# Patient Record
Sex: FEMALE | Race: WHITE | NOT HISPANIC OR LATINO | Employment: FULL TIME | URBAN - METROPOLITAN AREA
[De-identification: names, ages, dates, MRNs, and addresses within clinical notes are randomized per-mention and may not be internally consistent; named-entity substitution may affect disease eponyms.]

---

## 2017-11-29 ENCOUNTER — HOSPITAL ENCOUNTER (OUTPATIENT)
Dept: OTHER | Facility: HOSPITAL | Age: 52
Discharge: HOME | End: 2017-11-29
Attending: FAMILY MEDICINE | Admitting: FAMILY MEDICINE

## 2018-05-10 ENCOUNTER — HOSPITAL ENCOUNTER (OUTPATIENT)
Dept: SLEEP MEDICINE | Facility: HOSPITAL | Age: 53
Discharge: HOME | End: 2018-05-10
Attending: PSYCHIATRY & NEUROLOGY | Admitting: PSYCHIATRY & NEUROLOGY

## 2018-05-29 ENCOUNTER — HOSPITAL ENCOUNTER (OUTPATIENT)
Dept: SLEEP MEDICINE | Facility: HOSPITAL | Age: 53
Discharge: HOME | End: 2018-05-29
Attending: PSYCHIATRY & NEUROLOGY | Admitting: PSYCHIATRY & NEUROLOGY

## 2019-05-09 ENCOUNTER — CONVERSION ENCOUNTER (OUTPATIENT)
Dept: FAMILY MEDICINE CLINIC | Facility: CLINIC | Age: 54
End: 2019-05-09

## 2019-05-09 LAB
GLUCOSE BLD-MCNC: 176 MG/DL
HBA1C MFR BLD: 9 %

## 2019-06-13 VITALS
SYSTOLIC BLOOD PRESSURE: 144 MMHG | BODY MASS INDEX: 42.35 KG/M2 | HEART RATE: 95 BPM | HEIGHT: 63 IN | DIASTOLIC BLOOD PRESSURE: 94 MMHG | OXYGEN SATURATION: 95 % | WEIGHT: 239 LBS | RESPIRATION RATE: 17 BRPM

## 2019-07-31 ENCOUNTER — OFFICE VISIT (OUTPATIENT)
Dept: NEUROLOGY | Facility: CLINIC | Age: 54
End: 2019-07-31

## 2019-07-31 ENCOUNTER — TELEPHONE (OUTPATIENT)
Dept: NEUROLOGY | Facility: CLINIC | Age: 54
End: 2019-07-31

## 2019-07-31 VITALS
SYSTOLIC BLOOD PRESSURE: 152 MMHG | WEIGHT: 234.8 LBS | DIASTOLIC BLOOD PRESSURE: 74 MMHG | HEART RATE: 74 BPM | HEIGHT: 64 IN | BODY MASS INDEX: 40.08 KG/M2

## 2019-07-31 DIAGNOSIS — G47.33 OBSTRUCTIVE SLEEP APNEA SYNDROME: Primary | ICD-10-CM

## 2019-07-31 DIAGNOSIS — G47.33 OBSTRUCTIVE SLEEP APNEA: Primary | ICD-10-CM

## 2019-07-31 DIAGNOSIS — E66.01 CLASS 3 SEVERE OBESITY DUE TO EXCESS CALORIES WITHOUT SERIOUS COMORBIDITY WITH BODY MASS INDEX (BMI) OF 40.0 TO 44.9 IN ADULT (HCC): ICD-10-CM

## 2019-07-31 PROBLEM — E78.5 HYPERLIPIDEMIA: Status: ACTIVE | Noted: 2018-05-07

## 2019-07-31 PROBLEM — I10 HYPERTENSION, ESSENTIAL: Status: ACTIVE | Noted: 2019-07-31

## 2019-07-31 PROBLEM — H91.93 DECREASED HEARING OF BOTH EARS: Status: ACTIVE | Noted: 2019-07-31

## 2019-07-31 PROBLEM — E11.65 TYPE 2 DIABETES MELLITUS WITH HYPERGLYCEMIA, WITHOUT LONG-TERM CURRENT USE OF INSULIN (HCC): Status: ACTIVE | Noted: 2018-05-07

## 2019-07-31 PROBLEM — E66.813 CLASS 3 SEVERE OBESITY DUE TO EXCESS CALORIES WITHOUT SERIOUS COMORBIDITY WITH BODY MASS INDEX (BMI) OF 40.0 TO 44.9 IN ADULT: Status: ACTIVE | Noted: 2019-07-31

## 2019-07-31 PROBLEM — K20.90 ESOPHAGITIS: Status: ACTIVE | Noted: 2018-05-07

## 2019-07-31 PROBLEM — M21.611 BUNION, RIGHT: Status: ACTIVE | Noted: 2019-07-31

## 2019-07-31 PROBLEM — N95.1 MENOPAUSAL SYNDROME: Status: ACTIVE | Noted: 2019-07-31

## 2019-07-31 PROCEDURE — 99213 OFFICE O/P EST LOW 20 MIN: CPT | Performed by: PSYCHIATRY & NEUROLOGY

## 2019-07-31 RX ORDER — SIMVASTATIN 20 MG
TABLET ORAL DAILY
COMMUNITY
Start: 2019-05-30 | End: 2020-03-23 | Stop reason: SDUPTHER

## 2019-07-31 RX ORDER — LISINOPRIL 5 MG/1
TABLET ORAL DAILY
COMMUNITY
Start: 2019-05-09 | End: 2020-03-23 | Stop reason: SDUPTHER

## 2019-07-31 RX ORDER — ALPRAZOLAM 0.5 MG/1
TABLET ORAL DAILY PRN
Refills: 2 | COMMUNITY
Start: 2019-05-09 | End: 2022-06-03 | Stop reason: SDUPTHER

## 2019-07-31 RX ORDER — LANCETS
EACH MISCELLANEOUS
COMMUNITY
Start: 2018-01-04 | End: 2021-06-10

## 2019-07-31 RX ORDER — GLIPIZIDE 10 MG/1
TABLET, FILM COATED, EXTENDED RELEASE ORAL DAILY
COMMUNITY
Start: 2019-05-09 | End: 2019-08-16

## 2019-07-31 RX ORDER — METFORMIN HYDROCHLORIDE 500 MG/1
TABLET, EXTENDED RELEASE ORAL DAILY
COMMUNITY
Start: 2018-07-27 | End: 2019-08-06 | Stop reason: SDUPTHER

## 2019-07-31 RX ORDER — OMEPRAZOLE 40 MG/1
CAPSULE, DELAYED RELEASE ORAL
COMMUNITY
Start: 2019-06-01 | End: 2019-08-06 | Stop reason: SDUPTHER

## 2019-08-09 RX ORDER — METFORMIN HYDROCHLORIDE 500 MG/1
TABLET, EXTENDED RELEASE ORAL
Qty: 180 TABLET | Refills: 2 | Status: SHIPPED | OUTPATIENT
Start: 2019-08-09 | End: 2020-03-23 | Stop reason: SDUPTHER

## 2019-08-09 RX ORDER — OMEPRAZOLE 40 MG/1
CAPSULE, DELAYED RELEASE ORAL
Qty: 90 CAPSULE | Refills: 2 | Status: SHIPPED | OUTPATIENT
Start: 2019-08-09 | End: 2020-03-23 | Stop reason: SDUPTHER

## 2019-08-16 ENCOUNTER — OFFICE VISIT (OUTPATIENT)
Dept: FAMILY MEDICINE CLINIC | Facility: CLINIC | Age: 54
End: 2019-08-16

## 2019-08-16 VITALS
RESPIRATION RATE: 16 BRPM | HEIGHT: 64 IN | OXYGEN SATURATION: 96 % | TEMPERATURE: 98.2 F | WEIGHT: 237.2 LBS | DIASTOLIC BLOOD PRESSURE: 88 MMHG | BODY MASS INDEX: 40.49 KG/M2 | SYSTOLIC BLOOD PRESSURE: 128 MMHG | HEART RATE: 94 BPM

## 2019-08-16 DIAGNOSIS — E78.1 PURE HYPERGLYCERIDEMIA: ICD-10-CM

## 2019-08-16 DIAGNOSIS — I10 HYPERTENSION, ESSENTIAL: ICD-10-CM

## 2019-08-16 DIAGNOSIS — E11.65 TYPE 2 DIABETES MELLITUS WITH HYPERGLYCEMIA, WITHOUT LONG-TERM CURRENT USE OF INSULIN (HCC): Primary | ICD-10-CM

## 2019-08-16 LAB
BILIRUB BLD-MCNC: NEGATIVE MG/DL
CLARITY, POC: CLEAR
COLOR UR: YELLOW
GLUCOSE BLDC GLUCOMTR-MCNC: 241 MG/DL (ref 70–130)
GLUCOSE UR STRIP-MCNC: ABNORMAL MG/DL
HBA1C MFR BLD: 10.1 %
KETONES UR QL: NEGATIVE
LEUKOCYTE EST, POC: NEGATIVE
NITRITE UR-MCNC: NEGATIVE MG/ML
PH UR: 5 [PH] (ref 5–8)
PROT UR STRIP-MCNC: ABNORMAL MG/DL
RBC # UR STRIP: ABNORMAL /UL
SP GR UR: 1.02 (ref 1–1.03)
UROBILINOGEN UR QL: NORMAL

## 2019-08-16 PROCEDURE — 82962 GLUCOSE BLOOD TEST: CPT | Performed by: FAMILY MEDICINE

## 2019-08-16 PROCEDURE — 99214 OFFICE O/P EST MOD 30 MIN: CPT | Performed by: FAMILY MEDICINE

## 2019-08-16 PROCEDURE — 81002 URINALYSIS NONAUTO W/O SCOPE: CPT | Performed by: FAMILY MEDICINE

## 2019-08-16 PROCEDURE — 83036 HEMOGLOBIN GLYCOSYLATED A1C: CPT | Performed by: FAMILY MEDICINE

## 2019-08-16 RX ORDER — GLIPIZIDE 10 MG/1
10 TABLET, FILM COATED, EXTENDED RELEASE ORAL DAILY
Qty: 90 TABLET | Refills: 3
Start: 2019-08-16 | End: 2020-03-23

## 2019-08-16 RX ORDER — PIOGLITAZONEHYDROCHLORIDE 15 MG/1
15 TABLET ORAL DAILY
Qty: 90 TABLET | Refills: 3 | Status: SHIPPED | OUTPATIENT
Start: 2019-08-16 | End: 2020-03-23 | Stop reason: SDUPTHER

## 2019-08-16 RX ORDER — GLIPIZIDE 10 MG/1
20 TABLET, FILM COATED, EXTENDED RELEASE ORAL DAILY
Status: SHIPPED | COMMUNITY
Start: 2019-08-16 | End: 2019-08-16

## 2020-03-23 ENCOUNTER — TELEPHONE (OUTPATIENT)
Dept: FAMILY MEDICINE CLINIC | Facility: CLINIC | Age: 55
End: 2020-03-23

## 2020-03-23 ENCOUNTER — RESULTS ENCOUNTER (OUTPATIENT)
Dept: FAMILY MEDICINE CLINIC | Facility: CLINIC | Age: 55
End: 2020-03-23

## 2020-03-23 ENCOUNTER — OFFICE VISIT (OUTPATIENT)
Dept: FAMILY MEDICINE CLINIC | Facility: CLINIC | Age: 55
End: 2020-03-23

## 2020-03-23 VITALS
DIASTOLIC BLOOD PRESSURE: 84 MMHG | HEART RATE: 85 BPM | HEIGHT: 64 IN | TEMPERATURE: 97.7 F | SYSTOLIC BLOOD PRESSURE: 142 MMHG | OXYGEN SATURATION: 97 % | BODY MASS INDEX: 42.03 KG/M2 | WEIGHT: 246.2 LBS | RESPIRATION RATE: 16 BRPM

## 2020-03-23 DIAGNOSIS — Z12.31 ENCOUNTER FOR SCREENING MAMMOGRAM FOR MALIGNANT NEOPLASM OF BREAST: ICD-10-CM

## 2020-03-23 DIAGNOSIS — Z12.11 SCREEN FOR COLON CANCER: ICD-10-CM

## 2020-03-23 DIAGNOSIS — Z00.00 ANNUAL PHYSICAL EXAM: Primary | ICD-10-CM

## 2020-03-23 DIAGNOSIS — K20.90 ESOPHAGITIS: ICD-10-CM

## 2020-03-23 DIAGNOSIS — I10 HYPERTENSION, ESSENTIAL: ICD-10-CM

## 2020-03-23 DIAGNOSIS — E78.1 PURE HYPERTRIGLYCERIDEMIA: ICD-10-CM

## 2020-03-23 DIAGNOSIS — E11.65 TYPE 2 DIABETES MELLITUS WITH HYPERGLYCEMIA, WITHOUT LONG-TERM CURRENT USE OF INSULIN (HCC): ICD-10-CM

## 2020-03-23 LAB
BILIRUB BLD-MCNC: NEGATIVE MG/DL
CLARITY, POC: CLEAR
COLOR UR: YELLOW
GLUCOSE BLDC GLUCOMTR-MCNC: 298 MG/DL (ref 70–130)
GLUCOSE UR STRIP-MCNC: ABNORMAL MG/DL
HBA1C MFR BLD: 10.7 %
KETONES UR QL: NEGATIVE
LEUKOCYTE EST, POC: NEGATIVE
NITRITE UR-MCNC: NEGATIVE MG/ML
PH UR: 5 [PH] (ref 5–8)
POC MICROALBUMIN URINE: 20
PROT UR STRIP-MCNC: NEGATIVE MG/DL
RBC # UR STRIP: NEGATIVE /UL
SP GR UR: 1.02 (ref 1–1.03)
UROBILINOGEN UR QL: NORMAL

## 2020-03-23 PROCEDURE — 83036 HEMOGLOBIN GLYCOSYLATED A1C: CPT | Performed by: FAMILY MEDICINE

## 2020-03-23 PROCEDURE — 99396 PREV VISIT EST AGE 40-64: CPT | Performed by: FAMILY MEDICINE

## 2020-03-23 PROCEDURE — 99214 OFFICE O/P EST MOD 30 MIN: CPT | Performed by: FAMILY MEDICINE

## 2020-03-23 PROCEDURE — 82962 GLUCOSE BLOOD TEST: CPT | Performed by: FAMILY MEDICINE

## 2020-03-23 PROCEDURE — 82044 UR ALBUMIN SEMIQUANTITATIVE: CPT | Performed by: FAMILY MEDICINE

## 2020-03-23 PROCEDURE — 81003 URINALYSIS AUTO W/O SCOPE: CPT | Performed by: FAMILY MEDICINE

## 2020-03-23 RX ORDER — OMEPRAZOLE 40 MG/1
40 CAPSULE, DELAYED RELEASE ORAL DAILY
Qty: 90 CAPSULE | Refills: 3 | Status: SHIPPED | OUTPATIENT
Start: 2020-03-23 | End: 2021-03-08

## 2020-03-23 RX ORDER — METFORMIN HYDROCHLORIDE 500 MG/1
1000 TABLET, EXTENDED RELEASE ORAL
Qty: 180 TABLET | Refills: 3 | Status: SHIPPED | OUTPATIENT
Start: 2020-03-23 | End: 2021-03-08

## 2020-03-23 RX ORDER — SIMVASTATIN 20 MG
20 TABLET ORAL NIGHTLY
Qty: 90 TABLET | Refills: 3 | Status: SHIPPED | OUTPATIENT
Start: 2020-03-23 | End: 2021-03-08

## 2020-03-23 RX ORDER — PIOGLITAZONEHYDROCHLORIDE 45 MG/1
45 TABLET ORAL DAILY
Qty: 90 TABLET | Refills: 1 | Status: SHIPPED | OUTPATIENT
Start: 2020-03-23 | End: 2020-09-14

## 2020-03-23 RX ORDER — LISINOPRIL 5 MG/1
5 TABLET ORAL DAILY
Qty: 90 TABLET | Refills: 3 | Status: SHIPPED | OUTPATIENT
Start: 2020-03-23 | End: 2021-03-08

## 2020-03-23 RX ORDER — PIOGLITAZONEHYDROCHLORIDE 15 MG/1
15 TABLET ORAL DAILY
Qty: 90 TABLET | Refills: 3 | Status: SHIPPED | OUTPATIENT
Start: 2020-03-23 | End: 2020-03-23

## 2020-03-23 NOTE — TELEPHONE ENCOUNTER
I can increase the pioglitazone to 45 mg daily and since we're stopping the glipizide, she'll have to really restrict her diet.  I recommend she consider a very low carb or keto type diet for awhile.

## 2020-03-23 NOTE — TELEPHONE ENCOUNTER
Alania called back this morning after her appointment and she said she's sorry but the with the coupon card she's got, the trulicity will still be $450 per month and she can't afford that.  She wanted to know if there's something else to try?  Please advise.  Thank you.

## 2020-03-23 NOTE — TELEPHONE ENCOUNTER
I spoke to patient and she's agreeable to increase actos to 45mg daily. Could you please send new Rx to [facility] pharmacy? Thank you.

## 2020-03-24 LAB
ALBUMIN SERPL-MCNC: 4.5 G/DL (ref 3.8–4.9)
ALBUMIN/GLOB SERPL: 1.7 {RATIO} (ref 1.2–2.2)
ALP SERPL-CCNC: 115 IU/L (ref 39–117)
ALT SERPL-CCNC: 22 IU/L (ref 0–32)
AST SERPL-CCNC: 19 IU/L (ref 0–40)
BASOPHILS # BLD AUTO: 0.1 X10E3/UL (ref 0–0.2)
BASOPHILS NFR BLD AUTO: 1 %
BILIRUB SERPL-MCNC: 0.5 MG/DL (ref 0–1.2)
BUN SERPL-MCNC: 14 MG/DL (ref 6–24)
BUN/CREAT SERPL: 20 (ref 9–23)
CALCIUM SERPL-MCNC: 10 MG/DL (ref 8.7–10.2)
CHLORIDE SERPL-SCNC: 100 MMOL/L (ref 96–106)
CHOLEST SERPL-MCNC: 181 MG/DL (ref 100–199)
CO2 SERPL-SCNC: 22 MMOL/L (ref 20–29)
CREAT SERPL-MCNC: 0.7 MG/DL (ref 0.57–1)
EOSINOPHIL # BLD AUTO: 0.3 X10E3/UL (ref 0–0.4)
EOSINOPHIL NFR BLD AUTO: 3 %
ERYTHROCYTE [DISTWIDTH] IN BLOOD BY AUTOMATED COUNT: 13 % (ref 11.7–15.4)
GLOBULIN SER CALC-MCNC: 2.7 G/DL (ref 1.5–4.5)
GLUCOSE SERPL-MCNC: 293 MG/DL (ref 65–99)
HCT VFR BLD AUTO: 42.8 % (ref 34–46.6)
HDLC SERPL-MCNC: 37 MG/DL
HGB BLD-MCNC: 14 G/DL (ref 11.1–15.9)
IMM GRANULOCYTES # BLD AUTO: 0.1 X10E3/UL (ref 0–0.1)
IMM GRANULOCYTES NFR BLD AUTO: 1 %
LDLC SERPL CALC-MCNC: 85 MG/DL (ref 0–99)
LYMPHOCYTES # BLD AUTO: 4.2 X10E3/UL (ref 0.7–3.1)
LYMPHOCYTES NFR BLD AUTO: 41 %
MCH RBC QN AUTO: 28.4 PG (ref 26.6–33)
MCHC RBC AUTO-ENTMCNC: 32.7 G/DL (ref 31.5–35.7)
MCV RBC AUTO: 87 FL (ref 79–97)
MONOCYTES # BLD AUTO: 0.5 X10E3/UL (ref 0.1–0.9)
MONOCYTES NFR BLD AUTO: 5 %
NEUTROPHILS # BLD AUTO: 5 X10E3/UL (ref 1.4–7)
NEUTROPHILS NFR BLD AUTO: 49 %
PLATELET # BLD AUTO: 378 X10E3/UL (ref 150–450)
POTASSIUM SERPL-SCNC: 4.7 MMOL/L (ref 3.5–5.2)
PROT SERPL-MCNC: 7.2 G/DL (ref 6–8.5)
RBC # BLD AUTO: 4.93 X10E6/UL (ref 3.77–5.28)
SODIUM SERPL-SCNC: 138 MMOL/L (ref 134–144)
TRIGL SERPL-MCNC: 297 MG/DL (ref 0–149)
TSH SERPL DL<=0.005 MIU/L-ACNC: 3.73 UIU/ML (ref 0.45–4.5)
VLDLC SERPL CALC-MCNC: 59 MG/DL (ref 5–40)
WBC # BLD AUTO: 10.1 X10E3/UL (ref 3.4–10.8)

## 2020-04-01 NOTE — ASSESSMENT & PLAN NOTE
Lipid abnormalities are improving with treatment.  Nutritional counseling was provided. and Pharmacotherapy as ordered.  Lipids will be reassessed in 3 months.  Continue current medications.  Fasting labs ordered.

## 2020-04-01 NOTE — ASSESSMENT & PLAN NOTE
Diabetes is worsening.   Dietary recommendations for ADA diet.  Regular aerobic exercise.  Reminded to get yearly retinal exam.  Medication changes per orders.  Diabetes will be reassessed in 3 months.    Initially, she wanted to restart Trulicty, as she had good success with this in the past.  However, upon RX being sent to pharmacy, she called back that medication was too expensive.  Therefore, I will increase pioglitazone.  Patient only wanting generic medications at this time due to high deductible health insurance plan.  F/U in 3 mo.  If not improved, plan for referral to endocrinology.

## 2020-04-10 ENCOUNTER — TELEPHONE (OUTPATIENT)
Dept: FAMILY MEDICINE CLINIC | Facility: CLINIC | Age: 55
End: 2020-04-10

## 2020-04-10 NOTE — TELEPHONE ENCOUNTER
----- Message from Magali Rene MD sent at 4/6/2020 10:51 AM EDT -----  Please let patient know that her Cologuard testing for colon cancer screening is negative and good for 3 years of screening.  Next screening 2023.

## 2020-08-03 ENCOUNTER — TELEPHONE (OUTPATIENT)
Dept: FAMILY MEDICINE CLINIC | Facility: CLINIC | Age: 55
End: 2020-08-03

## 2020-08-03 NOTE — TELEPHONE ENCOUNTER
----- Message from Magali Rene MD sent at 8/1/2020  5:34 PM EDT -----  Regarding: Metformin  With the recent recall in Metformin, can you check to see if patient has been able to get this?   Thanks.

## 2020-08-03 NOTE — TELEPHONE ENCOUNTER
CHERRIE 08/03/2020 09:17am.  Pt returned call 08/-3/2020, advised she had Metformin filled 06/29/2020.

## 2020-08-04 ENCOUNTER — OFFICE VISIT (OUTPATIENT)
Dept: NEUROLOGY | Facility: CLINIC | Age: 55
End: 2020-08-04

## 2020-08-04 VITALS
HEART RATE: 81 BPM | SYSTOLIC BLOOD PRESSURE: 145 MMHG | DIASTOLIC BLOOD PRESSURE: 81 MMHG | HEIGHT: 64 IN | TEMPERATURE: 98 F | WEIGHT: 247.6 LBS | BODY MASS INDEX: 42.27 KG/M2

## 2020-08-04 DIAGNOSIS — G47.33 OBSTRUCTIVE SLEEP APNEA SYNDROME: Primary | ICD-10-CM

## 2020-08-04 PROCEDURE — 99213 OFFICE O/P EST LOW 20 MIN: CPT | Performed by: PSYCHIATRY & NEUROLOGY

## 2020-08-04 NOTE — PROGRESS NOTES
Sleep medicine follow-up visit    Alaina Hartman   1965  54 y.o. female   DATE OF SERVICE: 8/4/2020     PETE, yearly f/u for CPAP compliance, patient doing well with pap therapy. Patient uses a nasal mask and goes through [employer] for supplies.     SLEEP TESTING HISTORY:    On HST ,  patient had Severe obstructive sleep apnea syndrome with apnea-hypopnea index of 101.1 per sleep hour, minimum SpO2 of 62%    The compliance data reviewed and the patient is on CPAP therapy at 12-15 cm/H2O and compliance data indicates excellent compliance with 99.4% usage for more than 4 hours with an average usage of 7 hours 46 minutes. AHI down to 1.0 with CPAP therapy and mean CPAP pressure 12.5 cm of water.      The patient's hypersomnia also resolved with [place] Sleepiness Scale score of 0 with CPAP therapy.  The patient feels great and is benefiting from it and is compliant.     Obesity, BMI-43.1    Review of Systems   Constitutional: Negative for fatigue and fever.   HENT: Negative.    Eyes: Negative.    Respiratory: Positive for apnea. Negative for cough and wheezing.    Cardiovascular: Negative.    Gastrointestinal: Negative.    Endocrine: Negative.    Genitourinary: Negative.    Musculoskeletal: Negative.    Neurological: Negative.      I reviewed and addressed ROS entered by MA.    The following portions of the patient's history were reviewed and updated as appropriate: allergies, current medications, past family history, past medical history, past social history, past surgical history and problem list.      Family History   Problem Relation Age of Onset   • Breast cancer Mother    • Heart disease Mother    • Diabetes Brother    • Heart disease Brother        Past Medical History:   Diagnosis Date   • Diabetes mellitus (CMS/[facility])    • GERD (gastroesophageal reflux disease)    • Hyperlipidemia        Social History     Socioeconomic History   • Marital status:      Spouse name: Not on file   • Number of children:  Not on file   • Years of education: Not on file   • Highest education level: Not on file   Tobacco Use   • Smoking status: Former Smoker     Types: Cigarettes     Last attempt to quit: 2010     Years since quitting: 10.5   • Smokeless tobacco: Never Used   Substance and Sexual Activity   • Alcohol use: Yes     Frequency: Monthly or less     Drinks per session: 1 or 2   • Drug use: No   • Sexual activity: Defer         Current Outpatient Medications:   •  ACCU-CHEK FASTCLIX LANCETS misc, Accu-Chek FastClix Lancets Miscellaneous  1 (one) Misc Misc two times daily for 90 days  Quantity: 1  Refills: 4   Ordered: 4-Jan-2018  Leila Mayo  Start : 4-Jan-2018 Active Comments: Dx: E11.9, Disp: , Rfl:   •  ALPRAZolam (XANAX) 0.5 MG tablet, Daily As Needed., Disp: , Rfl: 2  •  Blood Glucose Monitoring Suppl (ACCU-CHEK YING) device, 1 Device Daily., Disp: , Rfl:   •  glucose blood (ACCU-CHEK YING PLUS) test strip, 1 strip., Disp: , Rfl:   •  lisinopril (PRINIVIL,ZESTRIL) 5 MG tablet, Take 1 tablet by mouth Daily., Disp: 90 tablet, Rfl: 3  •  metFORMIN ER (GLUCOPHAGE-XR) 500 MG 24 hr tablet, Take 2 tablets by mouth Daily With Dinner., Disp: 180 tablet, Rfl: 3  •  omeprazole (priLOSEC) 40 MG capsule, Take 1 capsule by mouth Daily., Disp: 90 capsule, Rfl: 3  •  pioglitazone (ACTOS) 45 MG tablet, Take 1 tablet by mouth Daily., Disp: 90 tablet, Rfl: 1  •  simvastatin (ZOCOR) 20 MG tablet, Take 1 tablet by mouth Every Night., Disp: 90 tablet, Rfl: 3    No Known Allergies     PHYSICAL EXAMINATION:  Vitals:    08/04/20 0856   BP: 145/81   Pulse: 81   Temp: 98 °F (36.7 °C)      Body mass index is 43.17 kg/m².       HEENT: Normal.      EXTREMITIES: No edema.     IMPRESSION:     Patient with obstructive sleep apnea syndrome with hypersomnia successfully treated with CPAP therapy and is compliant and benefiting from it.     Obesity, pt weight is up some    PLAN  1 continue cpap 12-15  2. Pt encouraged to lose weight  Fu in one  year    EPWORTH SLEEPINESS SCALE  Sitting and reading  0  WatchingTV  0  Sitting, inactive, in a public place  0  As a passenger in a car for 1 hour w/o a break  0  Lying down to rest in the afternoon  0  Sitting and talking to someone  0  Sitting quietly after a lunch  0  In a car, while stopped for traffic or a light  0  Total 0          RECOMMENDATIONS:   1. Continue present CPAP.   2. Follow up 1 year.       This document has been electronically signed by Joseph Seipel, MD on August 4, 2020 09:22

## 2020-08-05 ENCOUNTER — TELEPHONE (OUTPATIENT)
Dept: NEUROLOGY | Facility: CLINIC | Age: 55
End: 2020-08-05

## 2020-08-05 DIAGNOSIS — G47.33 OBSTRUCTIVE SLEEP APNEA SYNDROME: Primary | ICD-10-CM

## 2020-08-05 NOTE — TELEPHONE ENCOUNTER
----- Message from Joseph F Seipel, MD sent at 8/4/2020  9:12 AM EDT -----  nasal mask and goes through son[facility] for supplies

## 2020-08-26 ENCOUNTER — OFFICE VISIT (OUTPATIENT)
Dept: FAMILY MEDICINE CLINIC | Facility: CLINIC | Age: 55
End: 2020-08-26

## 2020-08-26 VITALS
TEMPERATURE: 97.7 F | WEIGHT: 244.6 LBS | DIASTOLIC BLOOD PRESSURE: 80 MMHG | OXYGEN SATURATION: 97 % | BODY MASS INDEX: 41.76 KG/M2 | HEART RATE: 107 BPM | HEIGHT: 64 IN | SYSTOLIC BLOOD PRESSURE: 122 MMHG

## 2020-08-26 DIAGNOSIS — G47.33 OBSTRUCTIVE SLEEP APNEA SYNDROME: ICD-10-CM

## 2020-08-26 DIAGNOSIS — I10 HYPERTENSION, ESSENTIAL: ICD-10-CM

## 2020-08-26 DIAGNOSIS — E66.01 CLASS 3 SEVERE OBESITY DUE TO EXCESS CALORIES WITHOUT SERIOUS COMORBIDITY WITH BODY MASS INDEX (BMI) OF 40.0 TO 44.9 IN ADULT (HCC): ICD-10-CM

## 2020-08-26 DIAGNOSIS — E78.1 PURE HYPERTRIGLYCERIDEMIA: ICD-10-CM

## 2020-08-26 DIAGNOSIS — E11.65 TYPE 2 DIABETES MELLITUS WITH HYPERGLYCEMIA, WITHOUT LONG-TERM CURRENT USE OF INSULIN (HCC): Primary | ICD-10-CM

## 2020-08-26 LAB — GLUCOSE BLDC GLUCOMTR-MCNC: 274 MG/DL (ref 70–130)

## 2020-08-26 PROCEDURE — 83036 HEMOGLOBIN GLYCOSYLATED A1C: CPT | Performed by: FAMILY MEDICINE

## 2020-08-26 PROCEDURE — 99214 OFFICE O/P EST MOD 30 MIN: CPT | Performed by: FAMILY MEDICINE

## 2020-08-26 PROCEDURE — 82962 GLUCOSE BLOOD TEST: CPT | Performed by: FAMILY MEDICINE

## 2020-08-26 RX ORDER — INSULIN ASPART 100 [IU]/ML
6 INJECTION, SUSPENSION SUBCUTANEOUS 2 TIMES DAILY WITH MEALS
Qty: 10 ML | Refills: 5 | Status: SHIPPED | OUTPATIENT
Start: 2020-08-26 | End: 2021-03-01

## 2020-08-26 RX ORDER — BLOOD SUGAR DIAGNOSTIC
1 STRIP MISCELLANEOUS 2 TIMES DAILY
Qty: 200 EACH | Refills: 5 | Status: SHIPPED | OUTPATIENT
Start: 2020-08-26 | End: 2020-10-30 | Stop reason: DRUGHIGH

## 2020-08-26 NOTE — PROGRESS NOTES
Chief Complaint   Patient presents with   • Diabetes   • Hyperlipidemia       Subjective   Alaina Hartman is a 54 y.o. female.     Diabetes   She presents for her follow-up diabetic visit. She has type 2 diabetes mellitus. There are no hypoglycemic associated symptoms. Pertinent negatives for hypoglycemia include no seizures. There are no diabetic associated symptoms. Pertinent negatives for diabetes include no chest pain, no polydipsia and no polyuria. There are no hypoglycemic complications. Symptoms are stable. Risk factors for coronary artery disease include diabetes mellitus, dyslipidemia, hypertension, obesity and post-menopausal. Current diabetic treatment includes oral agent (dual therapy). Compliance with diabetes treatment: could not tolerate glipizide (due to significant weight gain), Trulicity worked well but she could not afford. Her weight is stable. She is following a generally healthy diet. When asked about meal planning, she reported none. She has not had a previous visit with a dietitian. There is no change in her home blood glucose trend. An ACE inhibitor/angiotensin II receptor blocker is being taken. She does not see a podiatrist.  Hyperlipidemia   This is a chronic problem. The current episode started more than 1 year ago. Exacerbating diseases include diabetes and obesity. She has no history of hypothyroidism or liver disease. Pertinent negatives include no chest pain, myalgias or shortness of breath. Current antihyperlipidemic treatment includes statins. Risk factors for coronary artery disease include dyslipidemia, diabetes mellitus, obesity, hypertension and post-menopausal.   Hypertension   This is a chronic problem. The current episode started more than 1 year ago. The problem is unchanged. The problem is uncontrolled. Pertinent negatives include no chest pain, palpitations or shortness of breath. Current antihypertension treatment includes ACE inhibitors.      Patient has recently seen  Dr. Seipel for f/u on PETE on CPAP.      I have reviewed and updated her medications, medical history and problem list during today's office visit.       Past Medical History :  Active Ambulatory Problems     Diagnosis Date Noted   • BMI 40.0-44.9, adult (CMS/HCC) 07/31/2018   • Bunion, right 07/31/2019   • Decreased hearing of both ears 07/31/2019   • Esophagitis 05/07/2018   • Hyperlipidemia 05/07/2018   • Hypertension, essential 07/31/2019   • Menopausal syndrome 07/31/2019   • Obstructive sleep apnea syndrome 05/07/2018   • Type 2 diabetes mellitus with hyperglycemia, without long-term current use of insulin (CMS/HCC) 05/07/2018   • Class 3 severe obesity due to excess calories without serious comorbidity with body mass index (BMI) of 40.0 to 44.9 in adult (CMS/HCC) 07/31/2019     Resolved Ambulatory Problems     Diagnosis Date Noted   • No Resolved Ambulatory Problems     Past Medical History:   Diagnosis Date   • Diabetes mellitus (CMS/HCC)    • GERD (gastroesophageal reflux disease)        Medication List:    Current Outpatient Medications:   •  ALPRAZolam (XANAX) 0.5 MG tablet, Daily As Needed., Disp: , Rfl: 2  •  lisinopril (PRINIVIL,ZESTRIL) 5 MG tablet, Take 1 tablet by mouth Daily., Disp: 90 tablet, Rfl: 3  •  metFORMIN ER (GLUCOPHAGE-XR) 500 MG 24 hr tablet, Take 2 tablets by mouth Daily With Dinner., Disp: 180 tablet, Rfl: 3  •  omeprazole (priLOSEC) 40 MG capsule, Take 1 capsule by mouth Daily., Disp: 90 capsule, Rfl: 3  •  pioglitazone (ACTOS) 45 MG tablet, Take 1 tablet by mouth Daily., Disp: 90 tablet, Rfl: 1  •  simvastatin (ZOCOR) 20 MG tablet, Take 1 tablet by mouth Every Night., Disp: 90 tablet, Rfl: 3      No Known Allergies    Social History     Tobacco Use   • Smoking status: Former Smoker     Types: Cigarettes     Last attempt to quit: 2010     Years since quitting: 10.6   • Smokeless tobacco: Never Used   Substance Use Topics   • Alcohol use: Yes     Frequency: Monthly or less     Drinks  "per session: 1 or 2       Review of Systems   Constitutional: Negative for chills and fever.   HENT: Negative for ear pain and sore throat.    Eyes: Negative for double vision and visual disturbance.   Respiratory: Negative for cough and shortness of breath.    Cardiovascular: Negative for chest pain, palpitations and leg swelling.   Gastrointestinal: Negative for abdominal pain, blood in stool, constipation, diarrhea, nausea and vomiting.   Endocrine: Negative for polydipsia and polyuria.   Genitourinary: Negative for dysuria.   Musculoskeletal: Negative for arthralgias and myalgias.   Skin: Negative for rash.   Neurological: Negative for seizures, syncope, numbness and headache.   Psychiatric/Behavioral: Negative for behavioral problems and depressed mood.       I have reviewed and confirmed the accuracy of the ROS as documented by the MA/LPN/RN Magali Rene MD      Objective   Vitals:    08/26/20 0823   BP: 122/80   Pulse: 107   Temp: 97.7 °F (36.5 °C)   TempSrc: Oral   SpO2: 97%   Weight: 111 kg (244 lb 9.6 oz)   Height: 162.6 cm (64\")     Body mass index is 41.99 kg/m².    Physical Exam   Constitutional: She appears well-developed and well-nourished.   HENT:   Head: Normocephalic and atraumatic.   Right Ear: External ear normal.   Left Ear: External ear normal.   Mouth/Throat: Oropharynx is clear and moist.   Eyes: Pupils are equal, round, and reactive to light. Conjunctivae and EOM are normal. No scleral icterus.   Neck: Normal range of motion. Neck supple. No JVD present. No edema present. No thyromegaly present.   Cardiovascular: Normal rate, regular rhythm and normal heart sounds.   Pulmonary/Chest: Effort normal and breath sounds normal.   Musculoskeletal: Normal range of motion.   Neurological: She is alert. No cranial nerve deficit.   Skin: Skin is warm. No rash noted.   Psychiatric: She has a normal mood and affect. Her behavior is normal. Judgment and thought content normal.       Lab " Results   Component Value Date    HGBA1C 11.4 08/27/2020    HGBA1C 10.7 03/23/2020    HGBA1C 10.1 08/16/2019    HGBA1C 9.0 05/09/2019       Lab Results   Component Value Date    CHOL 139 08/22/2018    CHOL 143 02/19/2018    CHOL 218 (H) 11/29/2017     Lab Results   Component Value Date    TRIG 266 (H) 08/26/2020    TRIG 297 (H) 03/23/2020    TRIG 233 (H) 08/22/2018     Lab Results   Component Value Date    HDL 46 08/26/2020    HDL 37 (L) 03/23/2020    HDL 30 (L) 08/22/2018     Lab Results   Component Value Date     (H) 08/26/2020    LDL 85 03/23/2020    LDL 62 08/22/2018         Brief Urine Lab Results  (Last result in the past 365 days)      Color   Clarity   Blood   Leuk Est   Nitrite   Protein   CREAT   Urine HCG        03/23/20 0849 Yellow Clear Negative Negative Negative Negative                 Assessment/Plan     Diagnoses and all orders for this visit:    1. Type 2 diabetes mellitus with hyperglycemia, without long-term current use of insulin (CMS/[facility]) (Primary)  Assessment & Plan:  Diabetes is worsening.   Medication changes per orders.    Start Novolog 70/30.  Plan to start titration of insulin in 2 weeks if blood sugars not improving.      She declines referral to dietician.    Barrier to optimal blood sugar control includes most medications being cost prohibitive.  She prefers generics.  None of her current medications (metformin, pioglitazone) or past generic medications (glipizide) have been able to improve her blood sugars.  Insulin may be the most financially feasible option at this time.  Patient was instructed to try [facility] with a GoodRx coupon (coupon given in office).    Refer to endocrinology.   F/U with me in 3 mo if not able to see endocrinology before then.  F/U sooner if needed.    Orders:  -     POC Glucose  -     POC Glycosylated Hemoglobin (Hb A1C)  -     Cancel: POC Urinalysis Dipstick, Multipro  -     Comprehensive Metabolic Panel  -     CBC & Differential  -     Ambulatory  "Referral to Endocrinology  -     insulin aspart prot-insulin aspart (NovoLOG Mix 70/30) (70-30) 100 UNIT/ML injection; Inject 6 Units under the skin into the appropriate area as directed 2 (Two) Times a Day With Meals. Take 15 minutes before meals.  Dispense: 10 mL; Refill: 5  -     Insulin Syringe-Needle U-100 30G X 5/16\" 0.3 ML misc; 1 syringe 2 (two) times a day.  Dispense: 200 each; Refill: 5    2. Pure hypertriglyceridemia  Comments:  Fasting labs ordered.  Continue statin.  Orders:  -     Lipid Panel    3. Hypertension, essential  Comments:  Controlled.  Orders:  -     Comprehensive Metabolic Panel  -     CBC & Differential    4. Obstructive sleep apnea syndrome  Comments:  Now seeing Dr. Seipel.    5. Class 3 severe obesity due to excess calories without serious comorbidity with body mass index (BMI) of 40.0 to 44.9 in adult (CMS/[facility])  Comments:  Diet and weight loss encouraged.    Other orders  -     Cancel: Hemoglobin A1c      Return if symptoms worsen or fail to improve.     I wore protective equipment throughout this patient encounter to include mask. Hand hygiene was performed before donning protective equipment and after removal when leaving the room.          "

## 2020-08-27 LAB
ALBUMIN SERPL-MCNC: 4.5 G/DL (ref 3.8–4.9)
ALBUMIN/GLOB SERPL: 1.8 {RATIO} (ref 1.2–2.2)
ALP SERPL-CCNC: 114 IU/L (ref 39–117)
ALT SERPL-CCNC: 19 IU/L (ref 0–32)
AST SERPL-CCNC: 17 IU/L (ref 0–40)
BASOPHILS # BLD AUTO: 0.1 X10E3/UL (ref 0–0.2)
BASOPHILS NFR BLD AUTO: 1 %
BILIRUB SERPL-MCNC: 0.4 MG/DL (ref 0–1.2)
BUN SERPL-MCNC: 19 MG/DL (ref 6–24)
BUN/CREAT SERPL: 27 (ref 9–23)
CALCIUM SERPL-MCNC: 10.2 MG/DL (ref 8.7–10.2)
CHLORIDE SERPL-SCNC: 98 MMOL/L (ref 96–106)
CHOLEST SERPL-MCNC: 199 MG/DL (ref 100–199)
CO2 SERPL-SCNC: 26 MMOL/L (ref 20–29)
CREAT SERPL-MCNC: 0.7 MG/DL (ref 0.57–1)
EOSINOPHIL # BLD AUTO: 0.2 X10E3/UL (ref 0–0.4)
EOSINOPHIL NFR BLD AUTO: 2 %
ERYTHROCYTE [DISTWIDTH] IN BLOOD BY AUTOMATED COUNT: 13.8 % (ref 11.7–15.4)
GLOBULIN SER CALC-MCNC: 2.5 G/DL (ref 1.5–4.5)
GLUCOSE SERPL-MCNC: 276 MG/DL (ref 65–99)
HBA1C MFR BLD: 11.4 %
HCT VFR BLD AUTO: 40.4 % (ref 34–46.6)
HDLC SERPL-MCNC: 46 MG/DL
HGB BLD-MCNC: 12.9 G/DL (ref 11.1–15.9)
IMM GRANULOCYTES # BLD AUTO: 0.1 X10E3/UL (ref 0–0.1)
IMM GRANULOCYTES NFR BLD AUTO: 1 %
LDLC SERPL CALC-MCNC: 100 MG/DL (ref 0–99)
LYMPHOCYTES # BLD AUTO: 4.3 X10E3/UL (ref 0.7–3.1)
LYMPHOCYTES NFR BLD AUTO: 39 %
MCH RBC QN AUTO: 28.5 PG (ref 26.6–33)
MCHC RBC AUTO-ENTMCNC: 31.9 G/DL (ref 31.5–35.7)
MCV RBC AUTO: 89 FL (ref 79–97)
MONOCYTES # BLD AUTO: 0.5 X10E3/UL (ref 0.1–0.9)
MONOCYTES NFR BLD AUTO: 4 %
NEUTROPHILS # BLD AUTO: 5.8 X10E3/UL (ref 1.4–7)
NEUTROPHILS NFR BLD AUTO: 53 %
PLATELET # BLD AUTO: 364 X10E3/UL (ref 150–450)
POTASSIUM SERPL-SCNC: 4.9 MMOL/L (ref 3.5–5.2)
PROT SERPL-MCNC: 7 G/DL (ref 6–8.5)
RBC # BLD AUTO: 4.53 X10E6/UL (ref 3.77–5.28)
SODIUM SERPL-SCNC: 137 MMOL/L (ref 134–144)
TRIGL SERPL-MCNC: 266 MG/DL (ref 0–149)
VLDLC SERPL CALC-MCNC: 53 MG/DL (ref 5–40)
WBC # BLD AUTO: 11 X10E3/UL (ref 3.4–10.8)

## 2020-08-27 NOTE — ASSESSMENT & PLAN NOTE
Diabetes is worsening.   Medication changes per orders.    Start Novolog 70/30.  Plan to start titration of insulin in 2 weeks if blood sugars not improving.      She declines referral to dietician.    Barrier to optimal blood sugar control includes most medications being cost prohibitive.  She prefers generics.  None of her current medications (metformin, pioglitazone) or past generic medications (glipizide) have been able to improve her blood sugars.  Insulin may be the most financially feasible option at this time.  Patient was instructed to try [facility] with a GoodRx coupon (coupon given in office).    Refer to endocrinology.   F/U with me in 3 mo if not able to see endocrinology before then.  F/U sooner if needed.

## 2020-08-28 ENCOUNTER — TELEPHONE (OUTPATIENT)
Dept: FAMILY MEDICINE CLINIC | Facility: CLINIC | Age: 55
End: 2020-08-28

## 2020-09-03 DIAGNOSIS — E11.65 UNCONTROLLED TYPE 2 DIABETES MELLITUS WITH HYPERGLYCEMIA (HCC): Primary | ICD-10-CM

## 2020-09-14 DIAGNOSIS — E11.65 TYPE 2 DIABETES MELLITUS WITH HYPERGLYCEMIA, WITHOUT LONG-TERM CURRENT USE OF INSULIN (HCC): ICD-10-CM

## 2020-09-14 RX ORDER — PIOGLITAZONEHYDROCHLORIDE 45 MG/1
TABLET ORAL
Qty: 90 TABLET | Refills: 1 | Status: SHIPPED | OUTPATIENT
Start: 2020-09-14 | End: 2021-06-08

## 2020-10-30 ENCOUNTER — TELEPHONE (OUTPATIENT)
Dept: ENDOCRINOLOGY | Facility: CLINIC | Age: 55
End: 2020-10-30

## 2020-10-30 ENCOUNTER — OFFICE VISIT (OUTPATIENT)
Dept: ENDOCRINOLOGY | Facility: CLINIC | Age: 55
End: 2020-10-30

## 2020-10-30 DIAGNOSIS — E11.65 TYPE 2 DIABETES MELLITUS WITH HYPERGLYCEMIA, WITHOUT LONG-TERM CURRENT USE OF INSULIN (HCC): ICD-10-CM

## 2020-10-30 PROCEDURE — G0108 DIAB MANAGE TRN  PER INDIV: HCPCS | Performed by: DIETITIAN, REGISTERED

## 2020-10-30 NOTE — TELEPHONE ENCOUNTER
Pt attended DSME today and plans on seeing Dr. Cho in January '21.  Pt asked for her insulin syringes to be changed to a shorter needle.  Rx ready for MD signature.

## 2020-11-10 ENCOUNTER — OFFICE VISIT (OUTPATIENT)
Dept: ENDOCRINOLOGY | Facility: CLINIC | Age: 55
End: 2020-11-10

## 2020-11-10 DIAGNOSIS — E11.65 TYPE 2 DIABETES MELLITUS WITH HYPERGLYCEMIA, WITHOUT LONG-TERM CURRENT USE OF INSULIN (HCC): ICD-10-CM

## 2020-11-10 PROCEDURE — G0109 DIAB MANAGE TRN IND/GROUP: HCPCS | Performed by: DIETITIAN, REGISTERED

## 2020-11-17 ENCOUNTER — OFFICE VISIT (OUTPATIENT)
Dept: ENDOCRINOLOGY | Facility: CLINIC | Age: 55
End: 2020-11-17

## 2020-11-17 DIAGNOSIS — E11.65 TYPE 2 DIABETES MELLITUS WITH HYPERGLYCEMIA, WITHOUT LONG-TERM CURRENT USE OF INSULIN (HCC): ICD-10-CM

## 2020-11-17 PROCEDURE — G0109 DIAB MANAGE TRN IND/GROUP: HCPCS | Performed by: DIETITIAN, REGISTERED

## 2020-11-24 ENCOUNTER — OFFICE VISIT (OUTPATIENT)
Dept: ENDOCRINOLOGY | Facility: CLINIC | Age: 55
End: 2020-11-24

## 2020-11-24 DIAGNOSIS — E11.65 TYPE 2 DIABETES MELLITUS WITH HYPERGLYCEMIA, WITHOUT LONG-TERM CURRENT USE OF INSULIN (HCC): ICD-10-CM

## 2020-11-24 PROCEDURE — G0109 DIAB MANAGE TRN IND/GROUP: HCPCS | Performed by: DIETITIAN, REGISTERED

## 2020-11-25 ENCOUNTER — TELEPHONE (OUTPATIENT)
Dept: ENDOCRINOLOGY | Facility: CLINIC | Age: 55
End: 2020-11-25

## 2020-12-03 RX ORDER — BLOOD SUGAR DIAGNOSTIC
STRIP MISCELLANEOUS
Qty: 360 EACH | Refills: 3 | Status: SHIPPED | OUTPATIENT
Start: 2020-12-03 | End: 2022-02-21 | Stop reason: SDUPTHER

## 2021-02-28 DIAGNOSIS — E11.65 TYPE 2 DIABETES MELLITUS WITH HYPERGLYCEMIA, WITHOUT LONG-TERM CURRENT USE OF INSULIN (HCC): ICD-10-CM

## 2021-03-01 RX ORDER — INSULIN ASPART 100 [IU]/ML
INJECTION, SUSPENSION SUBCUTANEOUS
Qty: 10 ML | Refills: 5 | Status: SHIPPED | OUTPATIENT
Start: 2021-03-01 | End: 2021-09-17 | Stop reason: SDUPTHER

## 2021-03-08 DIAGNOSIS — E78.1 PURE HYPERTRIGLYCERIDEMIA: ICD-10-CM

## 2021-03-08 DIAGNOSIS — I10 HYPERTENSION, ESSENTIAL: ICD-10-CM

## 2021-03-08 DIAGNOSIS — K20.90 ESOPHAGITIS: ICD-10-CM

## 2021-03-08 DIAGNOSIS — E11.65 TYPE 2 DIABETES MELLITUS WITH HYPERGLYCEMIA, WITHOUT LONG-TERM CURRENT USE OF INSULIN (HCC): ICD-10-CM

## 2021-03-08 RX ORDER — SIMVASTATIN 20 MG
TABLET ORAL
Qty: 90 TABLET | Refills: 1 | Status: SHIPPED | OUTPATIENT
Start: 2021-03-08 | End: 2021-06-10

## 2021-03-08 RX ORDER — LISINOPRIL 5 MG/1
TABLET ORAL
Qty: 90 TABLET | Refills: 1 | Status: SHIPPED | OUTPATIENT
Start: 2021-03-08 | End: 2021-09-16

## 2021-03-08 RX ORDER — OMEPRAZOLE 40 MG/1
CAPSULE, DELAYED RELEASE ORAL
Qty: 90 CAPSULE | Refills: 1 | Status: SHIPPED | OUTPATIENT
Start: 2021-03-08 | End: 2021-12-28 | Stop reason: SDUPTHER

## 2021-03-08 RX ORDER — METFORMIN HYDROCHLORIDE 500 MG/1
TABLET, EXTENDED RELEASE ORAL
Qty: 180 TABLET | Refills: 1 | Status: SHIPPED | OUTPATIENT
Start: 2021-03-08 | End: 2021-09-16

## 2021-06-08 ENCOUNTER — LAB (OUTPATIENT)
Dept: LAB | Facility: HOSPITAL | Age: 56
End: 2021-06-08

## 2021-06-08 ENCOUNTER — OFFICE VISIT (OUTPATIENT)
Dept: ENDOCRINOLOGY | Facility: CLINIC | Age: 56
End: 2021-06-08

## 2021-06-08 VITALS
TEMPERATURE: 97.5 F | SYSTOLIC BLOOD PRESSURE: 125 MMHG | DIASTOLIC BLOOD PRESSURE: 80 MMHG | BODY MASS INDEX: 41.48 KG/M2 | HEART RATE: 83 BPM | OXYGEN SATURATION: 98 % | WEIGHT: 243 LBS | HEIGHT: 64 IN

## 2021-06-08 DIAGNOSIS — I10 HYPERTENSION, ESSENTIAL: ICD-10-CM

## 2021-06-08 DIAGNOSIS — E11.65 TYPE 2 DIABETES MELLITUS WITH HYPERGLYCEMIA, WITH LONG-TERM CURRENT USE OF INSULIN (HCC): Primary | ICD-10-CM

## 2021-06-08 DIAGNOSIS — Z79.4 TYPE 2 DIABETES MELLITUS WITH HYPERGLYCEMIA, WITH LONG-TERM CURRENT USE OF INSULIN (HCC): Primary | ICD-10-CM

## 2021-06-08 DIAGNOSIS — E78.2 MIXED HYPERLIPIDEMIA: ICD-10-CM

## 2021-06-08 DIAGNOSIS — E66.01 CLASS 3 SEVERE OBESITY DUE TO EXCESS CALORIES WITHOUT SERIOUS COMORBIDITY WITH BODY MASS INDEX (BMI) OF 40.0 TO 44.9 IN ADULT (HCC): ICD-10-CM

## 2021-06-08 DIAGNOSIS — Z79.4 TYPE 2 DIABETES MELLITUS WITH HYPERGLYCEMIA, WITH LONG-TERM CURRENT USE OF INSULIN (HCC): ICD-10-CM

## 2021-06-08 DIAGNOSIS — E11.65 TYPE 2 DIABETES MELLITUS WITH HYPERGLYCEMIA, WITH LONG-TERM CURRENT USE OF INSULIN (HCC): ICD-10-CM

## 2021-06-08 LAB
ALBUMIN SERPL-MCNC: 4.4 G/DL (ref 3.5–5.2)
ALBUMIN UR-MCNC: <1.2 MG/DL
ALBUMIN/GLOB SERPL: 1.6 G/DL
ALP SERPL-CCNC: 133 U/L (ref 39–117)
ALT SERPL W P-5'-P-CCNC: 33 U/L (ref 1–33)
ANION GAP SERPL CALCULATED.3IONS-SCNC: 12.4 MMOL/L (ref 5–15)
AST SERPL-CCNC: 24 U/L (ref 1–32)
BILIRUB SERPL-MCNC: 0.4 MG/DL (ref 0–1.2)
BUN SERPL-MCNC: 13 MG/DL (ref 6–20)
BUN/CREAT SERPL: 18.8 (ref 7–25)
CALCIUM SPEC-SCNC: 9.8 MG/DL (ref 8.6–10.5)
CHLORIDE SERPL-SCNC: 100 MMOL/L (ref 98–107)
CHOLEST SERPL-MCNC: 180 MG/DL (ref 0–200)
CO2 SERPL-SCNC: 25.6 MMOL/L (ref 22–29)
CREAT SERPL-MCNC: 0.69 MG/DL (ref 0.57–1)
CREAT UR-MCNC: 44.6 MG/DL
GFR SERPL CREATININE-BSD FRML MDRD: 89 ML/MIN/1.73
GLOBULIN UR ELPH-MCNC: 2.8 GM/DL
GLUCOSE BLDC GLUCOMTR-MCNC: 332 MG/DL (ref 70–105)
GLUCOSE SERPL-MCNC: 322 MG/DL (ref 65–99)
HBA1C MFR BLD: 12.7 % (ref 3.5–5.6)
HDLC SERPL-MCNC: 31 MG/DL (ref 40–60)
LDLC SERPL CALC-MCNC: 69 MG/DL (ref 0–100)
LDLC/HDLC SERPL: 1.48 {RATIO}
MICROALBUMIN/CREAT UR: NORMAL MG/G{CREAT}
POTASSIUM SERPL-SCNC: 4.8 MMOL/L (ref 3.5–5.2)
PROT SERPL-MCNC: 7.2 G/DL (ref 6–8.5)
SODIUM SERPL-SCNC: 138 MMOL/L (ref 136–145)
TRIGL SERPL-MCNC: 515 MG/DL (ref 0–150)
TSH SERPL DL<=0.05 MIU/L-ACNC: 2.43 UIU/ML (ref 0.27–4.2)
VLDLC SERPL-MCNC: 80 MG/DL (ref 5–40)

## 2021-06-08 PROCEDURE — 82962 GLUCOSE BLOOD TEST: CPT | Performed by: INTERNAL MEDICINE

## 2021-06-08 PROCEDURE — 82570 ASSAY OF URINE CREATININE: CPT

## 2021-06-08 PROCEDURE — 83036 HEMOGLOBIN GLYCOSYLATED A1C: CPT

## 2021-06-08 PROCEDURE — 80061 LIPID PANEL: CPT

## 2021-06-08 PROCEDURE — 99244 OFF/OP CNSLTJ NEW/EST MOD 40: CPT | Performed by: INTERNAL MEDICINE

## 2021-06-08 PROCEDURE — 80053 COMPREHEN METABOLIC PANEL: CPT

## 2021-06-08 PROCEDURE — 36415 COLL VENOUS BLD VENIPUNCTURE: CPT

## 2021-06-08 PROCEDURE — 82043 UR ALBUMIN QUANTITATIVE: CPT

## 2021-06-08 PROCEDURE — 84443 ASSAY THYROID STIM HORMONE: CPT

## 2021-06-08 NOTE — PATIENT INSTRUCTIONS
Please get your labs done today  Continue current medication  Continue to work on your diet and activity  Always keep glucose source in case of low blood sugar  Annual eye exam and flu vaccine

## 2021-06-08 NOTE — PROGRESS NOTES
Consultation requested by Dr. Rene for uncontrolled type 2 diabetes    Chief complaint: Uncontrolled type 2 diabetes    HPI: 55-year-old female who was diagnosed with diabetes about 1 to 2 years ago, also have hypertension and hyperlipidemia along with obesity is referred for diabetes consultation.  For type 2 diabetes: She did go through diabetes education at [facility], she checks her blood sugars 1-2 times per day and runs more than 200 most of the time.  She is currently using 70/30 insulin 20 units twice a day along with Metformin 500 twice a day.  She has tried pioglitazone in the past which she stopped due to swelling, glipizide caused palpitation and Jardiance did not help and she stopped both of them as well.  Is trying to work on diet the best she can.  She tells me that she did try Trulicity which worked very well but she could not afford it due to higher co-pay at that time.  But she is willing to try it again if needed.    Hypertension: Well-controlled    Hyperlipidemia: Currently on simvastatin.    Data reviewed:    Comprehensive Metabolic Panel (2020 09:18)   Lipid Panel (2020 09:18)   POC Glycosylated Hemoglobin (Hb A1C) (2020 09:00)       Past Medical History:   Diagnosis Date   • Diabetes mellitus (CMS/[facility])    • GERD (gastroesophageal reflux disease)    • Hiatal hernia    • Hyperlipidemia    • Type 2 diabetes mellitus (CMS/HCC)        Social History     Socioeconomic History   • Marital status:      Spouse name: Not on file   • Number of children: Not on file   • Years of education: Not on file   • Highest education level: Not on file   Tobacco Use   • Smoking status: Former Smoker     Types: Cigarettes     Quit date:      Years since quittin.4   • Smokeless tobacco: Never Used   Vaping Use   • Vaping Use: Never used   Substance and Sexual Activity   • Alcohol use: Yes     Comment: rare   • Drug use: No   • Sexual activity: Defer       Family History    Problem Relation Age of Onset   • Breast cancer Mother    • Heart disease Mother    • Thyroid disease Mother    • Stroke Mother    • Diabetes Brother    • Heart disease Brother    • Clotting disorder Father    • Cancer Paternal Grandmother        No Known Allergies    ROS:   Constitutional:  Denies fatigue, tiredness.    Eyes:  Denies change in visual acuity   HENT:  Denies nasal congestion or sore throat   Respiratory: denies cough, shortness of breath.   Cardiovascular:  denies chest pain, edema   GI:  Denies abdominal pain, nausea, vomiting.    :  Denies dysuria   Musculoskeletal:  Denies back pain or joint pain   Integument:  Denies dry skin, rash   Neurologic:  Denies headache, focal weakness or sensory changes   Endocrine:  Denies polyuria or polydipsia   Psychiatric:  Denies depression or anxiety      Vitals:    06/08/21 1330   BP: 125/80   Pulse: 83   Temp: 97.5 °F (36.4 °C)   SpO2: 98%     Body mass index is 42.37 kg/m².      Physical Exam:  GEN: NAD, conversant, obese  EYES: EOMI, PERRL, no conjunctival erythema  NECK: no thyromegaly, full ROM   CV: RRR, no murmurs/rubs/gallops, no peripheral edema  LUNG: CTAB, no wheezes/rales/ronchi  SKIN: no rashes, no acanthosis  MSK: no deformities, full ROM of all extremities  NEURO: no tremors, DTR normal  PSYCH: AOX3, appropriate mood, affect normal      Results Review:     I reviewed the patient's new clinical results.    Lab Results   Component Value Date    BUN 19 08/26/2020    CREATININE 0.70 08/26/2020    EGFRIFNONA 100 08/26/2020    EGFRIFAFRI 115 08/26/2020    BCR 27 (H) 08/26/2020    K 4.9 08/26/2020    CO2 26 08/26/2020    CALCIUM 10.2 08/26/2020    PROTENTOTREF 7.0 08/26/2020    ALBUMIN 4.5 08/26/2020    LABIL2 1.8 08/26/2020    AST 17 08/26/2020    ALT 19 08/26/2020    CHOL 139 08/22/2018    TRIG 266 (H) 08/26/2020    HDL 46 08/26/2020     (H) 08/26/2020     Lab Results   Component Value Date    HGBA1C 12.7 (H) 06/08/2021    HGBA1C 11.4  "08/27/2020    HGBA1C 10.7 03/23/2020     Lab Results   Component Value Date    CREATININE 0.70 08/26/2020     Lab Results   Component Value Date    TSH 3.730 03/23/2020       Medication Review: Reviewed.       Current Outpatient Medications:   •  ACCU-CHEK FASTCLIX LANCETS misc, Accu-Chek FastClix Lancets Miscellaneous  1 (one) Misc Misc two times daily for 90 days  Quantity: 1 {QS} Refills: 4   Ordered: 4-Jan-2018  Leila Mayo  Start : 4-Jan-2018 Active Comments: Dx: E11.9, Disp: , Rfl:   •  ALPRAZolam (XANAX) 0.5 MG tablet, Daily As Needed., Disp: , Rfl: 2  •  Blood Glucose Monitoring Suppl (ACCU-CHEK YING) device, 1 Device Daily., Disp: , Rfl:   •  glucose blood (Accu-Chek Ying Plus) test strip, Pt to check her blood sugars 4 times daily, Disp: 360 each, Rfl: 3  •  insulin aspart prot-insulin aspart (novoLOG 70/30) (70-30) 100 UNIT/ML injection, INJECT 6 UNITS UNDER THE SKIN IN THE APPROPRIATE AREA AS DIRCTED TWICE DAILY 15 MINUTES BEFORE MEALS. DISCARD AFTER 28 DAYS (Patient taking differently: 20 Units.), Disp: 10 mL, Rfl: 5  •  Insulin Syringe-Needle U-100 31G X 15/64\" 0.3 ML misc, Pt to use with insulin vials 2 times daily, Disp: 90 each, Rfl: 3  •  lisinopril (PRINIVIL,ZESTRIL) 5 MG tablet, Take 1 tablet by mouth once daily, Disp: 90 tablet, Rfl: 1  •  metFORMIN ER (GLUCOPHAGE-XR) 500 MG 24 hr tablet, TAKE 2 TABLETS BY MOUTH ONCE DAILY WITH SUPPER, Disp: 180 tablet, Rfl: 1  •  omeprazole (priLOSEC) 40 MG capsule, Take 1 capsule by mouth once daily, Disp: 90 capsule, Rfl: 1  •  simvastatin (ZOCOR) 20 MG tablet, TAKE 1 TABLET BY MOUTH ONCE EVERY NIGHT, Disp: 90 tablet, Rfl: 1    Assessment/Plan   1.  Diabetes mellitus type 2: Uncontrolled with hyperglycemia, at this time I will check A1c along with CMP before making any further recommendations.  She is advised to continue NovoLog Mix 70/30 insulin 20 units twice a day before meals along with Metformin 500 twice a day.  Continue to work on diet and activity " and always keep glucose source in case of low blood sugar.  Recommend annual eye exam and flu vaccine.  She already completed diabetes education.   2.  Hypertension: Well-controlled, continue current medication  3.  Hyperlipidemia: Currently on simvastatin, will follow lipid panel  4.  Obesity: She is trying to work on her diet and activity.           Chirag Cho MD FACE.

## 2021-06-08 NOTE — PROGRESS NOTES
Referred by Dr. Rene for uncontrolled diabetes consultation    Patient Care Team:  Magali Rene MD as PCP  General (Family Medicine)  Seipel, Joseph F MD as Consulting Physician (Sleep Medicine)     Chief Complaint: Uncontrolled type 2 diabetes    {CC  Problem List  Visit Diagnosis   Encounters  Notes  Medications  Labs  Result Review Imaging  Media     HPI: 55-year-old female who was diagnosed with diabetes about 1 to 2 years ago, also have hypertension and hyperlipidemia along with obesity is referred for diabetes consultation.  For type 2 diabetes: She did go through diabetes education at [facility], she checks her blood sugars 1-2 times per day and runs more than 200 most of the time.  She is currently using 70/30 insulin 20 units twice a day along with Metformin 500 twice a day.  She has tried pioglitazone in the past which she stopped due to swelling, glipizide caused palpitation and Jardiance did not help and she stopped both of them as well.  Is trying to work on diet the best she can.  She tells me that she did try Trulicity which worked very well but she could not afford it due to higher co-pay at that time.  But she is willing to try it again if needed.    Hypertension: Well-controlled    Hyperlipidemia: Currently on simvastatin.    Data reviewed:           Past Medical History:   Diagnosis Date   • Diabetes mellitus (CMS/[facility])    • GERD (gastroesophageal reflux disease)    • Hiatal hernia    • Hyperlipidemia    • Type 2 diabetes mellitus (CMS/HCC)        Social History     Socioeconomic History   • Marital status:      Spouse name: Not on file   • Number of children: Not on file   • Years of education: Not on file   • Highest education level: Not on file   Tobacco Use   • Smoking status: Former Smoker     Types: Cigarettes     Quit date:      Years since quittin.4   • Smokeless tobacco: Never Used   Vaping Use   • Vaping Use: Never used   Substance and  Sexual Activity   • Alcohol use: Yes     Comment: rare   • Drug use: No   • Sexual activity: Defer       Family History   Problem Relation Age of Onset   • Breast cancer Mother    • Heart disease Mother    • Thyroid disease Mother    • Stroke Mother    • Diabetes Brother    • Heart disease Brother    • Clotting disorder Father    • Cancer Paternal Grandmother        No Known Allergies    ROS:   Constitutional:  Denies fatigue, tiredness.    Eyes:  Denies change in visual acuity   HENT:  Denies nasal congestion or sore throat   Respiratory: denies cough, shortness of breath.   Cardiovascular:  denies chest pain, edema   GI:  Denies abdominal pain, nausea, vomiting.    :  Denies dysuria   Musculoskeletal:  Denies back pain or joint pain   Integument:  Denies dry skin, rash   Neurologic:  Denies headache, focal weakness or sensory changes   Endocrine:  Denies polyuria or polydipsia   Psychiatric:  Denies depression or anxiety      Vitals:    06/08/21 1330   BP: 125/80   Pulse: 83   Temp: 97.5 °F (36.4 °C)   SpO2: 98%     Body mass index is 42.37 kg/m².      Physical Exam:  GEN: NAD, conversant, obese  EYES: EOMI, PERRL, no conjunctival erythema  NECK: no thyromegaly, full ROM   CV: RRR, no murmurs/rubs/gallops, no peripheral edema  LUNG: CTAB, no wheezes/rales/ronchi  SKIN: no rashes, no acanthosis  MSK: no deformities, full ROM of all extremities  NEURO: no tremors, DTR normal  PSYCH: AOX3, appropriate mood, affect normal      Results Review:     I reviewed the patient's new clinical results.    Lab Results   Component Value Date    GLUCOSE 322 (H) 06/08/2021    BUN 13 06/08/2021    CREATININE 0.69 06/08/2021    EGFRIFNONA 89 06/08/2021    EGFRIFAFRI 115 08/26/2020    BCR 18.8 06/08/2021    K 4.8 06/08/2021    CO2 25.6 06/08/2021    CALCIUM 9.8 06/08/2021    PROTENTOTREF 7.0 08/26/2020    ALBUMIN 4.40 06/08/2021    LABIL2 1.8 08/26/2020    AST 24 06/08/2021    ALT 33 06/08/2021    CHOL 180 06/08/2021    TRIG 515 (H)  "06/08/2021    HDL 31 (L) 06/08/2021    LDL 69 06/08/2021     Lab Results   Component Value Date    HGBA1C 12.7 (H) 06/08/2021    HGBA1C 11.4 08/27/2020    HGBA1C 10.7 03/23/2020     Lab Results   Component Value Date    MICROALBUR <1.2 06/08/2021    CREATININE 0.69 06/08/2021     Lab Results   Component Value Date    TSH 2.430 06/08/2021       Medication Review: Reviewed.       Current Outpatient Medications:   •  ALPRAZolam (XANAX) 0.5 MG tablet, Daily As Needed., Disp: , Rfl: 2  •  Blood Glucose Monitoring Suppl (ACCU-CHEK YING) device, 1 Device Daily., Disp: , Rfl:   •  glucose blood (Accu-Chek Ying Plus) test strip, Pt to check her blood sugars 4 times daily, Disp: 360 each, Rfl: 3  •  insulin aspart prot-insulin aspart (novoLOG 70/30) (70-30) 100 UNIT/ML injection, INJECT 6 UNITS UNDER THE SKIN IN THE APPROPRIATE AREA AS DIRCTED TWICE DAILY 15 MINUTES BEFORE MEALS. DISCARD AFTER 28 DAYS (Patient taking differently: 20 Units.), Disp: 10 mL, Rfl: 5  •  Insulin Syringe-Needle U-100 31G X 15/64\" 0.3 ML misc, Pt to use with insulin vials 2 times daily, Disp: 90 each, Rfl: 3  •  lisinopril (PRINIVIL,ZESTRIL) 5 MG tablet, Take 1 tablet by mouth once daily, Disp: 90 tablet, Rfl: 1  •  metFORMIN ER (GLUCOPHAGE-XR) 500 MG 24 hr tablet, TAKE 2 TABLETS BY MOUTH ONCE DAILY WITH SUPPER, Disp: 180 tablet, Rfl: 1  •  omeprazole (priLOSEC) 40 MG capsule, Take 1 capsule by mouth once daily, Disp: 90 capsule, Rfl: 1    Assessment/Plan   1.  Diabetes mellitus type 2: Uncontrolled with hyperglycemia, at this time I will check A1c along with CMP before making any further recommendations.  She is advised to continue NovoLog Mix 70/30 insulin 20 units twice a day before meals along with Metformin 500 twice a day.  Continue to work on diet and activity and always keep glucose source in case of low blood sugar.  Recommend annual eye exam and flu vaccine.  She already completed diabetes education.  2.  Hypertension: Well-controlled, " continue current medication  3.  Hyperlipidemia: Currently on simvastatin, will follow lipid panel  4.  Obesity: She is trying to work on her diet and activity.           Chirag Cho MD FACE.

## 2021-06-10 DIAGNOSIS — Z79.4 TYPE 2 DIABETES MELLITUS WITH HYPERGLYCEMIA, WITH LONG-TERM CURRENT USE OF INSULIN (HCC): Primary | ICD-10-CM

## 2021-06-10 DIAGNOSIS — E11.65 TYPE 2 DIABETES MELLITUS WITH HYPERGLYCEMIA, WITH LONG-TERM CURRENT USE OF INSULIN (HCC): Primary | ICD-10-CM

## 2021-06-10 DIAGNOSIS — E78.2 MIXED HYPERLIPIDEMIA: ICD-10-CM

## 2021-06-10 DIAGNOSIS — I10 HYPERTENSION, ESSENTIAL: ICD-10-CM

## 2021-06-10 RX ORDER — ATORVASTATIN CALCIUM 20 MG/1
20 TABLET, FILM COATED ORAL DAILY
Qty: 90 TABLET | Refills: 4 | Status: SHIPPED | OUTPATIENT
Start: 2021-06-10 | End: 2022-07-11 | Stop reason: SDUPTHER

## 2021-06-10 RX ORDER — PEN NEEDLE, DIABETIC 31 GX5/16"
NEEDLE, DISPOSABLE MISCELLANEOUS
Qty: 50 EACH | Refills: 3 | Status: SHIPPED | OUTPATIENT
Start: 2021-06-10 | End: 2021-12-10 | Stop reason: SDUPTHER

## 2021-06-10 RX ORDER — DULAGLUTIDE 0.75 MG/.5ML
0.75 INJECTION, SOLUTION SUBCUTANEOUS
Qty: 4 PEN | Refills: 12 | Status: SHIPPED | OUTPATIENT
Start: 2021-06-10 | End: 2021-08-04

## 2021-07-12 ENCOUNTER — TELEPHONE (OUTPATIENT)
Dept: ENDOCRINOLOGY | Facility: CLINIC | Age: 56
End: 2021-07-12

## 2021-07-12 NOTE — TELEPHONE ENCOUNTER
Per pt, Trulicity has been working well but it is about $600 a month and she cannot afford it. She checked with insurance and Ozempic would cost the same. I lvm asking her if she had attempted to use a co-pay card from the , I asked her to call me back to discuss.

## 2021-07-12 NOTE — TELEPHONE ENCOUNTER
Pt has used coupon card, it did reduce it by $150, which is what made it $600, so she does want to explore other options. She has a high deductible plan.

## 2021-07-13 NOTE — TELEPHONE ENCOUNTER
Pt notified and verbalized understanding. She was driving, so I also sent a Voice[employer] message with instructions.

## 2021-07-13 NOTE — TELEPHONE ENCOUNTER
DC Trulicity, increase 70/30 insulin to 40 units subcu before breakfast and 20 units subcu before supper.  She can continue Metformin.  Check blood sugars at least 3 times a day before each meal and always keep glucose source in case of low blood sugar and check A1c and CMP before next follow-up.

## 2021-08-04 ENCOUNTER — OFFICE VISIT (OUTPATIENT)
Dept: NEUROLOGY | Facility: CLINIC | Age: 56
End: 2021-08-04

## 2021-08-04 VITALS
TEMPERATURE: 98 F | WEIGHT: 244 LBS | DIASTOLIC BLOOD PRESSURE: 78 MMHG | BODY MASS INDEX: 41.66 KG/M2 | HEART RATE: 74 BPM | SYSTOLIC BLOOD PRESSURE: 123 MMHG | HEIGHT: 64 IN

## 2021-08-04 DIAGNOSIS — G47.33 OBSTRUCTIVE SLEEP APNEA SYNDROME: ICD-10-CM

## 2021-08-04 PROCEDURE — 99212 OFFICE O/P EST SF 10 MIN: CPT | Performed by: PSYCHIATRY & NEUROLOGY

## 2021-08-13 ENCOUNTER — TELEPHONE (OUTPATIENT)
Dept: NEUROLOGY | Facility: CLINIC | Age: 56
End: 2021-08-13

## 2021-08-13 DIAGNOSIS — G47.33 OBSTRUCTIVE SLEEP APNEA SYNDROME: Primary | ICD-10-CM

## 2021-08-13 NOTE — TELEPHONE ENCOUNTER
----- Message from Joseph F Seipel, MD sent at 8/4/2021  9:21 AM EDT -----   nasal mask and goes through son[employer] for supplies.

## 2021-09-01 ENCOUNTER — TELEPHONE (OUTPATIENT)
Dept: ENDOCRINOLOGY | Facility: CLINIC | Age: 56
End: 2021-09-01

## 2021-09-01 NOTE — TELEPHONE ENCOUNTER
Patient left a voicemail asking if she can restart the Trulicity. She states she thinks she found a way to afford it. If OK send to [facility] in [place], [place].

## 2021-09-02 DIAGNOSIS — E11.65 TYPE 2 DIABETES MELLITUS WITH HYPERGLYCEMIA, WITH LONG-TERM CURRENT USE OF INSULIN (HCC): Primary | ICD-10-CM

## 2021-09-02 DIAGNOSIS — Z79.4 TYPE 2 DIABETES MELLITUS WITH HYPERGLYCEMIA, WITH LONG-TERM CURRENT USE OF INSULIN (HCC): Primary | ICD-10-CM

## 2021-09-02 RX ORDER — DULAGLUTIDE 1.5 MG/.5ML
1.5 INJECTION, SOLUTION SUBCUTANEOUS WEEKLY
Qty: 4 PEN | Refills: 11 | Status: SHIPPED | OUTPATIENT
Start: 2021-09-02 | End: 2021-12-10

## 2021-09-02 RX ORDER — DULAGLUTIDE 0.75 MG/.5ML
0.75 INJECTION, SOLUTION SUBCUTANEOUS WEEKLY
Qty: 4 PEN | Refills: 0 | Status: SHIPPED | OUTPATIENT
Start: 2021-09-02 | End: 2021-12-10

## 2021-09-02 NOTE — TELEPHONE ENCOUNTER
She can resume Trulicity at 0.75 mg subcu weekly for 4 weeks and then increase the dose to 1.5 mg subcu weekly.  Please send prescriptions.

## 2021-09-02 NOTE — TELEPHONE ENCOUNTER
Patient informed and I explained to her what dose to start on and then to increase to the other. She verbalized understanding.

## 2021-09-07 DIAGNOSIS — E11.65 TYPE 2 DIABETES MELLITUS WITH HYPERGLYCEMIA, WITHOUT LONG-TERM CURRENT USE OF INSULIN (HCC): ICD-10-CM

## 2021-09-08 RX ORDER — INSULIN ASPART 100 [IU]/ML
INJECTION, SUSPENSION SUBCUTANEOUS
Qty: 10 ML | Refills: 5 | OUTPATIENT
Start: 2021-09-08

## 2021-09-08 NOTE — TELEPHONE ENCOUNTER
Please reach out to Dr. Cho's office (endocrinology) and see if they would be willing to fill as he is managing her DM now.    I think he has instructed her to also take a different dosage than what the request is for.

## 2021-09-15 DIAGNOSIS — I10 HYPERTENSION, ESSENTIAL: ICD-10-CM

## 2021-09-15 DIAGNOSIS — E11.65 TYPE 2 DIABETES MELLITUS WITH HYPERGLYCEMIA, WITHOUT LONG-TERM CURRENT USE OF INSULIN (HCC): ICD-10-CM

## 2021-09-16 RX ORDER — LISINOPRIL 5 MG/1
TABLET ORAL
Qty: 90 TABLET | Refills: 0 | Status: SHIPPED | OUTPATIENT
Start: 2021-09-16 | End: 2021-12-28 | Stop reason: SDUPTHER

## 2021-09-16 RX ORDER — METFORMIN HYDROCHLORIDE 500 MG/1
TABLET, EXTENDED RELEASE ORAL
Qty: 180 TABLET | Refills: 0 | Status: SHIPPED | OUTPATIENT
Start: 2021-09-16 | End: 2021-12-28 | Stop reason: SDUPTHER

## 2021-09-17 DIAGNOSIS — E11.65 TYPE 2 DIABETES MELLITUS WITH HYPERGLYCEMIA, WITHOUT LONG-TERM CURRENT USE OF INSULIN (HCC): ICD-10-CM

## 2021-09-17 RX ORDER — INSULIN ASPART 100 [IU]/ML
20 INJECTION, SUSPENSION SUBCUTANEOUS 2 TIMES DAILY WITH MEALS
Qty: 20 ML | Refills: 5 | Status: SHIPPED | OUTPATIENT
Start: 2021-09-17 | End: 2021-12-10

## 2021-12-01 ENCOUNTER — LAB (OUTPATIENT)
Dept: LAB | Facility: HOSPITAL | Age: 56
End: 2021-12-01

## 2021-12-01 DIAGNOSIS — I10 HYPERTENSION, ESSENTIAL: ICD-10-CM

## 2021-12-01 DIAGNOSIS — E78.2 MIXED HYPERLIPIDEMIA: ICD-10-CM

## 2021-12-01 DIAGNOSIS — Z79.4 TYPE 2 DIABETES MELLITUS WITH HYPERGLYCEMIA, WITH LONG-TERM CURRENT USE OF INSULIN (HCC): ICD-10-CM

## 2021-12-01 DIAGNOSIS — E11.65 TYPE 2 DIABETES MELLITUS WITH HYPERGLYCEMIA, WITH LONG-TERM CURRENT USE OF INSULIN (HCC): ICD-10-CM

## 2021-12-01 LAB
ALBUMIN SERPL-MCNC: 4 G/DL (ref 3.5–5.2)
ALBUMIN/GLOB SERPL: 1.5 G/DL
ALP SERPL-CCNC: 117 U/L (ref 39–117)
ALT SERPL W P-5'-P-CCNC: 22 U/L (ref 1–33)
ANION GAP SERPL CALCULATED.3IONS-SCNC: 8.1 MMOL/L (ref 5–15)
AST SERPL-CCNC: 17 U/L (ref 1–32)
BILIRUB SERPL-MCNC: 0.4 MG/DL (ref 0–1.2)
BUN SERPL-MCNC: 15 MG/DL (ref 6–20)
BUN/CREAT SERPL: 22.1 (ref 7–25)
CALCIUM SPEC-SCNC: 9.2 MG/DL (ref 8.6–10.5)
CHLORIDE SERPL-SCNC: 101 MMOL/L (ref 98–107)
CHOLEST SERPL-MCNC: 148 MG/DL (ref 0–200)
CO2 SERPL-SCNC: 24.9 MMOL/L (ref 22–29)
CREAT SERPL-MCNC: 0.68 MG/DL (ref 0.57–1)
GFR SERPL CREATININE-BSD FRML MDRD: 90 ML/MIN/1.73
GLOBULIN UR ELPH-MCNC: 2.6 GM/DL
GLUCOSE SERPL-MCNC: 292 MG/DL (ref 65–99)
HBA1C MFR BLD: 11.7 % (ref 3.5–5.6)
HDLC SERPL-MCNC: 32 MG/DL (ref 40–60)
LDLC SERPL CALC-MCNC: 82 MG/DL (ref 0–100)
LDLC/HDLC SERPL: 2.38 {RATIO}
POTASSIUM SERPL-SCNC: 4.3 MMOL/L (ref 3.5–5.2)
PROT SERPL-MCNC: 6.6 G/DL (ref 6–8.5)
SODIUM SERPL-SCNC: 134 MMOL/L (ref 136–145)
TRIGL SERPL-MCNC: 200 MG/DL (ref 0–150)
VLDLC SERPL-MCNC: 34 MG/DL (ref 5–40)

## 2021-12-01 PROCEDURE — 83036 HEMOGLOBIN GLYCOSYLATED A1C: CPT

## 2021-12-01 PROCEDURE — 80053 COMPREHEN METABOLIC PANEL: CPT

## 2021-12-01 PROCEDURE — 36415 COLL VENOUS BLD VENIPUNCTURE: CPT

## 2021-12-01 PROCEDURE — 80061 LIPID PANEL: CPT

## 2021-12-10 ENCOUNTER — OFFICE VISIT (OUTPATIENT)
Dept: ENDOCRINOLOGY | Facility: CLINIC | Age: 56
End: 2021-12-10

## 2021-12-10 VITALS
DIASTOLIC BLOOD PRESSURE: 85 MMHG | SYSTOLIC BLOOD PRESSURE: 175 MMHG | OXYGEN SATURATION: 97 % | HEIGHT: 64 IN | WEIGHT: 242 LBS | HEART RATE: 92 BPM | TEMPERATURE: 97.3 F | BODY MASS INDEX: 41.32 KG/M2

## 2021-12-10 DIAGNOSIS — E66.01 CLASS 3 SEVERE OBESITY DUE TO EXCESS CALORIES WITHOUT SERIOUS COMORBIDITY WITH BODY MASS INDEX (BMI) OF 40.0 TO 44.9 IN ADULT (HCC): ICD-10-CM

## 2021-12-10 DIAGNOSIS — E11.65 TYPE 2 DIABETES MELLITUS WITH HYPERGLYCEMIA, WITH LONG-TERM CURRENT USE OF INSULIN (HCC): Primary | ICD-10-CM

## 2021-12-10 DIAGNOSIS — Z79.4 TYPE 2 DIABETES MELLITUS WITH HYPERGLYCEMIA, WITH LONG-TERM CURRENT USE OF INSULIN (HCC): Primary | ICD-10-CM

## 2021-12-10 DIAGNOSIS — I10 HYPERTENSION, ESSENTIAL: ICD-10-CM

## 2021-12-10 DIAGNOSIS — E78.2 MIXED HYPERLIPIDEMIA: ICD-10-CM

## 2021-12-10 LAB — GLUCOSE BLDC GLUCOMTR-MCNC: 245 MG/DL (ref 70–105)

## 2021-12-10 PROCEDURE — 99214 OFFICE O/P EST MOD 30 MIN: CPT | Performed by: INTERNAL MEDICINE

## 2021-12-10 PROCEDURE — 82962 GLUCOSE BLOOD TEST: CPT | Performed by: INTERNAL MEDICINE

## 2021-12-10 RX ORDER — DULAGLUTIDE 3 MG/.5ML
3 INJECTION, SOLUTION SUBCUTANEOUS WEEKLY
Qty: 4 PEN | Refills: 6 | Status: SHIPPED | OUTPATIENT
Start: 2021-12-10 | End: 2022-06-03

## 2021-12-10 RX ORDER — PEN NEEDLE, DIABETIC 31 GX5/16"
NEEDLE, DISPOSABLE MISCELLANEOUS
Qty: 100 EACH | Refills: 3 | Status: SHIPPED | OUTPATIENT
Start: 2021-12-10 | End: 2022-04-05 | Stop reason: HOSPADM

## 2021-12-10 RX ORDER — INSULIN LISPRO PROTAMIN/LISPRO 75-25/ML
VIAL (ML) SUBCUTANEOUS
Qty: 10 ML | Refills: 12 | Status: SHIPPED | OUTPATIENT
Start: 2021-12-10 | End: 2022-04-05 | Stop reason: HOSPADM

## 2021-12-10 NOTE — PATIENT INSTRUCTIONS
Increase Trulicity to 3.0 mg subcu weekly  DC NovoLog Mix 70/30 insulin due to insurance and start Humalog mix 75/25 insulin and take 20 units 3 times a day before each meal.  If it is expensive please call me  Please continue rest of the medication  Continue to work on your diet and activity  Annual eye exam and flu vaccine  Always keep glucose source in case of low blood sugar  Labs before follow-up.

## 2021-12-10 NOTE — PROGRESS NOTES
Endocrine Progress Note Outpatient     Patient Care Team:  Magali Rene MD as PCP - General (Family Medicine)  Seipel, Joseph F, MD as Consulting Physician (Sleep Medicine)    Chief Complaint: Follow-up type 2 diabetes          HPI: This is a 56-year-old female with history of type 2 diabetes, hypertension and hyperlipidemia is here for follow-up.    For type 2 diabetes: Currently on Trulicity 1.5 mg once a week, NovoLog Mix 70/30 insulin 20 units twice a day along with Metformin 500 mg twice a day.  The past she has not been able to tolerate glipizide and Actos.  She has tried Jardiance as well but it did not help her sugars.  Blood sugar still high at home around 250.  She is trying to work on her diet.    Hypertension: Her blood pressure is high today.  Hyperlipidemia: Currently on simvastatin.    Past Medical History:   Diagnosis Date   • Diabetes mellitus (HCC)    • GERD (gastroesophageal reflux disease)    • Hiatal hernia    • Hyperlipidemia    • Type 2 diabetes mellitus (HCC)        Social History     Socioeconomic History   • Marital status:    Tobacco Use   • Smoking status: Former Smoker     Types: Cigarettes     Quit date:      Years since quittin.9   • Smokeless tobacco: Never Used   Vaping Use   • Vaping Use: Never used   Substance and Sexual Activity   • Alcohol use: Yes     Comment: rare   • Drug use: No   • Sexual activity: Defer       Family History   Problem Relation Age of Onset   • Breast cancer Mother    • Heart disease Mother    • Thyroid disease Mother    • Stroke Mother    • Diabetes Brother    • Heart disease Brother    • Clotting disorder Father    • Cancer Paternal Grandmother        No Known Allergies    ROS:   Constitutional:  Denies fatigue, tiredness.    Eyes:  Denies change in visual acuity   HENT:  Denies nasal congestion or sore throat   Respiratory: denies cough, shortness of breath.   Cardiovascular:  denies chest pain, edema   GI:  Denies abdominal  pain, nausea, vomiting.   Musculoskeletal:  Denies back pain or joint pain   Integument:  Denies dry skin and rash   Neurologic:  Denies headache, focal weakness or sensory changes   Endocrine:  Denies polyuria or polydipsia   Psychiatric:  Denies depression or anxiety      Vitals:    12/10/21 1412   BP: 175/85   Pulse: 92   Temp: 97.3 °F (36.3 °C)   SpO2: 97%     Body mass index is 42.2 kg/m².     Physical Exam:  GEN: NAD, conversant, obese  EYES: EOMI, PERRL, no conjunctival erythema  NECK: no thyromegaly, full ROM   CV: RRR, no murmurs/rubs/gallops, no peripheral edema  LUNG: CTAB, no wheezes/rales/ronchi  SKIN: no rashes, no acanthosis  MSK: no deformities, full ROM of all extremities  NEURO: no tremors, DTR normal  PSYCH: AOX3, appropriate mood, affect normal      Results Review:     I reviewed the patient's new clinical results.    Lab Results   Component Value Date    HGBA1C 11.7 (H) 12/01/2021    HGBA1C 12.7 (H) 06/08/2021    HGBA1C 11.4 08/27/2020      Lab Results   Component Value Date    GLUCOSE 292 (H) 12/01/2021    BUN 15 12/01/2021    CREATININE 0.68 12/01/2021    EGFRIFNONA 90 12/01/2021    EGFRIFAFRI 115 08/26/2020    BCR 22.1 12/01/2021    K 4.3 12/01/2021    CO2 24.9 12/01/2021    CALCIUM 9.2 12/01/2021    PROTENTOTREF 7.0 08/26/2020    ALBUMIN 4.00 12/01/2021    LABIL2 1.8 08/26/2020    AST 17 12/01/2021    ALT 22 12/01/2021    CHOL 148 12/01/2021    TRIG 200 (H) 12/01/2021    LDL 82 12/01/2021    HDL 32 (L) 12/01/2021     Lab Results   Component Value Date    TSH 2.430 06/08/2021         Medication Review: Reviewed.       Current Outpatient Medications:   •  ALPRAZolam (XANAX) 0.5 MG tablet, Daily As Needed., Disp: , Rfl: 2  •  atorvastatin (LIPITOR) 20 MG tablet, Take 1 tablet by mouth Daily., Disp: 90 tablet, Rfl: 4  •  Blood Glucose Monitoring Suppl (ACCU-CHEK YING) device, 1 Device Daily., Disp: , Rfl:   •  Dulaglutide (Trulicity) 1.5 MG/0.5ML solution pen-injector, Inject 1.5 mg under the  "skin into the appropriate area as directed 1 (One) Time Per Week. Start after finishing the starting dose of 0.75mg/0.5ml weekly, Disp: 4 pen, Rfl: 11  •  glucose blood (Accu-Chek Fidelina Plus) test strip, Pt to check her blood sugars 4 times daily, Disp: 360 each, Rfl: 3  •  insulin aspart prot-insulin aspart (novoLOG 70/30) (70-30) 100 UNIT/ML injection, Inject 20 Units under the skin into the appropriate area as directed 2 (Two) Times a Day With Meals., Disp: 20 mL, Rfl: 5  •  Insulin Pen Needle (B-D ULTRAFINE III SHORT PEN) 31G X 8 MM misc, Use to inject Trulicity once a week. May use different size needle if preferred, Disp: 50 each, Rfl: 3  •  Insulin Syringe-Needle U-100 31G X 15/64\" 0.3 ML misc, Pt to use with insulin vials 2 times daily, Disp: 90 each, Rfl: 3  •  lisinopril (PRINIVIL,ZESTRIL) 5 MG tablet, Take 1 tablet by mouth once daily, Disp: 90 tablet, Rfl: 0  •  metFORMIN ER (GLUCOPHAGE-XR) 500 MG 24 hr tablet, TAKE 2 TABLETS BY MOUTH ONCE DAILY WITH SUPPER, Disp: 180 tablet, Rfl: 0  •  omeprazole (priLOSEC) 40 MG capsule, Take 1 capsule by mouth once daily, Disp: 90 capsule, Rfl: 1      Assessment/Plan   1.  Diabetes mellitus type 2: Uncontrolled but improving, at this time I will change NovoLog Mix 70/30 insulin to 20 units 3 times a day before each meal and continue Metformin but increase Trulicity to 3.0 mg subcu weekly.  She is going to work on her diet little bit more carefully.  We will follow blood sugars and make further recommendations as needed.  2.  Hypertension: Blood pressure is high today, [place] the [place] and she will be watching her blood pressure at home.  3.  Hyperlipidemia: Currently on simvastatin, but LDL is doing well, will follow lipid panel.  4.  Obesity: Her weight has come down by about 2 pounds.  Encouraged to continue to work on diet and activity and hopefully increasing the Trulicity will help her as well.            Chirag Cho MD " FACE.

## 2021-12-28 DIAGNOSIS — I10 HYPERTENSION, ESSENTIAL: ICD-10-CM

## 2021-12-28 DIAGNOSIS — E11.65 TYPE 2 DIABETES MELLITUS WITH HYPERGLYCEMIA, WITHOUT LONG-TERM CURRENT USE OF INSULIN: ICD-10-CM

## 2021-12-28 DIAGNOSIS — K20.90 ESOPHAGITIS: ICD-10-CM

## 2021-12-29 RX ORDER — LISINOPRIL 5 MG/1
5 TABLET ORAL DAILY
Qty: 90 TABLET | Refills: 0 | Status: SHIPPED | OUTPATIENT
Start: 2021-12-29 | End: 2022-04-05 | Stop reason: SDUPTHER

## 2021-12-29 RX ORDER — METFORMIN HYDROCHLORIDE 500 MG/1
1000 TABLET, EXTENDED RELEASE ORAL DAILY
Qty: 180 TABLET | Refills: 0 | Status: SHIPPED | OUTPATIENT
Start: 2021-12-29 | End: 2022-04-05 | Stop reason: SDUPTHER

## 2021-12-29 RX ORDER — OMEPRAZOLE 40 MG/1
40 CAPSULE, DELAYED RELEASE ORAL DAILY
Qty: 90 CAPSULE | Refills: 0 | Status: SHIPPED | OUTPATIENT
Start: 2021-12-29 | End: 2022-04-05 | Stop reason: SDUPTHER

## 2022-01-04 RX ORDER — INSULIN LISPRO 100 [IU]/ML
20 INJECTION, SUSPENSION SUBCUTANEOUS
Qty: 30 ML | Refills: 5 | Status: SHIPPED | OUTPATIENT
Start: 2022-01-04 | End: 2022-07-11 | Stop reason: SDUPTHER

## 2022-02-21 DIAGNOSIS — Z79.4 TYPE 2 DIABETES MELLITUS WITH HYPERGLYCEMIA, WITH LONG-TERM CURRENT USE OF INSULIN: Primary | ICD-10-CM

## 2022-02-21 DIAGNOSIS — E11.65 TYPE 2 DIABETES MELLITUS WITH HYPERGLYCEMIA, WITH LONG-TERM CURRENT USE OF INSULIN: Primary | ICD-10-CM

## 2022-02-21 RX ORDER — BLOOD SUGAR DIAGNOSTIC
STRIP MISCELLANEOUS
Qty: 360 EACH | Refills: 3 | Status: SHIPPED | OUTPATIENT
Start: 2022-02-21 | End: 2022-06-23

## 2022-04-05 DIAGNOSIS — I10 HYPERTENSION, ESSENTIAL: ICD-10-CM

## 2022-04-05 DIAGNOSIS — K20.90 ESOPHAGITIS: ICD-10-CM

## 2022-04-05 DIAGNOSIS — E11.65 TYPE 2 DIABETES MELLITUS WITH HYPERGLYCEMIA, WITHOUT LONG-TERM CURRENT USE OF INSULIN: ICD-10-CM

## 2022-04-05 RX ORDER — PEN NEEDLE, DIABETIC 30 GX3/16"
1 NEEDLE, DISPOSABLE MISCELLANEOUS 4 TIMES DAILY
Qty: 150 EACH | Refills: 5 | Status: SHIPPED | OUTPATIENT
Start: 2022-04-05 | End: 2022-12-28 | Stop reason: SDUPTHER

## 2022-04-05 RX ORDER — METFORMIN HYDROCHLORIDE 500 MG/1
1000 TABLET, EXTENDED RELEASE ORAL DAILY
Qty: 180 TABLET | Refills: 2 | Status: SHIPPED | OUTPATIENT
Start: 2022-04-05 | End: 2023-01-03

## 2022-04-05 RX ORDER — LISINOPRIL 5 MG/1
5 TABLET ORAL DAILY
Qty: 90 TABLET | Refills: 2 | Status: SHIPPED | OUTPATIENT
Start: 2022-04-05 | End: 2023-01-03

## 2022-04-05 RX ORDER — OMEPRAZOLE 40 MG/1
40 CAPSULE, DELAYED RELEASE ORAL DAILY
Qty: 90 CAPSULE | Refills: 2 | Status: SHIPPED | OUTPATIENT
Start: 2022-04-05 | End: 2023-01-03

## 2022-04-12 ENCOUNTER — TELEPHONE (OUTPATIENT)
Dept: FAMILY MEDICINE CLINIC | Facility: CLINIC | Age: 57
End: 2022-04-12

## 2022-04-12 NOTE — TELEPHONE ENCOUNTER
Called and spoke with patient. She stated she will probably be starting to look for a different practice.

## 2022-06-02 NOTE — PROGRESS NOTES
Chief Complaint   Patient presents with   • Annual Exam   • Hyperlipidemia   • Hypertension   • Hand Pain     Alaina Hartman is a 56 y.o. female here for her annual physical with me, along with f/u on multiple chronic medical conditions and to discuss new concerns.  Her last visit with me in the office was 8/2020.    Patient's last PE was 03/23/2020. Activity level is minimal. Exercises 4 per week. Appetite is good. Feels well with few complaints. Energy level is good. Sleeps well.     Patient's last colon cancer screening (Cologuard) was 04/01/2020. She is advised to repeat in 3 years.   Patient's last mammogram was 11/30/2018 (report not available for review).  Patient's last pap smear was date unknown.   HPV status unknown.  She had had a hysterectomy.  Last pelvic exam 2020.  Patient is doing routine self skin exam monthly.   Patient is doing routine self-breast exams monthly.    Hyperlipidemia  This is a recurrent problem. The current episode started more than 1 year ago. The problem is controlled. Exacerbating diseases include diabetes and obesity. Pertinent negatives include no chest pain, focal sensory loss, focal weakness, leg pain, myalgias or shortness of breath. Current antihyperlipidemic treatment includes statins. The current treatment provides moderate improvement of lipids. Risk factors for coronary artery disease include diabetes mellitus, hypertension, obesity, post-menopausal and a sedentary lifestyle.   Hypertension  This is a recurrent problem. The current episode started more than 1 year ago. The problem is unchanged. The problem is controlled. Pertinent negatives include no anxiety, blurred vision, chest pain, headaches, malaise/fatigue, neck pain, orthopnea, palpitations, peripheral edema, PND, shortness of breath or sweats. Risk factors for coronary artery disease include post-menopausal state, obesity, diabetes mellitus and sedentary lifestyle. Current antihypertension treatment includes  ACE inhibitors. The current treatment provides significant improvement. Compliance problems include exercise.    Hand Pain   The incident occurred more than 1 week ago. There was no injury mechanism. The pain is present in the left hand and right hand. The quality of the pain is described as aching and burning. The pain does not radiate. The pain is at a severity of 2/10. The pain is mild. The pain has been intermittent since the incident. Pertinent negatives include no chest pain, muscle weakness, numbness or tingling. The symptoms are aggravated by movement (gripping- No weakness with ). She has tried NSAIDs (stress ball) for the symptoms. The treatment provided no relief.     Father and brother developed contractures of their hands.    Mother had breast cancer (no known genetic testing).  No family h/o ovarian cancer.    The following portions of the patient's history were reviewed and updated as appropriate: allergies, current medications, past family history, past medical history, past social history, past surgical history and problem list.      Past Medical History :  Active Ambulatory Problems     Diagnosis Date Noted   • BMI 40.0-44.9, adult ([facility]) 07/31/2018   • Bunion, right 07/31/2019   • Decreased hearing of both ears 07/31/2019   • Esophagitis 05/07/2018   • Mixed hyperlipidemia 05/07/2018   • Hypertension, essential 07/31/2019   • Menopausal syndrome 07/31/2019   • Obstructive sleep apnea syndrome 05/07/2018   • Type 2 diabetes mellitus with hyperglycemia, with long-term current use of insulin ([facility]) 05/07/2018   • Class 3 severe obesity due to excess calories without serious comorbidity with body mass index (BMI) of 40.0 to 44.9 in adult ([facility]) 07/31/2019     Resolved Ambulatory Problems     Diagnosis Date Noted   • No Resolved Ambulatory Problems     Past Medical History:   Diagnosis Date   • Diabetes mellitus ([facility])    • GERD (gastroesophageal reflux disease)    • Hiatal hernia    • Hyperlipidemia     • Type 2 diabetes mellitus (HCC)        Medication List:    Current Outpatient Medications:   •  ALPRAZolam (XANAX) 0.5 MG tablet, Daily As Needed., Disp: , Rfl: 2  •  atorvastatin (LIPITOR) 20 MG tablet, Take 1 tablet by mouth Daily., Disp: 90 tablet, Rfl: 4  •  Blood Glucose Monitoring Suppl (ACCU-CHEK YING) device, 1 Device Daily., Disp: , Rfl:   •  glucose blood (Accu-Chek Ying Plus) test strip, Pt to check her blood sugars 4 times daily, Disp: 360 each, Rfl: 3  •  Insulin Lispro Prot & Lispro (HumaLOG Mix 75/25 KwikPen) (75-25) 100 UNIT/ML suspension pen-injector pen, Inject 20 Units under the skin into the appropriate area as directed 3 (Three) Times a Day Before Meals. Pt wants to switch from vials to pen please., Disp: 30 mL, Rfl: 5  •  Insulin Pen Needle (Pen Needles) 32G X 4 MM misc, 1 each 4 (Four) Times a Day. Use to inject insulin / DX E11.65, Disp: 150 each, Rfl: 5  •  lisinopril (PRINIVIL,ZESTRIL) 5 MG tablet, Take 1 tablet by mouth Daily. Appointment needed for additional refills., Disp: 90 tablet, Rfl: 2  •  metFORMIN ER (GLUCOPHAGE-XR) 500 MG 24 hr tablet, Take 2 tablets by mouth Daily. Appointment needed for additional refills., Disp: 180 tablet, Rfl: 2  •  omeprazole (priLOSEC) 40 MG capsule, Take 1 capsule by mouth Daily. Appointment needed for additional refills., Disp: 90 capsule, Rfl: 2    Allergies:  No Known Allergies    Social History:  Social History     Socioeconomic History   • Marital status:    Tobacco Use   • Smoking status: Former Smoker     Types: Cigarettes     Quit date:      Years since quittin.4   • Smokeless tobacco: Never Used   Vaping Use   • Vaping Use: Never used   Substance and Sexual Activity   • Alcohol use: Yes     Comment: rare   • Drug use: No   • Sexual activity: Defer       Family History:  Family History   Problem Relation Age of Onset   • Breast cancer Mother    • Heart disease Mother    • Thyroid disease Mother    • Stroke Mother    •  Diabetes Brother    • Heart disease Brother    • Clotting disorder Father    • Cancer Paternal Grandmother        Past Surgical History:  Past Surgical History:   Procedure Laterality Date   • BREAST LUMPECTOMY     • SUBTOTAL HYSTERECTOMY         PHQ-2 Depression Screening  Little interest or pleasure in doing things? 0-->not at all   Feeling down, depressed, or hopeless? 0-->not at all   PHQ-2 Total Score 0     Health Maintenance   Topic Date Due   • ZOSTER VACCINE (1 of 2) Never done   • Pneumococcal Vaccine 0-64 (2 - PCV) 11/29/2018   • HEPATITIS C SCREENING  Never done   • DIABETIC EYE EXAM  Never done   • MAMMOGRAM  11/30/2020   • DIABETIC FOOT EXAM  03/23/2021   • COVID-19 Vaccine (3 - Booster for Theresa series) 04/01/2022   • HEMOGLOBIN A1C  06/01/2022   • URINE MICROALBUMIN  06/08/2022   • INFLUENZA VACCINE  10/01/2022   • LIPID PANEL  12/01/2022   • COLORECTAL CANCER SCREENING  04/01/2023   • ANNUAL PHYSICAL  06/04/2023   • TDAP/TD VACCINES (3 - Td or Tdap) 08/13/2028   • Hepatitis B  Completed   • PAP SMEAR  Discontinued       Immunization History   Administered Date(s) Administered   • COVID-19 (THERESA) 06/02/2021, 12/01/2021   • Flu Vaccine Intradermal Quad 18-64YR 11/30/2018   • Flu Vaccine Split Quad 11/17/2017   • Hepatitis B 11/30/2018, 02/06/2019, 05/09/2019   • Hepatitis B Vaccine Adult IM 02/06/2019, 05/09/2019   • Influenza TIV (IM) 09/28/2021   • Influenza, Unspecified 11/30/2018   • Pneumococcal Polysaccharide (PPSV23) 11/29/2017   • Tdap 11/17/2017, 08/13/2018         Review Of Systems:  Review of Systems   Constitutional: Negative for malaise/fatigue.   Eyes: Negative for blurred vision.   Respiratory: Negative for shortness of breath.    Cardiovascular: Negative for chest pain, palpitations, orthopnea and PND.   Musculoskeletal: Negative for myalgias and neck pain.   Neurological: Negative for tingling, focal weakness and numbness.         Physical Exam:  Vital Signs:  /82 (BP  "Location: Right arm, Patient Position: Sitting, Cuff Size: Large Adult)   Pulse 97   Temp 97.8 °F (36.6 °C) (Temporal)   Resp 18   Ht 162.6 cm (64\")   Wt 112 kg (247 lb 4 oz)   SpO2 97% Comment: room air  BMI 42.44 kg/m²     Physical Exam  Constitutional:       General: She is not in acute distress.     Appearance: Normal appearance. She is well-developed. She is obese. She is not ill-appearing.   HENT:      Head: Normocephalic and atraumatic.      Right Ear: Tympanic membrane, ear canal and external ear normal.      Left Ear: Tympanic membrane, ear canal and external ear normal.      Nose: Nose normal.      Mouth/Throat:      Mouth: Mucous membranes are moist.      Pharynx: No posterior oropharyngeal erythema.   Eyes:      General: No scleral icterus.        Right eye: No discharge.         Left eye: No discharge.      Extraocular Movements: Extraocular movements intact.      Conjunctiva/sclera: Conjunctivae normal.      Pupils: Pupils are equal, round, and reactive to light.   Neck:      Thyroid: No thyromegaly.      Vascular: No carotid bruit or JVD.   Cardiovascular:      Rate and Rhythm: Normal rate and regular rhythm.      Pulses: Normal pulses.      Heart sounds: Normal heart sounds. No murmur heard.  Pulmonary:      Effort: Pulmonary effort is normal.      Breath sounds: Normal breath sounds. No wheezing or rales.   Chest:   Breasts:      Right: Normal. No swelling, bleeding, inverted nipple, mass, nipple discharge, skin change, tenderness or axillary adenopathy.      Left: Normal. No swelling, bleeding, inverted nipple, mass, nipple discharge, skin change, tenderness or axillary adenopathy.       Abdominal:      General: There is no distension.      Palpations: Abdomen is soft.      Tenderness: There is no abdominal tenderness. There is no guarding or rebound.   Genitourinary:     General: Normal vulva.      Exam position: Supine.      Vagina: Normal. No vaginal discharge.      Uterus: Absent.       " Adnexa:         Right: No mass, tenderness or fullness.          Left: No mass, tenderness or fullness.     Musculoskeletal:         General: No tenderness. Normal range of motion.      Cervical back: Normal range of motion and neck supple. No edema.   Lymphadenopathy:      Cervical: No cervical adenopathy.      Upper Body:      Right upper body: No axillary adenopathy.      Left upper body: No axillary adenopathy.   Skin:     General: Skin is warm.      Capillary Refill: Capillary refill takes less than 2 seconds.      Findings: No erythema, lesion or rash.   Neurological:      General: No focal deficit present.      Mental Status: She is alert and oriented to person, place, and time. Mental status is at baseline.      Cranial Nerves: No cranial nerve deficit.      Motor: No abnormal muscle tone.      Coordination: Coordination normal.   Psychiatric:         Mood and Affect: Mood normal.         Behavior: Behavior normal.         Thought Content: Thought content normal.         Judgment: Judgment normal.       Brief Urine Lab Results  (Last result in the past 365 days)      Color   Clarity   Blood   Leuk Est   Nitrite   Protein   CREAT   Urine HCG        06/03/22 0914 Yellow   Clear   Negative   Negative   Negative   Trace                   Assessment and Plan:  Diagnoses and all orders for this visit:    1. Annual physical exam (Primary)  -     POCT urinalysis dipstick, manual    2. Encounter for screening mammogram for malignant neoplasm of breast  -     Mammo Screening Bilateral With CAD; Future    3. Anxiety  -     ALPRAZolam (XANAX) 0.5 MG tablet; Take 1 tablet by mouth Daily As Needed for Anxiety.  Dispense: 20 tablet; Refill: 0    4. Arthralgia of both hands  -     Rheumatoid Factor  -     Cyclic Citrul Peptide Antibody, IgG / IgA  -     Sedimentation Rate  -     RENUKA  -     C-reactive protein  -     XR Hand 3+ View Bilateral  -     Ambulatory Referral to Hand Surgery  -     DNA / DS    5. Contracture of  hand  -     Ambulatory Referral to Hand Surgery    6. Class 3 severe obesity due to excess calories without serious comorbidity with body mass index (BMI) of 40.0 to 44.9 in adult (HCC)        Discussed screening for common diseases.  Discussed timing of cervical cancer screening with pap smear and HPV testing. Encouraged self-breast exams, clinical breast exams, and discussed timing of mammograms.  Recommend yearly eye exams. Also discussed monitoring of blood pressure, lipids, blood sugars.      Mammography ordered.  Colon cancer screening current.    I wore protective equipment throughout this patient encounter to include mask and gloves when appropriate. Hand hygiene was performed before donning protective equipment and after removal when leaving the room.

## 2022-06-03 ENCOUNTER — OFFICE VISIT (OUTPATIENT)
Dept: FAMILY MEDICINE CLINIC | Facility: CLINIC | Age: 57
End: 2022-06-03

## 2022-06-03 VITALS
WEIGHT: 247.25 LBS | BODY MASS INDEX: 42.21 KG/M2 | DIASTOLIC BLOOD PRESSURE: 82 MMHG | TEMPERATURE: 97.8 F | SYSTOLIC BLOOD PRESSURE: 138 MMHG | HEIGHT: 64 IN | RESPIRATION RATE: 18 BRPM | OXYGEN SATURATION: 97 % | HEART RATE: 97 BPM

## 2022-06-03 DIAGNOSIS — M24.549 CONTRACTURE OF HAND: ICD-10-CM

## 2022-06-03 DIAGNOSIS — M25.541 ARTHRALGIA OF BOTH HANDS: ICD-10-CM

## 2022-06-03 DIAGNOSIS — Z00.00 ANNUAL PHYSICAL EXAM: Primary | ICD-10-CM

## 2022-06-03 DIAGNOSIS — E66.01 CLASS 3 SEVERE OBESITY DUE TO EXCESS CALORIES WITHOUT SERIOUS COMORBIDITY WITH BODY MASS INDEX (BMI) OF 40.0 TO 44.9 IN ADULT: ICD-10-CM

## 2022-06-03 DIAGNOSIS — Z12.31 ENCOUNTER FOR SCREENING MAMMOGRAM FOR MALIGNANT NEOPLASM OF BREAST: ICD-10-CM

## 2022-06-03 DIAGNOSIS — F41.9 ANXIETY: ICD-10-CM

## 2022-06-03 DIAGNOSIS — M25.542 ARTHRALGIA OF BOTH HANDS: ICD-10-CM

## 2022-06-03 LAB
BILIRUB BLD-MCNC: NEGATIVE MG/DL
CLARITY, POC: CLEAR
COLOR UR: YELLOW
GLUCOSE UR STRIP-MCNC: ABNORMAL MG/DL
KETONES UR QL: NEGATIVE
LEUKOCYTE EST, POC: NEGATIVE
NITRITE UR-MCNC: NEGATIVE MG/ML
PH UR: 5 [PH] (ref 5–8)
PROT UR STRIP-MCNC: ABNORMAL MG/DL
RBC # UR STRIP: NEGATIVE /UL
SP GR UR: 1.03 (ref 1–1.03)
UROBILINOGEN UR QL: NORMAL

## 2022-06-03 PROCEDURE — 99396 PREV VISIT EST AGE 40-64: CPT | Performed by: FAMILY MEDICINE

## 2022-06-03 PROCEDURE — 99213 OFFICE O/P EST LOW 20 MIN: CPT | Performed by: FAMILY MEDICINE

## 2022-06-03 PROCEDURE — 81002 URINALYSIS NONAUTO W/O SCOPE: CPT | Performed by: FAMILY MEDICINE

## 2022-06-03 RX ORDER — ALPRAZOLAM 0.5 MG/1
0.5 TABLET ORAL DAILY PRN
Qty: 20 TABLET | Refills: 0 | Status: SHIPPED | OUTPATIENT
Start: 2022-06-03 | End: 2022-07-14 | Stop reason: SDUPTHER

## 2022-06-06 ENCOUNTER — TELEPHONE (OUTPATIENT)
Dept: FAMILY MEDICINE CLINIC | Facility: CLINIC | Age: 57
End: 2022-06-06

## 2022-06-06 NOTE — TELEPHONE ENCOUNTER
Patient called into the office in regards to returning a missed phone call from Marya.  Please call when possible and advise.

## 2022-06-09 ENCOUNTER — HOSPITAL ENCOUNTER (OUTPATIENT)
Dept: GENERAL RADIOLOGY | Facility: HOSPITAL | Age: 57
Discharge: HOME | End: 2022-06-09
Admitting: FAMILY MEDICINE

## 2022-06-09 PROCEDURE — 73130 X-RAY EXAM OF HAND: CPT

## 2022-06-10 LAB
ANA SER QL: POSITIVE
CRP SERPL-MCNC: 4 MG/L (ref 0–10)
DSDNA AB SER-ACNC: 1 IU/ML (ref 0–9)
ERYTHROCYTE [SEDIMENTATION RATE] IN BLOOD BY WESTERGREN METHOD: 5 MM/HR (ref 0–40)
Lab: NORMAL
RHEUMATOID FACT SERPL-ACNC: <10 IU/ML

## 2022-06-11 LAB — CCP IGA+IGG SERPL IA-ACNC: 3 UNITS (ref 0–19)

## 2022-06-23 DIAGNOSIS — E11.65 TYPE 2 DIABETES MELLITUS WITH HYPERGLYCEMIA, WITHOUT LONG-TERM CURRENT USE OF INSULIN: Primary | ICD-10-CM

## 2022-06-23 RX ORDER — BLOOD SUGAR DIAGNOSTIC
1 STRIP MISCELLANEOUS 4 TIMES DAILY
Qty: 150 EACH | Refills: 11 | Status: SHIPPED | OUTPATIENT
Start: 2022-06-23

## 2022-07-01 ENCOUNTER — TELEPHONE (OUTPATIENT)
Dept: FAMILY MEDICINE CLINIC | Facility: CLINIC | Age: 57
End: 2022-07-01

## 2022-07-01 DIAGNOSIS — R92.8 ABNORMAL MAMMOGRAM OF RIGHT BREAST: Primary | ICD-10-CM

## 2022-07-01 NOTE — TELEPHONE ENCOUNTER
Per Concha of [facility] they need orders for Right Diagnostic Mammo & Right Breast Ultrasound faxed to [phone].

## 2022-07-06 ENCOUNTER — TELEPHONE (OUTPATIENT)
Dept: FAMILY MEDICINE CLINIC | Facility: CLINIC | Age: 57
End: 2022-07-06

## 2022-07-06 DIAGNOSIS — N63.0 BREAST MASS IN FEMALE: Primary | ICD-10-CM

## 2022-07-06 NOTE — TELEPHONE ENCOUNTER
France from [facility] calling in regards to patient.  She mentioned they were going to be doing a biopsy and needed an order placed for this.  Mentioned I would send a message to the nurse.  Best number for her to be reached at is [phone].

## 2022-07-07 ENCOUNTER — LAB (OUTPATIENT)
Dept: LAB | Facility: HOSPITAL | Age: 57
End: 2022-07-07

## 2022-07-07 DIAGNOSIS — Z79.4 TYPE 2 DIABETES MELLITUS WITH HYPERGLYCEMIA, WITH LONG-TERM CURRENT USE OF INSULIN: ICD-10-CM

## 2022-07-07 DIAGNOSIS — E11.65 TYPE 2 DIABETES MELLITUS WITH HYPERGLYCEMIA, WITH LONG-TERM CURRENT USE OF INSULIN: ICD-10-CM

## 2022-07-07 LAB
ALBUMIN SERPL-MCNC: 4.2 G/DL (ref 3.5–5.2)
ALBUMIN UR-MCNC: 1.3 MG/DL
ALBUMIN/GLOB SERPL: 1.6 G/DL
ALP SERPL-CCNC: 146 U/L (ref 39–117)
ALT SERPL W P-5'-P-CCNC: 22 U/L (ref 1–33)
ANION GAP SERPL CALCULATED.3IONS-SCNC: 9 MMOL/L (ref 5–15)
AST SERPL-CCNC: 11 U/L (ref 1–32)
BILIRUB SERPL-MCNC: 0.3 MG/DL (ref 0–1.2)
BUN SERPL-MCNC: 12 MG/DL (ref 6–20)
BUN/CREAT SERPL: 18.2 (ref 7–25)
CALCIUM SPEC-SCNC: 9.2 MG/DL (ref 8.6–10.5)
CHLORIDE SERPL-SCNC: 102 MMOL/L (ref 98–107)
CHOLEST SERPL-MCNC: 169 MG/DL (ref 0–200)
CO2 SERPL-SCNC: 27 MMOL/L (ref 22–29)
CREAT SERPL-MCNC: 0.66 MG/DL (ref 0.57–1)
CREAT UR-MCNC: 87.7 MG/DL
EGFRCR SERPLBLD CKD-EPI 2021: 104.4 ML/MIN/1.73
GLOBULIN UR ELPH-MCNC: 2.6 GM/DL
GLUCOSE SERPL-MCNC: 285 MG/DL (ref 65–99)
HBA1C MFR BLD: 11.1 % (ref 3.5–5.6)
HDLC SERPL-MCNC: 45 MG/DL (ref 40–60)
LDLC SERPL CALC-MCNC: 102 MG/DL (ref 0–100)
LDLC/HDLC SERPL: 2.21 {RATIO}
MICROALBUMIN/CREAT UR: 14.8 MG/G
POTASSIUM SERPL-SCNC: 4.7 MMOL/L (ref 3.5–5.2)
PROT SERPL-MCNC: 6.8 G/DL (ref 6–8.5)
SODIUM SERPL-SCNC: 138 MMOL/L (ref 136–145)
TRIGL SERPL-MCNC: 123 MG/DL (ref 0–150)
VLDLC SERPL-MCNC: 22 MG/DL (ref 5–40)

## 2022-07-07 PROCEDURE — 80061 LIPID PANEL: CPT

## 2022-07-07 PROCEDURE — 82570 ASSAY OF URINE CREATININE: CPT

## 2022-07-07 PROCEDURE — 83036 HEMOGLOBIN GLYCOSYLATED A1C: CPT

## 2022-07-07 PROCEDURE — 80053 COMPREHEN METABOLIC PANEL: CPT

## 2022-07-07 PROCEDURE — 82043 UR ALBUMIN QUANTITATIVE: CPT

## 2022-07-07 PROCEDURE — 36415 COLL VENOUS BLD VENIPUNCTURE: CPT

## 2022-07-11 ENCOUNTER — OFFICE VISIT (OUTPATIENT)
Dept: ENDOCRINOLOGY | Facility: CLINIC | Age: 57
End: 2022-07-11

## 2022-07-11 VITALS
WEIGHT: 243 LBS | SYSTOLIC BLOOD PRESSURE: 142 MMHG | DIASTOLIC BLOOD PRESSURE: 85 MMHG | HEIGHT: 64 IN | HEART RATE: 93 BPM | OXYGEN SATURATION: 96 % | BODY MASS INDEX: 41.48 KG/M2

## 2022-07-11 DIAGNOSIS — E11.65 TYPE 2 DIABETES MELLITUS WITH HYPERGLYCEMIA, WITH LONG-TERM CURRENT USE OF INSULIN: Primary | ICD-10-CM

## 2022-07-11 DIAGNOSIS — E66.01 CLASS 3 SEVERE OBESITY DUE TO EXCESS CALORIES WITHOUT SERIOUS COMORBIDITY WITH BODY MASS INDEX (BMI) OF 40.0 TO 44.9 IN ADULT: ICD-10-CM

## 2022-07-11 DIAGNOSIS — Z79.4 TYPE 2 DIABETES MELLITUS WITH HYPERGLYCEMIA, WITH LONG-TERM CURRENT USE OF INSULIN: Primary | ICD-10-CM

## 2022-07-11 DIAGNOSIS — E78.2 MIXED HYPERLIPIDEMIA: ICD-10-CM

## 2022-07-11 DIAGNOSIS — I10 HYPERTENSION, ESSENTIAL: ICD-10-CM

## 2022-07-11 LAB — GLUCOSE BLDC GLUCOMTR-MCNC: 298 MG/DL (ref 70–105)

## 2022-07-11 PROCEDURE — 82962 GLUCOSE BLOOD TEST: CPT | Performed by: INTERNAL MEDICINE

## 2022-07-11 PROCEDURE — 99214 OFFICE O/P EST MOD 30 MIN: CPT | Performed by: INTERNAL MEDICINE

## 2022-07-11 RX ORDER — ERGOCALCIFEROL (VITAMIN D2) 50 MCG
2000 CAPSULE ORAL DAILY
Qty: 100 CAPSULE | Refills: 4 | Status: SHIPPED | OUTPATIENT
Start: 2022-07-11

## 2022-07-11 RX ORDER — INSULIN LISPRO 100 [IU]/ML
INJECTION, SUSPENSION SUBCUTANEOUS
Qty: 30 ML | Refills: 5 | Status: SHIPPED | OUTPATIENT
Start: 2022-07-11 | End: 2022-11-11 | Stop reason: SDUPTHER

## 2022-07-11 RX ORDER — ATORVASTATIN CALCIUM 40 MG/1
40 TABLET, FILM COATED ORAL DAILY
Qty: 90 TABLET | Refills: 4 | Status: SHIPPED | OUTPATIENT
Start: 2022-07-11 | End: 2023-02-20 | Stop reason: SDUPTHER

## 2022-07-11 RX ORDER — MAGNESIUM 200 MG
1000 TABLET ORAL DAILY
Qty: 100 EACH | Refills: 4 | Status: SHIPPED | OUTPATIENT
Start: 2022-07-11

## 2022-07-11 NOTE — PROGRESS NOTES
Endocrine Progress Note Outpatient     Patient Care Team:  Magali Rene MD as PCP - General (Family Medicine)  Seipel, Joseph F, MD as Consulting Physician (Sleep Medicine)    Chief Complaint: Follow-up type 2 diabetes    HPI: This is a 56-year-old female with history of type 2 diabetes, hypertension and hyperlipidemia is here for follow-up.     For type 2 diabetes: Currently on NovoLog Mix 75/25 insulin 25 units twice a day along with Metformin 500 mg twice a day.  The past she has not been able to tolerate glipizide and Actos.  She has tried Jardiance as well but it did not help her sugars.  Blood sugar still high at home around 240.  She is trying to work on her diet.    Hypertension: Her blood pressure is doing well.     Hyperlipidemia: Currently on atorvastatin.  .    Past Medical History:   Diagnosis Date   • Diabetes mellitus (HCC)    • GERD (gastroesophageal reflux disease)    • Hiatal hernia    • Hyperlipidemia    • Type 2 diabetes mellitus (HCC)        Social History     Socioeconomic History   • Marital status:      Spouse name: Bora   • Number of children: 3   • Years of education: 14   Tobacco Use   • Smoking status: Former Smoker     Types: Cigarettes     Quit date:      Years since quittin.5   • Smokeless tobacco: Never Used   Vaping Use   • Vaping Use: Never used   Substance and Sexual Activity   • Alcohol use: Yes     Comment: rare   • Drug use: No   • Sexual activity: Defer       Family History   Problem Relation Age of Onset   • Breast cancer Mother    • Heart disease Mother    • Thyroid disease Mother    • Stroke Mother    • Diabetes Brother    • Heart disease Brother    • Clotting disorder Father    • Cancer Paternal Grandmother        No Known Allergies    ROS:   Constitutional:  Denies fatigue, tiredness.    Eyes:  Denies change in visual acuity   HENT:  Denies nasal congestion or sore throat   Respiratory: denies cough, shortness of breath.   Cardiovascular:   denies chest pain, edema   GI:  Denies abdominal pain, nausea, vomiting.   Musculoskeletal:  Denies back pain or joint pain   Integument:  Denies dry skin and rash   Neurologic:  Denies headache, focal weakness or sensory changes   Endocrine:  Denies polyuria or polydipsia   Psychiatric:  Denies depression or anxiety      Vitals:    07/11/22 1519   BP: 142/85   Pulse: 93   SpO2: 96%     Body mass index is 41.71 kg/m².     Physical Exam:  GEN: NAD, conversant, obese  EYES: EOMI, PERRL, no conjunctival erythema  NECK: no thyromegaly, full ROM   CV: RRR, no murmurs/rubs/gallops, no peripheral edema  LUNG: CTAB, no wheezes/rales/ronchi  SKIN: no rashes, no acanthosis  MSK: no deformities, full ROM of all extremities  NEURO: no tremors, DTR normal  PSYCH: AOX3, appropriate mood, affect normal      Results Review:     I reviewed the patient's new clinical results.    Lab Results   Component Value Date    HGBA1C 11.1 (H) 07/07/2022    HGBA1C 11.7 (H) 12/01/2021    HGBA1C 12.7 (H) 06/08/2021      Lab Results   Component Value Date    GLUCOSE 285 (H) 07/07/2022    BUN 12 07/07/2022    CREATININE 0.66 07/07/2022    EGFRIFNONA 90 12/01/2021    EGFRIFAFRI 115 08/26/2020    BCR 18.2 07/07/2022    K 4.7 07/07/2022    CO2 27.0 07/07/2022    CALCIUM 9.2 07/07/2022    PROTENTOTREF 7.0 08/26/2020    ALBUMIN 4.20 07/07/2022    LABIL2 1.8 08/26/2020    AST 11 07/07/2022    ALT 22 07/07/2022    CHOL 169 07/07/2022    TRIG 123 07/07/2022     (H) 07/07/2022    HDL 45 07/07/2022     Lab Results   Component Value Date    TSH 2.430 06/08/2021         Medication Review: Reviewed.       Current Outpatient Medications:   •  ALPRAZolam (XANAX) 0.5 MG tablet, Take 1 tablet by mouth Daily As Needed for Anxiety., Disp: 20 tablet, Rfl: 0  •  atorvastatin (LIPITOR) 20 MG tablet, Take 1 tablet by mouth Daily., Disp: 90 tablet, Rfl: 4  •  glucose blood (Accu-Chek Guide) test strip, 1 each by Other route 4 (Four) Times a Day. Use as instructed,  Disp: 150 each, Rfl: 11  •  Insulin Lispro Prot & Lispro (HumaLOG Mix 75/25 KwikPen) (75-25) 100 UNIT/ML suspension pen-injector pen, Inject 20 Units under the skin into the appropriate area as directed 3 (Three) Times a Day Before Meals. Pt wants to switch from vials to pen please., Disp: 30 mL, Rfl: 5  •  Insulin Pen Needle (Pen Needles) 32G X 4 MM misc, 1 each 4 (Four) Times a Day. Use to inject insulin / DX E11.65, Disp: 150 each, Rfl: 5  •  lisinopril (PRINIVIL,ZESTRIL) 5 MG tablet, Take 1 tablet by mouth Daily. Appointment needed for additional refills., Disp: 90 tablet, Rfl: 2  •  metFORMIN ER (GLUCOPHAGE-XR) 500 MG 24 hr tablet, Take 2 tablets by mouth Daily. Appointment needed for additional refills., Disp: 180 tablet, Rfl: 2  •  omeprazole (priLOSEC) 40 MG capsule, Take 1 capsule by mouth Daily. Appointment needed for additional refills., Disp: 90 capsule, Rfl: 2      Assessment & Plan   1.  Diabetes mellitus type 2 with hyperglycemia: Uncontrolled with high blood sugars.  Will increase 75/25 insulin to 35 units subcu 3 times a day before meals and continue metformin.  She is going to work on her diet little bit more carefully.  We will follow blood sugars and make further recommendations as needed.    2.  Hypertension: Well controlled. CPM    3.  Hyperlipidemia: LDL mild high, will increase atorvastatin to 40 mg p.o. daily and follow lipid panel    4.  Obesity: Continue to work on diet and activity and try to lose some weight.    Assessment and plan from December 10, 2021 reviewed and updated.            Chirag Cho MD FACE.

## 2022-07-11 NOTE — PATIENT INSTRUCTIONS
Please change Humalog mix 75/25 insulin to 35 units subcu 3 times a day before meals  Increase atorvastatin to 40 mg p.o. daily  Always keep glucose source in case of low blood sugar  Continue to work on your diet and activity  Annual eye exam  Labs before follow-up

## 2022-07-12 PROCEDURE — 88360 TUMOR IMMUNOHISTOCHEM/MANUAL: CPT

## 2022-07-12 PROCEDURE — 88305 TISSUE EXAM BY PATHOLOGIST: CPT | Performed by: RADIOLOGY

## 2022-07-12 PROCEDURE — 88360 TUMOR IMMUNOHISTOCHEM/MANUAL: CPT | Performed by: RADIOLOGY

## 2022-07-13 ENCOUNTER — LAB REQUISITION (OUTPATIENT)
Dept: LAB | Facility: HOSPITAL | Age: 57
End: 2022-07-13

## 2022-07-13 DIAGNOSIS — Z00.00 ENCOUNTER FOR GENERAL ADULT MEDICAL EXAMINATION WITHOUT ABNORMAL FINDINGS: ICD-10-CM

## 2022-07-14 ENCOUNTER — TELEPHONE (OUTPATIENT)
Dept: FAMILY MEDICINE CLINIC | Facility: CLINIC | Age: 57
End: 2022-07-14

## 2022-07-14 DIAGNOSIS — F41.9 ANXIETY: ICD-10-CM

## 2022-07-14 DIAGNOSIS — C80.1 DUCTAL CARCINOMA: Primary | ICD-10-CM

## 2022-07-14 RX ORDER — ALPRAZOLAM 0.5 MG/1
0.5 TABLET ORAL DAILY PRN
Qty: 20 TABLET | Refills: 0 | Status: SHIPPED | OUTPATIENT
Start: 2022-07-14 | End: 2022-09-22 | Stop reason: SDUPTHER

## 2022-07-14 NOTE — TELEPHONE ENCOUNTER
Pathology results called to patient.   Invasive ductal carcinoma seen on biopsy.    Please refer to  [facility] in [place].

## 2022-07-15 ENCOUNTER — TELEPHONE (OUTPATIENT)
Dept: ONCOLOGY | Facility: CLINIC | Age: 57
End: 2022-07-15

## 2022-07-18 LAB
LAB AP CASE REPORT: NORMAL
LAB AP IHC HER2/NEU REPORT,ADDENDUM: NORMAL
PATH REPORT.ADDENDUM SPEC: NORMAL
PATH REPORT.FINAL DX SPEC: NORMAL
PATH REPORT.GROSS SPEC: NORMAL

## 2022-07-19 ENCOUNTER — RESEARCH ENCOUNTER (OUTPATIENT)
Dept: OTHER | Facility: OTHER | Age: 57
End: 2022-07-19

## 2022-07-19 RX ORDER — ACETAMINOPHEN 160 MG
2000 TABLET,DISINTEGRATING ORAL DAILY
COMMUNITY
Start: 2022-07-11 | End: 2022-09-22 | Stop reason: SDUPTHER

## 2022-07-19 NOTE — PROGRESS NOTES
Hematology/Oncology Outpatient Consultation    Patient name: Alaina Hartman  : 1965  MRN: [id number]  Primary Care Physician: Magali Rene MD  Referring Physician: Magali Rene*  Reason For Consult:     Chief Complaint   Patient presents with   • Appointment     Ductal carcinoma (HCC)       History of Present Illness:    This is a 56-year-old female who was clinically asymptomatic and had a routine screening mammogram which showed an abnormality on the right breast.  Prior to that patient had left breast biopsy in  for benign disease.    Review of her screening mammogram which was performed on 6/3/2022 showed a new 2 cm focal asymmetry with architectural distortion in the mid to posterior third depth of the outer right breast the left breast was benign.  Right diagnostic mammogram and ultrasound was recommended.  On 2022 patient underwent right diagnostic mammogram and ultrasound which showed a 2 cm irregular mass in the lateral inferior breast.  Same day she had an ultrasound which showed a 1.6 cm on the right breast the right axilla did not show any abnormal lymph nodes.    Patient then underwent ultrasound-guided biopsy of the right breast mass pathology revealed moderately differentiated invasive ductal carcinoma estrogen receptor positive at 95% progesterone receptor positive at 50%, HER2/bertha was negative at 1+ on immunohistochemistry.      Patient had a partial hysterectomy many years ago.  She is  and has 3 children.  She does not smoke, does not drink alcohol    Family history significant for mother with breast cancer in her 50s, her father had skin cancers, paternal grandmother had colon cancer at the age of 70      She is a       Past Medical History:   Diagnosis Date   • Diabetes mellitus (HCC)    • GERD (gastroesophageal reflux disease)    • Hiatal hernia    • Hyperlipidemia    • Type 2 diabetes mellitus (HCC)        Past Surgical History:    Procedure Laterality Date   • BREAST LUMPECTOMY     • SUBTOTAL HYSTERECTOMY           Current Outpatient Medications:   •  ALPRAZolam (XANAX) 0.5 MG tablet, Take 1 tablet by mouth Daily As Needed for Anxiety., Disp: 20 tablet, Rfl: 0  •  atorvastatin (LIPITOR) 40 MG tablet, Take 1 tablet by mouth Daily., Disp: 90 tablet, Rfl: 4  •  Cholecalciferol (Vitamin D3) 50 MCG (2000 UT) capsule, Take 2,000 Units by mouth Daily., Disp: , Rfl:   •  Cyanocobalamin (Vitamin B-12) 1000 MCG sublingual tablet, Place 1 tablet under the tongue Daily., Disp: 100 each, Rfl: 4  •  glucose blood (Accu-Chek Guide) test strip, 1 each by Other route 4 (Four) Times a Day. Use as instructed, Disp: 150 each, Rfl: 11  •  Insulin Lispro Prot & Lispro (HumaLOG Mix 75/25 KwikPen) (75-25) 100 UNIT/ML suspension pen-injector pen, Inject 35 units subcu 3 times a day before each meal., Disp: 30 mL, Rfl: 5  •  Insulin Pen Needle (Pen Needles) 32G X 4 MM misc, 1 each 4 (Four) Times a Day. Use to inject insulin / DX E11.65, Disp: 150 each, Rfl: 5  •  lisinopril (PRINIVIL,ZESTRIL) 5 MG tablet, Take 1 tablet by mouth Daily. Appointment needed for additional refills., Disp: 90 tablet, Rfl: 2  •  metFORMIN ER (GLUCOPHAGE-XR) 500 MG 24 hr tablet, Take 2 tablets by mouth Daily. Appointment needed for additional refills., Disp: 180 tablet, Rfl: 2  •  omeprazole (priLOSEC) 40 MG capsule, Take 1 capsule by mouth Daily. Appointment needed for additional refills., Disp: 90 capsule, Rfl: 2  •  Vitamin D, Ergocalciferol, 50 MCG (2000 UT) capsule, Take 2,000 Units by mouth Daily., Disp: 100 capsule, Rfl: 4    No Known Allergies    Immunization History   Administered Date(s) Administered   • COVID-19 (ticketea) 06/02/2021, 12/01/2021   • Flu Vaccine Intradermal Quad 18-64YR 11/30/2018   • Flu Vaccine Split Quad 11/17/2017   • Hepatitis B 11/30/2018, 02/06/2019, 05/09/2019   • Hepatitis B Vaccine Adult IM 02/06/2019, 05/09/2019   • Influenza TIV (IM) 09/28/2021   •  "Influenza, Unspecified 2018   • Pneumococcal Polysaccharide (PPSV23) 2017   • Tdap 2017, 2018       Family History   Problem Relation Age of Onset   • Breast cancer Mother    • Heart disease Mother    • Thyroid disease Mother    • Stroke Mother    • Diabetes Brother    • Heart disease Brother    • Clotting disorder Father    • Cancer Paternal Grandmother        Cancer-related family history includes Breast cancer in her mother; Cancer in her paternal grandmother.    Social History     Tobacco Use   • Smoking status: Former Smoker     Types: Cigarettes     Quit date:      Years since quittin.5   • Smokeless tobacco: Never Used   Vaping Use   • Vaping Use: Never used   Substance Use Topics   • Alcohol use: Yes     Comment: rare   • Drug use: No       ROS:    Review of Systems   Constitutional: Negative for chills, fatigue and fever.   HENT: Negative for congestion, drooling, ear discharge, rhinorrhea, sinus pressure and tinnitus.    Eyes: Negative for photophobia, pain and discharge.   Respiratory: Negative for apnea, choking and stridor.    Cardiovascular: Negative for palpitations.   Gastrointestinal: Negative for abdominal distention, abdominal pain and anal bleeding.   Endocrine: Negative for polydipsia and polyphagia.   Genitourinary: Negative for decreased urine volume, flank pain and genital sores.   Musculoskeletal: Negative for gait problem, neck pain and neck stiffness.   Skin: Negative for color change, rash and wound.   Neurological: Negative for tremors, seizures, syncope, facial asymmetry and speech difficulty.   Hematological: Negative for adenopathy.   Psychiatric/Behavioral: Negative for agitation, confusion, hallucinations and self-injury. The patient is not hyperactive.        Objective:    Vitals:    22 1218   BP: 154/85   Pulse: 75   Temp: 97.1 °F (36.2 °C)   SpO2: 97%   Weight: 110 kg (243 lb)  Comment: last visit   Height: 162.6 cm (64\")   PainSc: 0-No pain "     Body mass index is 41.71 kg/m².    ECOG    (0) Fully active, able to carry on all predisease performance without restriction    Physical Exam:    Physical Exam  Vitals and nursing note reviewed.   Constitutional:       General: She is not in acute distress.     Appearance: She is not diaphoretic.   HENT:      Head: Normocephalic and atraumatic.   Eyes:      General: No scleral icterus.        Right eye: No discharge.         Left eye: No discharge.      Conjunctiva/sclera: Conjunctivae normal.   Neck:      Thyroid: No thyromegaly.   Cardiovascular:      Rate and Rhythm: Normal rate and regular rhythm.      Heart sounds: Normal heart sounds.     No friction rub. No gallop.   Pulmonary:      Effort: Pulmonary effort is normal. No respiratory distress.      Breath sounds: No stridor. No wheezing.   Abdominal:      General: Bowel sounds are normal.      Palpations: Abdomen is soft. There is no mass.      Tenderness: There is no abdominal tenderness. There is no guarding or rebound.   Musculoskeletal:         General: No tenderness. Normal range of motion.      Cervical back: Normal range of motion and neck supple.   Lymphadenopathy:      Cervical: No cervical adenopathy.   Skin:     General: Skin is warm.      Findings: No erythema or rash.   Neurological:      Mental Status: She is alert and oriented to person, place, and time.      Motor: No abnormal muscle tone.   Psychiatric:         Behavior: Behavior normal.       Bilateral breast exam showed ecchymosis on the right lateral breast otherwise has no palpable mass.  Right axilla is negative.  Left breast and axilla was also negative    RECENT LABS  WBC   Date Value Ref Range Status   08/26/2020 11.0 (H) 3.4 - 10.8 x10E3/uL Final     RBC   Date Value Ref Range Status   08/26/2020 4.53 3.77 - 5.28 x10E6/uL Final     Hemoglobin   Date Value Ref Range Status   08/26/2020 12.9 11.1 - 15.9 g/dL Final     Hematocrit   Date Value Ref Range Status   08/26/2020 40.4 34.0 -  46.6 % Final     MCV   Date Value Ref Range Status   08/26/2020 89 79 - 97 fL Final     MCH   Date Value Ref Range Status   08/26/2020 28.5 26.6 - 33.0 pg Final     MCHC   Date Value Ref Range Status   08/26/2020 31.9 31.5 - 35.7 g/dL Final     RDW   Date Value Ref Range Status   08/26/2020 13.8 11.7 - 15.4 % Final     MPV   Date Value Ref Range Status   08/22/2018 8.7 7.5 - 11.5 fL Final     Platelets   Date Value Ref Range Status   08/26/2020 364 150 - 450 x10E3/uL Final     Neutrophil Rel %   Date Value Ref Range Status   08/26/2020 53 Not Estab. % Final     Lymphocyte Rel %   Date Value Ref Range Status   08/26/2020 39 Not Estab. % Final     Monocyte Rel %   Date Value Ref Range Status   08/26/2020 4 Not Estab. % Final     Eosinophil Rel %   Date Value Ref Range Status   08/26/2020 2 Not Estab. % Final     Basophil Rel %   Date Value Ref Range Status   08/26/2020 1 Not Estab. % Final     Neutrophils Absolute   Date Value Ref Range Status   08/26/2020 5.8 1.4 - 7.0 x10E3/uL Final     Lymphocytes Absolute   Date Value Ref Range Status   08/26/2020 4.3 (H) 0.7 - 3.1 x10E3/uL Final     Monocytes Absolute   Date Value Ref Range Status   08/26/2020 0.5 0.1 - 0.9 x10E3/uL Final     Eosinophils Absolute   Date Value Ref Range Status   08/26/2020 0.2 0.0 - 0.4 x10E3/uL Final     Basophils Absolute   Date Value Ref Range Status   08/26/2020 0.1 0.0 - 0.2 x10E3/uL Final       Lab Results   Component Value Date    GLUCOSE 285 (H) 07/07/2022    BUN 12 07/07/2022    CREATININE 0.66 07/07/2022    EGFRIFNONA 90 12/01/2021    EGFRIFAFRI 115 08/26/2020    BCR 18.2 07/07/2022    K 4.7 07/07/2022    CO2 27.0 07/07/2022    CALCIUM 9.2 07/07/2022    PROTENTOTREF 7.0 08/26/2020    ALBUMIN 4.20 07/07/2022    LABIL2 1.8 08/26/2020    AST 11 07/07/2022    ALT 22 07/07/2022         Assessment & Plan   There are no diagnoses linked to this encounter.    1. Moderately differentiated invasive ductal carcinoma of the right breast at 8 with  biopsy.  ER positive at 95%, AR positive at 50% and HER2/bertha was negative.  T1 cN0 M0  2. Status post partial hysterectomy, needs to determine menopausal status  3. Family history of breast cancer in her mother at the age of 50, paternal grandmother with colon cancer at 17 and her father had skin cancer.    Discussion    Reviewed her clinical scenario with patient in detail.  Discussed that she has clinical stage I breast cancer.  We reviewed that stage I breast cancer is highly curable but she would need to have surgical resection plus or minus chemotherapy, radiation if she undergoes breast conservative therapy and endocrine treatment.  We discussed that chemotherapy recommendations will based on Oncotype DX assay if she meets the criteria postoperatively.    She has family history of breast cancer in her mother therefore genetic testing will be considered.  Patient is willing to review the literature on this.  This has been given to her today.      Plans:    · Give information on cancer genetics for her to review  · FSH estradiol levels today  · Refer to Dr. Mcwilliams  · Refer to Dr. De Los Santos  · Add to breast conference  · Follow-up 2 weeks postsurgery  · All questions answered    Patient verbalized understanding and is in agreement of the above plan.              I spent 60 total minutes, face-to-face, caring for Alaina today.  90% of this time involved counseling and/or coordination of care as documented within this note, reviewing data, discussing with patient and formulating management decisions.

## 2022-07-21 LAB
LAB AP CASE REPORT: NORMAL
PATH REPORT.FINAL DX SPEC: NORMAL
PATH REPORT.GROSS SPEC: NORMAL

## 2022-07-22 ENCOUNTER — APPOINTMENT (OUTPATIENT)
Dept: LAB | Facility: HOSPITAL | Age: 57
End: 2022-07-22

## 2022-07-22 ENCOUNTER — CONSULT (OUTPATIENT)
Dept: ONCOLOGY | Facility: CLINIC | Age: 57
End: 2022-07-22

## 2022-07-22 VITALS
WEIGHT: 243 LBS | TEMPERATURE: 97.1 F | OXYGEN SATURATION: 97 % | SYSTOLIC BLOOD PRESSURE: 154 MMHG | BODY MASS INDEX: 41.48 KG/M2 | HEART RATE: 75 BPM | HEIGHT: 64 IN | DIASTOLIC BLOOD PRESSURE: 85 MMHG

## 2022-07-22 DIAGNOSIS — C80.1 DUCTAL CARCINOMA: Primary | ICD-10-CM

## 2022-07-22 LAB
ESTRADIOL SERPL HS-MCNC: <5 PG/ML
FSH SERPL-ACNC: 33.1 MIU/ML

## 2022-07-22 PROCEDURE — 99205 OFFICE O/P NEW HI 60 MIN: CPT | Performed by: INTERNAL MEDICINE

## 2022-07-22 PROCEDURE — 36415 COLL VENOUS BLD VENIPUNCTURE: CPT | Performed by: INTERNAL MEDICINE

## 2022-07-22 PROCEDURE — 83001 ASSAY OF GONADOTROPIN (FSH): CPT | Performed by: INTERNAL MEDICINE

## 2022-07-22 PROCEDURE — 82670 ASSAY OF TOTAL ESTRADIOL: CPT | Performed by: INTERNAL MEDICINE

## 2022-07-27 ENCOUNTER — OFFICE VISIT (OUTPATIENT)
Dept: SURGERY | Facility: CLINIC | Age: 57
End: 2022-07-27

## 2022-07-27 VITALS
RESPIRATION RATE: 18 BRPM | HEIGHT: 64 IN | DIASTOLIC BLOOD PRESSURE: 93 MMHG | SYSTOLIC BLOOD PRESSURE: 146 MMHG | OXYGEN SATURATION: 96 % | HEART RATE: 80 BPM | BODY MASS INDEX: 42.07 KG/M2 | TEMPERATURE: 97.7 F | WEIGHT: 246.4 LBS

## 2022-07-27 DIAGNOSIS — C50.111 MALIGNANT NEOPLASM OF CENTRAL PORTION OF RIGHT BREAST IN FEMALE, ESTROGEN RECEPTOR POSITIVE: Primary | ICD-10-CM

## 2022-07-27 DIAGNOSIS — Z17.0 MALIGNANT NEOPLASM OF CENTRAL PORTION OF RIGHT BREAST IN FEMALE, ESTROGEN RECEPTOR POSITIVE: Primary | ICD-10-CM

## 2022-07-27 PROCEDURE — 99204 OFFICE O/P NEW MOD 45 MIN: CPT | Performed by: SURGERY

## 2022-07-27 RX ORDER — CHLORHEXIDINE GLUCONATE 0.12 MG/ML
15 RINSE ORAL EVERY 12 HOURS SCHEDULED
Status: CANCELLED | OUTPATIENT
Start: 2022-07-27

## 2022-07-27 RX ORDER — LIDOCAINE 40 MG/G
1 CREAM TOPICAL AS NEEDED
Status: CANCELLED | OUTPATIENT
Start: 2022-07-27

## 2022-07-27 RX ORDER — MULTIPLE VITAMINS W/ MINERALS TAB 9MG-400MCG
1 TAB ORAL DAILY
COMMUNITY

## 2022-07-27 RX ORDER — SODIUM CHLORIDE 9 MG/ML
100 INJECTION, SOLUTION INTRAVENOUS CONTINUOUS
Status: CANCELLED | OUTPATIENT
Start: 2022-07-27

## 2022-07-28 ENCOUNTER — TELEPHONE (OUTPATIENT)
Dept: ONCOLOGY | Facility: CLINIC | Age: 57
End: 2022-07-28

## 2022-07-28 ENCOUNTER — PATIENT ROUNDING (BHMG ONLY) (OUTPATIENT)
Dept: ONCOLOGY | Facility: CLINIC | Age: 57
End: 2022-07-28

## 2022-07-28 PROBLEM — C50.111 MALIGNANT NEOPLASM OF CENTRAL PORTION OF RIGHT BREAST IN FEMALE, ESTROGEN RECEPTOR POSITIVE (HCC): Status: ACTIVE | Noted: 2022-07-28

## 2022-07-28 PROBLEM — Z17.0 MALIGNANT NEOPLASM OF CENTRAL PORTION OF RIGHT BREAST IN FEMALE, ESTROGEN RECEPTOR POSITIVE: Status: ACTIVE | Noted: 2022-07-28

## 2022-07-28 NOTE — TELEPHONE ENCOUNTER
The patient was needing to a consult with Dr. De Los Santos with Radiation Oncology.  I called and spoke with Olive and she was able to give me a few appointment options.  I called and spoke with the patient and she stated that she would like to come in on 8/2/2022 at 2:20pm.  I notified Olive and she placed the patient her on Dr. De Los Santos's schedule.

## 2022-07-28 NOTE — PROGRESS NOTES
July 28, 2022    Hello, may I speak with Alaina Hartman?    My name is Reema Richmond      I am  with [facility]  [address] [place] [address] [phone].    Before we get started may I verify your date of birth? 1965    I am calling to officially welcome you to our practice and ask about your recent visit. Is this a good time to talk? no    Tell me about your visit with us. What things went well?  A My Chart message was sent to the patient.       We're always looking for ways to make our patients' experiences even better. Do you have recommendations on ways we may improve?  no    Overall were you satisfied with your first visit to our practice? yes       I appreciate you taking the time to speak with me today. Is there anything else I can do for you? no      Thank you, and have a great day.

## 2022-08-01 ENCOUNTER — HOSPITAL ENCOUNTER (OUTPATIENT)
Dept: RADIATION ONCOLOGY | Facility: HOSPITAL | Age: 57
Setting detail: RADIATION/ONCOLOGY SERIES
End: 2022-08-01

## 2022-08-01 NOTE — H&P (VIEW-ONLY)
GENERAL SURGERY CONSULTATION NOTE    Consult requested by: Dr. Rust    Patient Care Team:  Magali Rene MD as PCP - General (Family Medicine)  Seipel, Joseph F, MD as Consulting Physician (Sleep Medicine)  Jagdeep Mcwilliams MD as Surgeon (General Surgery)    Reason for consult: Right breast cancer    Subjective     Patient is a 56 y.o. female presents with a new diagnosis of right breast cancer.  The patient went in for her routine screening mammogram without any complaints of pain, skin changes, palpable masses, or discharge from the nipple.  Her screening mammogram which was performed on 6/23/2022 demonstrated 2 cm focal asymmetry with architectural distortion in the mid to posterior third depth outer right breast.  The patient had previously had a left breast biopsy in 2003 for benign disease.  She underwent a diagnostic mammogram and ultrasound on 7/6/2022 which again demonstrated 2 cm irregularly marginated mass in the slightly inferior right breast at posterior depth.  On ultrasound, this mass measured 1.6 x 1.5 x 1.1 cm, was taller than wide, and with posterior shadowing.  The right axilla was examined, and only normal appearing right axillary lymph nodes were detected.  She then subsequently underwent core needle biopsy which returned as invasive moderately differentiated ductal carcinoma, Robert grade 6 of 9, no in situ carcinoma was identified.  She was noted to be ER positive at 95%, VT positive at 50% and HER2 negative.  Her family history is significant for her mother who was diagnosed with breast cancer in her 50s and her father who had skin cancers.  She had a paternal grandmother with colon cancer at the age of 70.  She reports that she started having her periods at age 15, she had a hysterectomy in 1991 for abnormal bleeding.  She had 4 pregnancies with 3 live births and 1 miscarriage.  Her first child was born when she was 19 and she never breast-fed.  She took oral  contraceptive pills for a short time 30 years ago, and has never taken any hormone replacement therapy.    Review of Systems   Constitutional: Negative for appetite change, chills and fever.   HENT: Negative for congestion and sore throat.    Respiratory: Negative for cough and shortness of breath.    Cardiovascular: Negative for chest pain and palpitations.   Gastrointestinal: Negative for abdominal pain, constipation, diarrhea, nausea, vomiting and GERD.   Genitourinary: Negative for difficulty urinating, dysuria and frequency.   Musculoskeletal: Negative for arthralgias and back pain.   Skin: Negative for rash and skin lesions.   Neurological: Negative for dizziness, seizures and memory problem.   Hematological: Negative for adenopathy. Does not bruise/bleed easily.   Psychiatric/Behavioral: Negative for sleep disturbance and depressed mood.        History  Past Medical History:   Diagnosis Date   • Diabetes mellitus (HCC)    • GERD (gastroesophageal reflux disease)    • Hiatal hernia    • Hyperlipidemia    • Type 2 diabetes mellitus (HCC)      Past Surgical History:   Procedure Laterality Date   • BREAST LUMPECTOMY Left    • SUBTOTAL HYSTERECTOMY       Family History   Problem Relation Age of Onset   • Breast cancer Mother    • Heart disease Mother    • Thyroid disease Mother    • Stroke Mother    • Clotting disorder Father    • Diabetes Brother    • Heart disease Brother    • Cancer Paternal Grandmother    • Colon cancer Paternal Grandmother      Social History     Tobacco Use   • Smoking status: Former Smoker     Types: Cigarettes     Quit date:      Years since quittin.5   • Smokeless tobacco: Never Used   Vaping Use   • Vaping Use: Never used   Substance Use Topics   • Alcohol use: Yes     Comment: rare   • Drug use: No     (Not in a hospital admission)    Allergies:  Patient has no known allergies.    Objective     Vital Signs       Physical Exam  Vitals reviewed. Exam conducted with a  chaperone present.   Constitutional:       Appearance: She is well-developed.   HENT:      Head: Normocephalic and atraumatic.   Eyes:      Pupils: Pupils are equal, round, and reactive to light.   Cardiovascular:      Rate and Rhythm: Normal rate and regular rhythm.   Pulmonary:      Effort: Pulmonary effort is normal.      Breath sounds: Normal breath sounds.   Chest:   Breasts:      Right: No axillary adenopathy or supraclavicular adenopathy.      Left: No axillary adenopathy or supraclavicular adenopathy.        Comments: Both breasts were examined the upright and supine position.  Both breasts exhibit normal shape and contour without any dominant masses bilaterally.  There are no overlying skin changes, no discharge from the nipple, and no obvious deformities.  Abdominal:      General: There is no distension.      Palpations: Abdomen is soft.      Tenderness: There is no abdominal tenderness.      Hernia: No hernia is present.   Musculoskeletal:         General: Normal range of motion.      Cervical back: Normal range of motion.   Lymphadenopathy:      Cervical: No cervical adenopathy.      Upper Body:      Right upper body: No supraclavicular or axillary adenopathy.      Left upper body: No supraclavicular or axillary adenopathy.   Skin:     General: Skin is warm and dry.      Findings: No rash.   Neurological:      Mental Status: She is alert and oriented to person, place, and time.         Results Review:   Lab Results (last 24 hours)     ** No results found for the last 24 hours. **        No radiology results for the last day      I reviewed the patient's new imaging results and agree with the interpretation.  I reviewed the patient's other test results and agree with the interpretation    Assessment & Plan     Active Problems:  Right breast cancer    We reviewed the patient's imaging, and her pathology reports in detail.  We discussed that breast cancer is treated in a multidisciplinary fashion, with  input from radiation oncology, medical oncology, and general surgery.  We discussed that the patient's case will be discussed at a multidisciplinary tumor board meeting held every other week.  We then discussed the diagnosis of breast cancer and that most breast cancers arise either from the ducts or from the lobules.  Using visual aids, we discussed the difference between invasive and in situ disease, especially whether or not the lymph nodes need to be evaluated.  Usually, with in situ disease, lymph nodes are not examined.  However, examining the lymph nodes should be considered if the area of concern is widespread or if it is high-grade.  We also discussed hormone receptors, and discussed that there are medications which can be given if the hormone receptors were positive which can be used to treat the cancer, and to provide protection in not only the breast with cancer, but the contralateral breast as well.  The surgical options were discussed with the patient which include breast conservation therapy (lumpectomy with radiation) and mastectomy.  With regards to mastectomy, we discussed that reconstruction may be performed either at the time of the surgery, or in a delayed fashion.  We discussed that the survival benefit between these 2 procedures are equivalent, and therefore they are considered oncologically appropriate.  However, some women will choose one versus the other for a multitude of factors.  There are times when a lumpectomy is not a feasible option, these include but are not limited to tumor to breast ratio, the location of the tumor, previous radiation to the chest wall, or connective tissue disorders which would make radiation treatment ineffective.  This patient has elected to undergo breast conservation therapy and understands that this may include a lumpectomy with evaluation of her lymph nodes, followed by radiation.  There may or may not be a role for chemotherapy or hormonal therapy  following her surgery as well.  This will be managed by medical oncology.  We discussed that in order for the surgery to be effective we will need to have clear surgical margins, and that if the margins are positive, then she may require further surgical excision versus a prolonged course of radiation.  The risk, benefits, and alternatives to surgery were discussed with the patient which include but are not limited to: bleeding, infection, damage to nerves/blood vessels, and pain.    The patient understands, and agrees to proceed with wire localized right breast lumpectomy with sentinel lymph node biopsy.      I discussed the patients findings and my recommendations with the patient and her .     Jagdeep Mcwilliams MD  08/01/22  15:02 EDT

## 2022-08-01 NOTE — PROGRESS NOTES
GENERAL SURGERY CONSULTATION NOTE    Consult requested by: Dr. Rust    Patient Care Team:  Magali Rene MD as PCP - General (Family Medicine)  Seipel, Joseph F, MD as Consulting Physician (Sleep Medicine)  Jagdeep Mcwilliams MD as Surgeon (General Surgery)    Reason for consult: Right breast cancer    Subjective     Patient is a 56 y.o. female presents with a new diagnosis of right breast cancer.  The patient went in for her routine screening mammogram without any complaints of pain, skin changes, palpable masses, or discharge from the nipple.  Her screening mammogram which was performed on 6/23/2022 demonstrated 2 cm focal asymmetry with architectural distortion in the mid to posterior third depth outer right breast.  The patient had previously had a left breast biopsy in 2003 for benign disease.  She underwent a diagnostic mammogram and ultrasound on 7/6/2022 which again demonstrated 2 cm irregularly marginated mass in the slightly inferior right breast at posterior depth.  On ultrasound, this mass measured 1.6 x 1.5 x 1.1 cm, was taller than wide, and with posterior shadowing.  The right axilla was examined, and only normal appearing right axillary lymph nodes were detected.  She then subsequently underwent core needle biopsy which returned as invasive moderately differentiated ductal carcinoma, Robert grade 6 of 9, no in situ carcinoma was identified.  She was noted to be ER positive at 95%, DE positive at 50% and HER2 negative.  Her family history is significant for her mother who was diagnosed with breast cancer in her 50s and her father who had skin cancers.  She had a paternal grandmother with colon cancer at the age of 70.  She reports that she started having her periods at age 15, she had a hysterectomy in 1991 for abnormal bleeding.  She had 4 pregnancies with 3 live births and 1 miscarriage.  Her first child was born when she was 19 and she never breast-fed.  She took oral  contraceptive pills for a short time 30 years ago, and has never taken any hormone replacement therapy.    Review of Systems   Constitutional: Negative for appetite change, chills and fever.   HENT: Negative for congestion and sore throat.    Respiratory: Negative for cough and shortness of breath.    Cardiovascular: Negative for chest pain and palpitations.   Gastrointestinal: Negative for abdominal pain, constipation, diarrhea, nausea, vomiting and GERD.   Genitourinary: Negative for difficulty urinating, dysuria and frequency.   Musculoskeletal: Negative for arthralgias and back pain.   Skin: Negative for rash and skin lesions.   Neurological: Negative for dizziness, seizures and memory problem.   Hematological: Negative for adenopathy. Does not bruise/bleed easily.   Psychiatric/Behavioral: Negative for sleep disturbance and depressed mood.        History  Past Medical History:   Diagnosis Date   • Diabetes mellitus (HCC)    • GERD (gastroesophageal reflux disease)    • Hiatal hernia    • Hyperlipidemia    • Type 2 diabetes mellitus (HCC)      Past Surgical History:   Procedure Laterality Date   • BREAST LUMPECTOMY Left    • SUBTOTAL HYSTERECTOMY       Family History   Problem Relation Age of Onset   • Breast cancer Mother    • Heart disease Mother    • Thyroid disease Mother    • Stroke Mother    • Clotting disorder Father    • Diabetes Brother    • Heart disease Brother    • Cancer Paternal Grandmother    • Colon cancer Paternal Grandmother      Social History     Tobacco Use   • Smoking status: Former Smoker     Types: Cigarettes     Quit date:      Years since quittin.5   • Smokeless tobacco: Never Used   Vaping Use   • Vaping Use: Never used   Substance Use Topics   • Alcohol use: Yes     Comment: rare   • Drug use: No     (Not in a hospital admission)    Allergies:  Patient has no known allergies.    Objective     Vital Signs       Physical Exam  Vitals reviewed. Exam conducted with a  chaperone present.   Constitutional:       Appearance: She is well-developed.   HENT:      Head: Normocephalic and atraumatic.   Eyes:      Pupils: Pupils are equal, round, and reactive to light.   Cardiovascular:      Rate and Rhythm: Normal rate and regular rhythm.   Pulmonary:      Effort: Pulmonary effort is normal.      Breath sounds: Normal breath sounds.   Chest:   Breasts:      Right: No axillary adenopathy or supraclavicular adenopathy.      Left: No axillary adenopathy or supraclavicular adenopathy.        Comments: Both breasts were examined the upright and supine position.  Both breasts exhibit normal shape and contour without any dominant masses bilaterally.  There are no overlying skin changes, no discharge from the nipple, and no obvious deformities.  Abdominal:      General: There is no distension.      Palpations: Abdomen is soft.      Tenderness: There is no abdominal tenderness.      Hernia: No hernia is present.   Musculoskeletal:         General: Normal range of motion.      Cervical back: Normal range of motion.   Lymphadenopathy:      Cervical: No cervical adenopathy.      Upper Body:      Right upper body: No supraclavicular or axillary adenopathy.      Left upper body: No supraclavicular or axillary adenopathy.   Skin:     General: Skin is warm and dry.      Findings: No rash.   Neurological:      Mental Status: She is alert and oriented to person, place, and time.         Results Review:   Lab Results (last 24 hours)     ** No results found for the last 24 hours. **        No radiology results for the last day      I reviewed the patient's new imaging results and agree with the interpretation.  I reviewed the patient's other test results and agree with the interpretation    Assessment & Plan     Active Problems:  Right breast cancer    We reviewed the patient's imaging, and her pathology reports in detail.  We discussed that breast cancer is treated in a multidisciplinary fashion, with  input from radiation oncology, medical oncology, and general surgery.  We discussed that the patient's case will be discussed at a multidisciplinary tumor board meeting held every other week.  We then discussed the diagnosis of breast cancer and that most breast cancers arise either from the ducts or from the lobules.  Using visual aids, we discussed the difference between invasive and in situ disease, especially whether or not the lymph nodes need to be evaluated.  Usually, with in situ disease, lymph nodes are not examined.  However, examining the lymph nodes should be considered if the area of concern is widespread or if it is high-grade.  We also discussed hormone receptors, and discussed that there are medications which can be given if the hormone receptors were positive which can be used to treat the cancer, and to provide protection in not only the breast with cancer, but the contralateral breast as well.  The surgical options were discussed with the patient which include breast conservation therapy (lumpectomy with radiation) and mastectomy.  With regards to mastectomy, we discussed that reconstruction may be performed either at the time of the surgery, or in a delayed fashion.  We discussed that the survival benefit between these 2 procedures are equivalent, and therefore they are considered oncologically appropriate.  However, some women will choose one versus the other for a multitude of factors.  There are times when a lumpectomy is not a feasible option, these include but are not limited to tumor to breast ratio, the location of the tumor, previous radiation to the chest wall, or connective tissue disorders which would make radiation treatment ineffective.  This patient has elected to undergo breast conservation therapy and understands that this may include a lumpectomy with evaluation of her lymph nodes, followed by radiation.  There may or may not be a role for chemotherapy or hormonal therapy  following her surgery as well.  This will be managed by medical oncology.  We discussed that in order for the surgery to be effective we will need to have clear surgical margins, and that if the margins are positive, then she may require further surgical excision versus a prolonged course of radiation.  The risk, benefits, and alternatives to surgery were discussed with the patient which include but are not limited to: bleeding, infection, damage to nerves/blood vessels, and pain.    The patient understands, and agrees to proceed with wire localized right breast lumpectomy with sentinel lymph node biopsy.      I discussed the patients findings and my recommendations with the patient and her .     Jagdeep Mcwilliams MD  08/01/22  15:02 EDT

## 2022-08-01 NOTE — PROGRESS NOTES
RADIATION THERAPY CONSULT NOTE    NAME: Alaina Hartman  YOB: 1965  MRN #: [id number]  DATE OF SERVICE: 8/1/2022  REFERRING PROVIDER: No referring provider defined for this encounter.  PRIMARY CARE PROVIDER: Magali Rene MD    DIAGNOSIS:    No diagnosis found.    REASON FOR CONSULTATION/CHIEF COMPLAINT:  ***  I was asked to see the patient at the request of the referring provider noted below for advice and recommendations regarding this diagnosis and the role of radiation therapy.                              REQUESTING PHYSICIAN:    No referring provider defined for this encounter.    RECORDS OBTAINED:  Records of the patients history including those obtained from the referring provider were reviewed and summarized in detail.    HISTORY OF PRESENT ILLNESS:  Alaina Hartman is a 56 y.o. female         The following portions of the patient's history were reviewed and updated as appropriate: allergies, current medications, past family history, past medical history, past social history, past surgical history and problem list. Reviewed with the patient and remain unchanged.    PAST MEDICAL HISTORY:  she has a past medical history of Diabetes mellitus (HCC), GERD (gastroesophageal reflux disease), Hiatal hernia, Hyperlipidemia, and Type 2 diabetes mellitus (HCC).    MEDICATIONS:    Current Outpatient Medications:   •  ALPRAZolam (XANAX) 0.5 MG tablet, Take 1 tablet by mouth Daily As Needed for Anxiety., Disp: 20 tablet, Rfl: 0  •  atorvastatin (LIPITOR) 40 MG tablet, Take 1 tablet by mouth Daily., Disp: 90 tablet, Rfl: 4  •  BIOTIN PO, Take  by mouth., Disp: , Rfl:   •  Cholecalciferol (Vitamin D3) 50 MCG (2000 UT) capsule, Take 2,000 Units by mouth Daily., Disp: , Rfl:   •  Cyanocobalamin (Vitamin B-12) 1000 MCG sublingual tablet, Place 1 tablet under the tongue Daily., Disp: 100 each, Rfl: 4  •  glucose blood (Accu-Chek Guide) test strip, 1 each by Other route 4 (Four) Times a Day. Use  as instructed, Disp: 150 each, Rfl: 11  •  Insulin Lispro Prot & Lispro (HumaLOG Mix 75/25 KwikPen) (75-25) 100 UNIT/ML suspension pen-injector pen, Inject 35 units subcu 3 times a day before each meal., Disp: 30 mL, Rfl: 5  •  Insulin Pen Needle (Pen Needles) 32G X 4 MM misc, 1 each 4 (Four) Times a Day. Use to inject insulin / DX E11.65, Disp: 150 each, Rfl: 5  •  lisinopril (PRINIVIL,ZESTRIL) 5 MG tablet, Take 1 tablet by mouth Daily. Appointment needed for additional refills., Disp: 90 tablet, Rfl: 2  •  metFORMIN ER (GLUCOPHAGE-XR) 500 MG 24 hr tablet, Take 2 tablets by mouth Daily. Appointment needed for additional refills., Disp: 180 tablet, Rfl: 2  •  multivitamin with minerals tablet tablet, Take 1 tablet by mouth Daily., Disp: , Rfl:   •  omeprazole (priLOSEC) 40 MG capsule, Take 1 capsule by mouth Daily. Appointment needed for additional refills., Disp: 90 capsule, Rfl: 2  •  Vitamin D, Ergocalciferol, 50 MCG (2000 UT) capsule, Take 2,000 Units by mouth Daily., Disp: 100 capsule, Rfl: 4    ALLERGIES:  No Known Allergies    PAST SURGICAL HISTORY:  she has a past surgical history that includes Breast lumpectomy (Left, 2003) and Subtotal Hysterectomy.    PREVIOUS RADIOTHERAPY OR CHEMOTHERAPY:  {yes and no:75829}    FAMILY HISTORY:  herfamily history includes Breast cancer in her mother; Cancer in her paternal grandmother; Clotting disorder in her father; Colon cancer in her paternal grandmother; Diabetes in her brother; Heart disease in her brother and mother; Stroke in her mother; Thyroid disease in her mother.    SOCIAL HISTORY:  she reports that she quit smoking about 12 years ago. Her smoking use included cigarettes. She has never used smokeless tobacco. She reports current alcohol use. She reports that she does not use drugs.    PAIN AND PAIN MANAGEMENT:  There were no vitals filed for this visit.    NUTRITIONAL STATUS:  Otherwise no issues    KPS:  {Karnofsky Performance Status:10940}  ECOG:  {Religious  Health Onc ECOG Status:75886}   PHQ-9 Total Score:       REVIEW OF SYSTEMS:   Review of Systems     Otherwise negative as below.     General: No fevers, chills, weight change, or drenching night sweats. Skin: No rashes or jaundice.  HEENT: No change in vision or hearing, no headaches.  Neck: No dysphagia or masses.  Heme/Lymph: No easy bruising or bleeding.  Respiratory System: No shortness of breath or cough.  Cardiovascular: No chest pain, palpitations, or dyspnea on exertion.  +/- Pacemaker. GI: No nausea, vomiting, diarrhea, melena, or hematochezia.  : No dysuria or hematuria.  Endocrine: No heat or cold intolerance. Musculoskeletal: No myalgias or arthralgias.  Neuro: No weakness, numbness, syncope, or seizures. Psych: No mood changes or depression. Ext: Denies swelling.      Objective   VITAL SIGNS:  There were no vitals filed for this visit.    PHYSICAL EXAM:  GENERAL:  No apparent distress. Sitting comfortably in room.    HEENT:  Normocephalic, atraumatic. Pupils are equal, round, reactive to light. Sclera anicteric. Conjunctiva not injected. Oropharynx without erythema, ulcerations or thrush.   NECK:  Supple with no masses.  LYMPHATIC:  No cervical, supraclavicular or axillary adenopathy appreciated bilaterally.   CARDIOVASCULAR:  S1 & S2 detected; no murmurs, rubs or gallops.  CHEST:  Clear to auscultation bilaterally; no wheezes, crackles or rubs. Work of breathing normal.  ABDOMEN:  Bowel sounds present. Abdomen is soft, nontender, nondistended.   MUSCULOSKELETAL:  No tenderness to palpation along the spine or scapulae. Normal range of motion.  EXTREMITIES:  No clubbing, cyanosis, edema.  SKIN:  No erythema, rashes, ulcerations noted.   NEUROLOGIC:  Cranial nerves II-XII grossly intact bilaterally. No focal neurologic deficits.  PSYCHIATRIC:  Alert, aware, and appropriate.  BREASTS *** (Enter for standard Template if female): Left breast unremarkable with no dominant masses, skin changes, discharge or  pain. Right breast unremarkable with no dominant masses, skin changes, discharge or pain.  GYN: ***  RECTAL EXAMINATION *** (Enter for standard Template if male): External skin normal, normal sphincter tone, prostate normal in size and consistency, no nodules, stool was guaiac negative.    PERTINENT IMAGING/PATHOLOGY/LABS (Medical Decision Making):      COORDINATION OF CARE:  A copy of this note is sent to the referring provider.    PATHOLOGY (Reviewed):     IMAGING (Reviewed):     LABS (Reviewed):  HEMATOLOGY:  WBC   Date Value Ref Range Status   08/26/2020 11.0 (H) 3.4 - 10.8 x10E3/uL Final     RBC   Date Value Ref Range Status   08/26/2020 4.53 3.77 - 5.28 x10E6/uL Final     Hemoglobin   Date Value Ref Range Status   08/26/2020 12.9 11.1 - 15.9 g/dL Final     Hematocrit   Date Value Ref Range Status   08/26/2020 40.4 34.0 - 46.6 % Final     Platelets   Date Value Ref Range Status   08/26/2020 364 150 - 450 x10E3/uL Final     CHEMISTRY:  Lab Results   Component Value Date    GLUCOSE 285 (H) 07/07/2022    BUN 12 07/07/2022    CREATININE 0.66 07/07/2022    EGFRIFNONA 90 12/01/2021    EGFRIFAFRI 115 08/26/2020    BCR 18.2 07/07/2022    K 4.7 07/07/2022    CO2 27.0 07/07/2022    CALCIUM 9.2 07/07/2022    PROTENTOTREF 7.0 08/26/2020    ALBUMIN 4.20 07/07/2022    LABIL2 1.8 08/26/2020    AST 11 07/07/2022    ALT 22 07/07/2022       Assessment & Plan   ASSESSMENT AND PLAN:    No diagnosis found.         This assessment comes from my review of the imaging, pathology, physician notes and other pertinent information as mentioned.    DISPOSITION:        TIME SPENT WITH PATIENT:  I spent greater than *** minutes in face-to-face time with the patient and *** minutes of that time were spent in counseling and coordination of care, including review of imaging and pathology; indications, goals, logistics, alternatives and risks - both common and rare - for my recommendations as well as surveillance and potential outcomes.          CC: No ref. provider found Magali Rene MD Jill Weaver, MIGUEL  8/1/2022  2:34 PM EDT

## 2022-08-02 ENCOUNTER — RESEARCH ENCOUNTER (OUTPATIENT)
Dept: OTHER | Facility: OTHER | Age: 57
End: 2022-08-02

## 2022-08-02 ENCOUNTER — CONSULT (OUTPATIENT)
Dept: RADIATION ONCOLOGY | Facility: HOSPITAL | Age: 57
End: 2022-08-02

## 2022-08-02 VITALS
HEIGHT: 64 IN | WEIGHT: 249 LBS | RESPIRATION RATE: 20 BRPM | SYSTOLIC BLOOD PRESSURE: 142 MMHG | DIASTOLIC BLOOD PRESSURE: 81 MMHG | HEART RATE: 96 BPM | BODY MASS INDEX: 42.51 KG/M2 | TEMPERATURE: 96.8 F | OXYGEN SATURATION: 96 %

## 2022-08-02 DIAGNOSIS — C50.111 MALIGNANT NEOPLASM OF CENTRAL PORTION OF RIGHT BREAST IN FEMALE, ESTROGEN RECEPTOR POSITIVE: Primary | ICD-10-CM

## 2022-08-02 DIAGNOSIS — Z17.0 MALIGNANT NEOPLASM OF CENTRAL PORTION OF RIGHT BREAST IN FEMALE, ESTROGEN RECEPTOR POSITIVE: Primary | ICD-10-CM

## 2022-08-02 PROCEDURE — G0463 HOSPITAL OUTPT CLINIC VISIT: HCPCS

## 2022-08-02 PROCEDURE — 99204 OFFICE O/P NEW MOD 45 MIN: CPT | Performed by: RADIOLOGY

## 2022-08-02 NOTE — PROGRESS NOTES
"Chief Complaint  Sleep Apnea    Subjective          Alaina Hartman presents to [facility] NEUROLOGY  History of Present Illness  PETE, yearly f/u for CPAP compliance, patient doing well with pap therapy. Patient uses a nasal mask and goes through [employer] for supplies. patient has not received replacement machine yet.    Sleep testing history:    On HST ,  patient had Severe obstructive sleep apnea syndrome with apnea-hypopnea index of 101.1 per sleep hour, minimum SpO2 of 62%    PAP download:  The patient is on CPAP therapy at 12-15 cm/H2O.   Data indicates Excellent compliance. With 100% usage for more than 4 hours with an average usage of 8 hours 16 minutes. AHI down to 0.6 .  Average pressures 12.3.  Average large leak 0sec.     The patient's hypersomnia has resolved       [place] Sleepiness Scale:  Sitting and reading 0 WatchingTV 0  Sitting, inactive, in a public place 0  As a passenger in a car for 1 hour w/o a break  0  Lying down to rest in the afternoon  0  Sitting and talking to someone  0  Sitting quietly after a lunch  0  In a car, while stopped for traffic or a light  0  Total 0    Review of Systems   Respiratory: Positive for apnea.    All other systems reviewed and are negative.        Objective   Vital Signs:   /76   Pulse 85   Temp 98 °F (36.7 °C) (Temporal)   Resp 18   Ht 162.6 cm (64\")   Wt 113 kg (250 lb)   BMI 42.91 kg/m²     Physical Exam  Vitals reviewed.   Pulmonary:      Effort: Pulmonary effort is normal.   Neurological:      General: No focal deficit present.      Mental Status: She is alert.   Psychiatric:         Mood and Affect: Mood normal.        Result Review :                 Assessment and Plan    Diagnoses and all orders for this visit:    1. Obstructive sleep apnea syndrome (Primary)      Continue CPAP 12-15  The patient is compliant with and benefiting from PAP therapy.      Follow Up   Return in about 1 year (around 8/4/2023).    Patient was given " instructions and counseling regarding her condition or for health maintenance advice. Please see specific information pulled into the AVS if appropriate.       This document has been electronically signed by Joseph Seipel, MD on August 4, 2022 09:08 EDT

## 2022-08-02 NOTE — PROGRESS NOTES
Radiation Oncology Consult Note    Name: Alaina Hartman  YOB: 1965  MRN #: [id number]  Date of Service: 8/2/2022  Referring Provider: Magali Rene  [facility] [address] [place] [id number]  Primary Care Provider: Magali Rene MD    DIAGNOSIS: Moderately differentiated invasive ductal carcinoma of the right breast, ER + (95%), HI + (50%) Her2-; [id number].    REASON FOR CONSULTATION/CHIEF COMPLAINT:  Newly diagnosed right breast cancer  I was asked to see the patient at the request of the referring provider noted below for advice and recommendations regarding this diagnosis and the role of radiation therapy.                              REQUESTING PHYSICIAN:  Magali Rene Md [address] [address] [place],  [place] [facility]    RECORDS OBTAINED:  Records of the patients history including those obtained from the referring provider were reviewed and summarized in detail.    HISTORY OF PRESENT ILLNESS:  Alaina Hartman is a 56 y.o. female who was clinically asymptomatic and had a routine screening mammogram which showed an abnormality on the right breast.       Review of her screening mammogram which was performed on 6/3/2022 showed a new 2 cm focal asymmetry with architectural distortion in the mid to posterior third depth of the outer right breast the left breast was benign.  Right diagnostic mammogram and ultrasound was recommended.      On 7/6/2022 patient underwent right diagnostic mammogram and ultrasound which showed a 2 cm irregular mass in the lateral inferior breast.  Same day she had an ultrasound which showed a 1.6 cm on the right breast the right axilla did not show any abnormal lymph nodes.     Patient then underwent ultrasound-guided biopsy of the right breast mass and pathology revealed moderately differentiated invasive ductal carcinoma estrogen receptor positive at 95% progesterone receptor positive at 50%, HER2/bertha was negative at 1+  on immunohistochemistry.        Patient had a partial hysterectomy many years ago.  She does have personal history of prior left breast biopsy in 2003 for benign disease.    She is  and has 3 children.  She does not smoke, does not drink alcohol     Family history significant for mother with breast cancer in her 50s, her father had skin cancers, paternal grandmother had colon cancer at the age of 70        The following portions of the patient's history were reviewed and updated as appropriate: allergies, current medications, past family history, past medical history, past social history, past surgical history and problem list. Reviewed with the patient and remain unchanged.    PAST MEDICAL HISTORY:  she  has a past medical history of Diabetes mellitus (HCC), GERD (gastroesophageal reflux disease), Hiatal hernia, Hyperlipidemia, and Type 2 diabetes mellitus (HCC).  MEDICATIONS:   Current Outpatient Medications:   •  ALPRAZolam (XANAX) 0.5 MG tablet, Take 1 tablet by mouth Daily As Needed for Anxiety., Disp: 20 tablet, Rfl: 0  •  atorvastatin (LIPITOR) 40 MG tablet, Take 1 tablet by mouth Daily., Disp: 90 tablet, Rfl: 4  •  BIOTIN PO, Take  by mouth., Disp: , Rfl:   •  Cholecalciferol (Vitamin D3) 50 MCG (2000 UT) capsule, Take 2,000 Units by mouth Daily., Disp: , Rfl:   •  Cyanocobalamin (Vitamin B-12) 1000 MCG sublingual tablet, Place 1 tablet under the tongue Daily., Disp: 100 each, Rfl: 4  •  glucose blood (Accu-Chek Guide) test strip, 1 each by Other route 4 (Four) Times a Day. Use as instructed, Disp: 150 each, Rfl: 11  •  Insulin Lispro Prot & Lispro (HumaLOG Mix 75/25 KwikPen) (75-25) 100 UNIT/ML suspension pen-injector pen, Inject 35 units subcu 3 times a day before each meal., Disp: 30 mL, Rfl: 5  •  Insulin Pen Needle (Pen Needles) 32G X 4 MM misc, 1 each 4 (Four) Times a Day. Use to inject insulin / DX E11.65, Disp: 150 each, Rfl: 5  •  lisinopril (PRINIVIL,ZESTRIL) 5 MG tablet, Take 1 tablet by  "mouth Daily. Appointment needed for additional refills., Disp: 90 tablet, Rfl: 2  •  metFORMIN ER (GLUCOPHAGE-XR) 500 MG 24 hr tablet, Take 2 tablets by mouth Daily. Appointment needed for additional refills., Disp: 180 tablet, Rfl: 2  •  multivitamin with minerals tablet tablet, Take 1 tablet by mouth Daily., Disp: , Rfl:   •  omeprazole (priLOSEC) 40 MG capsule, Take 1 capsule by mouth Daily. Appointment needed for additional refills., Disp: 90 capsule, Rfl: 2  •  Vitamin D, Ergocalciferol, 50 MCG (2000 UT) capsule, Take 2,000 Units by mouth Daily., Disp: 100 capsule, Rfl: 4  ALLERGIES: No Known Allergies  PAST SURGICAL HISTORY: she has a past surgical history that includes Breast lumpectomy (Left, 2003) and Subtotal Hysterectomy.  PREVIOUS RADIOTHERAPY OR CHEMOTHERAPY: no; no connective tissue disorders  FAMILY HISTORY: her family history includes Breast cancer in her mother; Cancer in her paternal grandmother; Clotting disorder in her father; Colon cancer in her paternal grandmother; Diabetes in her brother; Heart disease in her brother and mother; Stroke in her mother; Thyroid disease in her mother.   Paternal aunt had lung cancer  SOCIAL HISTORY: she  reports that she quit smoking about 12 years ago. Her smoking use included cigarettes. She has never used smokeless tobacco. She reports current alcohol use. She reports that she does not use drugs. .  PAIN AND PAIN MANAGEMENT: no pain.  Vitals:    08/02/22 1442   BP: 142/81   Pulse: 96   Resp: 20   Temp: 96.8 °F (36 °C)   TempSrc: Temporal   SpO2: 96%   Weight: 113 kg (249 lb)   Height: 162.6 cm (64\")   PainSc: 0-No pain     NUTRITIONAL STATUS:   no issues  KPS: 90        PHQ-9 Total Score: negative distress tool.     Review of Systems:   General: No fevers, chills, weight change, or drenching night sweats. Skin: No rashes or jaundice.  HEENT: No change in vision or hearing, no headaches.  Neck: No dysphagia or masses.  Heme/Lymph: No easy bruising or " "bleeding.  Respiratory System: No shortness of breath or cough.  Cardiovascular: No chest pain, palpitations, or dyspnea on exertion.  - Pacemaker. GI: No nausea, vomiting, diarrhea, melena, or hematochezia.  : No dysuria or hematuria.  Endocrine: No heat or cold intolerance. Musculoskeletal: No myalgias or arthralgias.  Neuro: No weakness, numbness, syncope, or seizures. Psych: No mood changes or depression. Ext: Denies swelling.        Objective     Vitals:  Vitals:    08/02/22 1442   BP: 142/81   Pulse: 96   Resp: 20   Temp: 96.8 °F (36 °C)   TempSrc: Temporal   SpO2: 96%   Weight: 113 kg (249 lb)   Height: 162.6 cm (64\")   PainSc: 0-No pain         PHYSICAL EXAM:  GENERAL: in no apparent distress, sitting comfortably in room.    HEENT: normocephalic, atraumatic. Pupils are equal, round, reactive to light. Sclera anicteric. Conjunctiva not injected. Oropharynx without erythema, ulcerations or thrush.   NECK: Supple with no masses.  LYMPHATIC: no cervical, supraclavicular or axillary adenopathy appreciated bilaterally.   CARDIOVASCULAR: S1 & S2 detected; no murmurs, rubs or gallops.  CHEST: clear to auscultation bilaterally; no wheezes, crackles or rubs. Work of breathing normal.  ABDOMEN: bowel sounds present. Abdomen is soft, nontender, nondistended.   MUSCULOSKELETAL: no tenderness to palpation along the spine or scapulae. Normal range of motion.  EXTREMITIES: no clubbing, cyanosis, edema.  SKIN: no erythema, rashes, ulcerations noted.   NEUROLOGIC: cranial nerves II-XII grossly intact bilaterally. No focal neurologic deficits.  PSYCHIATRIC:  alert, aware, and appropriate.  BREASTS: Left breast unremarkable with no dominant masses, skin changes, discharge or pain; Right breast with post biopsy bruising/changes, axillary region is negative. No discharge or pain.      PERTINENT IMAGING/PATHOLOGY/LABS (Medical Decision Making):     COORDINATION OF CARE: A copy of this note is sent to the referring " provider.    PATHOLOGY (Reviewed): as noted above    IMAGING (Reviewed): as noted above.    LABS (Reviewed):  Hematology WBC   Date Value Ref Range Status   08/26/2020 11.0 (H) 3.4 - 10.8 x10E3/uL Final     RBC   Date Value Ref Range Status   08/26/2020 4.53 3.77 - 5.28 x10E6/uL Final     Hemoglobin   Date Value Ref Range Status   08/26/2020 12.9 11.1 - 15.9 g/dL Final     Hematocrit   Date Value Ref Range Status   08/26/2020 40.4 34.0 - 46.6 % Final     Platelets   Date Value Ref Range Status   08/26/2020 364 150 - 450 x10E3/uL Final      Chemistry   Lab Results   Component Value Date    GLUCOSE 285 (H) 07/07/2022    BUN 12 07/07/2022    CREATININE 0.66 07/07/2022    EGFRIFNONA 90 12/01/2021    EGFRIFAFRI 115 08/26/2020    BCR 18.2 07/07/2022    K 4.7 07/07/2022    CO2 27.0 07/07/2022    CALCIUM 9.2 07/07/2022    PROTENTOTREF 7.0 08/26/2020    ALBUMIN 4.20 07/07/2022    LABIL2 1.8 08/26/2020    AST 11 07/07/2022    ALT 22 07/07/2022       Assessment & Plan     ASSESSMENT AND PLAN:    59 year old IDC of the right breast, ER+MN+Her2-  [id number]  -FH discussed with patient and with Dr. Rust from genetics standpoint earlier---mother at age of 50, paternal grandmother with colon cancer 17, father had skin cancer.  -Earlier met with Dr. Mcwilliams and favoring BCT and I have discussed the role of adjuvant whole breast radiation therapy to her in complete detail.  -Role for Oncotype DX already discussed.  All questions answered about XRT after high level discussion, including potential toxicities both acute and late.    This assessment comes from my review of the imaging, pathology, physician notes and other pertinent information as mentioned.    DISPOSITION: Post-Breast conserving surgery f/u needed.        TIME SPENT WITH PATIENT:   I spent 45 minutes caring for Alaina on this date of service. This time includes time spent by me in the following activities: preparing for the visit, reviewing tests, obtaining and/or  reviewing a separately obtained history, performing a medically appropriate examination and/or evaluation, counseling and educating the patient/family/caregiver, documenting information in the medical record and care coordination           CC: Magali Rene Catherine Nicole, MD Ifeoma R. Okeke, MD John P. McCormick, MD John A Cox, MD  8/2/2022  2:58 PM EDT        Addendum:  Surgery on 08/18/2022 with Dr. Mcwilliams--Right breast lumpectomy with a 1.7 cm IDC, grade 2,  5 SLNs removed and all negative, margins clear. Oncotype DX and follow-up with Dr. Rust; she will need to see us after Oncotype results are available to guide systemic therapy discussions.    Approved by:     Jagdeep De Los Santos MD  08/24/22  15:14 EDT

## 2022-08-04 ENCOUNTER — OFFICE VISIT (OUTPATIENT)
Dept: NEUROLOGY | Facility: CLINIC | Age: 57
End: 2022-08-04

## 2022-08-04 VITALS
HEART RATE: 85 BPM | TEMPERATURE: 98 F | DIASTOLIC BLOOD PRESSURE: 76 MMHG | BODY MASS INDEX: 42.68 KG/M2 | WEIGHT: 250 LBS | HEIGHT: 64 IN | SYSTOLIC BLOOD PRESSURE: 111 MMHG | RESPIRATION RATE: 18 BRPM

## 2022-08-04 DIAGNOSIS — G47.33 OBSTRUCTIVE SLEEP APNEA SYNDROME: Primary | ICD-10-CM

## 2022-08-04 PROCEDURE — 99213 OFFICE O/P EST LOW 20 MIN: CPT | Performed by: PSYCHIATRY & NEUROLOGY

## 2022-08-09 ENCOUNTER — HOSPITAL ENCOUNTER (OUTPATIENT)
Dept: CARDIOLOGY | Facility: HOSPITAL | Age: 57
Discharge: HOME | End: 2022-08-09

## 2022-08-09 ENCOUNTER — LAB (OUTPATIENT)
Dept: LAB | Facility: HOSPITAL | Age: 57
End: 2022-08-09

## 2022-08-09 DIAGNOSIS — C50.111 MALIGNANT NEOPLASM OF CENTRAL PORTION OF RIGHT BREAST IN FEMALE, ESTROGEN RECEPTOR POSITIVE: ICD-10-CM

## 2022-08-09 DIAGNOSIS — Z17.0 MALIGNANT NEOPLASM OF CENTRAL PORTION OF RIGHT BREAST IN FEMALE, ESTROGEN RECEPTOR POSITIVE: ICD-10-CM

## 2022-08-09 LAB
ALBUMIN SERPL-MCNC: 4.2 G/DL (ref 3.5–5.2)
ALBUMIN/GLOB SERPL: 1.8 G/DL
ALP SERPL-CCNC: 127 U/L (ref 39–117)
ALT SERPL W P-5'-P-CCNC: 18 U/L (ref 1–33)
ANION GAP SERPL CALCULATED.3IONS-SCNC: 13.5 MMOL/L (ref 5–15)
AST SERPL-CCNC: 17 U/L (ref 1–32)
BASOPHILS # BLD AUTO: 0.09 10*3/MM3 (ref 0–0.2)
BASOPHILS NFR BLD AUTO: 0.9 % (ref 0–1.5)
BILIRUB SERPL-MCNC: 0.5 MG/DL (ref 0–1.2)
BUN SERPL-MCNC: 14 MG/DL (ref 6–20)
BUN/CREAT SERPL: 20 (ref 7–25)
CALCIUM SPEC-SCNC: 8.9 MG/DL (ref 8.6–10.5)
CHLORIDE SERPL-SCNC: 99 MMOL/L (ref 98–107)
CO2 SERPL-SCNC: 25.5 MMOL/L (ref 22–29)
CREAT SERPL-MCNC: 0.7 MG/DL (ref 0.57–1)
DEPRECATED RDW RBC AUTO: 42.8 FL (ref 37–54)
EGFRCR SERPLBLD CKD-EPI 2021: 102.9 ML/MIN/1.73
EOSINOPHIL # BLD AUTO: 0.17 10*3/MM3 (ref 0–0.4)
EOSINOPHIL NFR BLD AUTO: 1.6 % (ref 0.3–6.2)
ERYTHROCYTE [DISTWIDTH] IN BLOOD BY AUTOMATED COUNT: 13.8 % (ref 12.3–15.4)
GLOBULIN UR ELPH-MCNC: 2.4 GM/DL
GLUCOSE SERPL-MCNC: 221 MG/DL (ref 65–99)
HCT VFR BLD AUTO: 39.1 % (ref 34–46.6)
HGB BLD-MCNC: 12.5 G/DL (ref 12–15.9)
IMM GRANULOCYTES # BLD AUTO: 0.05 10*3/MM3 (ref 0–0.05)
IMM GRANULOCYTES NFR BLD AUTO: 0.5 % (ref 0–0.5)
LYMPHOCYTES # BLD AUTO: 4.26 10*3/MM3 (ref 0.7–3.1)
LYMPHOCYTES NFR BLD AUTO: 41.3 % (ref 19.6–45.3)
MCH RBC QN AUTO: 27.1 PG (ref 26.6–33)
MCHC RBC AUTO-ENTMCNC: 32 G/DL (ref 31.5–35.7)
MCV RBC AUTO: 84.6 FL (ref 79–97)
MONOCYTES # BLD AUTO: 0.72 10*3/MM3 (ref 0.1–0.9)
MONOCYTES NFR BLD AUTO: 7 % (ref 5–12)
NEUTROPHILS NFR BLD AUTO: 48.7 % (ref 42.7–76)
NEUTROPHILS NFR BLD AUTO: 5.03 10*3/MM3 (ref 1.7–7)
NRBC BLD AUTO-RTO: 0 /100 WBC (ref 0–0.2)
PLATELET # BLD AUTO: 355 10*3/MM3 (ref 140–450)
PMV BLD AUTO: 11 FL (ref 6–12)
POTASSIUM SERPL-SCNC: 3.9 MMOL/L (ref 3.5–5.2)
PROT SERPL-MCNC: 6.6 G/DL (ref 6–8.5)
RBC # BLD AUTO: 4.62 10*6/MM3 (ref 3.77–5.28)
SODIUM SERPL-SCNC: 138 MMOL/L (ref 136–145)
WBC NRBC COR # BLD: 10.32 10*3/MM3 (ref 3.4–10.8)

## 2022-08-09 PROCEDURE — 80053 COMPREHEN METABOLIC PANEL: CPT

## 2022-08-09 PROCEDURE — 85025 COMPLETE CBC W/AUTO DIFF WBC: CPT

## 2022-08-09 PROCEDURE — 36415 COLL VENOUS BLD VENIPUNCTURE: CPT

## 2022-08-09 PROCEDURE — 93005 ELECTROCARDIOGRAM TRACING: CPT | Performed by: SURGERY

## 2022-08-09 PROCEDURE — 93010 ELECTROCARDIOGRAM REPORT: CPT | Performed by: INTERNAL MEDICINE

## 2022-08-10 LAB — QT INTERVAL: 365 MS

## 2022-08-15 ENCOUNTER — TELEPHONE (OUTPATIENT)
Dept: ONCOLOGY | Facility: CLINIC | Age: 57
End: 2022-08-15

## 2022-08-16 ENCOUNTER — LAB (OUTPATIENT)
Dept: LAB | Facility: HOSPITAL | Age: 57
End: 2022-08-16

## 2022-08-16 DIAGNOSIS — C50.111 MALIGNANT NEOPLASM OF CENTRAL PORTION OF RIGHT BREAST IN FEMALE, ESTROGEN RECEPTOR POSITIVE: ICD-10-CM

## 2022-08-16 DIAGNOSIS — Z17.0 MALIGNANT NEOPLASM OF CENTRAL PORTION OF RIGHT BREAST IN FEMALE, ESTROGEN RECEPTOR POSITIVE: ICD-10-CM

## 2022-08-16 PROCEDURE — U0004 COV-19 TEST NON-CDC HGH THRU: HCPCS

## 2022-08-16 PROCEDURE — C9803 HOPD COVID-19 SPEC COLLECT: HCPCS

## 2022-08-17 ENCOUNTER — ANESTHESIA EVENT (OUTPATIENT)
Dept: PERIOP | Facility: HOSPITAL | Age: 57
End: 2022-08-17

## 2022-08-17 LAB — SARS-COV-2 ORF1AB RESP QL NAA+PROBE: NOT DETECTED

## 2022-08-18 ENCOUNTER — ANESTHESIA (OUTPATIENT)
Dept: PERIOP | Facility: HOSPITAL | Age: 57
End: 2022-08-18

## 2022-08-18 ENCOUNTER — HOSPITAL ENCOUNTER (OUTPATIENT)
Dept: NUCLEAR MEDICINE | Facility: HOSPITAL | Age: 57
Discharge: HOME | End: 2022-08-18

## 2022-08-18 ENCOUNTER — HOSPITAL ENCOUNTER (OUTPATIENT)
Facility: HOSPITAL | Age: 57
Setting detail: HOSPITAL OUTPATIENT SURGERY
Discharge: HOME | End: 2022-08-18
Attending: SURGERY | Admitting: SURGERY

## 2022-08-18 ENCOUNTER — HOSPITAL ENCOUNTER (OUTPATIENT)
Dept: MAMMOGRAPHY | Facility: HOSPITAL | Age: 57
Discharge: HOME | End: 2022-08-18

## 2022-08-18 VITALS
SYSTOLIC BLOOD PRESSURE: 152 MMHG | BODY MASS INDEX: 46.85 KG/M2 | OXYGEN SATURATION: 97 % | HEIGHT: 64 IN | WEIGHT: 274.4 LBS | RESPIRATION RATE: 16 BRPM | TEMPERATURE: 97.3 F | DIASTOLIC BLOOD PRESSURE: 79 MMHG | HEART RATE: 66 BPM

## 2022-08-18 DIAGNOSIS — C50.111 MALIGNANT NEOPLASM OF CENTRAL PORTION OF RIGHT BREAST IN FEMALE, ESTROGEN RECEPTOR POSITIVE: ICD-10-CM

## 2022-08-18 DIAGNOSIS — Z17.0 MALIGNANT NEOPLASM OF CENTRAL PORTION OF RIGHT BREAST IN FEMALE, ESTROGEN RECEPTOR POSITIVE: ICD-10-CM

## 2022-08-18 LAB
GLUCOSE BLDC GLUCOMTR-MCNC: 191 MG/DL (ref 70–105)
GLUCOSE BLDC GLUCOMTR-MCNC: 217 MG/DL (ref 70–105)

## 2022-08-18 PROCEDURE — 25010000002 FENTANYL CITRATE (PF) 100 MCG/2ML SOLUTION: Performed by: NURSE ANESTHETIST, CERTIFIED REGISTERED

## 2022-08-18 PROCEDURE — 63710000001 INSULIN REGULAR HUMAN PER 5 UNITS: Performed by: ANESTHESIOLOGY

## 2022-08-18 PROCEDURE — 38900 IO MAP OF SENT LYMPH NODE: CPT | Performed by: SURGERY

## 2022-08-18 PROCEDURE — 88307 TISSUE EXAM BY PATHOLOGIST: CPT | Performed by: SURGERY

## 2022-08-18 PROCEDURE — 25010000002 SUCCINYLCHOLINE PER 20 MG: Performed by: NURSE ANESTHETIST, CERTIFIED REGISTERED

## 2022-08-18 PROCEDURE — 38525 BIOPSY/REMOVAL LYMPH NODES: CPT | Performed by: SURGERY

## 2022-08-18 PROCEDURE — 25010000002 PROPOFOL 10 MG/ML EMULSION: Performed by: NURSE ANESTHETIST, CERTIFIED REGISTERED

## 2022-08-18 PROCEDURE — 19301 PARTIAL MASTECTOMY: CPT | Performed by: NURSE PRACTITIONER

## 2022-08-18 PROCEDURE — 19301 PARTIAL MASTECTOMY: CPT | Performed by: SURGERY

## 2022-08-18 PROCEDURE — C1819 TISSUE LOCALIZATION-EXCISION: HCPCS

## 2022-08-18 PROCEDURE — 25010000002 CEFAZOLIN PER 500 MG: Performed by: SURGERY

## 2022-08-18 PROCEDURE — 0 TECHETIUM TC99M TILMANOCEPT: Performed by: SURGERY

## 2022-08-18 PROCEDURE — 76098 X-RAY EXAM SURGICAL SPECIMEN: CPT

## 2022-08-18 PROCEDURE — 88305 TISSUE EXAM BY PATHOLOGIST: CPT | Performed by: SURGERY

## 2022-08-18 PROCEDURE — A9520 TC99 TILMANOCEPT DIAG 0.5MCI: HCPCS | Performed by: SURGERY

## 2022-08-18 PROCEDURE — 38792 RA TRACER ID OF SENTINL NODE: CPT

## 2022-08-18 PROCEDURE — 25010000002 ONDANSETRON PER 1 MG: Performed by: NURSE ANESTHETIST, CERTIFIED REGISTERED

## 2022-08-18 PROCEDURE — 82962 GLUCOSE BLOOD TEST: CPT

## 2022-08-18 RX ORDER — LIDOCAINE 40 MG/G
1 CREAM TOPICAL AS NEEDED
Status: DISCONTINUED | OUTPATIENT
Start: 2022-08-18 | End: 2022-08-18 | Stop reason: HOSPADM

## 2022-08-18 RX ORDER — SODIUM CHLORIDE, SODIUM LACTATE, POTASSIUM CHLORIDE, AND CALCIUM CHLORIDE .6; .31; .03; .02 G/100ML; G/100ML; G/100ML; G/100ML
20 INJECTION, SOLUTION INTRAVENOUS CONTINUOUS
Status: DISCONTINUED | OUTPATIENT
Start: 2022-08-18 | End: 2022-08-18 | Stop reason: HOSPADM

## 2022-08-18 RX ORDER — CEFAZOLIN SODIUM IN 0.9 % NACL 3 G/100 ML
3 INTRAVENOUS SOLUTION, PIGGYBACK (ML) INTRAVENOUS ONCE
Status: COMPLETED | OUTPATIENT
Start: 2022-08-18 | End: 2022-08-18

## 2022-08-18 RX ORDER — SODIUM CHLORIDE 0.9 % (FLUSH) 0.9 %
10 SYRINGE (ML) INJECTION AS NEEDED
Status: DISCONTINUED | OUTPATIENT
Start: 2022-08-18 | End: 2022-08-18 | Stop reason: HOSPADM

## 2022-08-18 RX ORDER — SODIUM CHLORIDE 0.9 % (FLUSH) 0.9 %
10 SYRINGE (ML) INJECTION EVERY 12 HOURS SCHEDULED
Status: DISCONTINUED | OUTPATIENT
Start: 2022-08-18 | End: 2022-08-18 | Stop reason: HOSPADM

## 2022-08-18 RX ORDER — MIDAZOLAM HYDROCHLORIDE 1 MG/ML
1 INJECTION INTRAMUSCULAR; INTRAVENOUS
Status: DISCONTINUED | OUTPATIENT
Start: 2022-08-18 | End: 2022-08-18 | Stop reason: HOSPADM

## 2022-08-18 RX ORDER — FENTANYL CITRATE 50 UG/ML
INJECTION, SOLUTION INTRAMUSCULAR; INTRAVENOUS AS NEEDED
Status: DISCONTINUED | OUTPATIENT
Start: 2022-08-18 | End: 2022-08-18 | Stop reason: SURG

## 2022-08-18 RX ORDER — PHENYLEPHRINE HCL IN 0.9% NACL 1 MG/10 ML
SYRINGE (ML) INTRAVENOUS AS NEEDED
Status: DISCONTINUED | OUTPATIENT
Start: 2022-08-18 | End: 2022-08-18 | Stop reason: SURG

## 2022-08-18 RX ORDER — ONDANSETRON 2 MG/ML
4 INJECTION INTRAMUSCULAR; INTRAVENOUS ONCE AS NEEDED
Status: DISCONTINUED | OUTPATIENT
Start: 2022-08-18 | End: 2022-08-18 | Stop reason: HOSPADM

## 2022-08-18 RX ORDER — LIDOCAINE HYDROCHLORIDE 10 MG/ML
2.5 INJECTION, SOLUTION INFILTRATION; PERINEURAL ONCE
Status: DISCONTINUED | OUTPATIENT
Start: 2022-08-18 | End: 2022-08-19 | Stop reason: HOSPADM

## 2022-08-18 RX ORDER — ONDANSETRON 2 MG/ML
INJECTION INTRAMUSCULAR; INTRAVENOUS AS NEEDED
Status: DISCONTINUED | OUTPATIENT
Start: 2022-08-18 | End: 2022-08-18 | Stop reason: SURG

## 2022-08-18 RX ORDER — SODIUM CHLORIDE, SODIUM LACTATE, POTASSIUM CHLORIDE, CALCIUM CHLORIDE 600; 310; 30; 20 MG/100ML; MG/100ML; MG/100ML; MG/100ML
9 INJECTION, SOLUTION INTRAVENOUS CONTINUOUS PRN
Status: DISCONTINUED | OUTPATIENT
Start: 2022-08-18 | End: 2022-08-18 | Stop reason: HOSPADM

## 2022-08-18 RX ORDER — NALOXONE HCL 0.4 MG/ML
0.4 VIAL (ML) INJECTION AS NEEDED
Status: DISCONTINUED | OUTPATIENT
Start: 2022-08-18 | End: 2022-08-18 | Stop reason: HOSPADM

## 2022-08-18 RX ORDER — PROCHLORPERAZINE EDISYLATE 5 MG/ML
10 INJECTION INTRAMUSCULAR; INTRAVENOUS ONCE AS NEEDED
Status: DISCONTINUED | OUTPATIENT
Start: 2022-08-18 | End: 2022-08-18 | Stop reason: HOSPADM

## 2022-08-18 RX ORDER — ROCURONIUM BROMIDE 10 MG/ML
INJECTION, SOLUTION INTRAVENOUS AS NEEDED
Status: DISCONTINUED | OUTPATIENT
Start: 2022-08-18 | End: 2022-08-18 | Stop reason: SURG

## 2022-08-18 RX ORDER — FLUMAZENIL 0.1 MG/ML
0.1 INJECTION INTRAVENOUS AS NEEDED
Status: DISCONTINUED | OUTPATIENT
Start: 2022-08-18 | End: 2022-08-18 | Stop reason: HOSPADM

## 2022-08-18 RX ORDER — IPRATROPIUM BROMIDE AND ALBUTEROL SULFATE 2.5; .5 MG/3ML; MG/3ML
3 SOLUTION RESPIRATORY (INHALATION) ONCE AS NEEDED
Status: DISCONTINUED | OUTPATIENT
Start: 2022-08-18 | End: 2022-08-18 | Stop reason: HOSPADM

## 2022-08-18 RX ORDER — SODIUM CHLORIDE 9 MG/ML
100 INJECTION, SOLUTION INTRAVENOUS CONTINUOUS
Status: DISCONTINUED | OUTPATIENT
Start: 2022-08-18 | End: 2022-08-18 | Stop reason: HOSPADM

## 2022-08-18 RX ORDER — SUCCINYLCHOLINE CHLORIDE 20 MG/ML
INJECTION INTRAMUSCULAR; INTRAVENOUS AS NEEDED
Status: DISCONTINUED | OUTPATIENT
Start: 2022-08-18 | End: 2022-08-18 | Stop reason: SURG

## 2022-08-18 RX ORDER — BUPIVACAINE HYDROCHLORIDE 2.5 MG/ML
INJECTION, SOLUTION INFILTRATION; PERINEURAL AS NEEDED
Status: DISCONTINUED | OUTPATIENT
Start: 2022-08-18 | End: 2022-08-18 | Stop reason: HOSPADM

## 2022-08-18 RX ORDER — SODIUM CHLORIDE, SODIUM LACTATE, POTASSIUM CHLORIDE, CALCIUM CHLORIDE 600; 310; 30; 20 MG/100ML; MG/100ML; MG/100ML; MG/100ML
INJECTION, SOLUTION INTRAVENOUS CONTINUOUS PRN
Status: DISCONTINUED | OUTPATIENT
Start: 2022-08-18 | End: 2022-08-18 | Stop reason: SURG

## 2022-08-18 RX ORDER — CHLORHEXIDINE GLUCONATE 0.12 MG/ML
15 RINSE ORAL EVERY 12 HOURS SCHEDULED
Status: DISCONTINUED | OUTPATIENT
Start: 2022-08-18 | End: 2022-08-18 | Stop reason: HOSPADM

## 2022-08-18 RX ORDER — HYDROCODONE BITARTRATE AND ACETAMINOPHEN 5; 325 MG/1; MG/1
1-2 TABLET ORAL EVERY 4 HOURS PRN
Qty: 30 TABLET | Refills: 0 | Status: SHIPPED | OUTPATIENT
Start: 2022-08-18 | End: 2022-08-31

## 2022-08-18 RX ORDER — HYDROCODONE BITARTRATE AND ACETAMINOPHEN 7.5; 325 MG/1; MG/1
1 TABLET ORAL ONCE AS NEEDED
Status: DISCONTINUED | OUTPATIENT
Start: 2022-08-18 | End: 2022-08-18 | Stop reason: HOSPADM

## 2022-08-18 RX ORDER — DIPHENHYDRAMINE HYDROCHLORIDE 50 MG/ML
12.5 INJECTION INTRAMUSCULAR; INTRAVENOUS ONCE AS NEEDED
Status: DISCONTINUED | OUTPATIENT
Start: 2022-08-18 | End: 2022-08-18 | Stop reason: HOSPADM

## 2022-08-18 RX ORDER — FENTANYL CITRATE 50 UG/ML
50 INJECTION, SOLUTION INTRAMUSCULAR; INTRAVENOUS
Status: DISCONTINUED | OUTPATIENT
Start: 2022-08-18 | End: 2022-08-18 | Stop reason: HOSPADM

## 2022-08-18 RX ORDER — PROPOFOL 10 MG/ML
VIAL (ML) INTRAVENOUS AS NEEDED
Status: DISCONTINUED | OUTPATIENT
Start: 2022-08-18 | End: 2022-08-18 | Stop reason: SURG

## 2022-08-18 RX ADMIN — FENTANYL CITRATE 50 MCG: 50 INJECTION, SOLUTION INTRAMUSCULAR; INTRAVENOUS at 11:47

## 2022-08-18 RX ADMIN — SODIUM CHLORIDE, SODIUM LACTATE, POTASSIUM CHLORIDE, AND CALCIUM CHLORIDE: .6; .31; .03; .02 INJECTION, SOLUTION INTRAVENOUS at 10:48

## 2022-08-18 RX ADMIN — INSULIN HUMAN 5 UNITS: 100 INJECTION, SOLUTION PARENTERAL at 13:16

## 2022-08-18 RX ADMIN — Medication 100 MCG: at 11:07

## 2022-08-18 RX ADMIN — SODIUM CHLORIDE, SODIUM LACTATE, POTASSIUM CHLORIDE, AND CALCIUM CHLORIDE 20 ML/HR: .6; .31; .03; .02 INJECTION, SOLUTION INTRAVENOUS at 09:20

## 2022-08-18 RX ADMIN — PROPOFOL 150 MG: 10 INJECTION, EMULSION INTRAVENOUS at 11:27

## 2022-08-18 RX ADMIN — PROPOFOL 200 MG: 10 INJECTION, EMULSION INTRAVENOUS at 10:57

## 2022-08-18 RX ADMIN — ROCURONIUM BROMIDE 20 MG: 10 INJECTION, SOLUTION INTRAVENOUS at 11:11

## 2022-08-18 RX ADMIN — TILMANOCEPT 1 DOSE: KIT at 09:09

## 2022-08-18 RX ADMIN — SUCCINYLCHOLINE CHLORIDE 180 MG: 20 INJECTION INTRAMUSCULAR; INTRAVENOUS at 11:01

## 2022-08-18 RX ADMIN — Medication 3 G: at 10:57

## 2022-08-18 RX ADMIN — ONDANSETRON 4 MG: 2 INJECTION INTRAMUSCULAR; INTRAVENOUS at 11:03

## 2022-08-18 RX ADMIN — Medication 100 MCG: at 12:03

## 2022-08-18 RX ADMIN — LIDOCAINE 4% 1 APPLICATION: 4 CREAM TOPICAL at 08:20

## 2022-08-18 RX ADMIN — Medication 100 MCG: at 12:15

## 2022-08-18 RX ADMIN — FENTANYL CITRATE 100 MCG: 50 INJECTION, SOLUTION INTRAMUSCULAR; INTRAVENOUS at 10:59

## 2022-08-18 RX ADMIN — ROCURONIUM BROMIDE 10 MG: 10 INJECTION, SOLUTION INTRAVENOUS at 10:58

## 2022-08-18 RX ADMIN — SUGAMMADEX 100 MG: 100 INJECTION, SOLUTION INTRAVENOUS at 11:27

## 2022-08-18 RX ADMIN — PROPOFOL 100 MG: 10 INJECTION, EMULSION INTRAVENOUS at 11:12

## 2022-08-18 NOTE — DISCHARGE INSTRUCTIONS
Ok for discharge  Follow-up with me (Dr. Mcwilliams) in 2 weeks  Regular diet as tolerated  Ok to shower starting tomorrow. No tub baths, pools, lakes, or streams for 1 week.  Ok to apply ice to incisions  Please wear brassiere at all times except when showering  Call my office or present to the ED with: fevers greater than 101.5, redness around the incisions, drainage from the incisions, intractable nausea/vomiting, or pain that is getting worse instead of better

## 2022-08-18 NOTE — ANESTHESIA POSTPROCEDURE EVALUATION
Patient: Alaina Hartman    Procedure Summary     Date: 08/18/22 Room / Location: [facility] OR 08 / [facility] MAIN OR    Anesthesia Start: 1047 Anesthesia Stop: 1238    Procedures:       Right breast lumpectomy (Right Breast)      SENTINEL NODE BIOPSY (Right ) Diagnosis:       Malignant neoplasm of central portion of right breast in female, estrogen receptor positive (HCC)      (Malignant neoplasm of central portion of right breast in female, estrogen receptor positive (HCC) [C50.111, Z17.0])    Surgeons: Jagdeep Mcwilliams MD Provider: Jennifer Peraza MD    Anesthesia Type: general ASA Status: 3          Anesthesia Type: general    Vitals  Vitals Value Taken Time   /60 08/18/22 1327   Temp 97.9 °F (36.6 °C) 08/18/22 1326   Pulse 71 08/18/22 1327   Resp 12 08/18/22 1326   SpO2 97 % 08/18/22 1327   Vitals shown include unvalidated device data.        Post Anesthesia Care and Evaluation    Patient location during evaluation: PACU  Patient participation: complete - patient participated  Level of consciousness: awake  Pain management: adequate    Airway patency: patent  Anesthetic complications: No anesthetic complications  PONV Status: controlled  Cardiovascular status: acceptable  Respiratory status: acceptable  Hydration status: acceptable

## 2022-08-18 NOTE — ANESTHESIA PROCEDURE NOTES
Airway  Date/Time: 8/18/2022 10:58 AM    General Information and Staff    Anesthesiologist: Jennifer Peraza MD  CRNA/CAA: Lev Nunez CRNA    Indications and Patient Condition  Indications for airway management: airway protection and CNS depression    Preoxygenated: yes      Final Airway Details  Final airway type: endotracheal airway      Successful airway: ETT  Cuffed: yes   Successful intubation technique: direct laryngoscopy  Endotracheal tube insertion site: oral  Blade: Yfn  Blade size: 3  ETT size (mm): 7.0  Cormack-Lehane Classification: grade I - full view of glottis  Placement verified by: chest auscultation and capnometry   Measured from: lips  Number of attempts at approach: 1

## 2022-08-18 NOTE — ANESTHESIA PREPROCEDURE EVALUATION
Anesthesia Evaluation     Patient summary reviewed and Nursing notes reviewed   no history of anesthetic complications:  NPO Solid Status: > 8 hours  NPO Liquid Status: > 8 hours           Airway   Mallampati: II  TM distance: >3 FB  Neck ROM: full  No difficulty expected  Dental      Pulmonary     breath sounds clear to auscultation  (+) sleep apnea on CPAP,   Cardiovascular     ECG reviewed  Rhythm: regular  Rate: normal    (+) hypertension, hyperlipidemia,       Neuro/Psych- negative ROS  GI/Hepatic/Renal/Endo    (+) morbid obesity, hiatal hernia, GERD,  diabetes mellitus type 2 poorly controlled,     Musculoskeletal (-) negative ROS    Abdominal     Abdomen: soft.   Substance History - negative use     OB/GYN negative ob/gyn ROS         Other      history of cancer                    Anesthesia Plan    ASA 3     general     intravenous induction     Anesthetic plan, risks, benefits, and alternatives have been provided, discussed and informed consent has been obtained with: patient.    Plan discussed with CAA.        CODE STATUS:

## 2022-08-18 NOTE — OP NOTE
BREAST BIOPSY WITH NEEDLE LOCALIZATION, SENTINEL NODE BIOPSY  Operative Note    Patient Name:  Alaina Hartman  YOB: 1965    Date of Surgery:  8/18/2022     Indications: Patient is a 56-year-old lady with a newly diagnosed right breast cancer.  We discussed the risk, benefits, and alternatives to surgery, the patient agreed to proceed the operating room for right lumpectomy and sentinel lymph node biopsy.    Pre-op Diagnosis:   Malignant neoplasm of central portion of right breast in female, estrogen receptor positive (HCC) [C50.111, Z17.0]    Post-op Diagnosis:  Post-Op Diagnosis Codes:     * Malignant neoplasm of central portion of right breast in female, estrogen receptor positive (HCC) [C50.111, Z17.0]    Procedure/CPT® Codes:      Procedure(s):  Right breast lumpectomy  SENTINEL NODE BIOPSY    Staff:  Surgeon(s):  Jagdeep Mcwilliams MD    Assistant: Sadia Lo APRN was responsible for performing the following activities: Retraction, Suction and Closing and their skilled assistance was necessary for the success of this case.     Anesthesia: General    Estimated Blood Loss: minimal    Implants:    Nothing was implanted during the procedure    Specimen:          Specimens     ID Source Type Tests Collected By Collected At Frozen?    A Breast, Right Tissue · TISSUE PATHOLOGY EXAM   Jagdeep Mcwilliams MD 8/18/22 1124 No    Description: Right Breast; Long-lateral, Double-deep, Short-superior    Comment: Right Breast lumpectomy  Long-lateral  Double-deep  Short-superior    B Breast, Right Tissue · TISSUE PATHOLOGY EXAM   Jagdeep Mcwilliams MD 8/18/22 1150 No    Description: Right breast sentinel node    This specimen was not marked as sent.    C Breast, Right Tissue · TISSUE PATHOLOGY EXAM   Jagdeep Mcwilliams MD 8/18/22 1156     Description: Right breast extra jojo tissue    This specimen was not marked as sent.          Findings: Wire, clip, and mass within the specimen.   Gordon lymph node measuring 432 on neoprobe ex vivo, additional jojo tissue sent measuring 100 ex vivo.    Complications: None, immediately    Description of Procedure: After obtaining informed consent the preop holding area, the patient was brought the operating room placed in supine position.  SCDs were applied, and preoperative antibiotics were administered.  The patient then underwent uncomplicated induction of general endotracheal anesthesia.  The right chest and axilla were then prepped and draped in the usual sterile fashion and after a brief timeout the procedure began.  Prior to coming the operating room, the patient had undergone wire localization and injection of radiosulfur colloid in the periareolar tissue of the right breast.  I then began by making a curvilinear incision on the underneath side of the right breast about a fingerbreadth away from the wire, and then used electrocautery to dissect down through the subcutaneous fat and breast tissue.  I then raised a swath of subcutaneous fat inferiorly until the wire could be seen entering the incision, then I brought the wire through the incision.  I then grasped the tissue surrounding the wire, and using electrocautery was able to divide the surrounding breast tissue away from the lump, completely removing the wire.  With the lump of tissue now completely removed from the breast I used a short stitch to ana lilia the superior margin, long stitch marked lateral margin, double stitch to ana lilia the deep margin.  This was then passed off the field for mammographic review where it was confirmed that the wire, clip, and area of concern within the specimen.  I then turned my attention towards location of a sentinel lymph node.  Using the neoprobe I selected an area of increased uptake and created a an incision in the right axilla.  After doing this, I carried this down through the subcutaneous fat to the clavipectoral fascia which was incised exposing the axillary  fat pad below.  Using combination of blunt dissection and electrocautery I was able to dissect down and find an area of increased uptake.  There was a lymph node present which was large and bulky.  I remove this, and examined it and there appeared to be evidence of uptake up to 100 on neoprobe ex vivo.  I then returned to the axilla, where I encountered an area of even more uptake deeper in the axilla.  Again I grasped this with an Allis clamp and use electrocautery to divide the small feeding blood vessels and lymphatics.  With this lymph node now removed it was examined ex vivo and measured 432 on neoprobe.  This was labeled as the sentinel lymph node and passed off the field and the additional jojo tissue was passed off as well.  I then returned to the axilla and there was no increased uptake within 10% of 432 or 43 on neoprobe.  I then infiltrated local anesthesia into the wounds.  Used electrocautery to ensure hemostasis.  We then closed the incision using interrupted 3-0 Vicryl to reapproximate the deep tissues, interrupted 3-0 Vicryl to reapproximate the dermis, and running 4-0 Monocryl in a subcuticular fashion to reapproximate the skin.  The wound was dressed with skin affix skin glue, fluffs, ABD, and a brassiere.  The patient tolerated the procedure well, was awoken, and taken to PACU in satisfactory condition.    Jagdeep Mcwilliams MD     Date: 8/18/2022  Time: 12:16 EDT

## 2022-08-25 NOTE — PROGRESS NOTES
Hematology/Oncology Outpatient Follow Up    PATIENT NAME:Alaina Hartman  :1965  MRN: [id number]  PRIMARY CARE PHYSICIAN: Magali Rene MD  REFERRING PHYSICIAN: Magali Rene*    Chief Complaint   Patient presents with   • Follow-up     Ductal carcinoma (HCC)        HISTORY OF PRESENT ILLNESS:     This is a 56-year-old female who was clinically asymptomatic and had a routine screening mammogram which showed an abnormality on the right breast.  Prior to that patient had left breast biopsy in  for benign disease.     Review of her screening mammogram which was performed on 6/3/2022 showed a new 2 cm focal asymmetry with architectural distortion in the mid to posterior third depth of the outer right breast the left breast was benign.  Right diagnostic mammogram and ultrasound was recommended.  On 2022 patient underwent right diagnostic mammogram and ultrasound which showed a 2 cm irregular mass in the lateral inferior breast.  Same day she had an ultrasound which showed a 1.6 cm on the right breast the right axilla did not show any abnormal lymph nodes.     Patient then underwent ultrasound-guided biopsy of the right breast mass pathology revealed moderately differentiated invasive ductal carcinoma estrogen receptor positive at 95% progesterone receptor positive at 50%, HER2/bertha was negative at 1+ on immunohistochemistry.        Patient had a partial hysterectomy many years ago.  She is  and has 3 children.  She does not smoke, does not drink alcohol     Family history significant for mother with breast cancer in her 50s, her father had skin cancers, paternal grandmother had colon cancer at the age of 70        She is a       · 2022 estradiol level was less than 5 and FSH was 33 consistent with postmenopausal state  · 2022 patient underwent right lumpectomy with sentinel lymph node biopsy.  Pathology revealed residual moderately differentiated  ductal carcinoma measuring 1.7 cm completely excised total of 5 lymph nodes were removed and all of them were negative for metastatic disease.  Pathology stage is pT1 cpN0 M0.  There was no evidence of DCIS all margins were negative distance to the closest margin was anterior margin at 2 mm ER positive at 95, IN positive at 50% and HER2/bertha was negative.  Past Medical History:   Diagnosis Date   • Ankle edema     at times   • Breast cancer, right (HCC) 08/2022   • Diabetes mellitus (HCC)    • GERD (gastroesophageal reflux disease)    • Hiatal hernia    • Hyperlipidemia    • Morbid obesity (HCC)    • Sleep apnea     CPAP- to bring dos   • Type 2 diabetes mellitus (HCC)        Past Surgical History:   Procedure Laterality Date   • BREAST BIOPSY Right 8/18/2022    Procedure: Right breast lumpectomy;  Surgeon: Jagdeep Mcwilliams MD;  Location: [facility];  Service: General;  Laterality: Right;   • BREAST LUMPECTOMY Left 2003   • SENTINEL NODE BIOPSY Right 8/18/2022    Procedure: SENTINEL NODE BIOPSY;  Surgeon: Jagdeep Mcwilliams MD;  Location: [facility];  Service: General;  Laterality: Right;   • SUBTOTAL HYSTERECTOMY           Current Outpatient Medications:   •  ALPRAZolam (XANAX) 0.5 MG tablet, Take 1 tablet by mouth Daily As Needed for Anxiety., Disp: 20 tablet, Rfl: 0  •  atorvastatin (LIPITOR) 40 MG tablet, Take 1 tablet by mouth Daily., Disp: 90 tablet, Rfl: 4  •  BIOTIN PO, Take  by mouth., Disp: , Rfl:   •  Cholecalciferol (Vitamin D3) 50 MCG (2000 UT) capsule, Take 2,000 Units by mouth Daily., Disp: , Rfl:   •  Cyanocobalamin (Vitamin B-12) 1000 MCG sublingual tablet, Place 1 tablet under the tongue Daily., Disp: 100 each, Rfl: 4  •  glucose blood (Accu-Chek Guide) test strip, 1 each by Other route 4 (Four) Times a Day. Use as instructed, Disp: 150 each, Rfl: 11  •  HYDROcodone-acetaminophen (NORCO) 5-325 MG per tablet, Take 1-2 tablets by mouth Every 4 (Four) Hours As Needed (Pain).,  Disp: 30 tablet, Rfl: 0  •  Insulin Lispro Prot & Lispro (HumaLOG Mix 75/25 KwikPen) (75-25) 100 UNIT/ML suspension pen-injector pen, Inject 35 units subcu 3 times a day before each meal., Disp: 30 mL, Rfl: 5  •  Insulin Pen Needle (Pen Needles) 32G X 4 MM misc, 1 each 4 (Four) Times a Day. Use to inject insulin / DX E11.65, Disp: 150 each, Rfl: 5  •  lisinopril (PRINIVIL,ZESTRIL) 5 MG tablet, Take 1 tablet by mouth Daily. Appointment needed for additional refills. (Patient taking differently: Take 5 mg by mouth Every Morning. Appointment needed for additional refills.), Disp: 90 tablet, Rfl: 2  •  metFORMIN ER (GLUCOPHAGE-XR) 500 MG 24 hr tablet, Take 2 tablets by mouth Daily. Appointment needed for additional refills. (Patient taking differently: Take 1,000 mg by mouth every night at bedtime. Appointment needed for additional refills.), Disp: 180 tablet, Rfl: 2  •  multivitamin with minerals tablet tablet, Take 1 tablet by mouth Daily., Disp: , Rfl:   •  omeprazole (priLOSEC) 40 MG capsule, Take 1 capsule by mouth Daily. Appointment needed for additional refills., Disp: 90 capsule, Rfl: 2  •  Vitamin D, Ergocalciferol, 50 MCG (2000 UT) capsule, Take 2,000 Units by mouth Daily., Disp: 100 capsule, Rfl: 4    No Known Allergies    Family History   Problem Relation Age of Onset   • Breast cancer Mother    • Heart disease Mother    • Thyroid disease Mother    • Stroke Mother    • Clotting disorder Father    • Diabetes Brother    • Heart disease Brother    • Cancer Paternal Grandmother    • Colon cancer Paternal Grandmother        Cancer-related family history includes Breast cancer in her mother; Cancer in her paternal grandmother; Colon cancer in her paternal grandmother.    Social History     Tobacco Use   • Smoking status: Former Smoker     Types: Cigarettes     Quit date:      Years since quittin.6   • Smokeless tobacco: Never Used   Vaping Use   • Vaping Use: Never used   Substance Use Topics   • Alcohol  use: Yes     Comment: rare   • Drug use: No       I have reviewed and confirmed the accuracy of the patient's history: Chief complaint, HPI, ROS and Subjective as entered by the MA/LPN/RN. Lianet Rust MD 08/29/22      SUBJECTIVE:    Patient is here today for routine follow-up and does not have any specific complaints today.  She did well with her surgery        REVIEW OF SYSTEMS:    Review of Systems   Constitutional: Negative for chills, fatigue and fever.   HENT: Negative for congestion, drooling, ear discharge, rhinorrhea, sinus pressure and tinnitus.    Eyes: Negative for photophobia, pain and discharge.   Respiratory: Negative for apnea, choking and stridor.    Cardiovascular: Negative for palpitations.   Gastrointestinal: Negative for abdominal distention, abdominal pain and anal bleeding.   Endocrine: Negative for polydipsia and polyphagia.   Genitourinary: Negative for decreased urine volume, flank pain and genital sores.   Musculoskeletal: Negative for gait problem, neck pain and neck stiffness.   Skin: Negative for color change, rash and wound.   Neurological: Negative for tremors, seizures, syncope, facial asymmetry and speech difficulty.   Hematological: Negative for adenopathy.   Psychiatric/Behavioral: Negative for agitation, confusion, hallucinations and self-injury. The patient is not hyperactive.        OBJECTIVE:    Vitals:    08/29/22 1405   BP: 150/92   Pulse: 69   Resp: 20   Temp: 97.1 °F (36.2 °C)   TempSrc: Infrared   SpO2: 98%   PainSc:   4   PainLoc: Comment: under arm - surgery site     There is no height or weight on file to calculate BMI.    ECOG    (0) Fully active, able to carry on all predisease performance without restriction    Physical Exam  Vitals and nursing note reviewed.   Constitutional:       General: She is not in acute distress.     Appearance: She is not diaphoretic.   HENT:      Head: Normocephalic and atraumatic.   Eyes:      General: No scleral icterus.         Right eye: No discharge.         Left eye: No discharge.      Conjunctiva/sclera: Conjunctivae normal.   Neck:      Thyroid: No thyromegaly.   Cardiovascular:      Rate and Rhythm: Normal rate and regular rhythm.      Heart sounds: Normal heart sounds.     No friction rub. No gallop.   Pulmonary:      Effort: Pulmonary effort is normal. No respiratory distress.      Breath sounds: No stridor. No wheezing.   Abdominal:      General: Bowel sounds are normal.      Palpations: Abdomen is soft. There is no mass.      Tenderness: There is no abdominal tenderness. There is no guarding or rebound.   Musculoskeletal:         General: No tenderness. Normal range of motion.      Cervical back: Normal range of motion and neck supple.   Lymphadenopathy:      Cervical: No cervical adenopathy.   Skin:     General: Skin is warm.      Findings: No erythema or rash.   Neurological:      Mental Status: She is alert and oriented to person, place, and time.      Motor: No abnormal muscle tone.   Psychiatric:         Behavior: Behavior normal.       I have reexamined the patient and the results are consistent with the previously documented exam. Lianet Gissel Rust MD     RECENT LABS    WBC   Date Value Ref Range Status   08/29/2022 11.87 (H) 3.40 - 10.80 10*3/mm3 Final   08/26/2020 11.0 (H) 3.4 - 10.8 x10E3/uL Final     RBC   Date Value Ref Range Status   08/29/2022 4.43 3.77 - 5.28 10*6/mm3 Final   08/26/2020 4.53 3.77 - 5.28 x10E6/uL Final     Hemoglobin   Date Value Ref Range Status   08/29/2022 12.2 12.0 - 15.9 g/dL Final     Hematocrit   Date Value Ref Range Status   08/29/2022 37.6 34.0 - 46.6 % Final     MCV   Date Value Ref Range Status   08/29/2022 84.9 79.0 - 97.0 fL Final     MCH   Date Value Ref Range Status   08/29/2022 27.5 26.6 - 33.0 pg Final     MCHC   Date Value Ref Range Status   08/29/2022 32.4 31.5 - 35.7 g/dL Final     RDW   Date Value Ref Range Status   08/29/2022 14.3 12.3 - 15.4 % Final     RDW-SD   Date  Value Ref Range Status   08/29/2022 43.0 37.0 - 54.0 fl Final     MPV   Date Value Ref Range Status   08/29/2022 10.0 6.0 - 12.0 fL Final     Platelets   Date Value Ref Range Status   08/29/2022 334 140 - 450 10*3/mm3 Final     Neutrophil %   Date Value Ref Range Status   08/29/2022 56.4 42.7 - 76.0 % Final     Lymphocyte %   Date Value Ref Range Status   08/29/2022 35.2 19.6 - 45.3 % Final     Monocyte %   Date Value Ref Range Status   08/29/2022 5.9 5.0 - 12.0 % Final     Eosinophil %   Date Value Ref Range Status   08/29/2022 1.7 0.3 - 6.2 % Final     Basophil %   Date Value Ref Range Status   08/29/2022 0.8 0.0 - 1.5 % Final     Immature Grans %   Date Value Ref Range Status   08/09/2022 0.5 0.0 - 0.5 % Final     Neutrophils, Absolute   Date Value Ref Range Status   08/29/2022 6.69 1.70 - 7.00 10*3/mm3 Final     Lymphocytes, Absolute   Date Value Ref Range Status   08/29/2022 4.18 (H) 0.70 - 3.10 10*3/mm3 Final     Monocytes, Absolute   Date Value Ref Range Status   08/29/2022 0.70 0.10 - 0.90 10*3/mm3 Final     Eosinophils, Absolute   Date Value Ref Range Status   08/29/2022 0.20 0.00 - 0.40 10*3/mm3 Final     Basophils, Absolute   Date Value Ref Range Status   08/29/2022 0.10 0.00 - 0.20 10*3/mm3 Final     Immature Grans, Absolute   Date Value Ref Range Status   08/09/2022 0.05 0.00 - 0.05 10*3/mm3 Final     nRBC   Date Value Ref Range Status   08/09/2022 0.0 0.0 - 0.2 /100 WBC Final       Lab Results   Component Value Date    GLUCOSE 221 (H) 08/09/2022    BUN 14 08/09/2022    CREATININE 0.70 08/09/2022    EGFRIFNONA 90 12/01/2021    EGFRIFAFRI 115 08/26/2020    BCR 20.0 08/09/2022    K 3.9 08/09/2022    CO2 25.5 08/09/2022    CALCIUM 8.9 08/09/2022    PROTENTOTREF 7.0 08/26/2020    ALBUMIN 4.20 08/09/2022    LABIL2 1.8 08/26/2020    AST 17 08/09/2022    ALT 18 08/09/2022         Assessment & Plan     Ductal carcinoma (HCC)  - CBC & Differential  - Ambulatory Referral to Physical Therapy  Lymphedema      ASSESSMENT:      1. Moderately differentiated invasive ductal carcinoma of the right breast.  Status post right lumpectomy with sentinel lymph node biopsy.  pT1 cpN0 M0.  ER positive at 95%, AL positive at 50% and HER2/bertha was negative.  T1 cN0 M0  2. Status post partial hysterectomy, needs to determine menopausal status  3. Postmenopausal state following lab confirmation  4. Family history of breast cancer in her mother at the age of 50, paternal grandmother with colon cancer at 17 and her father had skin cancer.     Discussion     Reviewed her clinical scenario with patient in detail.  Discussed that she has clinical stage I breast cancer.  We reviewed that stage I breast cancer is highly curable but she would need to have surgical resection plus or minus chemotherapy, radiation if she undergoes breast conservative therapy and endocrine treatment.  We discussed that chemotherapy recommendations will based on Oncotype DX assay if she meets the criteria postoperatively.    I have reviewed her final pathology.  Her lymph nodes were negative therefore recommended Oncotype DX assay to help stratify additional benefit from chemotherapy.  We discussed the implications of Oncotype DX assay to help to predict benefits of chemo and also for prognostic information.  Patient is agreeable to pursue Oncotype DX assay     She has family history of breast cancer in her mother therefore genetic testing will be considered.  Patient is willing to review the literature on this.  This has been given to her today.        Plans:     · Oncotype DX assay ordered  · Gave information on cancer genetics for her to review.  Patient to let me know when she is ready to proceed  · She will be a candidate for AI   · Follow-up with Dr. Mcwilliams for postop care.  She has an appointment coming up  · Will refer her back to Dr. De Los Santos after review of Oncotype DX assay   · Referred to physical therapy for lymphedema prophylaxis  · All  questions answered     Patient verbalized understanding and is in agreement of the above plan.               I spent 40 total minutes, face-to-face, caring for Alaina today.  90% of this time involved counseling and/or coordination of care as documented within this note, reviewing her path report and formulating treatment decisions.

## 2022-08-29 ENCOUNTER — OFFICE VISIT (OUTPATIENT)
Dept: ONCOLOGY | Facility: CLINIC | Age: 57
End: 2022-08-29

## 2022-08-29 ENCOUNTER — LAB (OUTPATIENT)
Dept: LAB | Facility: HOSPITAL | Age: 57
End: 2022-08-29

## 2022-08-29 VITALS
TEMPERATURE: 97.1 F | HEART RATE: 69 BPM | DIASTOLIC BLOOD PRESSURE: 92 MMHG | OXYGEN SATURATION: 98 % | SYSTOLIC BLOOD PRESSURE: 150 MMHG | RESPIRATION RATE: 20 BRPM

## 2022-08-29 DIAGNOSIS — C80.1 DUCTAL CARCINOMA: ICD-10-CM

## 2022-08-29 DIAGNOSIS — C50.111 MALIGNANT NEOPLASM OF CENTRAL PORTION OF RIGHT BREAST IN FEMALE, ESTROGEN RECEPTOR POSITIVE: Primary | ICD-10-CM

## 2022-08-29 DIAGNOSIS — Z17.0 MALIGNANT NEOPLASM OF CENTRAL PORTION OF RIGHT BREAST IN FEMALE, ESTROGEN RECEPTOR POSITIVE: Primary | ICD-10-CM

## 2022-08-29 DIAGNOSIS — C80.1 DUCTAL CARCINOMA: Primary | ICD-10-CM

## 2022-08-29 LAB
BASOPHILS # BLD AUTO: 0.1 10*3/MM3 (ref 0–0.2)
BASOPHILS NFR BLD AUTO: 0.8 % (ref 0–1.5)
DEPRECATED RDW RBC AUTO: 43 FL (ref 37–54)
EOSINOPHIL # BLD AUTO: 0.2 10*3/MM3 (ref 0–0.4)
EOSINOPHIL NFR BLD AUTO: 1.7 % (ref 0.3–6.2)
ERYTHROCYTE [DISTWIDTH] IN BLOOD BY AUTOMATED COUNT: 14.3 % (ref 12.3–15.4)
HCT VFR BLD AUTO: 37.6 % (ref 34–46.6)
HGB BLD-MCNC: 12.2 G/DL (ref 12–15.9)
HOLD SPECIMEN: NORMAL
HOLD SPECIMEN: NORMAL
LYMPHOCYTES # BLD AUTO: 4.18 10*3/MM3 (ref 0.7–3.1)
LYMPHOCYTES NFR BLD AUTO: 35.2 % (ref 19.6–45.3)
MCH RBC QN AUTO: 27.5 PG (ref 26.6–33)
MCHC RBC AUTO-ENTMCNC: 32.4 G/DL (ref 31.5–35.7)
MCV RBC AUTO: 84.9 FL (ref 79–97)
MONOCYTES # BLD AUTO: 0.7 10*3/MM3 (ref 0.1–0.9)
MONOCYTES NFR BLD AUTO: 5.9 % (ref 5–12)
NEUTROPHILS NFR BLD AUTO: 56.4 % (ref 42.7–76)
NEUTROPHILS NFR BLD AUTO: 6.69 10*3/MM3 (ref 1.7–7)
PLATELET # BLD AUTO: 334 10*3/MM3 (ref 140–450)
PMV BLD AUTO: 10 FL (ref 6–12)
RBC # BLD AUTO: 4.43 10*6/MM3 (ref 3.77–5.28)
WBC NRBC COR # BLD: 11.87 10*3/MM3 (ref 3.4–10.8)

## 2022-08-29 PROCEDURE — 99215 OFFICE O/P EST HI 40 MIN: CPT | Performed by: INTERNAL MEDICINE

## 2022-08-29 PROCEDURE — 36415 COLL VENOUS BLD VENIPUNCTURE: CPT

## 2022-08-29 PROCEDURE — 85025 COMPLETE CBC W/AUTO DIFF WBC: CPT

## 2022-08-31 ENCOUNTER — OFFICE VISIT (OUTPATIENT)
Dept: SURGERY | Facility: CLINIC | Age: 57
End: 2022-08-31

## 2022-08-31 VITALS
BODY MASS INDEX: 43.23 KG/M2 | HEART RATE: 72 BPM | OXYGEN SATURATION: 98 % | WEIGHT: 253.2 LBS | TEMPERATURE: 98.4 F | DIASTOLIC BLOOD PRESSURE: 89 MMHG | SYSTOLIC BLOOD PRESSURE: 149 MMHG | HEIGHT: 64 IN | RESPIRATION RATE: 18 BRPM

## 2022-08-31 DIAGNOSIS — C50.111 MALIGNANT NEOPLASM OF CENTRAL PORTION OF RIGHT BREAST IN FEMALE, ESTROGEN RECEPTOR POSITIVE: Primary | ICD-10-CM

## 2022-08-31 DIAGNOSIS — Z17.0 MALIGNANT NEOPLASM OF CENTRAL PORTION OF RIGHT BREAST IN FEMALE, ESTROGEN RECEPTOR POSITIVE: Primary | ICD-10-CM

## 2022-08-31 PROCEDURE — 99024 POSTOP FOLLOW-UP VISIT: CPT | Performed by: SURGERY

## 2022-09-02 NOTE — PROGRESS NOTES
"Post-op Note    Subjective   Alaina Hartman is a 56 y.o. female status post right lumpectomy and sentinel lymph node biopsy performed on 8/18/2022.  Patient overall appears to be doing well without any significant pain, fevers, or wound healing complications.  She does have some slight swelling in her right axilla.      Objective   /89 (BP Location: Left arm, Patient Position: Sitting, Cuff Size: Large Adult)   Pulse 72   Temp 98.4 °F (36.9 °C) (Infrared)   Resp 18   Ht 161.3 cm (63.5\")   Wt 115 kg (253 lb 3.2 oz)   SpO2 98%   BMI 44.15 kg/m²   Surgical incision is well healed without any signs of erythema, induration, or drainage to suggest infection.  There is a slight seroma in the right axilla with some induration at the sentinel lymph node biopsy site without evidence of infection.  No significant swelling within the right arm      Assessment & Plan   Patient is a 56-year-old lady status post right lumpectomy and sentinel lymph node biopsy performed on 8/18/2022.    Pathology reviewed with patient which demonstrates:  Specimen #1 (Breast, right, needle localization excision):    Residual moderately differentiated ductal carcinoma (size 1.7 cm), completely excised    See synoptic report for additional details      Specimen #2 ([place] node, right breast, excision):    Two (2) sentinel lymph nodes negative for malignancy by H&E     Specimen #3 (\"Extranodal tissue right breast,\" excision):    Three (3) lymph nodes negative for malignancy    Follow-up with medical oncology as directed.  Oncotype DX will be performed given the size of the tumor to see whether or not the patient would benefit from chemotherapy.  She will follow-up with me if she needs a Port-A-Cath insertion.  Patient will benefit from endocrine therapy  Follow-up with radiation oncology as directed  Patient offered referral to physical therapy for lymphedema evaluation and treatment.  Discussed that she is slightly high risk due to the " number of lymph nodes that were removed, but her risk is still less than 1 in 4.  She will continue to perform daily exercises at home and use her right arm frequently.  Should she notice any swelling, she will reach back out to me and proceed with physical therapy  Follow-up with me in 6 months or sooner if needed    Jagdeep Mcwilliams MD  9/2/2022  14:58 EDT

## 2022-09-12 ENCOUNTER — APPOINTMENT (OUTPATIENT)
Dept: LAB | Facility: HOSPITAL | Age: 57
End: 2022-09-12

## 2022-09-13 ENCOUNTER — TELEPHONE (OUTPATIENT)
Dept: ONCOLOGY | Facility: CLINIC | Age: 57
End: 2022-09-13

## 2022-09-13 LAB
LAB AP CASE REPORT: NORMAL
LAB AP CLINICAL INFORMATION: NORMAL
LAB AP SYNOPTIC CHECKLIST: NORMAL
PATH REPORT.ADDENDUM SPEC: NORMAL
PATH REPORT.FINAL DX SPEC: NORMAL
PATH REPORT.GROSS SPEC: NORMAL

## 2022-09-13 NOTE — TELEPHONE ENCOUNTER
Late entry from 9/12/22: Received a call from Melba, the breast cancer navigator, stating that the pt's Oncotype form was not received by the company and it had to be re-sent. Because of this, the results were not available for pt's follow-up appt. I called the pt and explained the situation. She was understanding and appt was rescheduled.

## 2022-09-21 NOTE — PROGRESS NOTES
Subjective   Alaina Hartman is a 56 y.o. female.     Chief Complaint   Patient presents with   • Establish Care   • lab review    • breast surgery        History of Present Illness  Establish care :   Patient is here today and she states that she wants to get established with a new pcp as she was a former patient of Dr. Rene.     Breast surgery:   Patient is here today and she states that she at this time has breast cancer and she states that she recently had a Lumpectomy done on her Right side. She states that they also took out lymphnodes as well. She states that she has some pain still from this procedure. She states that she has yet to start treatments however there was some confusion at the oncology office and she states that due to a test being not submitted and she states that this is suppose to be back at the end of this month and she states that they will then tell her what the plan is going to be. She states that they have discussed radiation.     Labs :   Patient states that the last time she had labs done with her old PCP she had one come back that was RENUKA positive. She states that she was never really talked to as to what that meant as she was told that there could be several things that it could be. She states that she has looked into it some and yes it did say cancer and she is a new cancer diagnosed patient but there was other things it could have been that could trigger it positive and she states that she is curious to know what it might be.   Anxiety  Presents for follow-up visit. Symptoms include excessive worry, nervous/anxious behavior and panic. Patient reports no chest pain, compulsions, confusion, decreased concentration, depressed mood, dizziness, dry mouth, feeling of choking, hyperventilation, impotence, insomnia, irritability, malaise, muscle tension, nausea, obsessions, palpitations, restlessness, shortness of breath or suicidal ideas. Primary symptoms comment: she states that she  has terrible passenger anxiety, however she has recently been diagnosed with breast cancer  and she states that she feels her anxiety has been increasing some . Symptoms occur occasionally. The severity of symptoms is mild. The quality of sleep is fair. Nighttime awakenings: occasional.     Compliance with medications is %. Treatment side effects: none         The following portions of the patient's history were reviewed and updated as appropriate: allergies, current medications, past family history, past medical history, past social history, past surgical history and problem list.    Allergies:  No Known Allergies    Social History:  Social History     Socioeconomic History   • Marital status:      Spouse name: Bora   • Number of children: 3   • Years of education: 14   Tobacco Use   • Smoking status: Former Smoker     Packs/day: 0.25     Years: 10.00     Pack years: 2.50     Types: Cigarettes     Quit date: 2010     Years since quittin.7   • Smokeless tobacco: Never Used   Vaping Use   • Vaping Use: Never used   Substance and Sexual Activity   • Alcohol use: Not Currently     Comment: rare   • Drug use: Never   • Sexual activity: Yes     Partners: Male     Birth control/protection: Hysterectomy       Family History:  Family History   Problem Relation Age of Onset   • Breast cancer Mother    • Heart disease Mother    • Thyroid disease Mother    • Stroke Mother    • Clotting disorder Father    • Diabetes Brother    • Heart disease Brother    • Cancer Paternal Grandmother    • Colon cancer Paternal Grandmother    • Diabetes Brother    • Stroke Brother        Past Medical History :  Patient Active Problem List   Diagnosis   • BMI 40.0-44.9, adult (HCC)   • Bunion, right   • Decreased hearing of both ears   • Esophagitis   • Mixed hyperlipidemia   • Hypertension, essential   • Menopausal syndrome   • Obstructive sleep apnea syndrome   • Type 2 diabetes mellitus with hyperglycemia, with long-term  current use of insulin (HCC)   • Class 3 severe obesity due to excess calories without serious comorbidity with body mass index (BMI) of 40.0 to 44.9 in adult (HCC)   • Malignant neoplasm of central portion of right breast in female, estrogen receptor positive (HCC)       Medication List:  Outpatient Encounter Medications as of 9/22/2022   Medication Sig Dispense Refill   • ALPRAZolam (XANAX) 0.5 MG tablet Take 1 tablet by mouth Daily As Needed for Anxiety. 20 tablet 0   • atorvastatin (LIPITOR) 40 MG tablet Take 1 tablet by mouth Daily. 90 tablet 4   • BIOTIN PO Take  by mouth.     • Cyanocobalamin (Vitamin B-12) 1000 MCG sublingual tablet Place 1 tablet under the tongue Daily. 100 each 4   • glucose blood (Accu-Chek Guide) test strip 1 each by Other route 4 (Four) Times a Day. Use as instructed 150 each 11   • Insulin Lispro Prot & Lispro (HumaLOG Mix 75/25 KwikPen) (75-25) 100 UNIT/ML suspension pen-injector pen Inject 35 units subcu 3 times a day before each meal. 30 mL 5   • Insulin Pen Needle (Pen Needles) 32G X 4 MM misc 1 each 4 (Four) Times a Day. Use to inject insulin / DX E11.65 150 each 5   • lisinopril (PRINIVIL,ZESTRIL) 5 MG tablet Take 1 tablet by mouth Daily. Appointment needed for additional refills. (Patient taking differently: Take 5 mg by mouth Every Morning. Appointment needed for additional refills.) 90 tablet 2   • metFORMIN ER (GLUCOPHAGE-XR) 500 MG 24 hr tablet Take 2 tablets by mouth Daily. Appointment needed for additional refills. (Patient taking differently: Take 1,000 mg by mouth every night at bedtime. Appointment needed for additional refills.) 180 tablet 2   • multivitamin with minerals tablet tablet Take 1 tablet by mouth Daily.     • omeprazole (priLOSEC) 40 MG capsule Take 1 capsule by mouth Daily. Appointment needed for additional refills. 90 capsule 2   • Vitamin D, Ergocalciferol, 50 MCG (2000 UT) capsule Take 2,000 Units by mouth Daily. 100 capsule 4   • [DISCONTINUED]  "ALPRAZolam (XANAX) 0.5 MG tablet Take 1 tablet by mouth Daily As Needed for Anxiety. 20 tablet 0   • [DISCONTINUED] Cholecalciferol (Vitamin D3) 50 MCG (2000 UT) capsule Take 2,000 Units by mouth Daily.       No facility-administered encounter medications on file as of 9/22/2022.       Past Surgical History:  Past Surgical History:   Procedure Laterality Date   • BREAST BIOPSY Right 8/18/2022    Procedure: Right breast lumpectomy;  Surgeon: Jagdeep Mcwilliams MD;  Location: [facility] OR;  Service: General;  Laterality: Right;   • BREAST LUMPECTOMY Left 2003   • SENTINEL NODE BIOPSY Right 8/18/2022    Procedure: SENTINEL NODE BIOPSY;  Surgeon: Jagdeep Mcwilliams MD;  Location: [facility];  Service: General;  Laterality: Right;   • SUBTOTAL HYSTERECTOMY         Review of Systems:  Review of Systems   Constitutional: Negative for irritability.   Respiratory: Negative for shortness of breath.    Cardiovascular: Negative for chest pain and palpitations.   Gastrointestinal: Negative for nausea.   Genitourinary: Negative for impotence.   Neurological: Negative for dizziness and confusion.   Psychiatric/Behavioral: Negative for decreased concentration, suicidal ideas and depressed mood. The patient is nervous/anxious. The patient does not have insomnia.        I have reviewed and confirmed the accuracy of the HPI and ROS as documented by the MA/LPN/RN Fabiola Joyner MD    Vital Signs:  Visit Vitals  /70   Pulse 98   Temp 97.3 °F (36.3 °C)   Resp 18   Ht 161.3 cm (63.5\")   Wt 116 kg (254 lb 12.8 oz)   SpO2 99%   BMI 44.43 kg/m²       Physical Exam  Constitutional:       Appearance: Normal appearance.   HENT:      Head: Normocephalic and atraumatic.      Mouth/Throat:      Mouth: Mucous membranes are moist.      Pharynx: Oropharynx is clear.   Eyes:      General: No scleral icterus.     Extraocular Movements: Extraocular movements intact.      Conjunctiva/sclera: Conjunctivae normal.      Pupils: " Pupils are equal, round, and reactive to light.   Cardiovascular:      Rate and Rhythm: Normal rate and regular rhythm.      Heart sounds:     No friction rub. No gallop.   Pulmonary:      Effort: Pulmonary effort is normal. No respiratory distress.      Breath sounds: Normal breath sounds. No wheezing.   Abdominal:      General: Bowel sounds are normal. There is no distension.      Palpations: Abdomen is soft.      Tenderness: There is no abdominal tenderness.   Musculoskeletal:         General: No swelling, tenderness or deformity. Normal range of motion.      Cervical back: Normal range of motion. No rigidity or tenderness.   Lymphadenopathy:      Cervical: No cervical adenopathy.   Skin:     General: Skin is warm.      Capillary Refill: Capillary refill takes less than 2 seconds.      Findings: No lesion or rash.      Comments: Does have psoriatic plaques on elbows b/l, as well as across knuckles of hands b/l   Neurological:      General: No focal deficit present.      Mental Status: She is alert and oriented to person, place, and time. Mental status is at baseline.      Gait: Gait normal.   Psychiatric:         Behavior: Behavior normal.         Thought Content: Thought content normal.         Assessment and Plan:  Problem List Items Addressed This Visit        Endocrine and Metabolic    Class 3 severe obesity due to excess calories without serious comorbidity with body mass index (BMI) of 40.0 to 44.9 in adult (HCC) - Primary    Current Assessment & Plan     Patient's (Body mass index is 44.43 kg/m².) indicates that they are morbidly obese (BMI > 40 or > 35 with obesity - related health condition) with health conditions that include hypertension . Weight is improving with lifestyle modifications. BMI is is above average; BMI management plan is completed. We discussed portion control and increasing exercise.            Other Visit Diagnoses     Encounter to establish care        Anxiety        Relevant  Medications    ALPRAZolam (XANAX) 0.5 MG tablet    Need for assessment by dentistry for poor dentition        Malignant neoplasm of central portion of breast in female, estrogen receptor positive, unspecified laterality (HCC)              An After Visit Summary and PPPS were given to the patient.   Patient presents today for follow-up.  Recently had lumpectomy August 18.  Has oncology appointment September 29 to discuss further treatment plan.  She has labs scheduled at that time.  We will hold off on obtaining labs today for that reason.  Patient has been having some issues with anxiety she takes alprazolam as needed.  Will provide refill for this today.  She had some questions about her RENUKA coming back positive.  We discussed possibilities for why this is.  Ultimately deciding that we should focus on the breast cancer treatment at this time.  She is also due to have some dental work completed, will need clearance for this prior to procedure.  She can send the paperwork to our clinic to be completed.    I wore protective equipment throughout this patient encounter to include mask and eye protection. Hand hygiene was performed before donning protective equipment and after removal when leaving the room.

## 2022-09-22 ENCOUNTER — OFFICE VISIT (OUTPATIENT)
Dept: FAMILY MEDICINE CLINIC | Facility: CLINIC | Age: 57
End: 2022-09-22

## 2022-09-22 VITALS
BODY MASS INDEX: 43.5 KG/M2 | WEIGHT: 254.8 LBS | RESPIRATION RATE: 18 BRPM | OXYGEN SATURATION: 99 % | HEART RATE: 98 BPM | DIASTOLIC BLOOD PRESSURE: 70 MMHG | HEIGHT: 64 IN | SYSTOLIC BLOOD PRESSURE: 140 MMHG | TEMPERATURE: 97.3 F

## 2022-09-22 DIAGNOSIS — Z01.20 NEED FOR ASSESSMENT BY DENTISTRY FOR POOR DENTITION: ICD-10-CM

## 2022-09-22 DIAGNOSIS — E66.01 CLASS 3 SEVERE OBESITY DUE TO EXCESS CALORIES WITHOUT SERIOUS COMORBIDITY WITH BODY MASS INDEX (BMI) OF 40.0 TO 44.9 IN ADULT: Primary | ICD-10-CM

## 2022-09-22 DIAGNOSIS — Z17.0 MALIGNANT NEOPLASM OF CENTRAL PORTION OF BREAST IN FEMALE, ESTROGEN RECEPTOR POSITIVE, UNSPECIFIED LATERALITY: ICD-10-CM

## 2022-09-22 DIAGNOSIS — Z76.89 ENCOUNTER TO ESTABLISH CARE: ICD-10-CM

## 2022-09-22 DIAGNOSIS — C50.119 MALIGNANT NEOPLASM OF CENTRAL PORTION OF BREAST IN FEMALE, ESTROGEN RECEPTOR POSITIVE, UNSPECIFIED LATERALITY: ICD-10-CM

## 2022-09-22 DIAGNOSIS — F41.9 ANXIETY: ICD-10-CM

## 2022-09-22 PROCEDURE — 99214 OFFICE O/P EST MOD 30 MIN: CPT | Performed by: STUDENT IN AN ORGANIZED HEALTH CARE EDUCATION/TRAINING PROGRAM

## 2022-09-22 RX ORDER — ALPRAZOLAM 0.5 MG/1
0.5 TABLET ORAL DAILY PRN
Qty: 20 TABLET | Refills: 0 | Status: SHIPPED | OUTPATIENT
Start: 2022-09-22

## 2022-09-22 NOTE — ASSESSMENT & PLAN NOTE
Patient's (Body mass index is 44.43 kg/m².) indicates that they are morbidly obese (BMI > 40 or > 35 with obesity - related health condition) with health conditions that include hypertension . Weight is improving with lifestyle modifications. BMI is is above average; BMI management plan is completed. We discussed portion control and increasing exercise.

## 2022-09-27 LAB
LAB AP CASE REPORT: NORMAL
PATH REPORT.FINAL DX SPEC: NORMAL
PATH REPORT.GROSS SPEC: NORMAL

## 2022-09-29 ENCOUNTER — TELEPHONE (OUTPATIENT)
Dept: ONCOLOGY | Facility: CLINIC | Age: 57
End: 2022-09-29

## 2022-09-29 ENCOUNTER — OFFICE VISIT (OUTPATIENT)
Dept: ONCOLOGY | Facility: CLINIC | Age: 57
End: 2022-09-29

## 2022-09-29 ENCOUNTER — LAB (OUTPATIENT)
Dept: LAB | Facility: HOSPITAL | Age: 57
End: 2022-09-29

## 2022-09-29 VITALS
WEIGHT: 250 LBS | SYSTOLIC BLOOD PRESSURE: 151 MMHG | DIASTOLIC BLOOD PRESSURE: 94 MMHG | HEART RATE: 83 BPM | BODY MASS INDEX: 42.68 KG/M2 | TEMPERATURE: 97.1 F | HEIGHT: 64 IN | RESPIRATION RATE: 18 BRPM

## 2022-09-29 DIAGNOSIS — C80.1 DUCTAL CARCINOMA: ICD-10-CM

## 2022-09-29 DIAGNOSIS — Z17.0 MALIGNANT NEOPLASM OF CENTRAL PORTION OF RIGHT BREAST IN FEMALE, ESTROGEN RECEPTOR POSITIVE: Primary | ICD-10-CM

## 2022-09-29 DIAGNOSIS — C80.1 DUCTAL CARCINOMA: Primary | ICD-10-CM

## 2022-09-29 DIAGNOSIS — C50.111 MALIGNANT NEOPLASM OF CENTRAL PORTION OF RIGHT BREAST IN FEMALE, ESTROGEN RECEPTOR POSITIVE: Primary | ICD-10-CM

## 2022-09-29 LAB
ALBUMIN SERPL-MCNC: 4.1 G/DL (ref 3.5–5.2)
ALBUMIN/GLOB SERPL: 1.4 G/DL
ALP SERPL-CCNC: 145 U/L (ref 39–117)
ALT SERPL W P-5'-P-CCNC: 23 U/L (ref 1–33)
ANION GAP SERPL CALCULATED.3IONS-SCNC: 11 MMOL/L (ref 5–15)
AST SERPL-CCNC: 25 U/L (ref 1–32)
BASOPHILS # BLD AUTO: 0.08 10*3/MM3 (ref 0–0.2)
BASOPHILS NFR BLD AUTO: 0.7 % (ref 0–1.5)
BILIRUB SERPL-MCNC: 0.4 MG/DL (ref 0–1.2)
BUN SERPL-MCNC: 12 MG/DL (ref 6–20)
BUN/CREAT SERPL: 16.9 (ref 7–25)
CALCIUM SPEC-SCNC: 9.5 MG/DL (ref 8.6–10.5)
CHLORIDE SERPL-SCNC: 99 MMOL/L (ref 98–107)
CO2 SERPL-SCNC: 27 MMOL/L (ref 22–29)
CREAT SERPL-MCNC: 0.71 MG/DL (ref 0.57–1)
DEPRECATED RDW RBC AUTO: 43.6 FL (ref 37–54)
EGFRCR SERPLBLD CKD-EPI 2021: 101.2 ML/MIN/1.73
EOSINOPHIL # BLD AUTO: 0.17 10*3/MM3 (ref 0–0.4)
EOSINOPHIL NFR BLD AUTO: 1.4 % (ref 0.3–6.2)
ERYTHROCYTE [DISTWIDTH] IN BLOOD BY AUTOMATED COUNT: 14.5 % (ref 12.3–15.4)
GLOBULIN UR ELPH-MCNC: 3 GM/DL
GLUCOSE SERPL-MCNC: 256 MG/DL (ref 65–99)
HCT VFR BLD AUTO: 39.5 % (ref 34–46.6)
HGB BLD-MCNC: 12.7 G/DL (ref 12–15.9)
HOLD SPECIMEN: NORMAL
HOLD SPECIMEN: NORMAL
LYMPHOCYTES # BLD AUTO: 4.04 10*3/MM3 (ref 0.7–3.1)
LYMPHOCYTES NFR BLD AUTO: 34.1 % (ref 19.6–45.3)
MCH RBC QN AUTO: 27.1 PG (ref 26.6–33)
MCHC RBC AUTO-ENTMCNC: 32.2 G/DL (ref 31.5–35.7)
MCV RBC AUTO: 84.4 FL (ref 79–97)
MONOCYTES # BLD AUTO: 0.72 10*3/MM3 (ref 0.1–0.9)
MONOCYTES NFR BLD AUTO: 6.1 % (ref 5–12)
NEUTROPHILS NFR BLD AUTO: 57.7 % (ref 42.7–76)
NEUTROPHILS NFR BLD AUTO: 6.85 10*3/MM3 (ref 1.7–7)
PLATELET # BLD AUTO: 370 10*3/MM3 (ref 140–450)
PMV BLD AUTO: 10.1 FL (ref 6–12)
POTASSIUM SERPL-SCNC: 4.2 MMOL/L (ref 3.5–5.2)
PROT SERPL-MCNC: 7.1 G/DL (ref 6–8.5)
RBC # BLD AUTO: 4.68 10*6/MM3 (ref 3.77–5.28)
SODIUM SERPL-SCNC: 137 MMOL/L (ref 136–145)
WBC NRBC COR # BLD: 11.86 10*3/MM3 (ref 3.4–10.8)

## 2022-09-29 PROCEDURE — 85025 COMPLETE CBC W/AUTO DIFF WBC: CPT

## 2022-09-29 PROCEDURE — 36415 COLL VENOUS BLD VENIPUNCTURE: CPT

## 2022-09-29 PROCEDURE — 99215 OFFICE O/P EST HI 40 MIN: CPT | Performed by: INTERNAL MEDICINE

## 2022-09-29 PROCEDURE — 80053 COMPREHEN METABOLIC PANEL: CPT | Performed by: INTERNAL MEDICINE

## 2022-09-29 NOTE — TELEPHONE ENCOUNTER
PATIENT STATES SHE WANTS TO WAIT AND SCHEDULE FIRST CHEMO APPT WHEN SHE IS HERE FOR CHEMO TEACHING..

## 2022-09-29 NOTE — PROGRESS NOTES
Hematology/Oncology Outpatient Follow Up    PATIENT NAME:Alaina Hartman  :1965  MRN: [id number]  PRIMARY CARE PHYSICIAN: Fabiola Joyner MD  REFERRING PHYSICIAN: Fabiola Joyner MD    Chief Complaint   Patient presents with   • Follow-up     Ductal carcinoma (HCC)        HISTORY OF PRESENT ILLNESS:     This is a 56-year-old female who was clinically asymptomatic and had a routine screening mammogram which showed an abnormality on the right breast.  Prior to that patient had left breast biopsy in  for benign disease.     Review of her screening mammogram which was performed on 6/3/2022 showed a new 2 cm focal asymmetry with architectural distortion in the mid to posterior third depth of the outer right breast the left breast was benign.  Right diagnostic mammogram and ultrasound was recommended.  On 2022 patient underwent right diagnostic mammogram and ultrasound which showed a 2 cm irregular mass in the lateral inferior breast.  Same day she had an ultrasound which showed a 1.6 cm on the right breast the right axilla did not show any abnormal lymph nodes.     Patient then underwent ultrasound-guided biopsy of the right breast mass pathology revealed moderately differentiated invasive ductal carcinoma estrogen receptor positive at 95% progesterone receptor positive at 50%, HER2/bertha was negative at 1+ on immunohistochemistry.        Patient had a partial hysterectomy many years ago.  She is  and has 3 children.  She does not smoke, does not drink alcohol     Family history significant for mother with breast cancer in her 50s, her father had skin cancers, paternal grandmother had colon cancer at the age of 70        She is a       · 2022 estradiol level was less than 5 and FSH was 33 consistent with postmenopausal state  · 2022 patient underwent right lumpectomy with sentinel lymph node biopsy.  Pathology revealed residual moderately differentiated ductal carcinoma  measuring 1.7 cm completely excised total of 5 lymph nodes were removed and all of them were negative for metastatic disease.  Pathology stage is pT1 cpN0 M0.  There was no evidence of DCIS all margins were negative distance to the closest margin was anterior margin at 2 mm ER positive at 95, TN positive at 50% and HER2/bertha was negative.  · 9/27/2022 patient had Oncotype DX assay done which returned with a recurrence score of 28.  This is consistent with 17% risk of recurrence at 9 years distantly, and more than 15% chemotherapy benefit.  On the validation assay estrogen receptor was positive, TN was positive and HER2/bertha was negative.  Past Medical History:   Diagnosis Date   • Ankle edema     at times   • Breast cancer, right (HCC) 08/2022   • Diabetes mellitus (HCC)    • GERD (gastroesophageal reflux disease)    • Hiatal hernia    • Hyperlipidemia    • Morbid obesity (HCC)    • Sleep apnea     CPAP- to bring dos   • Type 2 diabetes mellitus (HCC)        Past Surgical History:   Procedure Laterality Date   • BREAST BIOPSY Right 8/18/2022    Procedure: Right breast lumpectomy;  Surgeon: Jagdeep Mcwilliams MD;  Location: [facility];  Service: General;  Laterality: Right;   • BREAST LUMPECTOMY Left 2003   • SENTINEL NODE BIOPSY Right 8/18/2022    Procedure: SENTINEL NODE BIOPSY;  Surgeon: Jagdeep Mcwilliams MD;  Location: [facility];  Service: General;  Laterality: Right;   • SUBTOTAL HYSTERECTOMY           Current Outpatient Medications:   •  ALPRAZolam (XANAX) 0.5 MG tablet, Take 1 tablet by mouth Daily As Needed for Anxiety., Disp: 20 tablet, Rfl: 0  •  atorvastatin (LIPITOR) 40 MG tablet, Take 1 tablet by mouth Daily., Disp: 90 tablet, Rfl: 4  •  BIOTIN PO, Take  by mouth., Disp: , Rfl:   •  Cyanocobalamin (Vitamin B-12) 1000 MCG sublingual tablet, Place 1 tablet under the tongue Daily., Disp: 100 each, Rfl: 4  •  glucose blood (Accu-Chek Guide) test strip, 1 each by Other route 4 (Four) Times  a Day. Use as instructed, Disp: 150 each, Rfl: 11  •  Insulin Lispro Prot & Lispro (HumaLOG Mix 75/25 KwikPen) (75-25) 100 UNIT/ML suspension pen-injector pen, Inject 35 units subcu 3 times a day before each meal., Disp: 30 mL, Rfl: 5  •  Insulin Pen Needle (Pen Needles) 32G X 4 MM misc, 1 each 4 (Four) Times a Day. Use to inject insulin / DX E11.65, Disp: 150 each, Rfl: 5  •  lisinopril (PRINIVIL,ZESTRIL) 5 MG tablet, Take 1 tablet by mouth Daily. Appointment needed for additional refills. (Patient taking differently: Take 5 mg by mouth Every Morning. Appointment needed for additional refills.), Disp: 90 tablet, Rfl: 2  •  metFORMIN ER (GLUCOPHAGE-XR) 500 MG 24 hr tablet, Take 2 tablets by mouth Daily. Appointment needed for additional refills. (Patient taking differently: Take 1,000 mg by mouth every night at bedtime. Appointment needed for additional refills.), Disp: 180 tablet, Rfl: 2  •  multivitamin with minerals tablet tablet, Take 1 tablet by mouth Daily., Disp: , Rfl:   •  omeprazole (priLOSEC) 40 MG capsule, Take 1 capsule by mouth Daily. Appointment needed for additional refills., Disp: 90 capsule, Rfl: 2  •  Vitamin D, Ergocalciferol, 50 MCG (2000 UT) capsule, Take 2,000 Units by mouth Daily., Disp: 100 capsule, Rfl: 4    No Known Allergies    Family History   Problem Relation Age of Onset   • Breast cancer Mother    • Heart disease Mother    • Thyroid disease Mother    • Stroke Mother    • Clotting disorder Father    • Diabetes Brother    • Heart disease Brother    • Cancer Paternal Grandmother    • Colon cancer Paternal Grandmother    • Diabetes Brother    • Stroke Brother        Cancer-related family history includes Breast cancer in her mother; Cancer in her paternal grandmother; Colon cancer in her paternal grandmother.    Social History     Tobacco Use   • Smoking status: Former Smoker     Packs/day: 0.25     Years: 10.00     Pack years: 2.50     Types: Cigarettes     Quit date: 1/1/2010     Years  "since quittin.7   • Smokeless tobacco: Never Used   Vaping Use   • Vaping Use: Never used   Substance Use Topics   • Alcohol use: Not Currently     Comment: rare   • Drug use: Never     I have reviewed and confirmed the accuracy of the patient's history: Chief complaint, HPI, ROS and Subjective as entered by the MA/LPN/RN. Lianet Rust MD 22       SUBJECTIVE:    Patient denies any new issues today        REVIEW OF SYSTEMS:    Review of Systems   Constitutional: Negative for chills, fatigue and fever.   HENT: Negative for congestion, drooling, ear discharge, rhinorrhea, sinus pressure and tinnitus.    Eyes: Negative for photophobia, pain and discharge.   Respiratory: Negative for apnea, choking and stridor.    Cardiovascular: Negative for palpitations.   Gastrointestinal: Negative for abdominal distention, abdominal pain and anal bleeding.   Endocrine: Negative for polydipsia and polyphagia.   Genitourinary: Negative for decreased urine volume, flank pain and genital sores.   Musculoskeletal: Negative for gait problem, neck pain and neck stiffness.   Skin: Negative for color change, rash and wound.   Neurological: Negative for tremors, seizures, syncope, facial asymmetry and speech difficulty.   Hematological: Negative for adenopathy.   Psychiatric/Behavioral: Negative for agitation, confusion, hallucinations and self-injury. The patient is not hyperactive.        OBJECTIVE:    Vitals:    22 1444   BP: 151/94   Pulse: 83   Resp: 18   Temp: 97.1 °F (36.2 °C)   TempSrc: Infrared   Weight: 113 kg (250 lb)   Height: 161.3 cm (63.5\")   PainSc: 0-No pain     Body mass index is 43.59 kg/m².    ECOG    (0) Fully active, able to carry on all predisease performance without restriction    Physical Exam  Vitals and nursing note reviewed.   Constitutional:       General: She is not in acute distress.     Appearance: She is not diaphoretic.   HENT:      Head: Normocephalic and atraumatic.   Eyes:      " General: No scleral icterus.        Right eye: No discharge.         Left eye: No discharge.      Conjunctiva/sclera: Conjunctivae normal.   Neck:      Thyroid: No thyromegaly.   Cardiovascular:      Rate and Rhythm: Normal rate and regular rhythm.      Heart sounds: Normal heart sounds.     No friction rub. No gallop.   Pulmonary:      Effort: Pulmonary effort is normal. No respiratory distress.      Breath sounds: No stridor. No wheezing.   Abdominal:      General: Bowel sounds are normal.      Palpations: Abdomen is soft. There is no mass.      Tenderness: There is no abdominal tenderness. There is no guarding or rebound.   Musculoskeletal:         General: No tenderness. Normal range of motion.      Cervical back: Normal range of motion and neck supple.   Lymphadenopathy:      Cervical: No cervical adenopathy.   Skin:     General: Skin is warm.      Findings: No erythema or rash.   Neurological:      Mental Status: She is alert and oriented to person, place, and time.      Motor: No abnormal muscle tone.   Psychiatric:         Behavior: Behavior normal.       I have reexamined the patient and the results are consistent with the previously documented exam. Lianet Gissel Rust MD     RECENT LABS    WBC   Date Value Ref Range Status   09/29/2022 11.86 (H) 3.40 - 10.80 10*3/mm3 Final   08/26/2020 11.0 (H) 3.4 - 10.8 x10E3/uL Final     RBC   Date Value Ref Range Status   09/29/2022 4.68 3.77 - 5.28 10*6/mm3 Final   08/26/2020 4.53 3.77 - 5.28 x10E6/uL Final     Hemoglobin   Date Value Ref Range Status   09/29/2022 12.7 12.0 - 15.9 g/dL Final     Hematocrit   Date Value Ref Range Status   09/29/2022 39.5 34.0 - 46.6 % Final     MCV   Date Value Ref Range Status   09/29/2022 84.4 79.0 - 97.0 fL Final     MCH   Date Value Ref Range Status   09/29/2022 27.1 26.6 - 33.0 pg Final     MCHC   Date Value Ref Range Status   09/29/2022 32.2 31.5 - 35.7 g/dL Final     RDW   Date Value Ref Range Status   09/29/2022 14.5 12.3 -  15.4 % Final     RDW-SD   Date Value Ref Range Status   09/29/2022 43.6 37.0 - 54.0 fl Final     MPV   Date Value Ref Range Status   09/29/2022 10.1 6.0 - 12.0 fL Final     Platelets   Date Value Ref Range Status   09/29/2022 370 140 - 450 10*3/mm3 Final     Neutrophil %   Date Value Ref Range Status   09/29/2022 57.7 42.7 - 76.0 % Final     Lymphocyte %   Date Value Ref Range Status   09/29/2022 34.1 19.6 - 45.3 % Final     Monocyte %   Date Value Ref Range Status   09/29/2022 6.1 5.0 - 12.0 % Final     Eosinophil %   Date Value Ref Range Status   09/29/2022 1.4 0.3 - 6.2 % Final     Basophil %   Date Value Ref Range Status   09/29/2022 0.7 0.0 - 1.5 % Final     Immature Grans %   Date Value Ref Range Status   08/09/2022 0.5 0.0 - 0.5 % Final     Neutrophils, Absolute   Date Value Ref Range Status   09/29/2022 6.85 1.70 - 7.00 10*3/mm3 Final     Lymphocytes, Absolute   Date Value Ref Range Status   09/29/2022 4.04 (H) 0.70 - 3.10 10*3/mm3 Final     Monocytes, Absolute   Date Value Ref Range Status   09/29/2022 0.72 0.10 - 0.90 10*3/mm3 Final     Eosinophils, Absolute   Date Value Ref Range Status   09/29/2022 0.17 0.00 - 0.40 10*3/mm3 Final     Basophils, Absolute   Date Value Ref Range Status   09/29/2022 0.08 0.00 - 0.20 10*3/mm3 Final     Immature Grans, Absolute   Date Value Ref Range Status   08/09/2022 0.05 0.00 - 0.05 10*3/mm3 Final     nRBC   Date Value Ref Range Status   08/09/2022 0.0 0.0 - 0.2 /100 WBC Final       Lab Results   Component Value Date    GLUCOSE 221 (H) 08/09/2022    BUN 14 08/09/2022    CREATININE 0.70 08/09/2022    EGFRIFNONA 90 12/01/2021    EGFRIFAFRI 115 08/26/2020    BCR 20.0 08/09/2022    K 3.9 08/09/2022    CO2 25.5 08/09/2022    CALCIUM 8.9 08/09/2022    PROTENTOTREF 7.0 08/26/2020    ALBUMIN 4.20 08/09/2022    LABIL2 1.8 08/26/2020    AST 17 08/09/2022    ALT 18 08/09/2022         Assessment & Plan     Ductal carcinoma (HCC)  - CBC & Differential  - Ambulatory Referral to General  Surgery  - Comprehensive Metabolic Panel  - Comprehensive Metabolic Panel      ASSESSMENT:      1. Moderately differentiated invasive ductal carcinoma of the right breast.  Status post right lumpectomy with sentinel lymph node biopsy.  pT1 cpN0 M0.  ER positive at 95%, IA positive at 50% and HER2/bertha was negative.  T1 cN0 M0  2. Oncotype DX assay with a high recurrence score at 28, consistent with 17% risk of distant recurrence at 9 years with tamoxifen alone and group absolute chemotherapy benefit of more than 15%  3. Status post partial hysterectomy  4. Postmenopausal state following lab confirmation  5. Family history of breast cancer in her mother at the age of 50, paternal grandmother with colon cancer at 17 and her father had skin cancer.     Discussion     Reviewed the results of her Oncotype DX assay which came back with a high recurrence score at 28.  She has some up to 15% benefit with chemotherapy.  She also has a risk of relapse of 17% despite adjuvant hormonal treatment.  For these reasons chemotherapy have been recommended.  Patient was reluctant about pursuing adjuvant chemotherapy due to concerns of side effects.  Discussed that it is important for her to receive chemotherapy given the high Oncotype DX assay report.  She has comorbidities but she is relatively healthy and remains very functional.  Patient is willing to take Taxotere and Cytoxan.    Taxotere and Cytoxan every 21 days for 4 cycles.    This will be followed by adjuvant radiation and then adjuvant endocrine therapy     We discussed supportive care that will be offered to her while undergoing chemotherapy    We discussed the side effects to include but not limited to:    Chemotherapy side effects include, but not limited to, nausea, vomiting, bone marrow suppression, which can result in blood, platelet transfusion. There is also risk of permanent bone marrow destruction, which can cause myelodysplastic syndrome or leukemia years down the  line. There is risk of infection which can result in hospitalization and even death. There is also risk of fatigue, asthenia, alopecia which could become permanent. Chemo will help to reduce risk of relapse of cancer, but does not eliminate risk completely.    Discussed that specifically Taxotere can lead to hypersensitivity reaction, peripheral neuropathy, cardiac arrhythmia, swelling.       Plans:     · Oncotype DX assay results reviewed  · We will asked Dr. Mcwilliams to place port for chemotherapy  · She was given information on Cytoxan and Taxotere  · Hemotherapy ordered in [place]  · Schedule chemotherapy education with nurse practitioner next week  · Referred to lymphedema clinic  · Gave information on cancer genetics for her to review.  Patient to let me know when she is ready to proceed  · She will be a candidate for AI   · Patient will be seen by radiation oncologist following adjuvant chemotherapy  · All questions answered       Patient verbalized understanding and is in agreement of the above plan.              I spent 45 total minutes, face-to-face, caring for Alaina today.  90% of this time involved counseling and/or coordination of care as documented within this note.

## 2022-09-29 NOTE — PATIENT INSTRUCTIONS
"Taxotere        Generic Name: Docetaxel    Drug Type:    Taxotere is an anti-cancer (\"antineoplastic\" or \"cytotoxic\") chemotherapy drug.  Taxotere is classified as a \"plant alkaloid,\" a \"taxane\" and an \"antimicrotubule agent.\"  (For more detail, see \"How Taxotere Works\" section below).    What Taxotere Is Used For:  Approved in treatment of breast cancer, non-small cell lung cancer, advanced stomach cancer, head and neck cancer and metastatic prostate cancer.  Also being investigated to treat small cell lung, ovarian, bladder, and pancreatic cancers, soft tissue sarcoma and melanoma.  Note:  If a drug has been approved for one use, physicians may elect to use this same drug for other problems if they believe it may be helpful.    How Taxotere Is Given:  Taxotere is given through a vein (intravenously, IV)    There is no pill form of Taxotere  Premedication with a corticosteroid pill starting a day prior to Taxotere infusion for 3 days is given to reduce the severity of fluid retention and allergic reactions.  Your doctor will prescribe the exact regimen.  The amount of Taxotere that you will receive depends on many factors, including your height and weight, your general health or other health problems, and the type of cancer or condition being treated.  Your doctor will determine your dose and schedule.  Side Effects:  Important things to remember about Taxotere side effects:    Most people do not experience all of the Taxotere side effects listed  Taxotere side effects are often predictable in terms of their onset and duration  Taxotere side effects are almost always reversible and will go away after treatment is complete  There are many options to help minimize or prevent Taxotere side effects  There is no relationship between the presence or severity of Taxotere side effects and the effectiveness of Taxotere.  Taxotere side effects and their severity depend on how much Taxotere is given.  In other words, high " doses of Taxotere may produce more severe side effects).  The following Taxotere side effects are common (occurring in greater than 30%) for patients taking Taxotere:    Low white blood cell count (this can increase your risk for infection)  Low red blood cell count (anemia)  Rayray: Meaning low point, rayray is the point in time between chemotherapy cycles in which you experience low blood counts.    Onset: 4-7 days  Rayray: 5-9 days  Recovery: 21 days    Fluid retention with weight gain, swelling of the ankles or abdominal area.  Peripheral neuropathy (numbness in your fingers and toes) may occur with repeated doses. This should be reported to your healthcare provider.  Nausea  Diarrhea  Mouth sores  Hair loss  Fatigue and weakness  Infection  Nail changes (color changes to your fingernails or toenails may occur while taking Taxotere. In extreme, but rare, cases nails may fall off. After you have finished Taxotere treatments, your nails will generally grow back.)  These Taxotere side effects are less common, meaning they occur in 10-29 percent of patients receiving Taxotere:    Vomiting  Muscle/bone/joint pain (myalgias and arthralgias)  Low platelet count (This can increase your risk of bleeding)  Increases in blood tests measuring liver function.  These return to normal once treatment is discontinued. (see liver problems)  Infusion-related Taxotere side effects (symptoms which may occur during the actual treatment) include:    Allergic reactions (rash, flushing, fever, lowered blood pressure).  Happens rarely, usually occurs in the first or second infusion.  Frequency is reduced by premedication with corticosteroid starting one day before infusion.  You will be monitored closely during the infusion for any signs of allergic reaction.  Infusion site reactions (uncommon and generally mild, consist of darkening of the vein, inflammation, redness or dryness of the skin, or swelling of the vein).  Not all Taxotere side  effects are listed above, some that are rare (occurring in less than 10% of patients) are not listed here.  However, you should always inform your health care provider if you experience any unusual symptoms.    When to contact your doctor or health care provider:  Contact your health care provider immediately, day or night, if you should experience any of the following symptoms:    Fever of 100.4° F (38° C) or higher, chills (possible signs of infection)  The following symptoms require medical attention, but are not an emergency.  Contact your health care provider within 24 hours of noticing any of the following:    Nausea (interferes with ability to eat and unrelieved with prescribed medication).  Vomiting (vomiting more than 4-5 times in a 24 hour period).  Diarrhea (4-6 episodes in a 24-hour period).  Unusual bleeding or bruising.  Black or tarry stools, or blood in your stools or urine.  Extreme fatigue (unable to carry on self-care activities).  Mouth sores (painful redness, swelling or ulcers).  Yellowing of the skin or eyes.  Swelling of the ankles.  Weight gain.  Swelling of the stomach.   Shortness of breath.  Always inform your health care provider if you experience any unusual symptoms.    Precautions:  Before starting Taxotere treatment, make sure you tell your doctor about any other medications you are taking (including prescription, over-the-counter, vitamins, herbal remedies, etc.).   Do not take aspirin, products containing aspirin unless your doctor specifically permits this.  Do not receive any kind of immunization or vaccination without your doctor's approval while taking Taxotere.  Inform your health care professional if you are pregnant or may be pregnant prior to starting this treatment. Pregnancy category D (Taxotere may be hazardous to the fetus.  Women who are pregnant or become pregnant must be advised of the potential hazard to the fetus).  For both men and women: Do not conceive a child  (get pregnant) while taking Taxotere.  Barrier methods of contraception, such as condoms, are recommended. Discuss with your doctor when you may safely become pregnant or conceive a child after therapy.  Do not breast feed while taking Taxotere.  Self-Care Tips:  You may be at risk of infection so try to avoid crowds or people with colds or not feeling well, and report fever or any other signs of infection immediately to your health care provider.  Wash your hands often.  To help treat/prevent mouth sores, use a soft toothbrush, and rinse three times a day with 1/2 to 1 teaspoon of baking soda and/or 1/2 to 1 teaspoon of salt mixed with 8 ounces of water.  Use an electric razor and a soft toothbrush to minimize bleeding.  Avoid contact sports or activities that could cause injury.  To reduce nausea, take anti-nausea medications as prescribed by your doctor, and eat small, frequent meals.    Avoid sun exposure.  Wear SPF 15 (or higher) sunblock and protective clothing. Drink at least two to three quarts of fluid every 24 hours, unless you are instructed otherwise.  In general, drinking alcoholic beverages should be kept to a minimum or avoided completely.  You should discuss this with your doctor.  Get plenty of rest.   Maintain good nutrition.  If you experience symptoms or side effects, be sure to discuss them with your health care team. They can prescribe medications and/or offer other suggestions that are effective in managing such problems.  Monitoring and Testing:  You will be checked regularly by your health care professional while you are taking Taxotere, to monitor side effects and check your response to therapy.  Periodic blood work to monitor your complete blood count (CBC) as well as the function of other organs (such as your kidneys and liver) will also be ordered by your doctor.        How Taxotere Works:  Cancerous tumors are characterized by cell division, which is no longer controlled as it is in  "normal tissue. \"Normal\" cells stop dividing when they come into contact with like cells, a mechanism known as contact inhibition.  Cancerous cells lose this ability.  Cancer cells no longer have the normal checks and balances in place that control and limit cell division.  The process of cell division, whether normal or cancerous cells, is through the cell cycle.  The cell cycle goes from the resting phase, through active growing phases, and then to mitosis (division).    The ability of chemotherapy to kill cancer cells depends on its ability to halt cell division.  Usually, the drugs work by damaging the RNA or DNA that tells the cell how to copy itself in division.  If the cells are unable to divide, they die.  The faster the cells are dividing, the more likely it is that chemotherapy will kill the cells, causing the tumor to shrink.  They also induce cell suicide (self-death or apoptosis).    Chemotherapy drugs that affect cells only when they are dividing are called cell-cycle specific.  Chemotherapy drugs that affect cells when they are at rest are called cell-cycle non-specific.  The scheduling of chemotherapy is set based on the type of cells, rate at which they divide, and the time at which a given drug is likely to be effective.  This is why chemotherapy is typically given in cycles.    Chemotherapy is most effective at killing cells that are rapidly dividing.  Unfortunately, chemotherapy does not know the difference between the cancerous cells and the normal cells. The \"normal\" cells will grow back and be healthy but in the meantime, side effects occur.  The \"normal\" cells most commonly affected by chemotherapy are the blood cells, the cells in the mouth, stomach and bowel, and the hair follicles; resulting in low blood counts, mouth sores, nausea, diarrhea, and/or hair loss.  Different drugs may affect different parts of the body.    Taxotere belongs to a class of chemotherapy drugs called plant " "alkaloids. Plant alkaloids are made from plants.  The vinca alkaloids are made from the periwinkle plant (catharanthus rosea). The taxanes are made from the bark of the [place] Yew tree (taxus).  The vinca alkaloids and taxanes are also known as antimicrotubule agents. The podophyllotoxins are derived from the May apple plant. Camptothecan analogs are derived from the  \"Happy Tree\" (Camptotheca acuminata).  Podophyllotoxins and camptothecan analogs are also known as topoisomerase inhibitors.  The plant alkaloids are cell-cycle specific.  This means they attack the cells during various phases of division.    Vinca alkaloids: Vincristine, Vinblastine and Vinorelbine  Taxanes:  Paclitaxel and Taxotere  Podophyllotoxins:  Etoposide and Tenisopide  Camptothecan analogs: Irinotecan and Topotecan  Antimicrotubule agents (such as Taxotere), inhibit the microtubule structures within the cell.  Microtubules are part of the cell's apparatus for dividing and replicating itself.  Inhibition of these structures ultimately results in cell death.    Note:  We strongly encourage you to talk with your health care professional about your specific medical condition and treatments. The information contained in this website is meant to be helpful and educational, but is not a substitute for medical advice.              Cytoxan ®        Generic Name: Cyclophosphamide    Other Trade Name: Neosar®    Cyclophosphamide is the generic name for the trade name drug Cytoxan or Neosar. In some cases, health care professionals may use the trade name Cytoxan or Neosar when referring to the generic drug name cyclophosphamide.    Drug Type: Cyclophosphamide is an anti-cancer (\"antineoplastic\" or \"cytotoxic\") chemotherapy drug. This medication is classified as an \"alkylating agent.\" (For more detail, see \"How Cyclophosphamide Works\" section below).    What Cyclophosphamide Is Used For  Cancers treated with cyclophosphamide include: Hodgkin's and " non-Hodgkin's lymphoma, Burkitt's lymphoma, chronic lymphocytic leukemia (CLL), chronic myelocytic leukemia (CML), acute myeloid leukemia (AML), acute lymphocytic leukemia (ALL), t-cell lymphoma (mycosis fungoides), multiple myeloma, neuroblastoma, retinoblastoma, breast cancer, ovarian cancer, and conditioning regimens for bone marrow transplantation  Note: If a drug has been approved for one use, physicians may elect to use this same drug for other problems if they believe it may be helpful.    How Cyclophosphamide Is Given  The amount of cyclophosphamide that you will receive depends on many factors, including your height and weight, your general health or other health problems, and the type of cancer or condition you have. Your doctor will determine your exact dosage and schedule.  Cyclophosphamide can be given by a number of different routes. The route that it is given depends on the dosage, the condition being treated, as well as the purpose it is being used for.  It is usually given through a vein by injection or infusion (intravenous, IV) or by mouth in tablet form, depending upon diagnosis.  Cyclophosphamide is also approved to be given by a shot into a muscle (IM), into the abdominal lining (intraperitoneal, IP), or into the lining of the lung (intrapleural).  Tablets should be given with food or after meals. Tablets should not be cut or crushed.  Side Effects  Important things to remember about the side effects of cyclophosphamide:    Most people will not experience all of the cyclophosphamide side effects listed.  Cyclophosphamide side effects are often predictable in terms of their onset, duration, and severity.  Cyclophosphamide side effects will improve after therapy is complete.  Cyclophosphamide side effects may be quite manageable. There are many options to minimize or prevent the side effects of cyclophosphamide.  The following side effects are common (occurring in greater than 30%) for patients  taking cyclophosphamide:    Low blood counts: Your white and red blood cells and platelets may temporarily decrease. This can put you at increased risk for infection, anemia and/or bleeding.  Onset: 7 days  Rayray: 10-14 days  Recovery: 21 days  Rayray: Meaning low point, rayray is the point in time between chemotherapy cycles in which you experience low blood counts.  Hair loss: Temporary - usually begins 3-6 weeks after the start of therapy. Hair will grow back after treatment is completed although the color and/or texture may be different.  Nausea and vomiting: more common with larger doses, usually beginning 6-10 hours after therapy.  Poor appetite  Discoloration of the skin or nails  The following are less common side effects (occurring in 10-29%) for patients receiving cyclophosphamide:    Loss of fertility: Your ability to conceive or father a child may be affected by cyclophosphamide. Discuss this issue with your health care provider.  Diarrhea  Mouth sores  Bladder irritation and bleeding (hemorrhagic cystitis) (see bladder problems)  Delayed Effects of Cyclophosphamide:    There is a slight risk of developing a blood cancer such as leukemia or myelodysplasia after taking cyclophosphamide. Talk to your doctor about this risk.  Note: Not all side effects are listed above. Side effects that are very rare - occurring in less than about 10 percent of patients - are not listed here. But you should always inform your health care provider if you experience any unusual symptoms.    When to Contact Your Doctor or Health Care Provider  Contact your health care provider immediately, day or night, if you should experience any of the following symptoms:    Fever of 100.4º F (38º C) or higher, chills (possible signs of infection)  The following symptoms require medical attention, but are not an emergency. Contact your health care provider within 24 hours of noticing any of the following:    Nausea (interferes with ability  to eat and unrelieved with prescribed medication)  Vomiting (vomiting more than 4-5 times in a 24-hour period)  Diarrhea (4-6 episodes in a 24-hour period)  Unusual bleeding or bruising  Black or tarry stools, or blood in your stools  Blood in the urine  Pain or burning with urination  Extreme fatigue (unable to carry on self-care activities)  Mouth sores (painful redness, swelling or ulcers  Always inform your health care provider if you experience any unusual symptoms.    Precautions  Before starting cyclophosphamide treatment, make sure you tell your doctor about any other medications you are taking (including prescription, over-the-counter, vitamins, herbal remedies, etc.) Do not take aspirin or products containing aspirin unless your doctor specifically permits this.  Do not receive any kind of immunization or vaccination without your doctor's approval while taking cyclophosphamide.  Inform your health care professional if you are pregnant or may be pregnant prior to starting this treatment. Cyclophosphamide may be hazardous to the fetus. Women who are pregnant or become pregnant must be advised of the potential hazard to the fetus.  For both men and women: Use contraceptives, and do not conceive a child (get pregnant) while taking cyclophosphamide. Barrier methods of contraception, such as condoms, are recommended for up to one year after last dose of cyclophosphamide.  Do not breast feed while taking cyclophosphamide.  Self-Care Tips  Drink at least two to three quarts of fluid every 24 hours, unless you are instructed otherwise.  It is important to void (empty your bladder) frequently especially in the first 24 hours after taking cyclophosphamide. Report any pain or burning on urination to your health care provider.  You may be at risk of infections so try to avoid crowds or people with colds, and report fever or any other signs of infection immediately to your health care provider.  Wash your hands  "often  To help treat/prevent mouth sores, use a soft toothbrush, and rinse three times a day with 1 teaspoon of baking soda mixed with 8 ounces of water.  Use an electric razor and a soft toothbrush to minimize bleeding.  Avoid contact sports or activities that could cause injury.  To reduce nausea, take anti-nausea medications as prescribed by your doctor, and eat small, frequent meals.  Eat foods that may help reduce diarrhea (see managing side effects - diarrhea).  Avoid sun exposure. Wear SPF 30 (or higher) sunblock and protective clothing.  In general, drinking alcoholic beverages should be kept to a minimum or avoided completely. You should discuss this with your doctor.  Get plenty or rest.  Maintain good nutrition.  Remain as active as you are able. Gentle exercise is encouraged such as a daily walk.  If you experience symptoms or side effects, be sure to discuss them with your health care team. They can prescribe medications and/or offer other suggestions that are effective in managing such problems.  Monitoring and Testing While Taking Cyclophosphamide  You will be checked regularly by your doctor while you are taking cyclophosphamide, to monitor side effects and check your response to therapy. Periodic blood work will be obtained to monitor your complete blood count (CBC) as well as the function of other organs (such as your kidneys and liver) will also be ordered by your doctor.    How Cyclophosphamide Works  Chemotherapy (anti-neoplastic drugs):    Cancerous tumors are characterized by cell division, which is no longer controlled as it is in normal tissue. \"Normal\" cells stop dividing when they come into contact with like cells, a mechanism known as contact inhibition. Cancerous cells lose this ability. Cancer cells no longer have the normal checks and balances in place that control and limit cell division. The process of cell division, whether normal or cancerous cells, is through the cell cycle. The " "cell cycle goes from the resting phase, through active growing phases, and then to mitosis (division).    The ability of chemotherapy to kill cancer cells depends on its ability to halt cell division. Usually, the drugs work by damaging the RNA or DNA that tells the cell how to copy itself in division. If the cells are unable to divide, they die. The faster the cells are dividing, the more likely it is that chemotherapy will kill the cells, causing the tumor to shrink. They also induce cell suicide (self-death or apoptosis).    Chemotherapy drugs that affect cells only when they are dividing are called cell-cycle specific. Chemotherapy drugs that affect cells when they are at rest are called cell-cycle non-specific. The scheduling of chemotherapy is set based on the type of cells, rate at which they divide, and the time at which a given drug is likely to be effective. This is why chemotherapy is typically given in cycles.    Unfortunately, chemotherapy does not know the difference between the cancerous cells and the normal cells. Chemotherapy will kill all cells that are rapidly dividing. The \"normal\" cells most commonly affected by chemotherapy are the blood cells, the cells in the mouth, stomach and bowel, are the hair follicles; resulting in low blood counts, mouth sores, nausea, diarrhea, and/or hair loss. Different drugs may affect different parts of the body.    Cyclophosphamide is classified as an alkylating agent. Alkylating agents are most active in the resting phase of the cell. These drugs are cell-cycle non-specific. There are several types of alkylating agents.    Note: We strongly encourage you to talk with your health care professional about your specific medical condition and treatments. The information contained in this website is meant to be helpful and educational, but is not substitute for medical advice.    For more information about cyclophosphamide, see FDA prescribing information or package " insert.

## 2022-09-30 ENCOUNTER — TELEPHONE (OUTPATIENT)
Dept: ONCOLOGY | Facility: CLINIC | Age: 57
End: 2022-09-30

## 2022-09-30 DIAGNOSIS — C50.111 MALIGNANT NEOPLASM OF CENTRAL PORTION OF RIGHT BREAST IN FEMALE, ESTROGEN RECEPTOR POSITIVE: Primary | ICD-10-CM

## 2022-09-30 DIAGNOSIS — Z17.0 MALIGNANT NEOPLASM OF CENTRAL PORTION OF RIGHT BREAST IN FEMALE, ESTROGEN RECEPTOR POSITIVE: Primary | ICD-10-CM

## 2022-09-30 RX ORDER — SODIUM CHLORIDE 9 MG/ML
100 INJECTION, SOLUTION INTRAVENOUS CONTINUOUS
Status: CANCELLED | OUTPATIENT
Start: 2022-09-30

## 2022-09-30 RX ORDER — CHLORHEXIDINE GLUCONATE 0.12 MG/ML
15 RINSE ORAL EVERY 12 HOURS SCHEDULED
Status: CANCELLED | OUTPATIENT
Start: 2022-09-30

## 2022-10-03 ENCOUNTER — NUTRITION (OUTPATIENT)
Dept: ONCOLOGY | Facility: CLINIC | Age: 57
End: 2022-10-03
Payer: COMMERCIAL

## 2022-10-03 ENCOUNTER — OFFICE VISIT (OUTPATIENT)
Dept: ONCOLOGY | Facility: CLINIC | Age: 57
End: 2022-10-03

## 2022-10-03 ENCOUNTER — CLINICAL SUPPORT (OUTPATIENT)
Dept: ONCOLOGY | Facility: CLINIC | Age: 57
End: 2022-10-03

## 2022-10-03 VITALS
BODY MASS INDEX: 42 KG/M2 | HEART RATE: 85 BPM | DIASTOLIC BLOOD PRESSURE: 85 MMHG | OXYGEN SATURATION: 96 % | WEIGHT: 246 LBS | HEIGHT: 64 IN | TEMPERATURE: 96.8 F | SYSTOLIC BLOOD PRESSURE: 141 MMHG

## 2022-10-03 VITALS
HEIGHT: 64 IN | TEMPERATURE: 96.8 F | SYSTOLIC BLOOD PRESSURE: 141 MMHG | DIASTOLIC BLOOD PRESSURE: 85 MMHG | BODY MASS INDEX: 42 KG/M2 | RESPIRATION RATE: 18 BRPM | HEART RATE: 85 BPM | WEIGHT: 246 LBS

## 2022-10-03 DIAGNOSIS — Z17.0 MALIGNANT NEOPLASM OF CENTRAL PORTION OF RIGHT BREAST IN FEMALE, ESTROGEN RECEPTOR POSITIVE: Primary | ICD-10-CM

## 2022-10-03 DIAGNOSIS — C50.111 MALIGNANT NEOPLASM OF CENTRAL PORTION OF RIGHT BREAST IN FEMALE, ESTROGEN RECEPTOR POSITIVE: Primary | ICD-10-CM

## 2022-10-03 DIAGNOSIS — Z71.9 ENCOUNTER FOR EDUCATION: Primary | ICD-10-CM

## 2022-10-03 DIAGNOSIS — C50.111 MALIGNANT NEOPLASM OF CENTRAL PORTION OF RIGHT BREAST IN FEMALE, ESTROGEN RECEPTOR POSITIVE: ICD-10-CM

## 2022-10-03 DIAGNOSIS — C80.1 DUCTAL CARCINOMA: ICD-10-CM

## 2022-10-03 DIAGNOSIS — Z17.0 MALIGNANT NEOPLASM OF CENTRAL PORTION OF RIGHT BREAST IN FEMALE, ESTROGEN RECEPTOR POSITIVE: ICD-10-CM

## 2022-10-03 PROCEDURE — 99214 OFFICE O/P EST MOD 30 MIN: CPT | Performed by: INTERNAL MEDICINE

## 2022-10-03 RX ORDER — DEXAMETHASONE 4 MG/1
TABLET ORAL
Qty: 12 TABLET | Refills: 3 | Status: SHIPPED | OUTPATIENT
Start: 2022-10-03 | End: 2023-02-20

## 2022-10-03 RX ORDER — ONDANSETRON HYDROCHLORIDE 8 MG/1
8 TABLET, FILM COATED ORAL 3 TIMES DAILY PRN
Qty: 30 TABLET | Refills: 5 | Status: SHIPPED | OUTPATIENT
Start: 2022-10-03

## 2022-10-03 NOTE — PROGRESS NOTES
OSW met w/ patient and her mother, after APRN teaching of initial infusion therapy. OSW was notified that patient had a plethora of questions related to prognosis, that APRN couldn't answer, therefore she was not signing anything today, and meeting with Dr. Rust on October 21st to have all her questions answered.     OSW introduced self, role and availability, regardless of participating in chemotherapy or not. Patient has previously had a lumpectomy, and was given 'clear margins' per patient report. Patient does work, lives with her  and reports basic needs are met.     OSW discussed ACP with patient, including discussing [place] guidelines[place]stat[place] for healthcare decisions. OSW provided patient with an ACP booklet, HCR form and Living Will form.     OSW discussed various services available here including: OSW, financial counselor, dietician, massage therapy and volunteer chaplaincy program. Also provided a packet of resources including a welcome letter, community, transportation and oncology resources.     Mother and patient asked OSW at the end, if OSW thought waiting until the 21st would be too long to wait. OSW deferred to Breast Health Navigator, and stated she would ask her for the family.     OSW called Breast Health Navigator, who stated Dr. Rust would want to see her before then. Breast Health Navigator stated she would handle it.     No other needs/concerns were noted by OSW at this time.     Concha Ibarra, LSW, CSW, MSW  Oncology MSW  [facility]

## 2022-10-03 NOTE — PAT
Pt educated on BS being too high for surgery-- encouraged to monitor sugar and avoid things that will elevate BS

## 2022-10-03 NOTE — TELEPHONE ENCOUNTER
Spoke to Dr. Brady's office. I explained that the pt will need treatment so I need to know what is planning on being done. They stated that she will have multiple teeth extracted and that the pt has an office visit in November, but the surgery would not be until around January 2023. I asked if there is any way they could get her in earlier. She stated that they are completely booked. I spoke to the pt after her office visit today and relayed this to her. I also spoke to Dr. Rust. She said that her main concern is infection. Will call [facility] to see what their recommendations are at this time.

## 2022-10-03 NOTE — PROGRESS NOTES
Hematology/Oncology Outpatient Follow Up    PATIENT NAME:Alaina Hartman  :1965  MRN: [id number]  PRIMARY CARE PHYSICIAN: Fabiola Joyner MD  REFERRING PHYSICIAN: Fabiola Joyner MD    Chief Complaint   Patient presents with   • Follow-up     Ductal carcinoma (HCC)        HISTORY OF PRESENT ILLNESS:     This is a 56-year-old female who was clinically asymptomatic and had a routine screening mammogram which showed an abnormality on the right breast.  Prior to that patient had left breast biopsy in  for benign disease.     Review of her screening mammogram which was performed on 6/3/2022 showed a new 2 cm focal asymmetry with architectural distortion in the mid to posterior third depth of the outer right breast the left breast was benign.  Right diagnostic mammogram and ultrasound was recommended.  On 2022 patient underwent right diagnostic mammogram and ultrasound which showed a 2 cm irregular mass in the lateral inferior breast.  Same day she had an ultrasound which showed a 1.6 cm on the right breast the right axilla did not show any abnormal lymph nodes.     Patient then underwent ultrasound-guided biopsy of the right breast mass pathology revealed moderately differentiated invasive ductal carcinoma estrogen receptor positive at 95% progesterone receptor positive at 50%, HER2/bertha was negative at 1+ on immunohistochemistry.        Patient had a partial hysterectomy many years ago.  She is  and has 3 children.  She does not smoke, does not drink alcohol     Family history significant for mother with breast cancer in her 50s, her father had skin cancers, paternal grandmother had colon cancer at the age of 70        She is a       · 2022 estradiol level was less than 5 and FSH was 33 consistent with postmenopausal state  · 2022 patient underwent right lumpectomy with sentinel lymph node biopsy.  Pathology revealed residual moderately differentiated ductal carcinoma  measuring 1.7 cm completely excised total of 5 lymph nodes were removed and all of them were negative for metastatic disease.  Pathology stage is pT1 cpN0 M0.  There was no evidence of DCIS all margins were negative distance to the closest margin was anterior margin at 2 mm ER positive at 95, SC positive at 50% and HER2/bertha was negative.  · 9/27/2022 patient had Oncotype DX assay done which returned with a recurrence score of 28.  This is consistent with 17% risk of recurrence at 9 years distantly, and more than 15% chemotherapy benefit.  On the validation assay estrogen receptor was positive, SC was positive and HER2/bertha was negative.  Past Medical History:   Diagnosis Date   • Ankle edema     at times   • Breast cancer, right (HCC) 08/2022   • Diabetes mellitus (HCC)    • GERD (gastroesophageal reflux disease)    • Hiatal hernia    • Hyperlipidemia    • Morbid obesity (HCC)    • Sleep apnea     CPAP- to bring dos   • Type 2 diabetes mellitus (HCC)        Past Surgical History:   Procedure Laterality Date   • BREAST BIOPSY Right 08/18/2022    Procedure: Right breast lumpectomy;  Surgeon: Jagdeep Mcwilliams MD;  Location: [facility];  Service: General;  Laterality: Right;   • BREAST LUMPECTOMY Left 2003    x2   • SENTINEL NODE BIOPSY Right 08/18/2022    Procedure: SENTINEL NODE BIOPSY;  Surgeon: Jagdeep Mcwilliams MD;  Location: [facility];  Service: General;  Laterality: Right;   • SUBTOTAL HYSTERECTOMY           Current Outpatient Medications:   •  ALPRAZolam (XANAX) 0.5 MG tablet, Take 1 tablet by mouth Daily As Needed for Anxiety., Disp: 20 tablet, Rfl: 0  •  atorvastatin (LIPITOR) 40 MG tablet, Take 1 tablet by mouth Daily., Disp: 90 tablet, Rfl: 4  •  BIOTIN PO, Take  by mouth., Disp: , Rfl:   •  Cyanocobalamin (Vitamin B-12) 1000 MCG sublingual tablet, Place 1 tablet under the tongue Daily., Disp: 100 each, Rfl: 4  •  dexamethasone (DECADRON) 4 MG tablet, Take 2 tablets oral twice a day  for 3 consecutive days beginning the day before chemotherapy and continue for 6 doses., Disp: 12 tablet, Rfl: 3  •  glucose blood (Accu-Chek Guide) test strip, 1 each by Other route 4 (Four) Times a Day. Use as instructed, Disp: 150 each, Rfl: 11  •  Insulin Lispro Prot & Lispro (HumaLOG Mix 75/25 KwikPen) (75-25) 100 UNIT/ML suspension pen-injector pen, Inject 35 units subcu 3 times a day before each meal., Disp: 30 mL, Rfl: 5  •  Insulin Pen Needle (Pen Needles) 32G X 4 MM misc, 1 each 4 (Four) Times a Day. Use to inject insulin / DX E11.65, Disp: 150 each, Rfl: 5  •  lisinopril (PRINIVIL,ZESTRIL) 5 MG tablet, Take 1 tablet by mouth Daily. Appointment needed for additional refills. (Patient taking differently: Take 5 mg by mouth Every Morning. Appointment needed for additional refills.    HOLD 24 hours before surgery), Disp: 90 tablet, Rfl: 2  •  metFORMIN ER (GLUCOPHAGE-XR) 500 MG 24 hr tablet, Take 2 tablets by mouth Daily. Appointment needed for additional refills. (Patient taking differently: Take 1,000 mg by mouth every night at bedtime. Appointment needed for additional refills.  HOLD 48 hours before surgery), Disp: 180 tablet, Rfl: 2  •  multivitamin with minerals tablet tablet, Take 1 tablet by mouth Daily., Disp: , Rfl:   •  omeprazole (priLOSEC) 40 MG capsule, Take 1 capsule by mouth Daily. Appointment needed for additional refills., Disp: 90 capsule, Rfl: 2  •  ondansetron (ZOFRAN) 8 MG tablet, Take 1 tablet by mouth 3 (Three) Times a Day As Needed for Nausea or Vomiting., Disp: 30 tablet, Rfl: 5  •  Vitamin D, Ergocalciferol, 50 MCG (2000 UT) capsule, Take 2,000 Units by mouth Daily., Disp: 100 capsule, Rfl: 4    No Known Allergies    Family History   Problem Relation Age of Onset   • Breast cancer Mother    • Heart disease Mother    • Thyroid disease Mother    • Stroke Mother    • Clotting disorder Father    • Diabetes Brother    • Heart disease Brother    • Cancer Paternal Grandmother    • Colon  cancer Paternal Grandmother    • Diabetes Brother    • Stroke Brother        Cancer-related family history includes Breast cancer in her mother; Cancer in her paternal grandmother; Colon cancer in her paternal grandmother.    Social History     Tobacco Use   • Smoking status: Former Smoker     Packs/day: 0.25     Years: 10.00     Pack years: 2.50     Types: Cigarettes     Quit date: 2010     Years since quittin.7   • Smokeless tobacco: Never Used   Vaping Use   • Vaping Use: Never used   Substance Use Topics   • Alcohol use: Not Currently     Comment: rare   • Drug use: Never     I have reexamined the patient and the results are consistent with the previously documented exam. Lianet Rust MD       SUBJECTIVE:    Patient denies any new issues today.  She has questions regarding the benefits of chemo therapy.  She brought her mother-in-law today        REVIEW OF SYSTEMS:    Review of Systems   Constitutional: Negative for chills, fatigue and fever.   HENT: Negative for congestion, drooling, ear discharge, rhinorrhea, sinus pressure and tinnitus.    Eyes: Negative for photophobia, pain and discharge.   Respiratory: Negative for apnea, choking and stridor.    Cardiovascular: Negative for palpitations.   Gastrointestinal: Negative for abdominal distention, abdominal pain and anal bleeding.   Endocrine: Negative for polydipsia and polyphagia.   Genitourinary: Negative for decreased urine volume, flank pain and genital sores.   Musculoskeletal: Negative for gait problem, neck pain and neck stiffness.   Skin: Negative for color change, rash and wound.   Neurological: Negative for tremors, seizures, syncope, facial asymmetry and speech difficulty.   Hematological: Negative for adenopathy.   Psychiatric/Behavioral: Negative for agitation, confusion, hallucinations and self-injury. The patient is not hyperactive.        OBJECTIVE:    Vitals:    10/03/22 1023   BP: 141/85   Pulse: 85   Resp: 18   Temp: 96.8  "°F (36 °C)   TempSrc: Infrared   Weight: 112 kg (246 lb)   Height: 161.3 cm (63.5\")   PainSc: 0-No pain     Body mass index is 42.89 kg/m².    ECOG    (0) Fully active, able to carry on all predisease performance without restriction    Physical Exam  Vitals and nursing note reviewed.   Constitutional:       General: She is not in acute distress.     Appearance: She is not diaphoretic.   HENT:      Head: Normocephalic and atraumatic.   Eyes:      General: No scleral icterus.        Right eye: No discharge.         Left eye: No discharge.      Conjunctiva/sclera: Conjunctivae normal.   Neck:      Thyroid: No thyromegaly.   Cardiovascular:      Rate and Rhythm: Normal rate and regular rhythm.      Heart sounds: Normal heart sounds.     No friction rub. No gallop.   Pulmonary:      Effort: Pulmonary effort is normal. No respiratory distress.      Breath sounds: No stridor. No wheezing.   Abdominal:      General: Bowel sounds are normal.      Palpations: Abdomen is soft. There is no mass.      Tenderness: There is no abdominal tenderness. There is no guarding or rebound.   Musculoskeletal:         General: No tenderness. Normal range of motion.      Cervical back: Normal range of motion and neck supple.   Lymphadenopathy:      Cervical: No cervical adenopathy.   Skin:     General: Skin is warm.      Findings: No erythema or rash.   Neurological:      Mental Status: She is alert and oriented to person, place, and time.      Motor: No abnormal muscle tone.   Psychiatric:         Behavior: Behavior normal.     I have reexamined the patient and the results are consistent with the previously documented exam. Lianet Rust MD       RECENT LABS    WBC   Date Value Ref Range Status   09/29/2022 11.86 (H) 3.40 - 10.80 10*3/mm3 Final   08/26/2020 11.0 (H) 3.4 - 10.8 x10E3/uL Final     RBC   Date Value Ref Range Status   09/29/2022 4.68 3.77 - 5.28 10*6/mm3 Final   08/26/2020 4.53 3.77 - 5.28 x10E6/uL Final     Hemoglobin "   Date Value Ref Range Status   09/29/2022 12.7 12.0 - 15.9 g/dL Final     Hematocrit   Date Value Ref Range Status   09/29/2022 39.5 34.0 - 46.6 % Final     MCV   Date Value Ref Range Status   09/29/2022 84.4 79.0 - 97.0 fL Final     MCH   Date Value Ref Range Status   09/29/2022 27.1 26.6 - 33.0 pg Final     MCHC   Date Value Ref Range Status   09/29/2022 32.2 31.5 - 35.7 g/dL Final     RDW   Date Value Ref Range Status   09/29/2022 14.5 12.3 - 15.4 % Final     RDW-SD   Date Value Ref Range Status   09/29/2022 43.6 37.0 - 54.0 fl Final     MPV   Date Value Ref Range Status   09/29/2022 10.1 6.0 - 12.0 fL Final     Platelets   Date Value Ref Range Status   09/29/2022 370 140 - 450 10*3/mm3 Final     Neutrophil %   Date Value Ref Range Status   09/29/2022 57.7 42.7 - 76.0 % Final     Lymphocyte %   Date Value Ref Range Status   09/29/2022 34.1 19.6 - 45.3 % Final     Monocyte %   Date Value Ref Range Status   09/29/2022 6.1 5.0 - 12.0 % Final     Eosinophil %   Date Value Ref Range Status   09/29/2022 1.4 0.3 - 6.2 % Final     Basophil %   Date Value Ref Range Status   09/29/2022 0.7 0.0 - 1.5 % Final     Immature Grans %   Date Value Ref Range Status   08/09/2022 0.5 0.0 - 0.5 % Final     Neutrophils, Absolute   Date Value Ref Range Status   09/29/2022 6.85 1.70 - 7.00 10*3/mm3 Final     Lymphocytes, Absolute   Date Value Ref Range Status   09/29/2022 4.04 (H) 0.70 - 3.10 10*3/mm3 Final     Monocytes, Absolute   Date Value Ref Range Status   09/29/2022 0.72 0.10 - 0.90 10*3/mm3 Final     Eosinophils, Absolute   Date Value Ref Range Status   09/29/2022 0.17 0.00 - 0.40 10*3/mm3 Final     Basophils, Absolute   Date Value Ref Range Status   09/29/2022 0.08 0.00 - 0.20 10*3/mm3 Final     Immature Grans, Absolute   Date Value Ref Range Status   08/09/2022 0.05 0.00 - 0.05 10*3/mm3 Final     nRBC   Date Value Ref Range Status   08/09/2022 0.0 0.0 - 0.2 /100 WBC Final       Lab Results   Component Value Date    GLUCOSE  256 (H) 09/29/2022    BUN 12 09/29/2022    CREATININE 0.71 09/29/2022    EGFRIFNONA 90 12/01/2021    EGFRIFAFRI 115 08/26/2020    BCR 16.9 09/29/2022    K 4.2 09/29/2022    CO2 27.0 09/29/2022    CALCIUM 9.5 09/29/2022    PROTENTOTREF 7.0 08/26/2020    ALBUMIN 4.10 09/29/2022    LABIL2 1.8 08/26/2020    AST 25 09/29/2022    ALT 23 09/29/2022         Assessment & Plan     There are no diagnoses linked to this encounter.    ASSESSMENT:      1. Moderately differentiated invasive ductal carcinoma of the right breast.  Status post right lumpectomy with sentinel lymph node biopsy.  pT1 cpN0 M0.  ER positive at 95%, LA positive at 50% and HER2/bertha was negative.  T1 cN0 M0.  Ongoing management  2. Oncotype DX assay with a high recurrence score at 28, consistent with 17% risk of distant recurrence at 9 years with tamoxifen alone and group absolute chemotherapy benefit of more than 15%  3. Status post partial hysterectomy  4. Postmenopausal state following lab confirmation  5. Family history of breast cancer in her mother at the age of 50, paternal grandmother with colon cancer at 17 and her father had skin cancer.  6. Assessment has been reviewed and updated.  No changes made 10/3/2022     Discussion     Reviewed the results of her Oncotype DX assay which came back with a high recurrence score at 28.  She has some up to 15% benefit with chemotherapy.  She also has a risk of relapse of 17% despite adjuvant hormonal treatment.  For these reasons chemotherapy have been recommended.  Patient was reluctant about pursuing adjuvant chemotherapy due to concerns of side effects.  Discussed that it is important for her to receive chemotherapy given the high Oncotype DX assay report.  She has comorbidities but she is relatively healthy and remains very functional.  Patient is willing to take Taxotere and Cytoxan.    Taxotere and Cytoxan every 21 days for 4 cycles.    This will be followed by adjuvant radiation and then adjuvant  endocrine therapy     We discussed supportive care that will be offered to her while undergoing chemotherapy    We discussed the side effects to include but not limited to:    Chemotherapy side effects include, but not limited to, nausea, vomiting, bone marrow suppression, which can result in blood, platelet transfusion. There is also risk of permanent bone marrow destruction, which can cause myelodysplastic syndrome or leukemia years down the line. There is risk of infection which can result in hospitalization and even death. There is also risk of fatigue, asthenia, alopecia which could become permanent. Chemo will help to reduce risk of relapse of cancer, but does not eliminate risk completely.    Discussed that specifically Taxotere can lead to hypersensitivity reaction, peripheral neuropathy, cardiac arrhythmia, swelling.       Plans:     · Oncotype DX assay results reviewed in great detail with patient and her mother-in-law who is present today.  · We will asked Dr. Mcwilliams to place port for chemotherapy.  This has been scheduled for October 6, 2022  · She was given information on Cytoxan and Taxotere.  She also had chemotherapy education today  · Chemotherapy has been ordered in [place]  · Referred to lymphedema clinic.  She is being followed by Melba Gamboa RN  · Gave information on cancer genetics for her to review.  Patient to let me know when she is ready to proceed  · She will be a candidate for AI   · Patient will be seen by radiation oncologist following adjuvant chemotherapy  · All questions answered       Patient verbalized understanding and is in agreement of the above plan.           I spent 30 total minutes, face-to-face, caring for Alaina today.  90% of this time involved counseling and/or coordination of care as documented within this note.

## 2022-10-05 ENCOUNTER — ANESTHESIA EVENT (OUTPATIENT)
Dept: PERIOP | Facility: HOSPITAL | Age: 57
End: 2022-10-05

## 2022-10-05 PROBLEM — Z45.2 ENCOUNTER FOR CARE RELATED TO VASCULAR ACCESS PORT: Status: ACTIVE | Noted: 2022-10-05

## 2022-10-05 RX ORDER — HEPARIN SODIUM (PORCINE) LOCK FLUSH IV SOLN 100 UNIT/ML 100 UNIT/ML
500 SOLUTION INTRAVENOUS AS NEEDED
Status: CANCELLED | OUTPATIENT
Start: 2022-10-05

## 2022-10-05 RX ORDER — SODIUM CHLORIDE 0.9 % (FLUSH) 0.9 %
20 SYRINGE (ML) INJECTION AS NEEDED
Status: CANCELLED | OUTPATIENT
Start: 2022-10-05

## 2022-10-05 NOTE — TELEPHONE ENCOUNTER
Spoke to a woman at [facility]. She stated that the dentist was in a procedure at that time, but she would try to find out information on infection risk and call back tomorrow.

## 2022-10-05 NOTE — TELEPHONE ENCOUNTER
Attempted to call [facility] to discuss infection risk if pt were to start treatment. Unable to speak to anyone. Detailed message with callback number left.

## 2022-10-05 NOTE — ANESTHESIA PREPROCEDURE EVALUATION
Anesthesia Evaluation     Patient summary reviewed and Nursing notes reviewed   no history of anesthetic complications:  NPO Solid Status: > 8 hours  NPO Liquid Status: > 8 hours           Airway   Mallampati: I  TM distance: >3 FB  Neck ROM: full  No difficulty expected  Dental - normal exam     Pulmonary - normal exam   (+) a smoker Former, sleep apnea on CPAP,   Cardiovascular - normal exam    ECG reviewed    (+) hypertension, hyperlipidemia,       Neuro/Psych  GI/Hepatic/Renal/Endo    (+) morbid obesity, hiatal hernia, GERD,  diabetes mellitus using insulin,     Musculoskeletal     Abdominal   (+) obese,     Bowel sounds: normal.   Substance History      OB/GYN          Other      history of cancer    ROS/Med Hx Other: Breast cancer, hyperglycemia, edema, esophagitis    PSH  BREAST LUMPECTOMY SUBTOTAL HYSTERECTOMY  BREAST BIOPSY SENTINEL NODE BIOPSY                Anesthesia Plan    ASA 3     general     (Patient identified; pre-operative vital signs, all relevant labs/studies, complete medical/surgical/anesthetic history, full medication list, full allergy list, and NPO status obtained/reviewed; physical assessment performed; anesthetic options, side effects, potential complications, risks, and benefits discussed; questions answered; written anesthesia consent obtained; patient cleared for procedure; anesthesia machine and equipment checked and functioning)  intravenous induction     Anesthetic plan, risks, benefits, and alternatives have been provided, discussed and informed consent has been obtained with: patient.    Plan discussed with CRNA and CAA.        CODE STATUS:

## 2022-10-06 ENCOUNTER — APPOINTMENT (OUTPATIENT)
Dept: GENERAL RADIOLOGY | Facility: HOSPITAL | Age: 57
End: 2022-10-06

## 2022-10-06 ENCOUNTER — HOSPITAL ENCOUNTER (OUTPATIENT)
Facility: HOSPITAL | Age: 57
Setting detail: HOSPITAL OUTPATIENT SURGERY
Discharge: HOME | End: 2022-10-06
Attending: SURGERY | Admitting: SURGERY

## 2022-10-06 ENCOUNTER — ANESTHESIA (OUTPATIENT)
Dept: PERIOP | Facility: HOSPITAL | Age: 57
End: 2022-10-06

## 2022-10-06 VITALS
DIASTOLIC BLOOD PRESSURE: 76 MMHG | SYSTOLIC BLOOD PRESSURE: 135 MMHG | HEART RATE: 92 BPM | HEIGHT: 64 IN | OXYGEN SATURATION: 97 % | WEIGHT: 251.4 LBS | BODY MASS INDEX: 42.92 KG/M2 | RESPIRATION RATE: 16 BRPM | TEMPERATURE: 98.2 F

## 2022-10-06 DIAGNOSIS — Z17.0 MALIGNANT NEOPLASM OF CENTRAL PORTION OF RIGHT BREAST IN FEMALE, ESTROGEN RECEPTOR POSITIVE: ICD-10-CM

## 2022-10-06 DIAGNOSIS — C50.111 MALIGNANT NEOPLASM OF CENTRAL PORTION OF RIGHT BREAST IN FEMALE, ESTROGEN RECEPTOR POSITIVE: ICD-10-CM

## 2022-10-06 LAB
GLUCOSE BLDC GLUCOMTR-MCNC: 197 MG/DL (ref 70–105)
GLUCOSE BLDC GLUCOMTR-MCNC: 214 MG/DL (ref 70–105)
GLUCOSE BLDC GLUCOMTR-MCNC: 225 MG/DL (ref 70–105)

## 2022-10-06 PROCEDURE — 82962 GLUCOSE BLOOD TEST: CPT

## 2022-10-06 PROCEDURE — 25010000002 ONDANSETRON PER 1 MG: Performed by: ANESTHESIOLOGIST ASSISTANT

## 2022-10-06 PROCEDURE — 76000 FLUOROSCOPY <1 HR PHYS/QHP: CPT

## 2022-10-06 PROCEDURE — 77001 FLUOROGUIDE FOR VEIN DEVICE: CPT

## 2022-10-06 PROCEDURE — 25010000002 CEFAZOLIN PER 500 MG: Performed by: SURGERY

## 2022-10-06 PROCEDURE — C1788 PORT, INDWELLING, IMP: HCPCS | Performed by: SURGERY

## 2022-10-06 PROCEDURE — 25010000002 PROPOFOL 10 MG/ML EMULSION: Performed by: ANESTHESIOLOGIST ASSISTANT

## 2022-10-06 PROCEDURE — 25010000002 HEPARIN (PORCINE) PER 1000 UNITS: Performed by: SURGERY

## 2022-10-06 PROCEDURE — 77001 FLUOROGUIDE FOR VEIN DEVICE: CPT | Performed by: SURGERY

## 2022-10-06 PROCEDURE — 25010000002 AMISULPRIDE (ANTIEMETIC) 5 MG/2ML SOLUTION: Performed by: ANESTHESIOLOGIST ASSISTANT

## 2022-10-06 PROCEDURE — S0260 H&P FOR SURGERY: HCPCS | Performed by: SURGERY

## 2022-10-06 PROCEDURE — 36561 INSERT TUNNELED CV CATH: CPT | Performed by: SURGERY

## 2022-10-06 PROCEDURE — 63710000001 INSULIN LISPRO (HUMAN) PER 5 UNITS: Performed by: ANESTHESIOLOGIST ASSISTANT

## 2022-10-06 PROCEDURE — 71045 X-RAY EXAM CHEST 1 VIEW: CPT

## 2022-10-06 PROCEDURE — 76937 US GUIDE VASCULAR ACCESS: CPT | Performed by: SURGERY

## 2022-10-06 PROCEDURE — 25010000002 FENTANYL CITRATE (PF) 100 MCG/2ML SOLUTION: Performed by: ANESTHESIOLOGIST ASSISTANT

## 2022-10-06 PROCEDURE — 0 LIDOCAINE 1 % SOLUTION: Performed by: ANESTHESIOLOGIST ASSISTANT

## 2022-10-06 PROCEDURE — 25010000002 MIDAZOLAM PER 1 MG: Performed by: ANESTHESIOLOGIST ASSISTANT

## 2022-10-06 PROCEDURE — C9399 UNCLASSIFIED DRUGS OR BIOLOG: HCPCS | Performed by: ANESTHESIOLOGIST ASSISTANT

## 2022-10-06 DEVICE — PRT INTRO VASC/INTERV VORTEX FILL/HL DETACH/POLYURET/CATH 8F: Type: IMPLANTABLE DEVICE | Site: CHEST | Status: FUNCTIONAL

## 2022-10-06 RX ORDER — DIPHENHYDRAMINE HYDROCHLORIDE 50 MG/ML
12.5 INJECTION INTRAMUSCULAR; INTRAVENOUS
Status: DISCONTINUED | OUTPATIENT
Start: 2022-10-06 | End: 2022-10-06 | Stop reason: HOSPADM

## 2022-10-06 RX ORDER — ACETAMINOPHEN 325 MG/1
650 TABLET ORAL ONCE AS NEEDED
Status: DISCONTINUED | OUTPATIENT
Start: 2022-10-06 | End: 2022-10-06 | Stop reason: HOSPADM

## 2022-10-06 RX ORDER — FENTANYL CITRATE 50 UG/ML
75 INJECTION, SOLUTION INTRAMUSCULAR; INTRAVENOUS
Status: DISCONTINUED | OUTPATIENT
Start: 2022-10-06 | End: 2022-10-06 | Stop reason: HOSPADM

## 2022-10-06 RX ORDER — NALOXONE HCL 0.4 MG/ML
0.4 VIAL (ML) INJECTION AS NEEDED
Status: DISCONTINUED | OUTPATIENT
Start: 2022-10-06 | End: 2022-10-06 | Stop reason: HOSPADM

## 2022-10-06 RX ORDER — HYDROCODONE BITARTRATE AND ACETAMINOPHEN 5; 325 MG/1; MG/1
1 TABLET ORAL ONCE AS NEEDED
Status: DISCONTINUED | OUTPATIENT
Start: 2022-10-06 | End: 2022-10-06 | Stop reason: HOSPADM

## 2022-10-06 RX ORDER — ONDANSETRON 2 MG/ML
4 INJECTION INTRAMUSCULAR; INTRAVENOUS ONCE AS NEEDED
Status: DISCONTINUED | OUTPATIENT
Start: 2022-10-06 | End: 2022-10-06 | Stop reason: HOSPADM

## 2022-10-06 RX ORDER — HYDRALAZINE HYDROCHLORIDE 20 MG/ML
5 INJECTION INTRAMUSCULAR; INTRAVENOUS
Status: DISCONTINUED | OUTPATIENT
Start: 2022-10-06 | End: 2022-10-06 | Stop reason: HOSPADM

## 2022-10-06 RX ORDER — PHENYLEPHRINE HCL IN 0.9% NACL 1 MG/10 ML
SYRINGE (ML) INTRAVENOUS AS NEEDED
Status: DISCONTINUED | OUTPATIENT
Start: 2022-10-06 | End: 2022-10-06 | Stop reason: SURG

## 2022-10-06 RX ORDER — INSULIN LISPRO 100 [IU]/ML
2 INJECTION, SOLUTION INTRAVENOUS; SUBCUTANEOUS ONCE
Status: COMPLETED | OUTPATIENT
Start: 2022-10-06 | End: 2022-10-06

## 2022-10-06 RX ORDER — BUPIVACAINE HYDROCHLORIDE AND EPINEPHRINE 5; 5 MG/ML; UG/ML
INJECTION, SOLUTION EPIDURAL; INTRACAUDAL; PERINEURAL AS NEEDED
Status: DISCONTINUED | OUTPATIENT
Start: 2022-10-06 | End: 2022-10-06 | Stop reason: HOSPADM

## 2022-10-06 RX ORDER — SODIUM CHLORIDE, SODIUM LACTATE, POTASSIUM CHLORIDE, CALCIUM CHLORIDE 600; 310; 30; 20 MG/100ML; MG/100ML; MG/100ML; MG/100ML
INJECTION, SOLUTION INTRAVENOUS CONTINUOUS PRN
Status: DISCONTINUED | OUTPATIENT
Start: 2022-10-06 | End: 2022-10-06 | Stop reason: SURG

## 2022-10-06 RX ORDER — DIPHENHYDRAMINE HCL 25 MG
25 CAPSULE ORAL
Status: DISCONTINUED | OUTPATIENT
Start: 2022-10-06 | End: 2022-10-06 | Stop reason: HOSPADM

## 2022-10-06 RX ORDER — HYDROCODONE BITARTRATE AND ACETAMINOPHEN 7.5; 325 MG/1; MG/1
1 TABLET ORAL ONCE AS NEEDED
Status: DISCONTINUED | OUTPATIENT
Start: 2022-10-06 | End: 2022-10-06 | Stop reason: HOSPADM

## 2022-10-06 RX ORDER — EPHEDRINE SULFATE 5 MG/ML
INJECTION INTRAVENOUS AS NEEDED
Status: DISCONTINUED | OUTPATIENT
Start: 2022-10-06 | End: 2022-10-06 | Stop reason: SURG

## 2022-10-06 RX ORDER — LIDOCAINE HYDROCHLORIDE 10 MG/ML
INJECTION, SOLUTION INFILTRATION; PERINEURAL AS NEEDED
Status: DISCONTINUED | OUTPATIENT
Start: 2022-10-06 | End: 2022-10-06 | Stop reason: SURG

## 2022-10-06 RX ORDER — LABETALOL HYDROCHLORIDE 5 MG/ML
5 INJECTION, SOLUTION INTRAVENOUS
Status: DISCONTINUED | OUTPATIENT
Start: 2022-10-06 | End: 2022-10-06 | Stop reason: HOSPADM

## 2022-10-06 RX ORDER — MIDAZOLAM HYDROCHLORIDE 1 MG/ML
2 INJECTION INTRAMUSCULAR; INTRAVENOUS
Status: DISCONTINUED | OUTPATIENT
Start: 2022-10-06 | End: 2022-10-06 | Stop reason: HOSPADM

## 2022-10-06 RX ORDER — CHLORHEXIDINE GLUCONATE 0.12 MG/ML
15 RINSE ORAL EVERY 12 HOURS SCHEDULED
Status: DISCONTINUED | OUTPATIENT
Start: 2022-10-06 | End: 2022-10-06 | Stop reason: HOSPADM

## 2022-10-06 RX ORDER — FLUMAZENIL 0.1 MG/ML
0.5 INJECTION INTRAVENOUS AS NEEDED
Status: DISCONTINUED | OUTPATIENT
Start: 2022-10-06 | End: 2022-10-06 | Stop reason: HOSPADM

## 2022-10-06 RX ORDER — MEPERIDINE HYDROCHLORIDE 25 MG/ML
12.5 INJECTION INTRAMUSCULAR; INTRAVENOUS; SUBCUTANEOUS
Status: DISCONTINUED | OUTPATIENT
Start: 2022-10-06 | End: 2022-10-06 | Stop reason: HOSPADM

## 2022-10-06 RX ORDER — HYDROCODONE BITARTRATE AND ACETAMINOPHEN 5; 325 MG/1; MG/1
1-2 TABLET ORAL EVERY 4 HOURS PRN
Qty: 15 TABLET | Refills: 0 | Status: SHIPPED | OUTPATIENT
Start: 2022-10-06 | End: 2022-11-04 | Stop reason: SDUPTHER

## 2022-10-06 RX ORDER — PROMETHAZINE HYDROCHLORIDE 25 MG/1
25 TABLET ORAL ONCE AS NEEDED
Status: DISCONTINUED | OUTPATIENT
Start: 2022-10-06 | End: 2022-10-06 | Stop reason: HOSPADM

## 2022-10-06 RX ORDER — MIDAZOLAM HYDROCHLORIDE 1 MG/ML
INJECTION INTRAMUSCULAR; INTRAVENOUS AS NEEDED
Status: DISCONTINUED | OUTPATIENT
Start: 2022-10-06 | End: 2022-10-06 | Stop reason: SURG

## 2022-10-06 RX ORDER — DIPHENHYDRAMINE HYDROCHLORIDE 50 MG/ML
12.5 INJECTION INTRAMUSCULAR; INTRAVENOUS ONCE AS NEEDED
Status: DISCONTINUED | OUTPATIENT
Start: 2022-10-06 | End: 2022-10-06 | Stop reason: HOSPADM

## 2022-10-06 RX ORDER — SODIUM CHLORIDE 9 MG/ML
100 INJECTION, SOLUTION INTRAVENOUS CONTINUOUS
Status: DISCONTINUED | OUTPATIENT
Start: 2022-10-06 | End: 2022-10-06 | Stop reason: HOSPADM

## 2022-10-06 RX ORDER — FENTANYL CITRATE 50 UG/ML
INJECTION, SOLUTION INTRAMUSCULAR; INTRAVENOUS AS NEEDED
Status: DISCONTINUED | OUTPATIENT
Start: 2022-10-06 | End: 2022-10-06 | Stop reason: SURG

## 2022-10-06 RX ORDER — PROMETHAZINE HYDROCHLORIDE 25 MG/1
25 SUPPOSITORY RECTAL ONCE AS NEEDED
Status: DISCONTINUED | OUTPATIENT
Start: 2022-10-06 | End: 2022-10-06 | Stop reason: HOSPADM

## 2022-10-06 RX ORDER — LORAZEPAM 2 MG/ML
1 INJECTION INTRAMUSCULAR
Status: DISCONTINUED | OUTPATIENT
Start: 2022-10-06 | End: 2022-10-06 | Stop reason: HOSPADM

## 2022-10-06 RX ORDER — ONDANSETRON 2 MG/ML
INJECTION INTRAMUSCULAR; INTRAVENOUS AS NEEDED
Status: DISCONTINUED | OUTPATIENT
Start: 2022-10-06 | End: 2022-10-06 | Stop reason: SURG

## 2022-10-06 RX ORDER — ACETAMINOPHEN 325 MG/1
325 TABLET ORAL ONCE AS NEEDED
Status: DISCONTINUED | OUTPATIENT
Start: 2022-10-06 | End: 2022-10-06 | Stop reason: HOSPADM

## 2022-10-06 RX ORDER — IPRATROPIUM BROMIDE AND ALBUTEROL SULFATE 2.5; .5 MG/3ML; MG/3ML
3 SOLUTION RESPIRATORY (INHALATION) ONCE AS NEEDED
Status: DISCONTINUED | OUTPATIENT
Start: 2022-10-06 | End: 2022-10-06 | Stop reason: HOSPADM

## 2022-10-06 RX ORDER — EPHEDRINE SULFATE 5 MG/ML
5 INJECTION INTRAVENOUS ONCE AS NEEDED
Status: DISCONTINUED | OUTPATIENT
Start: 2022-10-06 | End: 2022-10-06 | Stop reason: HOSPADM

## 2022-10-06 RX ORDER — PROPOFOL 10 MG/ML
VIAL (ML) INTRAVENOUS AS NEEDED
Status: DISCONTINUED | OUTPATIENT
Start: 2022-10-06 | End: 2022-10-06 | Stop reason: SURG

## 2022-10-06 RX ORDER — ACETAMINOPHEN 650 MG/1
650 SUPPOSITORY RECTAL ONCE AS NEEDED
Status: DISCONTINUED | OUTPATIENT
Start: 2022-10-06 | End: 2022-10-06 | Stop reason: HOSPADM

## 2022-10-06 RX ORDER — HYDROCODONE BITARTRATE AND ACETAMINOPHEN 10; 325 MG/1; MG/1
1 TABLET ORAL EVERY 4 HOURS PRN
Status: DISCONTINUED | OUTPATIENT
Start: 2022-10-06 | End: 2022-10-06 | Stop reason: HOSPADM

## 2022-10-06 RX ADMIN — EPHEDRINE SULFATE 10 MG: 5 INJECTION INTRAVENOUS at 14:59

## 2022-10-06 RX ADMIN — Medication 100 MCG: at 14:39

## 2022-10-06 RX ADMIN — AMISULPRIDE 5 MG: 2.5 INJECTION, SOLUTION INTRAVENOUS at 14:53

## 2022-10-06 RX ADMIN — MIDAZOLAM HYDROCHLORIDE 1 MG: 1 INJECTION, SOLUTION INTRAMUSCULAR; INTRAVENOUS at 14:31

## 2022-10-06 RX ADMIN — MIDAZOLAM HYDROCHLORIDE 1 MG: 1 INJECTION, SOLUTION INTRAMUSCULAR; INTRAVENOUS at 14:33

## 2022-10-06 RX ADMIN — PROPOFOL 200 MG: 10 INJECTION, EMULSION INTRAVENOUS at 14:35

## 2022-10-06 RX ADMIN — INSULIN LISPRO 2 UNITS: 100 INJECTION, SOLUTION INTRAVENOUS; SUBCUTANEOUS at 15:36

## 2022-10-06 RX ADMIN — FENTANYL CITRATE 50 MCG: 50 INJECTION, SOLUTION INTRAMUSCULAR; INTRAVENOUS at 14:54

## 2022-10-06 RX ADMIN — Medication 200 MCG: at 14:50

## 2022-10-06 RX ADMIN — CEFAZOLIN 2 G: 2 INJECTION, POWDER, FOR SOLUTION INTRAMUSCULAR; INTRAVENOUS at 14:38

## 2022-10-06 RX ADMIN — Medication 100 MCG: at 15:11

## 2022-10-06 RX ADMIN — Medication 200 MCG: at 14:58

## 2022-10-06 RX ADMIN — SODIUM CHLORIDE, SODIUM LACTATE, POTASSIUM CHLORIDE, AND CALCIUM CHLORIDE: .6; .31; .03; .02 INJECTION, SOLUTION INTRAVENOUS at 14:28

## 2022-10-06 RX ADMIN — MUPIROCIN 1 APPLICATION: 20 OINTMENT TOPICAL at 12:00

## 2022-10-06 RX ADMIN — FENTANYL CITRATE 50 MCG: 50 INJECTION, SOLUTION INTRAMUSCULAR; INTRAVENOUS at 14:34

## 2022-10-06 RX ADMIN — LIDOCAINE HYDROCHLORIDE 50 MG: 10 INJECTION, SOLUTION INFILTRATION; PERINEURAL at 14:34

## 2022-10-06 RX ADMIN — CHLORHEXIDINE GLUCONATE 15 ML: 1.2 SOLUTION ORAL at 12:00

## 2022-10-06 RX ADMIN — HYDROCODONE BITARTRATE AND ACETAMINOPHEN 1 TABLET: 5; 325 TABLET ORAL at 16:00

## 2022-10-06 RX ADMIN — ONDANSETRON 4 MG: 2 INJECTION INTRAMUSCULAR; INTRAVENOUS at 15:18

## 2022-10-06 RX ADMIN — Medication 200 MCG: at 14:44

## 2022-10-06 NOTE — ANESTHESIA POSTPROCEDURE EVALUATION
Oncology End of Shift Note Bedside shift change report given to Maria D Hood RN (incoming nurse) by Rolanda Negrete (outgoing nurse) on Darlynn Allegra. Report included the following information SBAR, Kardex and MAR. Shift Summary: pt stable during shift. Received prn meds x2. Am labs drawn Issues for Physician to Address: 
 
Patient on Cardiac Monitoring? Yes 
[x] Yes 
[] No 
 
Rhythm Rolanda Negrete Patient: Alaina Hartman    Procedure Summary     Date: 10/06/22 Room / Location: [facility] OR 08 / [facility] MAIN OR    Anesthesia Start: 1428 Anesthesia Stop: 1525    Procedure: INSERTION OF PORTACATH (N/A ) Diagnosis:       Malignant neoplasm of central portion of right breast in female, estrogen receptor positive (HCC)      (Malignant neoplasm of central portion of right breast in female, estrogen receptor positive (HCC) [C50.111, Z17.0])    Surgeons: Jagdeep Mcwilliams MD Provider: Bhupinder Conrad MD    Anesthesia Type: general ASA Status: 3          Anesthesia Type: general    Vitals  Vitals Value Taken Time   /81 10/06/22 1632   Temp 98.5 °F (36.9 °C) 10/06/22 1630   Pulse 96 10/06/22 1636   Resp 19 10/06/22 1630   SpO2 93 % 10/06/22 1636   Vitals shown include unvalidated device data.        Post Anesthesia Care and Evaluation    Patient location during evaluation: PACU  Patient participation: complete - patient participated  Level of consciousness: awake  Pain scale: See nurse's notes for pain score.  Pain management: adequate    Airway patency: patent  Anesthetic complications: No anesthetic complications  PONV Status: none  Cardiovascular status: acceptable  Respiratory status: acceptable and spontaneous ventilation  Hydration status: acceptable    Comments: Patient seen and examined postoperatively; vital signs stable; SpO2 greater than or equal to 90%; cardiopulmonary status stable; nausea/vomiting adequately controlled; pain adequately controlled; no apparent anesthesia complications; patient discharged from anesthesia care when discharge criteria were met

## 2022-10-06 NOTE — OP NOTE
INSERTION OF PORTACATH  Operative Note    Patient Name:  Alaina Hartman  YOB: 1965    Date of Surgery:  10/6/2022     Indications:  The patient is a 56 y.o. female who presents for port-a-cath insertion for initiation of chemotherpay. The risks, benefits, and alternative to surgery were discussed with the patient prior to proceeding to the operating room.    Pre-op Diagnosis:   Malignant neoplasm of central portion of right breast in female, estrogen receptor positive (HCC) [C50.111, Z17.0]    Post-op Diagnosis:  Post-Op Diagnosis Codes:     * Malignant neoplasm of central portion of right breast in female, estrogen receptor positive (HCC) [C50.111, Z17.0]    Procedure/CPT® Codes:      Procedure(s):  INSERTION OF PORTACATH    Staff:  Surgeon(s):  Jagdeep Mcwilliams MD         Anesthesia: General    Estimated Blood Loss: minimal    Implants:    Implant Name Type Inv. Item Serial No.  Lot No. LRB No. Used Action   PRT INTRO VASC/INTERV VORTEX FILL/HL DETACH/POLYURET/CATH 8F - [id number] Implant PRT INTRO VASC/INTERV VORTEX FILL/HL DETACH/POLYURET/CATH 8F  [employer] [id number] N/A 1 Implanted       Specimen:          None    Findings: Normal anatomy, left internal jugular vein approach    Complications: none, immediately    Description of Procedure: After obtaining informed consent in the preop holding area the patient was brought to the operating room and placed in the supine position.  SCDs were applied, and preoperative antibiotics were administered.  The patient then underwent uncomplicated induction of general endotracheal anesthesia.  The arms were tucked in the left neck and chest were prepped and draped in the usual sterile fashion.  After a brief timeout the procedure began.  Using ultrasound guidance the left internal jugular vein was accessed using a finder needle and a wire was passed through the left internal jugular vein towards the heart.  Fluoroscopy was used to  demonstrate a wire passing from the left neck down towards the right heart and through to the inferior vena cava.  We then turned our attention to creation of a subcutaneous pocket for the port to sit.  An incision was made in the left chest and carried down through the subcutaneous tissues using electrocautery.  Subcutaneous fat was then divided using electrocautery to maintain hemostasis.  Once the pocket was of adequate size we turned our attention towards passing her catheter through the internal jugular vein.  Once again prior to dilating, ultrasound guidance was used to confirm location of the wire within the left internal jugular vein.  We then used a dilator and sheath over the wire and removed the dilator and wire leaving the sheath in place.  A tunneling device was used to tunnel the catheter from the neck to the port site.  The catheter was cut at 23 cm, and the catheter was attached to the port using the clear plastic piece.  There was a snap confirming adequate seating of the catheter and clear plastic securing piece on the port.  We then inserted the catheter through the sheath which was then broken away leaving the catheter in the left internal jugular vein.  The entire sheath was removed.  We then aspirated and flushed the catheter without any difficulty. A second view was obtained with fluoroscopy to confirm adequate positioning of the catheter. The port site was then closed in 2 layers, using interrupted 4-0 Vicryl stitches to reapproximate the dermis and 4-0 running Monocryl to reapproximate the epidermis.  The wound was dressed using skin glue.  A single 4-0 Monocryl stitch was applied in the right neck in a subcuticular fashion, and the skin was dressed using skin glue.  Prior to closing these incisions they were infiltrated with local anesthesia.  The patient tolerated the procedure well, was extubated, and taken to PACU in satisfactory condition.  She will have a chest x-ray performed prior to  being discharged home to confirm proper placement of her port as well as to look for pneumothorax.    Jagdeep Mcwilliams MD     Date: 10/6/2022  Time: 15:19 EDT

## 2022-10-06 NOTE — DISCHARGE INSTRUCTIONS
Ok for discharge  Follow-up with me (Dr. Mcwilliams) in 2 weeks  Regular diet as tolerated  Ok to shower starting tomorrow. No tub baths, pools, lakes, or streams for 1 week.  Ok to apply ice to incisions  No heavy lifting (greater than 20 lbs) for 6 weeks after surgery  Call my office or present to the ED with: fevers greater than 101.5, redness around the incisions, drainage from the incisions, intractable nausea/vomiting, or pain that is getting worse instead of better

## 2022-10-06 NOTE — H&P
GENERAL SURGERY ESTABLISHED PATIENT NOTE    Patient Care Team:  Fabiola Joyner MD as PCP - General (Family Medicine)  Seipel, Joseph F, MD as Consulting Physician (Sleep Medicine)  Jagdeep Mcwilliams MD as Surgeon (General Surgery)    Reason for follow-up: Right breast cancer    Subjective     Patient is a 56 y.o. female presents today for insertion of Port-A-Cath after undergoing a right lumpectomy and sentinel lymph node biopsy on 8/18/2022.  The patient surgical specimen was sent for further diagnostic testing, and using the Oncotype Dx test, it was determined that the patient would see a clear benefit from receiving adjuvant chemotherapy to reduce the risk of recurrence in the future.     Review of Systems   Constitutional: Negative for appetite change, chills and fever.   HENT: Negative for congestion and sore throat.    Respiratory: Negative for cough and shortness of breath.    Cardiovascular: Negative for chest pain and palpitations.   Gastrointestinal: Negative for abdominal pain, constipation, diarrhea, nausea, vomiting and GERD.   Genitourinary: Negative for difficulty urinating, dysuria and frequency.   Musculoskeletal: Negative for arthralgias and back pain.   Skin: Negative for rash and skin lesions.   Neurological: Negative for dizziness, seizures and memory problem.   Hematological: Negative for adenopathy. Does not bruise/bleed easily.   Psychiatric/Behavioral: Negative for sleep disturbance and depressed mood.        History  Past Medical History:   Diagnosis Date   • Ankle edema     at times   • Breast cancer, right (HCC) 08/2022   • Diabetes mellitus (HCC)    • GERD (gastroesophageal reflux disease)    • Hiatal hernia    • Hyperlipidemia    • Morbid obesity (HCC)    • Sleep apnea     CPAP- to bring dos   • Type 2 diabetes mellitus (HCC)      Past Surgical History:   Procedure Laterality Date   • BREAST BIOPSY Right 08/18/2022    Procedure: Right breast lumpectomy;  Surgeon: Oj  Jagdeep Aguila MD;  Location: [facility] MAIN OR;  Service: General;  Laterality: Right;   • BREAST LUMPECTOMY Left 2003    x2   • SENTINEL NODE BIOPSY Right 2022    Procedure: SENTINEL NODE BIOPSY;  Surgeon: Jagdeep Mcwilliams MD;  Location: [facility] MAIN OR;  Service: General;  Laterality: Right;   • SUBTOTAL HYSTERECTOMY       Family History   Problem Relation Age of Onset   • Breast cancer Mother    • Heart disease Mother    • Thyroid disease Mother    • Stroke Mother    • Clotting disorder Father    • Diabetes Brother    • Heart disease Brother    • Cancer Paternal Grandmother    • Colon cancer Paternal Grandmother    • Diabetes Brother    • Stroke Brother      Social History     Tobacco Use   • Smoking status: Former Smoker     Packs/day: 0.25     Years: 10.00     Pack years: 2.50     Types: Cigarettes     Quit date: 2010     Years since quittin.7   • Smokeless tobacco: Never Used   Vaping Use   • Vaping Use: Never used   Substance Use Topics   • Alcohol use: Not Currently     Comment: rare   • Drug use: Never     Medications Prior to Admission   Medication Sig Dispense Refill Last Dose   • ALPRAZolam (XANAX) 0.5 MG tablet Take 1 tablet by mouth Daily As Needed for Anxiety. 20 tablet 0    • atorvastatin (LIPITOR) 40 MG tablet Take 1 tablet by mouth Daily. 90 tablet 4    • BIOTIN PO Take  by mouth.   10/2/2022   • Cyanocobalamin (Vitamin B-12) 1000 MCG sublingual tablet Place 1 tablet under the tongue Daily. 100 each 4 10/3/2022   • Insulin Lispro Prot & Lispro (HumaLOG Mix 75/25 KwikPen) (75-25) 100 UNIT/ML suspension pen-injector pen Inject 35 units subcu 3 times a day before each meal. 30 mL 5    • lisinopril (PRINIVIL,ZESTRIL) 5 MG tablet Take 1 tablet by mouth Daily. Appointment needed for additional refills. (Patient taking differently: Take 5 mg by mouth Every Morning. Appointment needed for additional refills.     HOLD 24 hours before surgery) 90 tablet 2    • metFORMIN ER (GLUCOPHAGE-XR)  500 MG 24 hr tablet Take 2 tablets by mouth Daily. Appointment needed for additional refills. (Patient taking differently: Take 1,000 mg by mouth every night at bedtime. Appointment needed for additional refills.    HOLD 48 hours before surgery) 180 tablet 2    • multivitamin with minerals tablet tablet Take 1 tablet by mouth Daily.   10/3/2022   • omeprazole (priLOSEC) 40 MG capsule Take 1 capsule by mouth Daily. Appointment needed for additional refills. 90 capsule 2    • Vitamin D, Ergocalciferol, 50 MCG (2000 UT) capsule Take 2,000 Units by mouth Daily. 100 capsule 4 10/3/2022   • dexamethasone (DECADRON) 4 MG tablet Take 2 tablets oral twice a day for 3 consecutive days beginning the day before chemotherapy and continue for 6 doses. 12 tablet 3    • glucose blood (Accu-Chek Guide) test strip 1 each by Other route 4 (Four) Times a Day. Use as instructed 150 each 11    • Insulin Pen Needle (Pen Needles) 32G X 4 MM misc 1 each 4 (Four) Times a Day. Use to inject insulin / DX E11.65 150 each 5    • ondansetron (ZOFRAN) 8 MG tablet Take 1 tablet by mouth 3 (Three) Times a Day As Needed for Nausea or Vomiting. 30 tablet 5      Allergies:  Patient has no known allergies.    Objective     Vital Signs       Physical Exam  Vitals reviewed.   Constitutional:       Appearance: She is well-developed.   HENT:      Head: Normocephalic and atraumatic.   Eyes:      Pupils: Pupils are equal, round, and reactive to light.   Cardiovascular:      Rate and Rhythm: Normal rate and regular rhythm.   Pulmonary:      Effort: Pulmonary effort is normal.      Breath sounds: Normal breath sounds.   Abdominal:      General: There is no distension.      Palpations: Abdomen is soft.      Tenderness: There is no abdominal tenderness.      Hernia: No hernia is present.   Musculoskeletal:         General: Normal range of motion.      Cervical back: Normal range of motion.   Lymphadenopathy:      Cervical: No cervical adenopathy.   Skin:      General: Skin is warm and dry.      Findings: No rash.   Neurological:      General: No focal deficit present.      Mental Status: She is alert and oriented to person, place, and time.   Psychiatric:         Mood and Affect: Mood normal.         Behavior: Behavior normal.         Thought Content: Thought content normal.         Judgment: Judgment normal.         Results Review:   Lab Results (last 24 hours)     ** No results found for the last 24 hours. **        No radiology results for the last day      I reviewed the patient's new imaging results and agree with the interpretation.  I reviewed the patient's other test results and agree with the interpretation    Assessment & Plan     Principal Problem:    Malignant neoplasm of central portion of right breast in female, estrogen receptor positive (HCC)    Discussed the risk, benefits, and alternatives to Port-A-Cath insertion.  The patient understands, and agrees to proceed to the operating room for Port-A-Cath insertion.  Risks include bleeding, infection, malposition, malfunction, need for revision, damage to surrounding structures, need for removal, and pneumothorax.    I discussed the patients findings and my recommendations with the patient.     Jagdeep Mcwilliams MD  10/06/22  11:29 EDT

## 2022-10-06 NOTE — ANESTHESIA PROCEDURE NOTES
Airway  Urgency: elective    Date/Time: 10/6/2022 2:36 PM  End Time:10/6/2022 2:36 AM  Airway not difficult    General Information and Staff    Patient location during procedure: OR  Anesthesiologist: Bhupinder Conrad MD  CRNA/CAA: Mitzy Green CAA    Indications and Patient Condition  Indications for airway management: airway protection    Preoxygenated: yes  Mask difficulty assessment: 0 - not attempted    Final Airway Details  Final airway type: supraglottic airway      Successful airway: unique and LMA  Size 4    Number of attempts at approach: 1  Assessment: lips, teeth, and gum same as pre-op and atraumatic intubation    Additional Comments  Performed by DONNA Storey

## 2022-10-08 RX ORDER — LEVOFLOXACIN 500 MG/1
500 TABLET, FILM COATED ORAL DAILY
Qty: 10 TABLET | Refills: 0 | Status: SHIPPED | OUTPATIENT
Start: 2022-10-08 | End: 2022-10-18

## 2022-10-10 ENCOUNTER — TELEPHONE (OUTPATIENT)
Dept: SURGERY | Facility: CLINIC | Age: 57
End: 2022-10-10

## 2022-10-13 ENCOUNTER — TELEPHONE (OUTPATIENT)
Dept: ONCOLOGY | Facility: CLINIC | Age: 57
End: 2022-10-13

## 2022-10-13 NOTE — TELEPHONE ENCOUNTER
Attempted to call [facility] to assess for risk of infection if chemotherapy is initiated prior to dental surgery. Someone answered the phone and asked if I could hold. I said yes. Was put on hold for a while and then phone began to ring. Phone rang for several minutes, but there was never an answer.

## 2022-10-13 NOTE — TELEPHONE ENCOUNTER
Caller: Alaina Hartman    Relationship to patient: Self    Best call back number: [phone]    Chief complaint: PATIENT CALLING TO VERIFY STATUS OF CHEMO SCHEDULING    Type of visit:  INFUSIONS    Requested date: NEXT AVAILABLE    ”

## 2022-10-14 ENCOUNTER — TELEPHONE (OUTPATIENT)
Dept: ONCOLOGY | Facility: CLINIC | Age: 57
End: 2022-10-14

## 2022-10-14 NOTE — TELEPHONE ENCOUNTER
I received a call from the patient earlier this week stating that she has not been scheduled for chemotherapy at this time.  I reached out to Shona Freire and Rose Marie Ragsdale to follow up on her scheduling.  Rose Marie has been waiting to hear back from her dental office, [facility].    I reached out to [facility] yesterday and spoke with Stella Bland.  She spoke with the dentist at [facility] and they stated that the patient is needing to have teeth pulled prior to starting chemotherapy.  I let her know that the oral surgeon could not see her until 11/2022.  She stated that she could move the schedule around and get her in next Tuesday, 10/18 to have her teeth pulled.  She stated that she would call the patient.    I spoke with [facility] this morning and they have the patient scheduled for the 18th and she would have to wait two weeks after the extraction to start chemotherapy.    I called and notified the patient of the timeline and I also notified Shona and Rose Marie.

## 2022-10-21 ENCOUNTER — APPOINTMENT (OUTPATIENT)
Dept: LAB | Facility: HOSPITAL | Age: 57
End: 2022-10-21

## 2022-10-24 ENCOUNTER — TELEPHONE (OUTPATIENT)
Dept: ONCOLOGY | Facility: CLINIC | Age: 57
End: 2022-10-24

## 2022-10-24 NOTE — TELEPHONE ENCOUNTER
Received a call from the pt with questions regarding her upcoming treatment. We reviewed how the dex should be taken, Neulasta and taking Claritin to prevent bone pain, and off-week labs. No further questions or needs at this time.

## 2022-10-27 ENCOUNTER — HOSPITAL ENCOUNTER (OUTPATIENT)
Dept: ONCOLOGY | Facility: HOSPITAL | Age: 57
Setting detail: INFUSION SERIES
Discharge: HOME | End: 2022-10-27

## 2022-10-27 VITALS
SYSTOLIC BLOOD PRESSURE: 156 MMHG | WEIGHT: 252.5 LBS | RESPIRATION RATE: 18 BRPM | BODY MASS INDEX: 43.11 KG/M2 | DIASTOLIC BLOOD PRESSURE: 94 MMHG | TEMPERATURE: 97.5 F | HEIGHT: 64 IN | HEART RATE: 111 BPM

## 2022-10-27 DIAGNOSIS — Z45.2 ENCOUNTER FOR CARE RELATED TO VASCULAR ACCESS PORT: ICD-10-CM

## 2022-10-27 DIAGNOSIS — C50.111 MALIGNANT NEOPLASM OF CENTRAL PORTION OF RIGHT BREAST IN FEMALE, ESTROGEN RECEPTOR POSITIVE: Primary | ICD-10-CM

## 2022-10-27 DIAGNOSIS — Z17.0 MALIGNANT NEOPLASM OF CENTRAL PORTION OF RIGHT BREAST IN FEMALE, ESTROGEN RECEPTOR POSITIVE: Primary | ICD-10-CM

## 2022-10-27 LAB
ALBUMIN SERPL-MCNC: 4 G/DL (ref 3.5–5.2)
ALBUMIN/GLOB SERPL: 1.3 G/DL
ALP SERPL-CCNC: 119 U/L (ref 39–117)
ALT SERPL W P-5'-P-CCNC: 27 U/L (ref 1–33)
ANION GAP SERPL CALCULATED.3IONS-SCNC: 15 MMOL/L (ref 5–15)
AST SERPL-CCNC: 16 U/L (ref 1–32)
BASOPHILS # BLD AUTO: 0.01 10*3/MM3 (ref 0–0.2)
BASOPHILS NFR BLD AUTO: 0 % (ref 0–1.5)
BILIRUB SERPL-MCNC: 0.4 MG/DL (ref 0–1.2)
BUN SERPL-MCNC: 16 MG/DL (ref 6–20)
BUN/CREAT SERPL: 25.4 (ref 7–25)
CALCIUM SPEC-SCNC: 9.5 MG/DL (ref 8.6–10.5)
CHLORIDE SERPL-SCNC: 100 MMOL/L (ref 98–107)
CO2 SERPL-SCNC: 20 MMOL/L (ref 22–29)
CREAT SERPL-MCNC: 0.63 MG/DL (ref 0.57–1)
DEPRECATED RDW RBC AUTO: 44.3 FL (ref 37–54)
EGFRCR SERPLBLD CKD-EPI 2021: 105.6 ML/MIN/1.73
EOSINOPHIL # BLD AUTO: 0.01 10*3/MM3 (ref 0–0.4)
EOSINOPHIL NFR BLD AUTO: 0 % (ref 0.3–6.2)
ERYTHROCYTE [DISTWIDTH] IN BLOOD BY AUTOMATED COUNT: 14.8 % (ref 12.3–15.4)
GLOBULIN UR ELPH-MCNC: 3 GM/DL
GLUCOSE SERPL-MCNC: 403 MG/DL (ref 65–99)
HCT VFR BLD AUTO: 37 % (ref 34–46.6)
HGB BLD-MCNC: 12.2 G/DL (ref 12–15.9)
HGB UR QL STRIP.AUTO: NEGATIVE
LYMPHOCYTES # BLD AUTO: 1.98 10*3/MM3 (ref 0.7–3.1)
LYMPHOCYTES NFR BLD AUTO: 9.3 % (ref 19.6–45.3)
MCH RBC QN AUTO: 27.9 PG (ref 26.6–33)
MCHC RBC AUTO-ENTMCNC: 33 G/DL (ref 31.5–35.7)
MCV RBC AUTO: 84.5 FL (ref 79–97)
MONOCYTES # BLD AUTO: 0.49 10*3/MM3 (ref 0.1–0.9)
MONOCYTES NFR BLD AUTO: 2.3 % (ref 5–12)
NEUTROPHILS NFR BLD AUTO: 18.74 10*3/MM3 (ref 1.7–7)
NEUTROPHILS NFR BLD AUTO: 88.4 % (ref 42.7–76)
PLATELET # BLD AUTO: 297 10*3/MM3 (ref 140–450)
PMV BLD AUTO: 10.7 FL (ref 6–12)
POTASSIUM SERPL-SCNC: 4.1 MMOL/L (ref 3.5–5.2)
PROT SERPL-MCNC: 7 G/DL (ref 6–8.5)
RBC # BLD AUTO: 4.38 10*6/MM3 (ref 3.77–5.28)
SODIUM SERPL-SCNC: 135 MMOL/L (ref 136–145)
WBC NRBC COR # BLD: 21.23 10*3/MM3 (ref 3.4–10.8)

## 2022-10-27 PROCEDURE — 96375 TX/PRO/DX INJ NEW DRUG ADDON: CPT

## 2022-10-27 PROCEDURE — 85025 COMPLETE CBC W/AUTO DIFF WBC: CPT | Performed by: INTERNAL MEDICINE

## 2022-10-27 PROCEDURE — 96417 CHEMO IV INFUS EACH ADDL SEQ: CPT

## 2022-10-27 PROCEDURE — 25010000002 ALTEPLASE 2 MG RECONSTITUTED SOLUTION

## 2022-10-27 PROCEDURE — 25010000002 HEPARIN LOCK FLUSH PER 10 UNITS: Performed by: INTERNAL MEDICINE

## 2022-10-27 PROCEDURE — 80053 COMPREHEN METABOLIC PANEL: CPT | Performed by: INTERNAL MEDICINE

## 2022-10-27 PROCEDURE — 25010000002 DOCETAXEL 20 MG/ML SOLUTION 8 ML VIAL: Performed by: INTERNAL MEDICINE

## 2022-10-27 PROCEDURE — 36593 DECLOT VASCULAR DEVICE: CPT

## 2022-10-27 PROCEDURE — 96413 CHEMO IV INFUSION 1 HR: CPT

## 2022-10-27 PROCEDURE — 25010000002 PALONOSETRON 0.25 MG/5ML SOLUTION PREFILLED SYRINGE: Performed by: INTERNAL MEDICINE

## 2022-10-27 PROCEDURE — 81002 URINALYSIS NONAUTO W/O SCOPE: CPT | Performed by: INTERNAL MEDICINE

## 2022-10-27 PROCEDURE — 25010000002 CYCLOPHOSPHAMIDE 1 GM/5ML SOLUTION 5 ML VIAL: Performed by: INTERNAL MEDICINE

## 2022-10-27 RX ORDER — SODIUM CHLORIDE 9 MG/ML
250 INJECTION, SOLUTION INTRAVENOUS ONCE
Status: CANCELLED | OUTPATIENT
Start: 2022-10-27

## 2022-10-27 RX ORDER — PALONOSETRON 0.05 MG/ML
0.25 INJECTION, SOLUTION INTRAVENOUS ONCE
Status: COMPLETED | OUTPATIENT
Start: 2022-10-27 | End: 2022-10-27

## 2022-10-27 RX ORDER — DIPHENHYDRAMINE HYDROCHLORIDE 50 MG/ML
50 INJECTION INTRAMUSCULAR; INTRAVENOUS AS NEEDED
Status: CANCELLED | OUTPATIENT
Start: 2022-10-27

## 2022-10-27 RX ORDER — FAMOTIDINE 10 MG/ML
20 INJECTION, SOLUTION INTRAVENOUS AS NEEDED
Status: CANCELLED | OUTPATIENT
Start: 2022-10-27

## 2022-10-27 RX ORDER — SODIUM CHLORIDE 9 MG/ML
250 INJECTION, SOLUTION INTRAVENOUS ONCE
Status: COMPLETED | OUTPATIENT
Start: 2022-10-27 | End: 2022-10-27

## 2022-10-27 RX ORDER — SODIUM CHLORIDE 0.9 % (FLUSH) 0.9 %
20 SYRINGE (ML) INJECTION AS NEEDED
Status: DISCONTINUED | OUTPATIENT
Start: 2022-10-27 | End: 2022-10-28 | Stop reason: HOSPADM

## 2022-10-27 RX ORDER — PALONOSETRON 0.05 MG/ML
0.25 INJECTION, SOLUTION INTRAVENOUS ONCE
Status: CANCELLED | OUTPATIENT
Start: 2022-10-27

## 2022-10-27 RX ORDER — HEPARIN SODIUM (PORCINE) LOCK FLUSH IV SOLN 100 UNIT/ML 100 UNIT/ML
500 SOLUTION INTRAVENOUS AS NEEDED
Status: CANCELLED | OUTPATIENT
Start: 2022-10-27

## 2022-10-27 RX ORDER — LIDOCAINE AND PRILOCAINE 25; 25 MG/G; MG/G
1 CREAM TOPICAL
Qty: 1 G | Refills: 5 | Status: SHIPPED | OUTPATIENT
Start: 2022-10-27 | End: 2023-04-01 | Stop reason: ALTCHOICE

## 2022-10-27 RX ORDER — HEPARIN SODIUM (PORCINE) LOCK FLUSH IV SOLN 100 UNIT/ML 100 UNIT/ML
500 SOLUTION INTRAVENOUS AS NEEDED
Status: DISCONTINUED | OUTPATIENT
Start: 2022-10-27 | End: 2022-10-28 | Stop reason: HOSPADM

## 2022-10-27 RX ORDER — SODIUM CHLORIDE 0.9 % (FLUSH) 0.9 %
20 SYRINGE (ML) INJECTION AS NEEDED
Status: CANCELLED | OUTPATIENT
Start: 2022-10-27

## 2022-10-27 RX ADMIN — PALONOSETRON 0.25 MG: 0.25 INJECTION, SOLUTION INTRAVENOUS at 10:26

## 2022-10-27 RX ADMIN — ALTEPLASE: 2.2 INJECTION, POWDER, LYOPHILIZED, FOR SOLUTION INTRAVENOUS at 08:45

## 2022-10-27 RX ADMIN — CYCLOPHOSPHAMIDE 1280 MG: 200 INJECTION, SOLUTION INTRAVENOUS at 11:40

## 2022-10-27 RX ADMIN — SODIUM CHLORIDE 250 ML: 9 INJECTION, SOLUTION INTRAVENOUS at 10:25

## 2022-10-27 RX ADMIN — HEPARIN 500 UNITS: 100 SYRINGE at 12:19

## 2022-10-27 RX ADMIN — DOCETAXEL 160 MG: 20 INJECTION, SOLUTION, CONCENTRATE INTRAVENOUS at 10:31

## 2022-10-27 RX ADMIN — Medication 20 ML: at 12:19

## 2022-10-27 NOTE — PROGRESS NOTES
Pt here for C1D1 Taxotere/Cytoxan.  Pt w/ no complaints at this time.  Port accessed w/ no blood return.  Activase instilled at 0845.  At 0945 blood return noted.  Blood drawn from port.  10 cc blood wasted prior to specimen collection.  Specimens sent to lab for processing.  Treatment given and pt tolerated.  Per Dr. Rust, pt does not need Neulasta w/ this treatment due to WBC's being elevated.  Pt to see Valentina Cotto NP next week and get a CBC per Dr. Rust's orders.  Pt scheduled and given an AVS w/ next appointments and v/u.  Pt discharged from clinic w/ mother in law.  Pt requested a script for Emla Cream.  Request sent to clinical pool.

## 2022-10-27 NOTE — ADDENDUM NOTE
Encounter addended by: Minnie Beckham RN on: 10/27/2022 12:59 PM   Actions taken: Clinical Note Signed

## 2022-11-03 NOTE — PROGRESS NOTES
Hematology/Oncology Outpatient Follow Up    PATIENT NAME:Alaina Hartman  :1965  MRN: [id number]  PRIMARY CARE PHYSICIAN: Fabiola Joyner MD  REFERRING PHYSICIAN: Fabiola Joyner MD    Chief Complaint   Patient presents with   • Follow-up     Malignant neoplasm of central portion of right breast in female, estrogen receptor positive (HCC)        HISTORY OF PRESENT ILLNESS:     This is a 56-year-old female who was clinically asymptomatic and had a routine screening mammogram which showed an abnormality on the right breast.  Prior to that patient had left breast biopsy in  for benign disease.     Review of her screening mammogram which was performed on 6/3/2022 showed a new 2 cm focal asymmetry with architectural distortion in the mid to posterior third depth of the outer right breast the left breast was benign.  Right diagnostic mammogram and ultrasound was recommended.  On 2022 patient underwent right diagnostic mammogram and ultrasound which showed a 2 cm irregular mass in the lateral inferior breast.  Same day she had an ultrasound which showed a 1.6 cm on the right breast the right axilla did not show any abnormal lymph nodes.     Patient then underwent ultrasound-guided biopsy of the right breast mass pathology revealed moderately differentiated invasive ductal carcinoma estrogen receptor positive at 95% progesterone receptor positive at 50%, HER2/bertha was negative at 1+ on immunohistochemistry.        Patient had a partial hysterectomy many years ago.  She is  and has 3 children.  She does not smoke, does not drink alcohol     Family history significant for mother with breast cancer in her 50s, her father had skin cancers, paternal grandmother had colon cancer at the age of 70        She is a       · 2022 estradiol level was less than 5 and FSH was 33 consistent with postmenopausal state  · 2022 patient underwent right lumpectomy with sentinel lymph node biopsy.   Pathology revealed residual moderately differentiated ductal carcinoma measuring 1.7 cm completely excised total of 5 lymph nodes were removed and all of them were negative for metastatic disease.  Pathology stage is pT1 cpN0 M0.  There was no evidence of DCIS all margins were negative distance to the closest margin was anterior margin at 2 mm ER positive at 95, AK positive at 50% and HER2/bertha was negative.  · 9/27/2022 patient had Oncotype DX assay done which returned with a recurrence score of 28.  This is consistent with 17% risk of recurrence at 9 years distantly, and more than 15% chemotherapy benefit.  On the validation assay estrogen receptor was positive, AK was positive and HER2/bertha was negative.  · 10/27/2022: C1 D1 Taxotere and Cytoxan received.  Neulasta support held due to leukocytosis.      Past Medical History:   Diagnosis Date   • Ankle edema     at times   • Breast cancer, right (HCC) 08/2022   • Diabetes mellitus (HCC)    • GERD (gastroesophageal reflux disease)    • Hiatal hernia    • Hyperlipidemia    • Morbid obesity (HCC)    • Sleep apnea     CPAP- to bring dos   • Type 2 diabetes mellitus (HCC)        Past Surgical History:   Procedure Laterality Date   • BREAST BIOPSY Right 08/18/2022    Procedure: Right breast lumpectomy;  Surgeon: Jagdeep Mcwilliams MD;  Location: [facility] MAIN OR;  Service: General;  Laterality: Right;   • BREAST LUMPECTOMY Left 2003    x2   • PORTACATH PLACEMENT N/A 10/6/2022    Procedure: INSERTION OF PORTACATH;  Surgeon: Jagdeep Mcwilliams MD;  Location: [facility] MAIN OR;  Service: General;  Laterality: N/A;   • SENTINEL NODE BIOPSY Right 08/18/2022    Procedure: SENTINEL NODE BIOPSY;  Surgeon: Jagdeep Mcwilliams MD;  Location: [facility] MAIN OR;  Service: General;  Laterality: Right;   • SUBTOTAL HYSTERECTOMY           Current Outpatient Medications:   •  ALPRAZolam (XANAX) 0.5 MG tablet, Take 1 tablet by mouth Daily As Needed for Anxiety., Disp: 20 tablet,  Rfl: 0  •  atorvastatin (LIPITOR) 40 MG tablet, Take 1 tablet by mouth Daily., Disp: 90 tablet, Rfl: 4  •  BIOTIN PO, Take  by mouth., Disp: , Rfl:   •  Cyanocobalamin (Vitamin B-12) 1000 MCG sublingual tablet, Place 1 tablet under the tongue Daily., Disp: 100 each, Rfl: 4  •  dexamethasone (DECADRON) 4 MG tablet, Take 2 tablets oral twice a day for 3 consecutive days beginning the day before chemotherapy and continue for 6 doses., Disp: 12 tablet, Rfl: 3  •  glucose blood (Accu-Chek Guide) test strip, 1 each by Other route 4 (Four) Times a Day. Use as instructed, Disp: 150 each, Rfl: 11  •  HYDROcodone-acetaminophen (NORCO) 5-325 MG per tablet, Take 1 tablet by mouth Every 6 (Six) Hours As Needed for Moderate Pain or Severe Pain (Pain) for up to 3 days., Disp: 12 tablet, Rfl: 0  •  Insulin Lispro Prot & Lispro (HumaLOG Mix 75/25 KwikPen) (75-25) 100 UNIT/ML suspension pen-injector pen, Inject 35 units subcu 3 times a day before each meal., Disp: 30 mL, Rfl: 5  •  Insulin Pen Needle (Pen Needles) 32G X 4 MM misc, 1 each 4 (Four) Times a Day. Use to inject insulin / DX E11.65, Disp: 150 each, Rfl: 5  •  lidocaine-prilocaine (EMLA) 2.5-2.5 % cream, Apply 1 application topically to the appropriate area as directed Every 2 (Two) Hours As Needed for Mild Pain. Apply to port site one hour prior to chemotherapy appointment, Disp: 1 g, Rfl: 5  •  lisinopril (PRINIVIL,ZESTRIL) 5 MG tablet, Take 1 tablet by mouth Daily. Appointment needed for additional refills. (Patient taking differently: Take 1 tablet by mouth Every Morning. Appointment needed for additional refills.    HOLD 24 hours before surgery), Disp: 90 tablet, Rfl: 2  •  metFORMIN ER (GLUCOPHAGE-XR) 500 MG 24 hr tablet, Take 2 tablets by mouth Daily. Appointment needed for additional refills. (Patient taking differently: Take 2 tablets by mouth every night at bedtime. Appointment needed for additional refills.  HOLD 48 hours before surgery), Disp: 180 tablet, Rfl:  2  •  multivitamin with minerals tablet tablet, Take 1 tablet by mouth Daily., Disp: , Rfl:   •  omeprazole (priLOSEC) 40 MG capsule, Take 1 capsule by mouth Daily. Appointment needed for additional refills., Disp: 90 capsule, Rfl: 2  •  ondansetron (ZOFRAN) 8 MG tablet, Take 1 tablet by mouth 3 (Three) Times a Day As Needed for Nausea or Vomiting., Disp: 30 tablet, Rfl: 5  •  Vitamin D, Ergocalciferol, 50 MCG (2000 UT) capsule, Take 2,000 Units by mouth Daily., Disp: 100 capsule, Rfl: 4  •  levoFLOXacin (Levaquin) 500 MG tablet, Take 1 tablet by mouth Daily., Disp: 7 tablet, Rfl: 0  No current facility-administered medications for this visit.    Facility-Administered Medications Ordered in Other Visits:   •  Filgrastim (NEUPOGEN) injection 480 mcg, 480 mcg, Subcutaneous, Once, Valentina Cotto APRN    No Known Allergies    Family History   Problem Relation Age of Onset   • Breast cancer Mother    • Heart disease Mother    • Thyroid disease Mother    • Stroke Mother    • Clotting disorder Father    • Diabetes Brother    • Heart disease Brother    • Cancer Paternal Grandmother    • Colon cancer Paternal Grandmother    • Diabetes Brother    • Stroke Brother        Cancer-related family history includes Breast cancer in her mother; Cancer in her paternal grandmother; Colon cancer in her paternal grandmother.    Social History     Tobacco Use   • Smoking status: Former     Packs/day: 0.25     Years: 10.00     Pack years: 2.50     Types: Cigarettes     Quit date: 2010     Years since quittin.8   • Smokeless tobacco: Never   Vaping Use   • Vaping Use: Never used   Substance Use Topics   • Alcohol use: Not Currently     Comment: rare   • Drug use: Never     I have reexamined the patient and the results are consistent with the previously documented exam. ADRIANNA Paz       SUBJECTIVE:  Patient here for routine follow-up after cycle 1 of chemotherapy.  She reports she did well with her first cycle.  " She did have some nausea but it was easily controlled with her as needed medication.  She had bone pain and muscle pain in her legs after treatment which she states surprised her because she did not receive the Neulasta.  She tried to take Tylenol for this but did not relieve it.  And she used her as needed hydrocodone that was leftover from her surgeon which did take care of the pain.  She states that after 2 or 3 days this completely subsided.  She did have some elevated blood sugar levels above 400 after taking her steroids for Taxotere and she contacted her endocrinologist for adjustment.          REVIEW OF SYSTEMS:    Review of Systems   Constitutional: Positive for fatigue. Negative for chills and fever.   HENT: Negative for congestion, drooling, ear discharge, rhinorrhea, sinus pressure and tinnitus.    Eyes: Negative for photophobia, pain and discharge.   Respiratory: Negative for apnea, choking and stridor.    Cardiovascular: Negative for palpitations.   Gastrointestinal: Negative for abdominal distention, abdominal pain and anal bleeding.   Endocrine: Negative for polydipsia and polyphagia.   Genitourinary: Negative for decreased urine volume, flank pain and genital sores.   Musculoskeletal: Positive for arthralgias. Negative for gait problem, neck pain and neck stiffness.   Skin: Negative for color change, rash and wound.   Neurological: Negative for tremors, seizures, syncope, facial asymmetry and speech difficulty.   Hematological: Negative for adenopathy.   Psychiatric/Behavioral: Negative for agitation, confusion, hallucinations and self-injury. The patient is not hyperactive.        OBJECTIVE:    Vitals:    11/04/22 0803   BP: 139/87   Pulse: 93   Temp: 98.7 °F (37.1 °C)   TempSrc: Oral   SpO2: 99%   Weight: 115 kg (253 lb 8.5 oz)   Height: 162.6 cm (64.02\")   PainSc: 0-No pain     Body mass index is 43.49 kg/m².    ECOG    (0) Fully active, able to carry on all predisease performance without " restriction    Physical Exam  Vitals and nursing note reviewed.   Constitutional:       General: She is not in acute distress.     Appearance: She is not diaphoretic.   HENT:      Head: Normocephalic and atraumatic.   Eyes:      General: No scleral icterus.        Right eye: No discharge.         Left eye: No discharge.      Conjunctiva/sclera: Conjunctivae normal.   Neck:      Thyroid: No thyromegaly.   Cardiovascular:      Rate and Rhythm: Normal rate and regular rhythm.      Heart sounds: Normal heart sounds.     No friction rub. No gallop.   Pulmonary:      Effort: Pulmonary effort is normal. No respiratory distress.      Breath sounds: No stridor. No wheezing.   Abdominal:      General: Bowel sounds are normal.      Palpations: Abdomen is soft. There is no mass.      Tenderness: There is no abdominal tenderness. There is no guarding or rebound.   Musculoskeletal:         General: No tenderness. Normal range of motion.      Cervical back: Normal range of motion and neck supple.      Right lower leg: Edema ( trace ankle) present.      Left lower leg: Edema ( trace ankle) present.   Lymphadenopathy:      Cervical: No cervical adenopathy.   Skin:     General: Skin is warm.      Findings: No erythema or rash.   Neurological:      Mental Status: She is alert and oriented to person, place, and time.      Motor: No abnormal muscle tone.   Psychiatric:         Behavior: Behavior normal.     I have reexamined the patient and the results are consistent with the previously documented exam. ADRIANNA Paz       RECENT LABS    WBC   Date Value Ref Range Status   11/04/2022 2.28 (L) 3.40 - 10.80 10*3/mm3 Final   08/26/2020 11.0 (H) 3.4 - 10.8 x10E3/uL Final     RBC   Date Value Ref Range Status   11/04/2022 4.31 3.77 - 5.28 10*6/mm3 Final   08/26/2020 4.53 3.77 - 5.28 x10E6/uL Final     Hemoglobin   Date Value Ref Range Status   11/04/2022 11.9 (L) 12.0 - 15.9 g/dL Final     Hematocrit   Date Value Ref Range Status    11/04/2022 36.7 34.0 - 46.6 % Final     MCV   Date Value Ref Range Status   11/04/2022 85.2 79.0 - 97.0 fL Final     MCH   Date Value Ref Range Status   11/04/2022 27.6 26.6 - 33.0 pg Final     MCHC   Date Value Ref Range Status   11/04/2022 32.4 31.5 - 35.7 g/dL Final     RDW   Date Value Ref Range Status   11/04/2022 14.7 12.3 - 15.4 % Final     RDW-SD   Date Value Ref Range Status   11/04/2022 44.2 37.0 - 54.0 fl Final     MPV   Date Value Ref Range Status   11/04/2022 10.6 6.0 - 12.0 fL Final     Platelets   Date Value Ref Range Status   11/04/2022 297 140 - 450 10*3/mm3 Final     Neutrophil %   Date Value Ref Range Status   11/04/2022 3.5 (L) 42.7 - 76.0 % Final     Lymphocyte %   Date Value Ref Range Status   11/04/2022 84.2 (H) 19.6 - 45.3 % Final     Monocyte %   Date Value Ref Range Status   11/04/2022 8.3 5.0 - 12.0 % Final     Eosinophil %   Date Value Ref Range Status   11/04/2022 0.9 0.3 - 6.2 % Final     Basophil %   Date Value Ref Range Status   11/04/2022 3.1 (H) 0.0 - 1.5 % Final     Immature Grans %   Date Value Ref Range Status   08/09/2022 0.5 0.0 - 0.5 % Final     Neutrophils, Absolute   Date Value Ref Range Status   11/04/2022 0.08 (C) 1.70 - 7.00 10*3/mm3 Final     Lymphocytes, Absolute   Date Value Ref Range Status   11/04/2022 1.92 0.70 - 3.10 10*3/mm3 Final     Monocytes, Absolute   Date Value Ref Range Status   11/04/2022 0.19 0.10 - 0.90 10*3/mm3 Final     Eosinophils, Absolute   Date Value Ref Range Status   11/04/2022 0.02 0.00 - 0.40 10*3/mm3 Final     Basophils, Absolute   Date Value Ref Range Status   11/04/2022 0.07 0.00 - 0.20 10*3/mm3 Final     Immature Grans, Absolute   Date Value Ref Range Status   08/09/2022 0.05 0.00 - 0.05 10*3/mm3 Final     nRBC   Date Value Ref Range Status   08/09/2022 0.0 0.0 - 0.2 /100 WBC Final       Lab Results   Component Value Date    GLUCOSE 403 (C) 10/27/2022    BUN 16 10/27/2022    CREATININE 0.63 10/27/2022    EGFRIFNONA 90 12/01/2021     EGFRIFAFRI 115 08/26/2020    BCR 25.4 (H) 10/27/2022    K 4.1 10/27/2022    CO2 20.0 (L) 10/27/2022    CALCIUM 9.5 10/27/2022    PROTENTOTREF 7.0 08/26/2020    ALBUMIN 4.00 10/27/2022    LABIL2 1.8 08/26/2020    AST 16 10/27/2022    ALT 27 10/27/2022         Assessment & Plan     Malignant neoplasm of central portion of right breast in female, estrogen receptor positive (HCC)  - CBC & Differential  - HYDROcodone-acetaminophen (NORCO) 5-325 MG per tablet    Chemotherapy-induced neutropenia (HCC)  - levoFLOXacin (Levaquin) 500 MG tablet      ASSESSMENT:      1. Moderately differentiated invasive ductal carcinoma of the right breast.  Status post right lumpectomy with sentinel lymph node biopsy.  pT1 cpN0 M0.  ER positive at 95%, NM positive at 50% and HER2/bertha was negative.  T1 cN0 M0.  Ongoing management  2. Oncotype DX assay with a high recurrence score at 28, consistent with 17% risk of distant recurrence at 9 years with tamoxifen alone and group absolute chemotherapy benefit of more than 15%  3. Status post partial hysterectomy  4. Postmenopausal state following lab confirmation  5. Family history of breast cancer in her mother at the age of 50, paternal grandmother with colon cancer at 17 and her father had skin cancer.  6. Assessment has been reviewed and updated.    7. Chemotherapy-induced neutropenia.  Patient is afebrile.     Discussion     Reviewed the results of her Oncotype DX assay which came back with a high recurrence score at 28.  She has some up to 15% benefit with chemotherapy.  She also has a risk of relapse of 17% despite adjuvant hormonal treatment.  For these reasons chemotherapy have been recommended.  Patient was reluctant about pursuing adjuvant chemotherapy due to concerns of side effects.  Discussed that it is important for her to receive chemotherapy given the high Oncotype DX assay report.  She has comorbidities but she is relatively healthy and remains very functional.  Patient is willing  to take Taxotere and Cytoxan.    Taxotere and Cytoxan every 21 days for 4 cycles.    This will be followed by adjuvant radiation and then adjuvant endocrine therapy     We discussed supportive care that will be offered to her while undergoing chemotherapy    We discussed the side effects to include but not limited to:    Chemotherapy side effects include, but not limited to, nausea, vomiting, bone marrow suppression, which can result in blood, platelet transfusion. There is also risk of permanent bone marrow destruction, which can cause myelodysplastic syndrome or leukemia years down the line. There is risk of infection which can result in hospitalization and even death. There is also risk of fatigue, asthenia, alopecia which could become permanent. Chemo will help to reduce risk of relapse of cancer, but does not eliminate risk completely.    Discussed that specifically Taxotere can lead to hypersensitivity reaction, peripheral neuropathy, cardiac arrhythmia, swelling.       Plans:     · CBC today reviewed with patient and her mother-in-law  · Neupogen 480 mg subcu today in office  · Set up for ambulatory care on 11/5/2022 and 11/6/2022 for Neupogen 480 mg subcu  · Return to office on Monday, 11/7/2022 for CBC  · Levaquin 500 mg p.o. daily for 7 days for prophylaxis due to neutropenia  · At Neulasta back into treatment with cycle 2  · Educated on neutropenic precautions  · Follow-up with nurse practitioner in 1 week with labs  · Follow-up with Dr. Rust in 4 weeks prior to cycle 3 of treatment  · Oncotype DX assay results reviewed in great detail with patient and her mother-in-law who is present today.  · We will asked Dr. Mcwilliams to place port for chemotherapy.  This has been scheduled for October 6, 2022  · She was given information on Cytoxan and Taxotere.  She also had chemotherapy education today  · Chemotherapy has been ordered in [place]  · Referred to lymphedema clinic.  She is being followed by Melba  Cooper RN  · Gave information on cancer genetics for her to review.  Patient to let me know when she is ready to proceed  · She will be a candidate for AI   · Patient will be seen by radiation oncologist following adjuvant chemotherapy  · All questions answered       Patient verbalized understanding and is in agreement of the above plan.           Greater than 40 minutes spent with patient, more than fifty percent of that was spent face-to-face caring for Alaina today.  90% of this time involved counseling and/or coordination of care as documented within this note.    Electronically signed by ADRIANNA Paz, 11/04/22, 09:25 EDT

## 2022-11-04 ENCOUNTER — APPOINTMENT (OUTPATIENT)
Dept: LAB | Facility: HOSPITAL | Age: 57
End: 2022-11-04

## 2022-11-04 ENCOUNTER — OFFICE VISIT (OUTPATIENT)
Dept: ONCOLOGY | Facility: CLINIC | Age: 57
End: 2022-11-04

## 2022-11-04 ENCOUNTER — HOSPITAL ENCOUNTER (OUTPATIENT)
Dept: ONCOLOGY | Facility: HOSPITAL | Age: 57
Setting detail: INFUSION SERIES
Discharge: HOME | End: 2022-11-04

## 2022-11-04 VITALS
WEIGHT: 253.53 LBS | OXYGEN SATURATION: 99 % | HEART RATE: 93 BPM | TEMPERATURE: 98.7 F | BODY MASS INDEX: 43.28 KG/M2 | SYSTOLIC BLOOD PRESSURE: 139 MMHG | DIASTOLIC BLOOD PRESSURE: 87 MMHG | HEIGHT: 64 IN

## 2022-11-04 DIAGNOSIS — Z17.0 MALIGNANT NEOPLASM OF CENTRAL PORTION OF RIGHT BREAST IN FEMALE, ESTROGEN RECEPTOR POSITIVE: Primary | ICD-10-CM

## 2022-11-04 DIAGNOSIS — T45.1X5A CHEMOTHERAPY-INDUCED NEUTROPENIA: ICD-10-CM

## 2022-11-04 DIAGNOSIS — D70.1 CHEMOTHERAPY-INDUCED NEUTROPENIA: Primary | ICD-10-CM

## 2022-11-04 DIAGNOSIS — D70.1 CHEMOTHERAPY-INDUCED NEUTROPENIA: ICD-10-CM

## 2022-11-04 DIAGNOSIS — C50.111 MALIGNANT NEOPLASM OF CENTRAL PORTION OF RIGHT BREAST IN FEMALE, ESTROGEN RECEPTOR POSITIVE: ICD-10-CM

## 2022-11-04 DIAGNOSIS — C50.111 MALIGNANT NEOPLASM OF CENTRAL PORTION OF RIGHT BREAST IN FEMALE, ESTROGEN RECEPTOR POSITIVE: Primary | ICD-10-CM

## 2022-11-04 DIAGNOSIS — Z17.0 MALIGNANT NEOPLASM OF CENTRAL PORTION OF RIGHT BREAST IN FEMALE, ESTROGEN RECEPTOR POSITIVE: ICD-10-CM

## 2022-11-04 DIAGNOSIS — T45.1X5A CHEMOTHERAPY-INDUCED NEUTROPENIA: Primary | ICD-10-CM

## 2022-11-04 LAB
BASOPHILS # BLD AUTO: 0.07 10*3/MM3 (ref 0–0.2)
BASOPHILS NFR BLD AUTO: 3.1 % (ref 0–1.5)
DEPRECATED RDW RBC AUTO: 44.2 FL (ref 37–54)
EOSINOPHIL # BLD AUTO: 0.02 10*3/MM3 (ref 0–0.4)
EOSINOPHIL NFR BLD AUTO: 0.9 % (ref 0.3–6.2)
ERYTHROCYTE [DISTWIDTH] IN BLOOD BY AUTOMATED COUNT: 14.7 % (ref 12.3–15.4)
HCT VFR BLD AUTO: 36.7 % (ref 34–46.6)
HGB BLD-MCNC: 11.9 G/DL (ref 12–15.9)
HOLD SPECIMEN: NORMAL
HOLD SPECIMEN: NORMAL
LYMPHOCYTES # BLD AUTO: 1.92 10*3/MM3 (ref 0.7–3.1)
LYMPHOCYTES NFR BLD AUTO: 84.2 % (ref 19.6–45.3)
MCH RBC QN AUTO: 27.6 PG (ref 26.6–33)
MCHC RBC AUTO-ENTMCNC: 32.4 G/DL (ref 31.5–35.7)
MCV RBC AUTO: 85.2 FL (ref 79–97)
MONOCYTES # BLD AUTO: 0.19 10*3/MM3 (ref 0.1–0.9)
MONOCYTES NFR BLD AUTO: 8.3 % (ref 5–12)
NEUTROPHILS NFR BLD AUTO: 0.08 10*3/MM3 (ref 1.7–7)
NEUTROPHILS NFR BLD AUTO: 3.5 % (ref 42.7–76)
PLATELET # BLD AUTO: 297 10*3/MM3 (ref 140–450)
PMV BLD AUTO: 10.6 FL (ref 6–12)
RBC # BLD AUTO: 4.31 10*6/MM3 (ref 3.77–5.28)
WBC NRBC COR # BLD: 2.28 10*3/MM3 (ref 3.4–10.8)

## 2022-11-04 PROCEDURE — 85025 COMPLETE CBC W/AUTO DIFF WBC: CPT | Performed by: NURSE PRACTITIONER

## 2022-11-04 PROCEDURE — 99215 OFFICE O/P EST HI 40 MIN: CPT | Performed by: NURSE PRACTITIONER

## 2022-11-04 PROCEDURE — 25010000002 FILGRASTIM-SNDZ 480 MCG/0.8ML SOLUTION PREFILLED SYRINGE: Performed by: INTERNAL MEDICINE

## 2022-11-04 PROCEDURE — 36415 COLL VENOUS BLD VENIPUNCTURE: CPT

## 2022-11-04 PROCEDURE — 96372 THER/PROPH/DIAG INJ SC/IM: CPT

## 2022-11-04 RX ORDER — HYDROCODONE BITARTRATE AND ACETAMINOPHEN 5; 325 MG/1; MG/1
1 TABLET ORAL EVERY 6 HOURS PRN
Qty: 12 TABLET | Refills: 0 | Status: SHIPPED | OUTPATIENT
Start: 2022-11-04 | End: 2022-11-07

## 2022-11-04 RX ORDER — LEVOFLOXACIN 500 MG/1
500 TABLET, FILM COATED ORAL DAILY
Qty: 7 TABLET | Refills: 0 | Status: SHIPPED | OUTPATIENT
Start: 2022-11-04 | End: 2022-11-30

## 2022-11-04 RX ADMIN — FILGRASTIM-SNDZ 480 MCG: 480 INJECTION, SOLUTION INTRAVENOUS; SUBCUTANEOUS at 10:05

## 2022-11-04 NOTE — PROGRESS NOTES
Patient here for Zarxio injection. Patient reports she has never received this injection before. Patient received Zarxio injection. Patient advised she needs to stay for 30 minutes to be observed for reaction and patient verbalized understanding.Patient informed that she is to be at [facility] at 0845 the next two days to receive Zarxio injections.After time lapsed patient voiced no complaints and no signs or symptoms of reaction noted.

## 2022-11-05 ENCOUNTER — HOSPITAL ENCOUNTER (OUTPATIENT)
Dept: INFUSION THERAPY | Facility: HOSPITAL | Age: 57
Setting detail: INFUSION SERIES
Discharge: HOME | End: 2022-11-05

## 2022-11-05 DIAGNOSIS — D70.1 CHEMOTHERAPY-INDUCED NEUTROPENIA: Primary | ICD-10-CM

## 2022-11-05 DIAGNOSIS — Z17.0 MALIGNANT NEOPLASM OF CENTRAL PORTION OF RIGHT BREAST IN FEMALE, ESTROGEN RECEPTOR POSITIVE: ICD-10-CM

## 2022-11-05 DIAGNOSIS — T45.1X5A CHEMOTHERAPY-INDUCED NEUTROPENIA: Primary | ICD-10-CM

## 2022-11-05 DIAGNOSIS — C50.111 MALIGNANT NEOPLASM OF CENTRAL PORTION OF RIGHT BREAST IN FEMALE, ESTROGEN RECEPTOR POSITIVE: ICD-10-CM

## 2022-11-05 PROCEDURE — 96372 THER/PROPH/DIAG INJ SC/IM: CPT

## 2022-11-05 PROCEDURE — 25010000002 FILGRASTIM-SNDZ 480 MCG/0.8ML SOLUTION PREFILLED SYRINGE: Performed by: NURSE PRACTITIONER

## 2022-11-05 RX ORDER — HEPARIN SODIUM (PORCINE) LOCK FLUSH IV SOLN 100 UNIT/ML 100 UNIT/ML
500 SOLUTION INTRAVENOUS AS NEEDED
Status: CANCELLED | OUTPATIENT
Start: 2022-11-05

## 2022-11-05 RX ORDER — SODIUM CHLORIDE 0.9 % (FLUSH) 0.9 %
20 SYRINGE (ML) INJECTION AS NEEDED
Status: CANCELLED | OUTPATIENT
Start: 2022-11-05

## 2022-11-05 RX ADMIN — FILGRASTIM-SNDZ 480 MCG: 480 INJECTION, SOLUTION INTRAVENOUS; SUBCUTANEOUS at 08:57

## 2022-11-05 NOTE — PROGRESS NOTES
Zarxio injection given subcutaneous in Left upper arm.  Patient tolerated well with no signs or symptoms of distress noted or reported.

## 2022-11-06 ENCOUNTER — HOSPITAL ENCOUNTER (OUTPATIENT)
Dept: INFUSION THERAPY | Facility: HOSPITAL | Age: 57
Setting detail: INFUSION SERIES
Discharge: HOME | End: 2022-11-06

## 2022-11-06 VITALS
DIASTOLIC BLOOD PRESSURE: 75 MMHG | TEMPERATURE: 98.1 F | RESPIRATION RATE: 18 BRPM | HEART RATE: 102 BPM | SYSTOLIC BLOOD PRESSURE: 120 MMHG | OXYGEN SATURATION: 96 %

## 2022-11-06 DIAGNOSIS — C50.111 MALIGNANT NEOPLASM OF CENTRAL PORTION OF RIGHT BREAST IN FEMALE, ESTROGEN RECEPTOR POSITIVE: ICD-10-CM

## 2022-11-06 DIAGNOSIS — T45.1X5A CHEMOTHERAPY-INDUCED NEUTROPENIA: Primary | ICD-10-CM

## 2022-11-06 DIAGNOSIS — D70.1 CHEMOTHERAPY-INDUCED NEUTROPENIA: Primary | ICD-10-CM

## 2022-11-06 DIAGNOSIS — Z17.0 MALIGNANT NEOPLASM OF CENTRAL PORTION OF RIGHT BREAST IN FEMALE, ESTROGEN RECEPTOR POSITIVE: ICD-10-CM

## 2022-11-06 PROCEDURE — 96372 THER/PROPH/DIAG INJ SC/IM: CPT

## 2022-11-06 PROCEDURE — 25010000002 FILGRASTIM-SNDZ 480 MCG/0.8ML SOLUTION PREFILLED SYRINGE: Performed by: NURSE PRACTITIONER

## 2022-11-06 RX ORDER — SODIUM CHLORIDE 0.9 % (FLUSH) 0.9 %
20 SYRINGE (ML) INJECTION AS NEEDED
Status: CANCELLED | OUTPATIENT
Start: 2022-11-06

## 2022-11-06 RX ORDER — HEPARIN SODIUM (PORCINE) LOCK FLUSH IV SOLN 100 UNIT/ML 100 UNIT/ML
500 SOLUTION INTRAVENOUS AS NEEDED
Status: CANCELLED | OUTPATIENT
Start: 2022-11-06

## 2022-11-06 RX ADMIN — FILGRASTIM-SNDZ 480 MCG: 480 INJECTION, SOLUTION INTRAVENOUS; SUBCUTANEOUS at 09:16

## 2022-11-07 ENCOUNTER — LAB (OUTPATIENT)
Dept: LAB | Facility: HOSPITAL | Age: 57
End: 2022-11-07

## 2022-11-07 DIAGNOSIS — Z17.0 MALIGNANT NEOPLASM OF CENTRAL PORTION OF RIGHT BREAST IN FEMALE, ESTROGEN RECEPTOR POSITIVE: ICD-10-CM

## 2022-11-07 DIAGNOSIS — C50.111 MALIGNANT NEOPLASM OF CENTRAL PORTION OF RIGHT BREAST IN FEMALE, ESTROGEN RECEPTOR POSITIVE: Primary | ICD-10-CM

## 2022-11-07 DIAGNOSIS — Z17.0 MALIGNANT NEOPLASM OF CENTRAL PORTION OF RIGHT BREAST IN FEMALE, ESTROGEN RECEPTOR POSITIVE: Primary | ICD-10-CM

## 2022-11-07 DIAGNOSIS — C50.111 MALIGNANT NEOPLASM OF CENTRAL PORTION OF RIGHT BREAST IN FEMALE, ESTROGEN RECEPTOR POSITIVE: ICD-10-CM

## 2022-11-07 LAB
BASOPHILS # BLD AUTO: 0.13 10*3/MM3 (ref 0–0.2)
BASOPHILS NFR BLD AUTO: 0.7 % (ref 0–1.5)
DEPRECATED RDW RBC AUTO: 47.2 FL (ref 37–54)
EOSINOPHIL # BLD AUTO: 0.07 10*3/MM3 (ref 0–0.4)
EOSINOPHIL NFR BLD AUTO: 0.4 % (ref 0.3–6.2)
ERYTHROCYTE [DISTWIDTH] IN BLOOD BY AUTOMATED COUNT: 15.6 % (ref 12.3–15.4)
HCT VFR BLD AUTO: 36.1 % (ref 34–46.6)
HGB BLD-MCNC: 11.6 G/DL (ref 12–15.9)
LYMPHOCYTES NFR BLD AUTO: ABNORMAL %
MCH RBC QN AUTO: 27.8 PG (ref 26.6–33)
MCHC RBC AUTO-ENTMCNC: 32.1 G/DL (ref 31.5–35.7)
MCV RBC AUTO: 86.4 FL (ref 79–97)
MONOCYTES NFR BLD AUTO: ABNORMAL %
NEUTROPHILS NFR BLD AUTO: ABNORMAL %
PLATELET # BLD AUTO: 320 10*3/MM3 (ref 140–450)
PMV BLD AUTO: 10 FL (ref 6–12)
RBC # BLD AUTO: 4.18 10*6/MM3 (ref 3.77–5.28)
WBC NRBC COR # BLD: 18.19 10*3/MM3 (ref 3.4–10.8)

## 2022-11-07 PROCEDURE — 85025 COMPLETE CBC W/AUTO DIFF WBC: CPT

## 2022-11-07 PROCEDURE — 36415 COLL VENOUS BLD VENIPUNCTURE: CPT

## 2022-11-09 NOTE — PROGRESS NOTES
Hematology/Oncology Outpatient Follow Up    PATIENT NAME:Alaina Hartman  :1965  MRN: [id number]  PRIMARY CARE PHYSICIAN: Fabiola Joyner MD  REFERRING PHYSICIAN: Fabiola Joyner MD    Chief Complaint   Patient presents with   • Follow-up     Malignant neoplasm of central portion of right breast in female, estrogen receptor positive (HCC)        HISTORY OF PRESENT ILLNESS:     This is a 56-year-old female who was clinically asymptomatic and had a routine screening mammogram which showed an abnormality on the right breast.  Prior to that patient had left breast biopsy in  for benign disease.     Review of her screening mammogram which was performed on 6/3/2022 showed a new 2 cm focal asymmetry with architectural distortion in the mid to posterior third depth of the outer right breast the left breast was benign.  Right diagnostic mammogram and ultrasound was recommended.  On 2022 patient underwent right diagnostic mammogram and ultrasound which showed a 2 cm irregular mass in the lateral inferior breast.  Same day she had an ultrasound which showed a 1.6 cm on the right breast the right axilla did not show any abnormal lymph nodes.     Patient then underwent ultrasound-guided biopsy of the right breast mass pathology revealed moderately differentiated invasive ductal carcinoma estrogen receptor positive at 95% progesterone receptor positive at 50%, HER2/bertha was negative at 1+ on immunohistochemistry.        Patient had a partial hysterectomy many years ago.  She is  and has 3 children.  She does not smoke, does not drink alcohol     Family history significant for mother with breast cancer in her 50s, her father had skin cancers, paternal grandmother had colon cancer at the age of 70        She is a       · 2022 estradiol level was less than 5 and FSH was 33 consistent with postmenopausal state  · 2022 patient underwent right lumpectomy with sentinel lymph node biopsy.   Pathology revealed residual moderately differentiated ductal carcinoma measuring 1.7 cm completely excised total of 5 lymph nodes were removed and all of them were negative for metastatic disease.  Pathology stage is pT1 cpN0 M0.  There was no evidence of DCIS all margins were negative distance to the closest margin was anterior margin at 2 mm ER positive at 95, CT positive at 50% and HER2/bertha was negative.  · 9/27/2022 patient had Oncotype DX assay done which returned with a recurrence score of 28.  This is consistent with 17% risk of recurrence at 9 years distantly, and more than 15% chemotherapy benefit.  On the validation assay estrogen receptor was positive, CT was positive and HER2/bertha was negative.  · 10/27/2022: C1 D1 Taxotere and Cytoxan received.  Neulasta support held due to leukocytosis.  · 11/4/2022: WBC 2.28, hemoglobin 11.9, platelets 297,000, ANC 0.08.  Patient was initiated on Neupogen 480 mg subcu given 11/4/2022, 11/5/2022, 11/6/2022.  Patient to receive Neulasta again with next cycle of treatment.  · 11/10/2022:      Past Medical History:   Diagnosis Date   • Ankle edema     at times   • Breast cancer, right (HCC) 08/2022   • Diabetes mellitus (HCC)    • GERD (gastroesophageal reflux disease)    • Hiatal hernia    • Hyperlipidemia    • Morbid obesity (HCC)    • Sleep apnea     CPAP- to bring dos   • Type 2 diabetes mellitus (HCC)        Past Surgical History:   Procedure Laterality Date   • BREAST BIOPSY Right 08/18/2022    Procedure: Right breast lumpectomy;  Surgeon: Jagdeep Mcwilliams MD;  Location: [facility] MAIN OR;  Service: General;  Laterality: Right;   • BREAST LUMPECTOMY Left 2003    x2   • PORTACATH PLACEMENT N/A 10/6/2022    Procedure: INSERTION OF PORTACATH;  Surgeon: Jagdeep Mcwilliams MD;  Location: [facility] MAIN OR;  Service: General;  Laterality: N/A;   • SENTINEL NODE BIOPSY Right 08/18/2022    Procedure: SENTINEL NODE BIOPSY;  Surgeon: Jagdeep Mcwilliams MD;   Location: [facility] MAIN OR;  Service: General;  Laterality: Right;   • SUBTOTAL HYSTERECTOMY           Current Outpatient Medications:   •  ALPRAZolam (XANAX) 0.5 MG tablet, Take 1 tablet by mouth Daily As Needed for Anxiety., Disp: 20 tablet, Rfl: 0  •  atorvastatin (LIPITOR) 40 MG tablet, Take 1 tablet by mouth Daily., Disp: 90 tablet, Rfl: 4  •  BIOTIN PO, Take  by mouth., Disp: , Rfl:   •  Cyanocobalamin (Vitamin B-12) 1000 MCG sublingual tablet, Place 1 tablet under the tongue Daily., Disp: 100 each, Rfl: 4  •  dexamethasone (DECADRON) 4 MG tablet, Take 2 tablets oral twice a day for 3 consecutive days beginning the day before chemotherapy and continue for 6 doses., Disp: 12 tablet, Rfl: 3  •  glucose blood (Accu-Chek Guide) test strip, 1 each by Other route 4 (Four) Times a Day. Use as instructed, Disp: 150 each, Rfl: 11  •  Insulin Lispro Prot & Lispro (HumaLOG Mix 75/25 KwikPen) (75-25) 100 UNIT/ML suspension pen-injector pen, Inject 35 units subcu 3 times a day before each meal., Disp: 30 mL, Rfl: 5  •  Insulin Pen Needle (Pen Needles) 32G X 4 MM misc, 1 each 4 (Four) Times a Day. Use to inject insulin / DX E11.65, Disp: 150 each, Rfl: 5  •  levoFLOXacin (Levaquin) 500 MG tablet, Take 1 tablet by mouth Daily., Disp: 7 tablet, Rfl: 0  •  lidocaine-prilocaine (EMLA) 2.5-2.5 % cream, Apply 1 application topically to the appropriate area as directed Every 2 (Two) Hours As Needed for Mild Pain. Apply to port site one hour prior to chemotherapy appointment, Disp: 1 g, Rfl: 5  •  lisinopril (PRINIVIL,ZESTRIL) 5 MG tablet, Take 1 tablet by mouth Daily. Appointment needed for additional refills. (Patient taking differently: Take 1 tablet by mouth Every Morning. Appointment needed for additional refills.    HOLD 24 hours before surgery), Disp: 90 tablet, Rfl: 2  •  metFORMIN ER (GLUCOPHAGE-XR) 500 MG 24 hr tablet, Take 2 tablets by mouth Daily. Appointment needed for additional refills. (Patient taking differently:  Take 2 tablets by mouth every night at bedtime. Appointment needed for additional refills.  HOLD 48 hours before surgery), Disp: 180 tablet, Rfl: 2  •  multivitamin with minerals tablet tablet, Take 1 tablet by mouth Daily., Disp: , Rfl:   •  omeprazole (priLOSEC) 40 MG capsule, Take 1 capsule by mouth Daily. Appointment needed for additional refills., Disp: 90 capsule, Rfl: 2  •  ondansetron (ZOFRAN) 8 MG tablet, Take 1 tablet by mouth 3 (Three) Times a Day As Needed for Nausea or Vomiting., Disp: 30 tablet, Rfl: 5  •  Vitamin D, Ergocalciferol, 50 MCG (2000 UT) capsule, Take 2,000 Units by mouth Daily., Disp: 100 capsule, Rfl: 4    No Known Allergies    Family History   Problem Relation Age of Onset   • Breast cancer Mother    • Heart disease Mother    • Thyroid disease Mother    • Stroke Mother    • Clotting disorder Father    • Diabetes Brother    • Heart disease Brother    • Cancer Paternal Grandmother    • Colon cancer Paternal Grandmother    • Diabetes Brother    • Stroke Brother        Cancer-related family history includes Breast cancer in her mother; Cancer in her paternal grandmother; Colon cancer in her paternal grandmother.    Social History     Tobacco Use   • Smoking status: Former     Packs/day: 0.25     Years: 10.00     Pack years: 2.50     Types: Cigarettes     Quit date: 2010     Years since quittin.8   • Smokeless tobacco: Never   Vaping Use   • Vaping Use: Never used   Substance Use Topics   • Alcohol use: Not Currently     Comment: rare   • Drug use: Never     I have reexamined the patient and the results are consistent with the previously documented exam. ADRIANNA Paz       SUBJECTIVE:  Patient here today for follow-up on low white blood cell count.  She was given Neupogen last Friday and over the weekend and has tolerated that without issue.  She has also completed her Levaquin prescription today.  She has had no signs or symptoms of infection and no new complaints.     "    REVIEW OF SYSTEMS:    Review of Systems   Constitutional: Positive for fatigue. Negative for chills and fever.   HENT: Negative for congestion, drooling, ear discharge, rhinorrhea, sinus pressure and tinnitus.    Eyes: Negative for photophobia, pain and discharge.   Respiratory: Negative for apnea, choking and stridor.    Cardiovascular: Negative for palpitations.   Gastrointestinal: Negative for abdominal distention, abdominal pain and anal bleeding.   Endocrine: Negative for polydipsia and polyphagia.   Genitourinary: Negative for decreased urine volume, flank pain and genital sores.   Musculoskeletal: Positive for arthralgias. Negative for gait problem, neck pain and neck stiffness.   Skin: Negative for color change, rash and wound.   Neurological: Negative for tremors, seizures, syncope, facial asymmetry and speech difficulty.   Hematological: Negative for adenopathy.   Psychiatric/Behavioral: Negative for agitation, confusion, hallucinations and self-injury. The patient is not hyperactive.        OBJECTIVE:    Vitals:    11/10/22 0811   BP: 150/89   Pulse: 96   Temp: 98.4 °F (36.9 °C)   TempSrc: Oral   SpO2: 96%   Weight: 115 kg (253 lb 8.5 oz)   Height: 162.6 cm (64.02\")   PainSc: 0-No pain     Body mass index is 43.49 kg/m².    ECOG    (0) Fully active, able to carry on all predisease performance without restriction    Physical Exam  Vitals and nursing note reviewed.   Constitutional:       General: She is not in acute distress.     Appearance: She is not diaphoretic.   HENT:      Head: Normocephalic and atraumatic.   Eyes:      General: No scleral icterus.        Right eye: No discharge.         Left eye: No discharge.      Conjunctiva/sclera: Conjunctivae normal.   Neck:      Thyroid: No thyromegaly.   Cardiovascular:      Rate and Rhythm: Normal rate and regular rhythm.      Heart sounds: Normal heart sounds.     No friction rub. No gallop.   Pulmonary:      Effort: Pulmonary effort is normal. No " respiratory distress.      Breath sounds: No stridor. No wheezing.   Abdominal:      General: Bowel sounds are normal.      Palpations: Abdomen is soft. There is no mass.      Tenderness: There is no abdominal tenderness. There is no guarding or rebound.   Musculoskeletal:         General: No tenderness. Normal range of motion.      Cervical back: Normal range of motion and neck supple.      Right lower leg: Edema ( trace ankle) present.      Left lower leg: Edema ( trace ankle) present.   Lymphadenopathy:      Cervical: No cervical adenopathy.   Skin:     General: Skin is warm.      Findings: No erythema or rash.   Neurological:      Mental Status: She is alert and oriented to person, place, and time.      Motor: No abnormal muscle tone.   Psychiatric:         Behavior: Behavior normal.     I have reexamined the patient and the results are consistent with the previously documented exam. ADRIANNA Paz       RECENT LABS    WBC   Date Value Ref Range Status   11/10/2022 20.85 (H) 3.40 - 10.80 10*3/mm3 Final   08/26/2020 11.0 (H) 3.4 - 10.8 x10E3/uL Final     RBC   Date Value Ref Range Status   11/10/2022 4.16 3.77 - 5.28 10*6/mm3 Final   08/26/2020 4.53 3.77 - 5.28 x10E6/uL Final     Hemoglobin   Date Value Ref Range Status   11/10/2022 11.5 (L) 12.0 - 15.9 g/dL Final     Hematocrit   Date Value Ref Range Status   11/10/2022 35.9 34.0 - 46.6 % Final     MCV   Date Value Ref Range Status   11/10/2022 86.3 79.0 - 97.0 fL Final     MCH   Date Value Ref Range Status   11/10/2022 27.6 26.6 - 33.0 pg Final     MCHC   Date Value Ref Range Status   11/10/2022 32.0 31.5 - 35.7 g/dL Final     RDW   Date Value Ref Range Status   11/10/2022 14.9 12.3 - 15.4 % Final     RDW-SD   Date Value Ref Range Status   11/10/2022 46.1 37.0 - 54.0 fl Final     MPV   Date Value Ref Range Status   11/10/2022 9.4 6.0 - 12.0 fL Final     Platelets   Date Value Ref Range Status   11/10/2022 249 140 - 450 10*3/mm3 Final     Neutrophil %    Date Value Ref Range Status   11/04/2022 3.5 (L) 42.7 - 76.0 % Final     Lymphocyte %   Date Value Ref Range Status   11/10/2022 23.8 19.6 - 45.3 % Final     Monocyte %   Date Value Ref Range Status   11/04/2022 8.3 5.0 - 12.0 % Final     Eosinophil %   Date Value Ref Range Status   11/10/2022 0.0 (L) 0.3 - 6.2 % Final     Basophil %   Date Value Ref Range Status   11/10/2022 0.4 0.0 - 1.5 % Final     Immature Grans %   Date Value Ref Range Status   08/09/2022 0.5 0.0 - 0.5 % Final     Neutrophils, Absolute   Date Value Ref Range Status   11/04/2022 0.08 (C) 1.70 - 7.00 10*3/mm3 Final     Lymphocytes, Absolute   Date Value Ref Range Status   11/10/2022 4.97 (H) 0.70 - 3.10 10*3/mm3 Final     Monocytes, Absolute   Date Value Ref Range Status   11/04/2022 0.19 0.10 - 0.90 10*3/mm3 Final     Eosinophils, Absolute   Date Value Ref Range Status   11/10/2022 0.01 0.00 - 0.40 10*3/mm3 Final     Basophils, Absolute   Date Value Ref Range Status   11/10/2022 0.09 0.00 - 0.20 10*3/mm3 Final     Immature Grans, Absolute   Date Value Ref Range Status   08/09/2022 0.05 0.00 - 0.05 10*3/mm3 Final     nRBC   Date Value Ref Range Status   08/09/2022 0.0 0.0 - 0.2 /100 WBC Final       Lab Results   Component Value Date    GLUCOSE 403 (C) 10/27/2022    BUN 16 10/27/2022    CREATININE 0.63 10/27/2022    EGFRIFNONA 90 12/01/2021    EGFRIFAFRI 115 08/26/2020    BCR 25.4 (H) 10/27/2022    K 4.1 10/27/2022    CO2 20.0 (L) 10/27/2022    CALCIUM 9.5 10/27/2022    PROTENTOTREF 7.0 08/26/2020    ALBUMIN 4.00 10/27/2022    LABIL2 1.8 08/26/2020    AST 16 10/27/2022    ALT 27 10/27/2022         Assessment & Plan     Chemotherapy-induced neutropenia (HCC)  - CBC & Differential      ASSESSMENT:      1. Moderately differentiated invasive ductal carcinoma of the right breast.  Status post right lumpectomy with sentinel lymph node biopsy.  pT1 cpN0 M0.  ER positive at 95%, VA positive at 50% and HER2/bertha was negative.  T1 cN0 M0.  Ongoing  management  2. Oncotype DX assay with a high recurrence score at 28, consistent with 17% risk of distant recurrence at 9 years with tamoxifen alone and group absolute chemotherapy benefit of more than 15%  3. Status post partial hysterectomy  4. Postmenopausal state following lab confirmation  5. Family history of breast cancer in her mother at the age of 50, paternal grandmother with colon cancer at 17 and her father had skin cancer.  6. Assessment has been reviewed and updated.    7. Chemotherapy-induced neutropenia.  Patient is afebrile.     Discussion     Reviewed the results of her Oncotype DX assay which came back with a high recurrence score at 28.  She has some up to 15% benefit with chemotherapy.  She also has a risk of relapse of 17% despite adjuvant hormonal treatment.  For these reasons chemotherapy have been recommended.  Patient was reluctant about pursuing adjuvant chemotherapy due to concerns of side effects.  Discussed that it is important for her to receive chemotherapy given the high Oncotype DX assay report.  She has comorbidities but she is relatively healthy and remains very functional.  Patient is willing to take Taxotere and Cytoxan.    Taxotere and Cytoxan every 21 days for 4 cycles.    This will be followed by adjuvant radiation and then adjuvant endocrine therapy     We discussed supportive care that will be offered to her while undergoing chemotherapy    We discussed the side effects to include but not limited to:    Chemotherapy side effects include, but not limited to, nausea, vomiting, bone marrow suppression, which can result in blood, platelet transfusion. There is also risk of permanent bone marrow destruction, which can cause myelodysplastic syndrome or leukemia years down the line. There is risk of infection which can result in hospitalization and even death. There is also risk of fatigue, asthenia, alopecia which could become permanent. Chemo will help to reduce risk of relapse  of cancer, but does not eliminate risk completely.    Discussed that specifically Taxotere can lead to hypersensitivity reaction, peripheral neuropathy, cardiac arrhythmia, swelling.       Plans:     · CBC today reviewed with patient and her mother-in-law  · Continue docetaxel and Cytoxan-due for cycle 2 next week  · Add Neulasta back into treatment with cycle 2  · Follow-up with Dr. Rust in 3 weeks prior to cycle 3 of treatment  · Ask  to meet with the patient today due to difficulty affording gas for appointments  · Oncotype DX assay results reviewed in great detail with patient and her mother-in-law who is present today.  · We will asked Dr. Mcwilliams to place port for chemotherapy.  This has been scheduled for October 6, 2022  · She was given information on Cytoxan and Taxotere.  She also had chemotherapy education today  · Chemotherapy has been ordered in [place]  · Referred to lymphedema clinic.  She is being followed by Melba Gamboa RN  · Gave information on cancer genetics for her to review.  Patient to let me know when she is ready to proceed  · She will be a candidate for AI   · Patient will be seen by radiation oncologist following adjuvant chemotherapy  · All questions answered       Patient verbalized understanding and is in agreement of the above plan.         Greater than 20 minutes spent with patient, more than 50% of that was spent face-to-face caring for Alaina today.  90% of this time involved in counseling and coronation of care as documented within this note    Electronically signed by ADRIANNA Paz, 11/10/22, 08:42 EST

## 2022-11-10 ENCOUNTER — OFFICE VISIT (OUTPATIENT)
Dept: ONCOLOGY | Facility: CLINIC | Age: 57
End: 2022-11-10

## 2022-11-10 ENCOUNTER — NURSE NAVIGATOR (OUTPATIENT)
Dept: ONCOLOGY | Facility: CLINIC | Age: 57
End: 2022-11-10

## 2022-11-10 ENCOUNTER — DOCUMENTATION (OUTPATIENT)
Dept: ONCOLOGY | Facility: CLINIC | Age: 57
End: 2022-11-10

## 2022-11-10 ENCOUNTER — APPOINTMENT (OUTPATIENT)
Dept: LAB | Facility: HOSPITAL | Age: 57
End: 2022-11-10

## 2022-11-10 VITALS
HEIGHT: 64 IN | SYSTOLIC BLOOD PRESSURE: 150 MMHG | DIASTOLIC BLOOD PRESSURE: 89 MMHG | BODY MASS INDEX: 43.28 KG/M2 | WEIGHT: 253.53 LBS | OXYGEN SATURATION: 96 % | HEART RATE: 96 BPM | TEMPERATURE: 98.4 F

## 2022-11-10 DIAGNOSIS — T45.1X5A CHEMOTHERAPY-INDUCED NEUTROPENIA: Primary | ICD-10-CM

## 2022-11-10 DIAGNOSIS — C50.111 MALIGNANT NEOPLASM OF CENTRAL PORTION OF RIGHT BREAST IN FEMALE, ESTROGEN RECEPTOR POSITIVE: Primary | ICD-10-CM

## 2022-11-10 DIAGNOSIS — Z17.0 MALIGNANT NEOPLASM OF CENTRAL PORTION OF RIGHT BREAST IN FEMALE, ESTROGEN RECEPTOR POSITIVE: Primary | ICD-10-CM

## 2022-11-10 DIAGNOSIS — D70.1 CHEMOTHERAPY-INDUCED NEUTROPENIA: Primary | ICD-10-CM

## 2022-11-10 LAB
BASOPHILS # BLD AUTO: 0.09 10*3/MM3 (ref 0–0.2)
BASOPHILS NFR BLD AUTO: 0.4 % (ref 0–1.5)
DEPRECATED RDW RBC AUTO: 46.1 FL (ref 37–54)
EOSINOPHIL # BLD AUTO: 0.01 10*3/MM3 (ref 0–0.4)
EOSINOPHIL NFR BLD AUTO: 0 % (ref 0.3–6.2)
ERYTHROCYTE [DISTWIDTH] IN BLOOD BY AUTOMATED COUNT: 14.9 % (ref 12.3–15.4)
HCT VFR BLD AUTO: 35.9 % (ref 34–46.6)
HGB BLD-MCNC: 11.5 G/DL (ref 12–15.9)
HOLD SPECIMEN: NORMAL
HOLD SPECIMEN: NORMAL
LYMPHOCYTES # BLD AUTO: 4.97 10*3/MM3 (ref 0.7–3.1)
LYMPHOCYTES NFR BLD AUTO: 23.8 % (ref 19.6–45.3)
MCH RBC QN AUTO: 27.6 PG (ref 26.6–33)
MCHC RBC AUTO-ENTMCNC: 32 G/DL (ref 31.5–35.7)
MCV RBC AUTO: 86.3 FL (ref 79–97)
MONOCYTES NFR BLD AUTO: ABNORMAL %
NEUTROPHILS NFR BLD AUTO: ABNORMAL %
PLATELET # BLD AUTO: 249 10*3/MM3 (ref 140–450)
PMV BLD AUTO: 9.4 FL (ref 6–12)
RBC # BLD AUTO: 4.16 10*6/MM3 (ref 3.77–5.28)
WBC NRBC COR # BLD: 20.85 10*3/MM3 (ref 3.4–10.8)

## 2022-11-10 PROCEDURE — 36415 COLL VENOUS BLD VENIPUNCTURE: CPT

## 2022-11-10 PROCEDURE — 85025 COMPLETE CBC W/AUTO DIFF WBC: CPT | Performed by: NURSE PRACTITIONER

## 2022-11-10 PROCEDURE — 99214 OFFICE O/P EST MOD 30 MIN: CPT | Performed by: NURSE PRACTITIONER

## 2022-11-10 NOTE — PROGRESS NOTES
I received a message from ADRIANNA Ervin this morning stating that the patient is needing assistance with gas money.    I was able to give the patient a [employer] gift card for gas from the [employer] funds from the  in 4/2022.  The patient also met with Concha Ibarra to discuss jerod funds.

## 2022-11-10 NOTE — PROGRESS NOTES
OSW was notified by APRN that patient is struggling with gas money.     OSW met w/ patient and friend to discuss which non-medical grants she could be eligible for. OSW reviewed [employer], [employer], [employer], etc.     Patient completed application for [employer] - OSW will get MD signature.     Patient also completed initial [employer] application - awaiting it to be emailed.     Patient was provided with a [id number] gift card today to help offset costs.     No other needs/concerns discussed at this time.     Concha Ibarra, LSW, CSW, MSW  Oncology MSW  [facility]

## 2022-11-11 ENCOUNTER — TELEPHONE (OUTPATIENT)
Dept: ENDOCRINOLOGY | Facility: CLINIC | Age: 57
End: 2022-11-11

## 2022-11-11 RX ORDER — INSULIN LISPRO 100 [IU]/ML
INJECTION, SUSPENSION SUBCUTANEOUS
Qty: 30 ML | Refills: 5
Start: 2022-11-11 | End: 2022-12-28 | Stop reason: SDUPTHER

## 2022-11-11 NOTE — TELEPHONE ENCOUNTER
Dario pt and she has increased her insulin up to 55 units on her own recently, but her BG is still high on the days she takes the steroids. Per MD s/o advised pt to increase her insulin to 65 units TID on steroid tx days and to let educator know how BGs improve.

## 2022-11-11 NOTE — TELEPHONE ENCOUNTER
Patient left voicemail stating she is going through chemo treatments. For the 1-2 days prior to treatment she has to take a steroid. She notices her blood sugars spikes on those days. She is asking how to adjust her insulin on those days. Please call her at [phone]

## 2022-11-17 ENCOUNTER — HOSPITAL ENCOUNTER (OUTPATIENT)
Dept: ONCOLOGY | Facility: HOSPITAL | Age: 57
Setting detail: INFUSION SERIES
Discharge: HOME | End: 2022-11-17

## 2022-11-17 VITALS
BODY MASS INDEX: 42.85 KG/M2 | SYSTOLIC BLOOD PRESSURE: 151 MMHG | TEMPERATURE: 97 F | OXYGEN SATURATION: 99 % | HEIGHT: 64 IN | RESPIRATION RATE: 18 BRPM | DIASTOLIC BLOOD PRESSURE: 77 MMHG | WEIGHT: 251 LBS | HEART RATE: 112 BPM

## 2022-11-17 DIAGNOSIS — Z45.2 ENCOUNTER FOR CARE RELATED TO VASCULAR ACCESS PORT: ICD-10-CM

## 2022-11-17 DIAGNOSIS — Z17.0 MALIGNANT NEOPLASM OF CENTRAL PORTION OF RIGHT BREAST IN FEMALE, ESTROGEN RECEPTOR POSITIVE: Primary | ICD-10-CM

## 2022-11-17 DIAGNOSIS — C50.111 MALIGNANT NEOPLASM OF CENTRAL PORTION OF RIGHT BREAST IN FEMALE, ESTROGEN RECEPTOR POSITIVE: Primary | ICD-10-CM

## 2022-11-17 LAB
ALBUMIN SERPL-MCNC: 4 G/DL (ref 3.5–5.2)
ALBUMIN/GLOB SERPL: 1.6 G/DL
ALP SERPL-CCNC: 120 U/L (ref 39–117)
ALT SERPL W P-5'-P-CCNC: 23 U/L (ref 1–33)
ANION GAP SERPL CALCULATED.3IONS-SCNC: 20 MMOL/L (ref 5–15)
AST SERPL-CCNC: 16 U/L (ref 1–32)
BASOPHILS # BLD AUTO: 0.05 10*3/MM3 (ref 0–0.2)
BASOPHILS NFR BLD AUTO: 0.2 % (ref 0–1.5)
BILIRUB SERPL-MCNC: 0.5 MG/DL (ref 0–1.2)
BUN SERPL-MCNC: 18 MG/DL (ref 6–20)
BUN/CREAT SERPL: 31 (ref 7–25)
CALCIUM SPEC-SCNC: 9.2 MG/DL (ref 8.6–10.5)
CHLORIDE SERPL-SCNC: 100 MMOL/L (ref 98–107)
CO2 SERPL-SCNC: 17 MMOL/L (ref 22–29)
CREAT SERPL-MCNC: 0.58 MG/DL (ref 0.57–1)
DEPRECATED RDW RBC AUTO: 44.4 FL (ref 37–54)
EGFRCR SERPLBLD CKD-EPI 2021: 107.7 ML/MIN/1.73
EOSINOPHIL # BLD AUTO: 0.01 10*3/MM3 (ref 0–0.4)
EOSINOPHIL NFR BLD AUTO: 0 % (ref 0.3–6.2)
ERYTHROCYTE [DISTWIDTH] IN BLOOD BY AUTOMATED COUNT: 15.4 % (ref 12.3–15.4)
GLOBULIN UR ELPH-MCNC: 2.5 GM/DL
GLUCOSE SERPL-MCNC: 409 MG/DL (ref 65–99)
HCT VFR BLD AUTO: 34 % (ref 34–46.6)
HGB BLD-MCNC: 11.3 G/DL (ref 12–15.9)
HGB UR QL STRIP.AUTO: NEGATIVE
LYMPHOCYTES # BLD AUTO: 2.24 10*3/MM3 (ref 0.7–3.1)
LYMPHOCYTES NFR BLD AUTO: 9.5 % (ref 19.6–45.3)
MCH RBC QN AUTO: 28.1 PG (ref 26.6–33)
MCHC RBC AUTO-ENTMCNC: 33.2 G/DL (ref 31.5–35.7)
MCV RBC AUTO: 84.6 FL (ref 79–97)
MONOCYTES # BLD AUTO: 0.42 10*3/MM3 (ref 0.1–0.9)
MONOCYTES NFR BLD AUTO: 1.8 % (ref 5–12)
NEUTROPHILS NFR BLD AUTO: 20.76 10*3/MM3 (ref 1.7–7)
NEUTROPHILS NFR BLD AUTO: 88.5 % (ref 42.7–76)
PLATELET # BLD AUTO: 244 10*3/MM3 (ref 140–450)
PMV BLD AUTO: 10.8 FL (ref 6–12)
POTASSIUM SERPL-SCNC: 3.7 MMOL/L (ref 3.5–5.2)
PROT SERPL-MCNC: 6.5 G/DL (ref 6–8.5)
RBC # BLD AUTO: 4.02 10*6/MM3 (ref 3.77–5.28)
SODIUM SERPL-SCNC: 137 MMOL/L (ref 136–145)
WBC NRBC COR # BLD: 23.48 10*3/MM3 (ref 3.4–10.8)

## 2022-11-17 PROCEDURE — 25010000002 DOCETAXEL 20 MG/ML SOLUTION 8 ML VIAL: Performed by: INTERNAL MEDICINE

## 2022-11-17 PROCEDURE — 80053 COMPREHEN METABOLIC PANEL: CPT | Performed by: INTERNAL MEDICINE

## 2022-11-17 PROCEDURE — 81002 URINALYSIS NONAUTO W/O SCOPE: CPT

## 2022-11-17 PROCEDURE — 96417 CHEMO IV INFUS EACH ADDL SEQ: CPT

## 2022-11-17 PROCEDURE — 96375 TX/PRO/DX INJ NEW DRUG ADDON: CPT

## 2022-11-17 PROCEDURE — 85025 COMPLETE CBC W/AUTO DIFF WBC: CPT | Performed by: INTERNAL MEDICINE

## 2022-11-17 PROCEDURE — 25010000002 CYCLOPHOSPHAMIDE 1 GM/5ML SOLUTION 5 ML VIAL: Performed by: INTERNAL MEDICINE

## 2022-11-17 PROCEDURE — 25010000002 PEGFILGRASTIM 6 MG/0.6ML PREFILLED SYRINGE KIT: Performed by: INTERNAL MEDICINE

## 2022-11-17 PROCEDURE — 25010000002 HEPARIN LOCK FLUSH PER 10 UNITS: Performed by: INTERNAL MEDICINE

## 2022-11-17 PROCEDURE — 96377 APPLICATON ON-BODY INJECTOR: CPT

## 2022-11-17 PROCEDURE — 25010000002 PALONOSETRON 0.25 MG/5ML SOLUTION PREFILLED SYRINGE: Performed by: INTERNAL MEDICINE

## 2022-11-17 PROCEDURE — 96413 CHEMO IV INFUSION 1 HR: CPT

## 2022-11-17 RX ORDER — SODIUM CHLORIDE 9 MG/ML
250 INJECTION, SOLUTION INTRAVENOUS ONCE
Status: COMPLETED | OUTPATIENT
Start: 2022-11-17 | End: 2022-11-17

## 2022-11-17 RX ORDER — HEPARIN SODIUM (PORCINE) LOCK FLUSH IV SOLN 100 UNIT/ML 100 UNIT/ML
500 SOLUTION INTRAVENOUS AS NEEDED
Status: DISCONTINUED | OUTPATIENT
Start: 2022-11-17 | End: 2022-11-18 | Stop reason: HOSPADM

## 2022-11-17 RX ORDER — SODIUM CHLORIDE 0.9 % (FLUSH) 0.9 %
20 SYRINGE (ML) INJECTION AS NEEDED
Status: DISCONTINUED | OUTPATIENT
Start: 2022-11-17 | End: 2022-11-18 | Stop reason: HOSPADM

## 2022-11-17 RX ORDER — DIPHENHYDRAMINE HYDROCHLORIDE 50 MG/ML
50 INJECTION INTRAMUSCULAR; INTRAVENOUS AS NEEDED
Status: CANCELLED | OUTPATIENT
Start: 2022-11-17

## 2022-11-17 RX ORDER — PALONOSETRON 0.05 MG/ML
0.25 INJECTION, SOLUTION INTRAVENOUS ONCE
Status: COMPLETED | OUTPATIENT
Start: 2022-11-17 | End: 2022-11-17

## 2022-11-17 RX ORDER — SODIUM CHLORIDE 0.9 % (FLUSH) 0.9 %
20 SYRINGE (ML) INJECTION AS NEEDED
Status: CANCELLED | OUTPATIENT
Start: 2022-11-17

## 2022-11-17 RX ORDER — FAMOTIDINE 10 MG/ML
20 INJECTION, SOLUTION INTRAVENOUS AS NEEDED
Status: CANCELLED | OUTPATIENT
Start: 2022-11-17

## 2022-11-17 RX ORDER — HEPARIN SODIUM (PORCINE) LOCK FLUSH IV SOLN 100 UNIT/ML 100 UNIT/ML
500 SOLUTION INTRAVENOUS AS NEEDED
Status: CANCELLED | OUTPATIENT
Start: 2022-11-17

## 2022-11-17 RX ADMIN — HEPARIN 500 UNITS: 100 SYRINGE at 12:00

## 2022-11-17 RX ADMIN — PEGFILGRASTIM 6 MG: KIT SUBCUTANEOUS at 12:00

## 2022-11-17 RX ADMIN — SODIUM CHLORIDE 250 ML: 9 INJECTION, SOLUTION INTRAVENOUS at 09:32

## 2022-11-17 RX ADMIN — DOCETAXEL 160 MG: 20 INJECTION, SOLUTION, CONCENTRATE INTRAVENOUS at 10:06

## 2022-11-17 RX ADMIN — Medication 20 ML: at 12:00

## 2022-11-17 RX ADMIN — PALONOSETRON 0.25 MG: 0.25 INJECTION, SOLUTION INTRAVENOUS at 09:36

## 2022-11-17 RX ADMIN — CYCLOPHOSPHAMIDE 1280 MG: 200 INJECTION, SOLUTION INTRAVENOUS at 11:16

## 2022-11-17 NOTE — PROGRESS NOTES
Message below sent to MD Dr. Rust, pt. is here for C2 Taxotere, Cytoxan, Pt. is doing well today. Pt's CBC results WBC 23.48, HGB 11.3, Plts 244, ANC 36133, Pt. received Zarxio x3 doses last dose on 11/6. Pt. will get neulasta on body today. Is it ok to give the neulasta? Also could you sign the treatment plan?  Pt's ANC dropped after the first cycle on 11/4 ANC 0.08.  Orders given to give the neulasta today per Dr. Rust  Treatment given as ordered and pt. Tolerated well.   Pt. Discharged from clinic with no complaints and AVS was given.

## 2022-11-23 ENCOUNTER — LAB (OUTPATIENT)
Dept: LAB | Facility: HOSPITAL | Age: 57
End: 2022-11-23

## 2022-11-23 DIAGNOSIS — Z17.0 MALIGNANT NEOPLASM OF CENTRAL PORTION OF RIGHT BREAST IN FEMALE, ESTROGEN RECEPTOR POSITIVE: Primary | ICD-10-CM

## 2022-11-23 DIAGNOSIS — C50.111 MALIGNANT NEOPLASM OF CENTRAL PORTION OF RIGHT BREAST IN FEMALE, ESTROGEN RECEPTOR POSITIVE: Primary | ICD-10-CM

## 2022-11-23 DIAGNOSIS — C50.111 MALIGNANT NEOPLASM OF CENTRAL PORTION OF RIGHT BREAST IN FEMALE, ESTROGEN RECEPTOR POSITIVE: ICD-10-CM

## 2022-11-23 DIAGNOSIS — Z17.0 MALIGNANT NEOPLASM OF CENTRAL PORTION OF RIGHT BREAST IN FEMALE, ESTROGEN RECEPTOR POSITIVE: ICD-10-CM

## 2022-11-23 LAB
BASOPHILS # BLD AUTO: 0.07 10*3/MM3 (ref 0–0.2)
BASOPHILS NFR BLD AUTO: 1.7 % (ref 0–1.5)
DEPRECATED RDW RBC AUTO: 49.4 FL (ref 37–54)
EOSINOPHIL # BLD AUTO: 0.16 10*3/MM3 (ref 0–0.4)
EOSINOPHIL NFR BLD AUTO: 3.8 % (ref 0.3–6.2)
ERYTHROCYTE [DISTWIDTH] IN BLOOD BY AUTOMATED COUNT: 16.2 % (ref 12.3–15.4)
HCT VFR BLD AUTO: 33.4 % (ref 34–46.6)
HGB BLD-MCNC: 10.7 G/DL (ref 12–15.9)
LYMPHOCYTES # BLD AUTO: 1.72 10*3/MM3 (ref 0.7–3.1)
LYMPHOCYTES NFR BLD AUTO: 40.8 % (ref 19.6–45.3)
MCH RBC QN AUTO: 28.2 PG (ref 26.6–33)
MCHC RBC AUTO-ENTMCNC: 32 G/DL (ref 31.5–35.7)
MCV RBC AUTO: 87.9 FL (ref 79–97)
MONOCYTES # BLD AUTO: 0.77 10*3/MM3 (ref 0.1–0.9)
MONOCYTES NFR BLD AUTO: 18.2 % (ref 5–12)
NEUTROPHILS NFR BLD AUTO: 1.5 10*3/MM3 (ref 1.7–7)
NEUTROPHILS NFR BLD AUTO: 35.5 % (ref 42.7–76)
PLATELET # BLD AUTO: 224 10*3/MM3 (ref 140–450)
PMV BLD AUTO: 9.7 FL (ref 6–12)
RBC # BLD AUTO: 3.8 10*6/MM3 (ref 3.77–5.28)
WBC NRBC COR # BLD: 4.22 10*3/MM3 (ref 3.4–10.8)

## 2022-11-23 PROCEDURE — 36415 COLL VENOUS BLD VENIPUNCTURE: CPT

## 2022-11-23 PROCEDURE — 85025 COMPLETE CBC W/AUTO DIFF WBC: CPT

## 2022-11-29 NOTE — PROGRESS NOTES
Hematology/Oncology Outpatient Follow Up    PATIENT NAME:Alaina Hartman  :1965  MRN: [id number]  PRIMARY CARE PHYSICIAN: Fabiola Joyner MD  REFERRING PHYSICIAN: Fabiola Joyner MD    Chief Complaint   Patient presents with   • Follow-up     Malignant neoplasm of central portion of right breast in female, estrogen receptor positive (HCC)        HISTORY OF PRESENT ILLNESS:     This is a 56-year-old female who was clinically asymptomatic and had a routine screening mammogram which showed an abnormality on the right breast.  Prior to that patient had left breast biopsy in  for benign disease.     Review of her screening mammogram which was performed on 6/3/2022 showed a new 2 cm focal asymmetry with architectural distortion in the mid to posterior third depth of the outer right breast the left breast was benign.  Right diagnostic mammogram and ultrasound was recommended.  On 2022 patient underwent right diagnostic mammogram and ultrasound which showed a 2 cm irregular mass in the lateral inferior breast.  Same day she had an ultrasound which showed a 1.6 cm on the right breast the right axilla did not show any abnormal lymph nodes.     Patient then underwent ultrasound-guided biopsy of the right breast mass pathology revealed moderately differentiated invasive ductal carcinoma estrogen receptor positive at 95% progesterone receptor positive at 50%, HER2/bertha was negative at 1+ on immunohistochemistry.        Patient had a partial hysterectomy many years ago.  She is  and has 3 children.  She does not smoke, does not drink alcohol     Family history significant for mother with breast cancer in her 50s, her father had skin cancers, paternal grandmother had colon cancer at the age of 70        She is a       · 2022 estradiol level was less than 5 and FSH was 33 consistent with postmenopausal state  · 2022 patient underwent right lumpectomy with sentinel lymph node biopsy.   Pathology revealed residual moderately differentiated ductal carcinoma measuring 1.7 cm completely excised total of 5 lymph nodes were removed and all of them were negative for metastatic disease.  Pathology stage is pT1 cpN0 M0.  There was no evidence of DCIS all margins were negative distance to the closest margin was anterior margin at 2 mm ER positive at 95, SC positive at 50% and HER2/bertha was negative.  · 9/27/2022 patient had Oncotype DX assay done which returned with a recurrence score of 28.  This is consistent with 17% risk of recurrence at 9 years distantly, and more than 15% chemotherapy benefit.  On the validation assay estrogen receptor was positive, SC was positive and HER2/bertha was negative.  · 10/27/2022: C1 D1 Taxotere and Cytoxan received.  Neulasta support held due to leukocytosis.  · 11/4/2022: WBC 2.28, hemoglobin 11.9, platelets 297,000, ANC 0.08.  Patient was initiated on Neupogen 480 mg subcu given 11/4/2022, 11/5/2022, 11/6/2022.  Patient to receive Neulasta again with next cycle of treatment.  · 11/17/2022: Patient received cycle 2 of Taxotere Cytoxan with Neulasta support      Past Medical History:   Diagnosis Date   • Ankle edema     at times   • Breast cancer, right (HCC) 08/2022   • Diabetes mellitus (HCC)    • GERD (gastroesophageal reflux disease)    • Hiatal hernia    • Hyperlipidemia    • Morbid obesity (HCC)    • Sleep apnea     CPAP- to bring dos   • Type 2 diabetes mellitus (HCC)        Past Surgical History:   Procedure Laterality Date   • BREAST BIOPSY Right 08/18/2022    Procedure: Right breast lumpectomy;  Surgeon: Jagdeep Mcwilliams MD;  Location: [facility] MAIN OR;  Service: General;  Laterality: Right;   • BREAST LUMPECTOMY Left 2003    x2   • PORTACATH PLACEMENT N/A 10/6/2022    Procedure: INSERTION OF PORTACATH;  Surgeon: Jagdeep Mcwilliams MD;  Location: [facility] MAIN OR;  Service: General;  Laterality: N/A;   • SENTINEL NODE BIOPSY Right 08/18/2022    Procedure:  SENTINEL NODE BIOPSY;  Surgeon: Jagdeep Mcwilliams MD;  Location: [facility] MAIN OR;  Service: General;  Laterality: Right;   • SUBTOTAL HYSTERECTOMY           Current Outpatient Medications:   •  ALPRAZolam (XANAX) 0.5 MG tablet, Take 1 tablet by mouth Daily As Needed for Anxiety., Disp: 20 tablet, Rfl: 0  •  atorvastatin (LIPITOR) 40 MG tablet, Take 1 tablet by mouth Daily., Disp: 90 tablet, Rfl: 4  •  BIOTIN PO, Take  by mouth., Disp: , Rfl:   •  Cyanocobalamin (Vitamin B-12) 1000 MCG sublingual tablet, Place 1 tablet under the tongue Daily., Disp: 100 each, Rfl: 4  •  dexamethasone (DECADRON) 4 MG tablet, Take 2 tablets oral twice a day for 3 consecutive days beginning the day before chemotherapy and continue for 6 doses., Disp: 12 tablet, Rfl: 3  •  glucose blood (Accu-Chek Guide) test strip, 1 each by Other route 4 (Four) Times a Day. Use as instructed, Disp: 150 each, Rfl: 11  •  Insulin Lispro Prot & Lispro (HumaLOG Mix 75/25 KwikPen) (75-25) 100 UNIT/ML suspension pen-injector pen, Inject 55 units subcu 3 times a day before each meal. On days taking steroids, take 65 units TID, Disp: 30 mL, Rfl: 5  •  Insulin Pen Needle (Pen Needles) 32G X 4 MM misc, 1 each 4 (Four) Times a Day. Use to inject insulin / DX E11.65, Disp: 150 each, Rfl: 5  •  levoFLOXacin (Levaquin) 500 MG tablet, Take 1 tablet by mouth Daily for 10 days., Disp: 10 tablet, Rfl: 0  •  lidocaine-prilocaine (EMLA) 2.5-2.5 % cream, Apply 1 application topically to the appropriate area as directed Every 2 (Two) Hours As Needed for Mild Pain. Apply to port site one hour prior to chemotherapy appointment, Disp: 1 g, Rfl: 5  •  lisinopril (PRINIVIL,ZESTRIL) 5 MG tablet, Take 1 tablet by mouth Daily. Appointment needed for additional refills. (Patient taking differently: Take 1 tablet by mouth Every Morning. Appointment needed for additional refills.    HOLD 24 hours before surgery), Disp: 90 tablet, Rfl: 2  •  metFORMIN ER (GLUCOPHAGE-XR) 500 MG 24  hr tablet, Take 2 tablets by mouth Daily. Appointment needed for additional refills. (Patient taking differently: Take 2 tablets by mouth every night at bedtime. Appointment needed for additional refills.  HOLD 48 hours before surgery), Disp: 180 tablet, Rfl: 2  •  multivitamin with minerals tablet tablet, Take 1 tablet by mouth Daily., Disp: , Rfl:   •  omeprazole (priLOSEC) 40 MG capsule, Take 1 capsule by mouth Daily. Appointment needed for additional refills., Disp: 90 capsule, Rfl: 2  •  ondansetron (ZOFRAN) 8 MG tablet, Take 1 tablet by mouth 3 (Three) Times a Day As Needed for Nausea or Vomiting., Disp: 30 tablet, Rfl: 5  •  Vitamin D, Ergocalciferol, 50 MCG (2000 UT) capsule, Take 2,000 Units by mouth Daily., Disp: 100 capsule, Rfl: 4    No Known Allergies    Family History   Problem Relation Age of Onset   • Breast cancer Mother    • Heart disease Mother    • Thyroid disease Mother    • Stroke Mother    • Clotting disorder Father    • Diabetes Brother    • Heart disease Brother    • Cancer Paternal Grandmother    • Colon cancer Paternal Grandmother    • Diabetes Brother    • Stroke Brother        Cancer-related family history includes Breast cancer in her mother; Cancer in her paternal grandmother; Colon cancer in her paternal grandmother.    Social History     Tobacco Use   • Smoking status: Former     Packs/day: 0.25     Years: 10.00     Pack years: 2.50     Types: Cigarettes     Quit date: 2010     Years since quittin.9   • Smokeless tobacco: Never   Vaping Use   • Vaping Use: Never used   Substance Use Topics   • Alcohol use: Not Currently     Comment: rare   • Drug use: Never      I have reviewed and confirmed the accuracy of the patient's history: Chief complaint, HPI, ROS and Subjective as entered by the MA/LPN/RN. Lianet Rust MD 22       SUBJECTIVE:    She complains of generalized body aches and pains following each cycle of chemotherapy.  She is requiring hydrocodone 5 mg  "for pain management.  She denies nausea or vomiting.  She does have chemotherapy-induced fatigue.    She complains of cough, nasal congestion for 7 days        REVIEW OF SYSTEMS:    Review of Systems   Constitutional: Positive for fatigue. Negative for chills and fever.   HENT: Negative for congestion, drooling, ear discharge, rhinorrhea, sinus pressure and tinnitus.    Eyes: Negative for photophobia, pain and discharge.   Respiratory: Negative for apnea, choking and stridor.    Cardiovascular: Negative for palpitations.   Gastrointestinal: Negative for abdominal distention, abdominal pain and anal bleeding.   Endocrine: Negative for polydipsia and polyphagia.   Genitourinary: Negative for decreased urine volume, flank pain and genital sores.   Musculoskeletal: Positive for arthralgias. Negative for gait problem, neck pain and neck stiffness.   Skin: Negative for color change, rash and wound.   Neurological: Negative for tremors, seizures, syncope, facial asymmetry and speech difficulty.   Hematological: Negative for adenopathy.   Psychiatric/Behavioral: Negative for agitation, confusion, hallucinations and self-injury. The patient is not hyperactive.        OBJECTIVE:    Vitals:    11/30/22 0917   BP: 140/81   Pulse: 99   Resp: 20   Temp: 98.6 °F (37 °C)   TempSrc: Infrared   Weight: 114 kg (251 lb)   Height: 162.6 cm (64\")   PainSc: 0-No pain     Body mass index is 43.08 kg/m².    ECOG    (0) Fully active, able to carry on all predisease performance without restriction    Physical Exam  Vitals and nursing note reviewed.   Constitutional:       General: She is not in acute distress.     Appearance: She is not diaphoretic.   HENT:      Head: Normocephalic and atraumatic.   Eyes:      General: No scleral icterus.        Right eye: No discharge.         Left eye: No discharge.      Conjunctiva/sclera: Conjunctivae normal.   Neck:      Thyroid: No thyromegaly.   Cardiovascular:      Rate and Rhythm: Normal rate and " regular rhythm.      Heart sounds: Normal heart sounds.     No friction rub. No gallop.   Pulmonary:      Effort: Pulmonary effort is normal. No respiratory distress.      Breath sounds: No stridor. No wheezing.   Abdominal:      General: Bowel sounds are normal.      Palpations: Abdomen is soft. There is no mass.      Tenderness: There is no abdominal tenderness. There is no guarding or rebound.   Musculoskeletal:         General: No tenderness. Normal range of motion.      Cervical back: Normal range of motion and neck supple.      Right lower leg: Edema ( trace ankle) present.      Left lower leg: Edema ( trace ankle) present.   Lymphadenopathy:      Cervical: No cervical adenopathy.   Skin:     General: Skin is warm.      Findings: No erythema or rash.   Neurological:      Mental Status: She is alert and oriented to person, place, and time.      Motor: No abnormal muscle tone.   Psychiatric:         Behavior: Behavior normal.     I have reexamined the patient and the results are consistent with the previously documented exam. Lianet Rust MD       RECENT LABS    WBC   Date Value Ref Range Status   11/30/2022 17.18 (H) 3.40 - 10.80 10*3/mm3 Final   08/26/2020 11.0 (H) 3.4 - 10.8 x10E3/uL Final     RBC   Date Value Ref Range Status   11/30/2022 3.89 3.77 - 5.28 10*6/mm3 Final   08/26/2020 4.53 3.77 - 5.28 x10E6/uL Final     Hemoglobin   Date Value Ref Range Status   11/30/2022 10.9 (L) 12.0 - 15.9 g/dL Final     Hematocrit   Date Value Ref Range Status   11/30/2022 34.1 34.0 - 46.6 % Final     MCV   Date Value Ref Range Status   11/30/2022 87.7 79.0 - 97.0 fL Final     MCH   Date Value Ref Range Status   11/30/2022 28.0 26.6 - 33.0 pg Final     MCHC   Date Value Ref Range Status   11/30/2022 32.0 31.5 - 35.7 g/dL Final     RDW   Date Value Ref Range Status   11/30/2022 18.0 (H) 12.3 - 15.4 % Final     RDW-SD   Date Value Ref Range Status   11/30/2022 51.7 37.0 - 54.0 fl Final     MPV   Date Value Ref  Range Status   11/30/2022 9.2 6.0 - 12.0 fL Final     Platelets   Date Value Ref Range Status   11/30/2022 222 140 - 450 10*3/mm3 Final     Neutrophil %   Date Value Ref Range Status   11/30/2022 70.9 42.7 - 76.0 % Final     Lymphocyte %   Date Value Ref Range Status   11/30/2022 22.4 19.6 - 45.3 % Final     Monocyte %   Date Value Ref Range Status   11/30/2022 5.6 5.0 - 12.0 % Final     Eosinophil %   Date Value Ref Range Status   11/30/2022 0.5 0.3 - 6.2 % Final     Basophil %   Date Value Ref Range Status   11/30/2022 0.6 0.0 - 1.5 % Final     Immature Grans %   Date Value Ref Range Status   08/09/2022 0.5 0.0 - 0.5 % Final     Neutrophils, Absolute   Date Value Ref Range Status   11/30/2022 12.19 (H) 1.70 - 7.00 10*3/mm3 Final     Lymphocytes, Absolute   Date Value Ref Range Status   11/30/2022 3.84 (H) 0.70 - 3.10 10*3/mm3 Final     Monocytes, Absolute   Date Value Ref Range Status   11/30/2022 0.96 (H) 0.10 - 0.90 10*3/mm3 Final     Eosinophils, Absolute   Date Value Ref Range Status   11/30/2022 0.09 0.00 - 0.40 10*3/mm3 Final     Basophils, Absolute   Date Value Ref Range Status   11/30/2022 0.10 0.00 - 0.20 10*3/mm3 Final     Immature Grans, Absolute   Date Value Ref Range Status   08/09/2022 0.05 0.00 - 0.05 10*3/mm3 Final     nRBC   Date Value Ref Range Status   08/09/2022 0.0 0.0 - 0.2 /100 WBC Final       Lab Results   Component Value Date    GLUCOSE 409 (C) 11/17/2022    BUN 18 11/17/2022    CREATININE 0.58 11/17/2022    EGFRIFNONA 90 12/01/2021    EGFRIFAFRI 115 08/26/2020    BCR 31.0 (H) 11/17/2022    K 3.7 11/17/2022    CO2 17.0 (L) 11/17/2022    CALCIUM 9.2 11/17/2022    PROTENTOTREF 7.0 08/26/2020    ALBUMIN 4.00 11/17/2022    LABIL2 1.8 08/26/2020    AST 16 11/17/2022    ALT 23 11/17/2022         Assessment & Plan     Malignant neoplasm of central portion of right breast in female, estrogen receptor positive (HCC)  - CBC & Differential  - Prosthetic Cranial    Drug-related hair loss  - Prosthetic  Cranial      ASSESSMENT:      1. Moderately differentiated invasive ductal carcinoma of the right breast.  Status post right lumpectomy with sentinel lymph node biopsy.  pT1 cpN0 M0.  ER positive at 95%, OR positive at 50% and HER2/bertha was negative.  T1 cN0 M0.  Ongoing management  2. Oncotype DX assay with a high recurrence score at 28, consistent with 17% risk of distant recurrence at 9 years with tamoxifen alone and group absolute chemotherapy benefit of more than 15%  3. Patient is on adjuvant chemotherapy with Cytoxan and Taxotere.  She has received 2 out of 4 cycles  4. Status post partial hysterectomy   5. Chemotherapy-induced fatigue  6. Chemotherapy-induced nausea  7. Sinusitis: Begin Levaquin 500 mg p.o. daily for 10 days  8. Neulasta induced body aches and pains: Responding to hydrocodone at least twice a day  9. Postmenopausal state following lab confirmation  10. Family history of breast cancer in her mother at the age of 50, paternal grandmother with colon cancer at 17 and her father had skin cancer.  11. Assessment has been reviewed and updated.    12. Chemotherapy-induced neutropenia.  Patient is afebrile.     Discussion     Reviewed the results of her Oncotype DX assay which came back with a high recurrence score at 28.  She has some up to 15% benefit with chemotherapy.  She also has a risk of relapse of 17% despite adjuvant hormonal treatment.  For these reasons chemotherapy have been recommended.  Patient was reluctant about pursuing adjuvant chemotherapy due to concerns of side effects.  Discussed that it is important for her to receive chemotherapy given the high Oncotype DX assay report.  She has comorbidities but she is relatively healthy and remains very functional.  Patient is willing to take Taxotere and Cytoxan.    Taxotere and Cytoxan every 21 days for 4 cycles.    This will be followed by adjuvant radiation and then adjuvant endocrine therapy     We discussed supportive care that will be  offered to her while undergoing chemotherapy    We discussed the side effects to include but not limited to:    Chemotherapy side effects include, but not limited to, nausea, vomiting, bone marrow suppression, which can result in blood, platelet transfusion. There is also risk of permanent bone marrow destruction, which can cause myelodysplastic syndrome or leukemia years down the line. There is risk of infection which can result in hospitalization and even death. There is also risk of fatigue, asthenia, alopecia which could become permanent. Chemo will help to reduce risk of relapse of cancer, but does not eliminate risk completely.    Discussed that specifically Taxotere can lead to hypersensitivity reaction, peripheral neuropathy, cardiac arrhythmia, swelling.       Plans:     · CBC reviewed  · Levaquin 500 mg p.o. daily for 10 days for sinusitis  · Continue docetaxel and Cytoxan-due for cycle 3  · Continue Neulasta  · Follow-up withhim in 3 weeks and see me in 6 weeks  ·  to see  · Follow-up in lymphedema clinic  · Gave information on cancer genetics for her to review.  Patient to let me know when she is ready to proceed  · She will be a candidate for AI   · Patient will be seen by radiation oncologist following adjuvant chemotherapy  · All questions answered       Patient verbalized understanding and is in agreement of the above plan.             I spent 40 total minutes, face-to-face, caring for Alaina today.  90% of this time involved counseling and/or coordination of care as documented within this note.

## 2022-11-30 ENCOUNTER — LAB (OUTPATIENT)
Dept: LAB | Facility: HOSPITAL | Age: 57
End: 2022-11-30

## 2022-11-30 ENCOUNTER — OFFICE VISIT (OUTPATIENT)
Dept: ONCOLOGY | Facility: CLINIC | Age: 57
End: 2022-11-30

## 2022-11-30 VITALS
TEMPERATURE: 98.6 F | HEART RATE: 99 BPM | SYSTOLIC BLOOD PRESSURE: 140 MMHG | RESPIRATION RATE: 20 BRPM | DIASTOLIC BLOOD PRESSURE: 81 MMHG | BODY MASS INDEX: 42.85 KG/M2 | WEIGHT: 251 LBS | HEIGHT: 64 IN

## 2022-11-30 DIAGNOSIS — C50.111 MALIGNANT NEOPLASM OF CENTRAL PORTION OF RIGHT BREAST IN FEMALE, ESTROGEN RECEPTOR POSITIVE: Primary | ICD-10-CM

## 2022-11-30 DIAGNOSIS — L65.8 DRUG-RELATED HAIR LOSS: ICD-10-CM

## 2022-11-30 DIAGNOSIS — Z17.0 MALIGNANT NEOPLASM OF CENTRAL PORTION OF RIGHT BREAST IN FEMALE, ESTROGEN RECEPTOR POSITIVE: Primary | ICD-10-CM

## 2022-11-30 DIAGNOSIS — T50.905A DRUG-RELATED HAIR LOSS: ICD-10-CM

## 2022-11-30 LAB
BASOPHILS # BLD AUTO: 0.1 10*3/MM3 (ref 0–0.2)
BASOPHILS NFR BLD AUTO: 0.6 % (ref 0–1.5)
DEPRECATED RDW RBC AUTO: 51.7 FL (ref 37–54)
EOSINOPHIL # BLD AUTO: 0.09 10*3/MM3 (ref 0–0.4)
EOSINOPHIL NFR BLD AUTO: 0.5 % (ref 0.3–6.2)
ERYTHROCYTE [DISTWIDTH] IN BLOOD BY AUTOMATED COUNT: 18 % (ref 12.3–15.4)
HCT VFR BLD AUTO: 34.1 % (ref 34–46.6)
HGB BLD-MCNC: 10.9 G/DL (ref 12–15.9)
HOLD SPECIMEN: NORMAL
HOLD SPECIMEN: NORMAL
LYMPHOCYTES # BLD AUTO: 3.84 10*3/MM3 (ref 0.7–3.1)
LYMPHOCYTES NFR BLD AUTO: 22.4 % (ref 19.6–45.3)
MCH RBC QN AUTO: 28 PG (ref 26.6–33)
MCHC RBC AUTO-ENTMCNC: 32 G/DL (ref 31.5–35.7)
MCV RBC AUTO: 87.7 FL (ref 79–97)
MONOCYTES # BLD AUTO: 0.96 10*3/MM3 (ref 0.1–0.9)
MONOCYTES NFR BLD AUTO: 5.6 % (ref 5–12)
NEUTROPHILS NFR BLD AUTO: 12.19 10*3/MM3 (ref 1.7–7)
NEUTROPHILS NFR BLD AUTO: 70.9 % (ref 42.7–76)
PLATELET # BLD AUTO: 222 10*3/MM3 (ref 140–450)
PMV BLD AUTO: 9.2 FL (ref 6–12)
RBC # BLD AUTO: 3.89 10*6/MM3 (ref 3.77–5.28)
WBC NRBC COR # BLD: 17.18 10*3/MM3 (ref 3.4–10.8)

## 2022-11-30 PROCEDURE — 36415 COLL VENOUS BLD VENIPUNCTURE: CPT

## 2022-11-30 PROCEDURE — 99215 OFFICE O/P EST HI 40 MIN: CPT | Performed by: INTERNAL MEDICINE

## 2022-11-30 PROCEDURE — 85025 COMPLETE CBC W/AUTO DIFF WBC: CPT

## 2022-11-30 RX ORDER — HYDROCODONE BITARTRATE AND ACETAMINOPHEN 5; 325 MG/1; MG/1
1 TABLET ORAL EVERY 8 HOURS PRN
Qty: 20 TABLET | Refills: 0 | Status: CANCELLED | OUTPATIENT
Start: 2022-11-30

## 2022-11-30 RX ORDER — LEVOFLOXACIN 500 MG/1
500 TABLET, FILM COATED ORAL DAILY
Qty: 10 TABLET | Refills: 0 | Status: SHIPPED | OUTPATIENT
Start: 2022-11-30 | End: 2022-12-10

## 2022-12-05 ENCOUNTER — TELEPHONE (OUTPATIENT)
Dept: ONCOLOGY | Facility: CLINIC | Age: 57
End: 2022-12-05

## 2022-12-05 DIAGNOSIS — M89.8X9 BONE PAIN DUE TO G-CSF: Primary | ICD-10-CM

## 2022-12-05 RX ORDER — HYDROCODONE BITARTRATE AND ACETAMINOPHEN 5; 325 MG/1; MG/1
1 TABLET ORAL EVERY 6 HOURS PRN
Qty: 12 TABLET | Refills: 0 | Status: SHIPPED | OUTPATIENT
Start: 2022-12-05 | End: 2022-12-08

## 2022-12-05 NOTE — TELEPHONE ENCOUNTER
Caller: CARLOS A     Relationship to patient: SELF    Best call back number: [phone]    Patient is needing: HYDROCODONE- ACETOMETAPHINE 5-325 MG CALLED INTO :    [facility]  [address] [place]    PT WOULD LIKE A CALL OR MESSAGE WHEN SCRIPT IS SENT TO PHARMACY.

## 2022-12-08 ENCOUNTER — HOSPITAL ENCOUNTER (OUTPATIENT)
Dept: ONCOLOGY | Facility: HOSPITAL | Age: 57
Setting detail: INFUSION SERIES
Discharge: HOME | End: 2022-12-08
Payer: COMMERCIAL

## 2022-12-08 VITALS
SYSTOLIC BLOOD PRESSURE: 150 MMHG | BODY MASS INDEX: 43.57 KG/M2 | HEART RATE: 113 BPM | WEIGHT: 255.2 LBS | RESPIRATION RATE: 18 BRPM | DIASTOLIC BLOOD PRESSURE: 79 MMHG | OXYGEN SATURATION: 96 % | TEMPERATURE: 97.9 F | HEIGHT: 64 IN

## 2022-12-08 DIAGNOSIS — C50.111 MALIGNANT NEOPLASM OF CENTRAL PORTION OF RIGHT BREAST IN FEMALE, ESTROGEN RECEPTOR POSITIVE: Primary | ICD-10-CM

## 2022-12-08 DIAGNOSIS — Z45.2 ENCOUNTER FOR CARE RELATED TO VASCULAR ACCESS PORT: ICD-10-CM

## 2022-12-08 DIAGNOSIS — Z17.0 MALIGNANT NEOPLASM OF CENTRAL PORTION OF RIGHT BREAST IN FEMALE, ESTROGEN RECEPTOR POSITIVE: Primary | ICD-10-CM

## 2022-12-08 LAB
ALBUMIN SERPL-MCNC: 4.1 G/DL (ref 3.5–5.2)
ALBUMIN/GLOB SERPL: 1.8 G/DL
ALP SERPL-CCNC: 118 U/L (ref 39–117)
ALT SERPL W P-5'-P-CCNC: 27 U/L (ref 1–33)
ANION GAP SERPL CALCULATED.3IONS-SCNC: 19 MMOL/L (ref 5–15)
AST SERPL-CCNC: 16 U/L (ref 1–32)
BASOPHILS # BLD AUTO: 0.04 10*3/MM3 (ref 0–0.2)
BASOPHILS NFR BLD AUTO: 0.3 % (ref 0–1.5)
BILIRUB SERPL-MCNC: 0.6 MG/DL (ref 0–1.2)
BUN SERPL-MCNC: 19 MG/DL (ref 6–20)
BUN/CREAT SERPL: 26.4 (ref 7–25)
CALCIUM SPEC-SCNC: 9.5 MG/DL (ref 8.6–10.5)
CHLORIDE SERPL-SCNC: 101 MMOL/L (ref 98–107)
CO2 SERPL-SCNC: 17 MMOL/L (ref 22–29)
CREAT SERPL-MCNC: 0.72 MG/DL (ref 0.57–1)
DEPRECATED RDW RBC AUTO: 60.2 FL (ref 37–54)
EGFRCR SERPLBLD CKD-EPI 2021: 98.9 ML/MIN/1.73
EOSINOPHIL # BLD AUTO: 0 10*3/MM3 (ref 0–0.4)
EOSINOPHIL NFR BLD AUTO: 0 % (ref 0.3–6.2)
ERYTHROCYTE [DISTWIDTH] IN BLOOD BY AUTOMATED COUNT: 19.6 % (ref 12.3–15.4)
GLOBULIN UR ELPH-MCNC: 2.3 GM/DL
GLUCOSE SERPL-MCNC: 402 MG/DL (ref 65–99)
HCT VFR BLD AUTO: 31.2 % (ref 34–46.6)
HGB BLD-MCNC: 10.1 G/DL (ref 12–15.9)
HGB UR QL STRIP.AUTO: NEGATIVE
LYMPHOCYTES # BLD AUTO: 1.29 10*3/MM3 (ref 0.7–3.1)
LYMPHOCYTES NFR BLD AUTO: 8.9 % (ref 19.6–45.3)
MCH RBC QN AUTO: 28.9 PG (ref 26.6–33)
MCHC RBC AUTO-ENTMCNC: 32.4 G/DL (ref 31.5–35.7)
MCV RBC AUTO: 89.4 FL (ref 79–97)
MONOCYTES # BLD AUTO: 0.5 10*3/MM3 (ref 0.1–0.9)
MONOCYTES NFR BLD AUTO: 3.5 % (ref 5–12)
NEUTROPHILS NFR BLD AUTO: 12.61 10*3/MM3 (ref 1.7–7)
NEUTROPHILS NFR BLD AUTO: 87.3 % (ref 42.7–76)
PLATELET # BLD AUTO: 332 10*3/MM3 (ref 140–450)
PMV BLD AUTO: 9.8 FL (ref 6–12)
POTASSIUM SERPL-SCNC: 4 MMOL/L (ref 3.5–5.2)
PROT SERPL-MCNC: 6.4 G/DL (ref 6–8.5)
RBC # BLD AUTO: 3.49 10*6/MM3 (ref 3.77–5.28)
SODIUM SERPL-SCNC: 137 MMOL/L (ref 136–145)
WBC NRBC COR # BLD: 14.44 10*3/MM3 (ref 3.4–10.8)

## 2022-12-08 PROCEDURE — 25010000002 PEGFILGRASTIM 6 MG/0.6ML PREFILLED SYRINGE KIT: Performed by: INTERNAL MEDICINE

## 2022-12-08 PROCEDURE — 96377 APPLICATON ON-BODY INJECTOR: CPT

## 2022-12-08 PROCEDURE — 81002 URINALYSIS NONAUTO W/O SCOPE: CPT

## 2022-12-08 PROCEDURE — 85025 COMPLETE CBC W/AUTO DIFF WBC: CPT | Performed by: INTERNAL MEDICINE

## 2022-12-08 PROCEDURE — 25010000002 CYCLOPHOSPHAMIDE 1 GM/5ML SOLUTION 5 ML VIAL: Performed by: INTERNAL MEDICINE

## 2022-12-08 PROCEDURE — 25010000002 PALONOSETRON 0.25 MG/5ML SOLUTION PREFILLED SYRINGE: Performed by: INTERNAL MEDICINE

## 2022-12-08 PROCEDURE — 96417 CHEMO IV INFUS EACH ADDL SEQ: CPT

## 2022-12-08 PROCEDURE — 80053 COMPREHEN METABOLIC PANEL: CPT | Performed by: INTERNAL MEDICINE

## 2022-12-08 PROCEDURE — 96413 CHEMO IV INFUSION 1 HR: CPT

## 2022-12-08 PROCEDURE — 25010000002 DOCETAXEL 20 MG/ML SOLUTION 8 ML VIAL: Performed by: INTERNAL MEDICINE

## 2022-12-08 PROCEDURE — 25010000002 HEPARIN LOCK FLUSH PER 10 UNITS: Performed by: INTERNAL MEDICINE

## 2022-12-08 PROCEDURE — 96375 TX/PRO/DX INJ NEW DRUG ADDON: CPT

## 2022-12-08 RX ORDER — HEPARIN SODIUM (PORCINE) LOCK FLUSH IV SOLN 100 UNIT/ML 100 UNIT/ML
500 SOLUTION INTRAVENOUS AS NEEDED
Status: DISCONTINUED | OUTPATIENT
Start: 2022-12-08 | End: 2022-12-09 | Stop reason: HOSPADM

## 2022-12-08 RX ORDER — FAMOTIDINE 10 MG/ML
20 INJECTION, SOLUTION INTRAVENOUS AS NEEDED
Status: CANCELLED | OUTPATIENT
Start: 2022-12-08

## 2022-12-08 RX ORDER — SODIUM CHLORIDE 0.9 % (FLUSH) 0.9 %
20 SYRINGE (ML) INJECTION AS NEEDED
Status: CANCELLED | OUTPATIENT
Start: 2022-12-08

## 2022-12-08 RX ORDER — HEPARIN SODIUM (PORCINE) LOCK FLUSH IV SOLN 100 UNIT/ML 100 UNIT/ML
500 SOLUTION INTRAVENOUS AS NEEDED
Status: CANCELLED | OUTPATIENT
Start: 2022-12-08

## 2022-12-08 RX ORDER — SODIUM CHLORIDE 9 MG/ML
250 INJECTION, SOLUTION INTRAVENOUS ONCE
Status: COMPLETED | OUTPATIENT
Start: 2022-12-08 | End: 2022-12-08

## 2022-12-08 RX ORDER — DIPHENHYDRAMINE HYDROCHLORIDE 50 MG/ML
50 INJECTION INTRAMUSCULAR; INTRAVENOUS AS NEEDED
Status: CANCELLED | OUTPATIENT
Start: 2022-12-08

## 2022-12-08 RX ORDER — SODIUM CHLORIDE 0.9 % (FLUSH) 0.9 %
20 SYRINGE (ML) INJECTION AS NEEDED
Status: DISCONTINUED | OUTPATIENT
Start: 2022-12-08 | End: 2022-12-09 | Stop reason: HOSPADM

## 2022-12-08 RX ORDER — PALONOSETRON 0.05 MG/ML
0.25 INJECTION, SOLUTION INTRAVENOUS ONCE
Status: COMPLETED | OUTPATIENT
Start: 2022-12-08 | End: 2022-12-08

## 2022-12-08 RX ADMIN — Medication 20 ML: at 12:52

## 2022-12-08 RX ADMIN — DOCETAXEL 160 MG: 20 INJECTION, SOLUTION, CONCENTRATE INTRAVENOUS at 11:12

## 2022-12-08 RX ADMIN — SODIUM CHLORIDE 250 ML: 9 INJECTION, SOLUTION INTRAVENOUS at 09:21

## 2022-12-08 RX ADMIN — PEGFILGRASTIM 6 MG: KIT SUBCUTANEOUS at 12:55

## 2022-12-08 RX ADMIN — PALONOSETRON 0.25 MG: 0.25 INJECTION, SOLUTION INTRAVENOUS at 09:25

## 2022-12-08 RX ADMIN — CYCLOPHOSPHAMIDE 1280 MG: 200 INJECTION, SOLUTION INTRAVENOUS at 12:18

## 2022-12-08 RX ADMIN — HEPARIN 500 UNITS: 100 SYRINGE at 12:52

## 2022-12-15 ENCOUNTER — LAB (OUTPATIENT)
Dept: LAB | Facility: HOSPITAL | Age: 57
End: 2022-12-15
Payer: COMMERCIAL

## 2022-12-15 DIAGNOSIS — Z17.0 MALIGNANT NEOPLASM OF CENTRAL PORTION OF RIGHT BREAST IN FEMALE, ESTROGEN RECEPTOR POSITIVE: Primary | ICD-10-CM

## 2022-12-15 DIAGNOSIS — C50.111 MALIGNANT NEOPLASM OF CENTRAL PORTION OF RIGHT BREAST IN FEMALE, ESTROGEN RECEPTOR POSITIVE: Primary | ICD-10-CM

## 2022-12-15 LAB
ALBUMIN SERPL-MCNC: 3.5 G/DL (ref 3.5–5.2)
ALBUMIN/GLOB SERPL: 1.7 G/DL
ALP SERPL-CCNC: 124 U/L (ref 39–117)
ALT SERPL W P-5'-P-CCNC: 19 U/L (ref 1–33)
ANION GAP SERPL CALCULATED.3IONS-SCNC: 10 MMOL/L (ref 5–15)
AST SERPL-CCNC: 24 U/L (ref 1–32)
BASOPHILS # BLD AUTO: 0.06 10*3/MM3 (ref 0–0.2)
BASOPHILS NFR BLD AUTO: 0.4 % (ref 0–1.5)
BILIRUB SERPL-MCNC: 0.5 MG/DL (ref 0–1.2)
BUN SERPL-MCNC: 9 MG/DL (ref 6–20)
BUN/CREAT SERPL: 16.4 (ref 7–25)
CALCIUM SPEC-SCNC: 8.8 MG/DL (ref 8.6–10.5)
CHLORIDE SERPL-SCNC: 103 MMOL/L (ref 98–107)
CO2 SERPL-SCNC: 26 MMOL/L (ref 22–29)
CREAT SERPL-MCNC: 0.55 MG/DL (ref 0.57–1)
DEPRECATED RDW RBC AUTO: 65.1 FL (ref 37–54)
EGFRCR SERPLBLD CKD-EPI 2021: 108.4 ML/MIN/1.73
EOSINOPHIL # BLD AUTO: 0.16 10*3/MM3 (ref 0–0.4)
EOSINOPHIL NFR BLD AUTO: 1 % (ref 0.3–6.2)
ERYTHROCYTE [DISTWIDTH] IN BLOOD BY AUTOMATED COUNT: 20.5 % (ref 12.3–15.4)
GLOBULIN UR ELPH-MCNC: 2.1 GM/DL
GLUCOSE SERPL-MCNC: 192 MG/DL (ref 65–99)
HCT VFR BLD AUTO: 30.2 % (ref 34–46.6)
HGB BLD-MCNC: 9.5 G/DL (ref 12–15.9)
LYMPHOCYTES NFR BLD AUTO: ABNORMAL %
MCH RBC QN AUTO: 29.1 PG (ref 26.6–33)
MCHC RBC AUTO-ENTMCNC: 31.5 G/DL (ref 31.5–35.7)
MCV RBC AUTO: 92.4 FL (ref 79–97)
MONOCYTES # BLD AUTO: 2.69 10*3/MM3 (ref 0.1–0.9)
MONOCYTES NFR BLD AUTO: 17.1 % (ref 5–12)
NEUTROPHILS NFR BLD AUTO: ABNORMAL %
PLATELET # BLD AUTO: 304 10*3/MM3 (ref 140–450)
PMV BLD AUTO: 10.4 FL (ref 6–12)
POTASSIUM SERPL-SCNC: 4 MMOL/L (ref 3.5–5.2)
PROT SERPL-MCNC: 5.6 G/DL (ref 6–8.5)
RBC # BLD AUTO: 3.27 10*6/MM3 (ref 3.77–5.28)
SODIUM SERPL-SCNC: 139 MMOL/L (ref 136–145)
WBC NRBC COR # BLD: 15.73 10*3/MM3 (ref 3.4–10.8)

## 2022-12-15 PROCEDURE — 80053 COMPREHEN METABOLIC PANEL: CPT

## 2022-12-15 PROCEDURE — 85025 COMPLETE CBC W/AUTO DIFF WBC: CPT

## 2022-12-15 PROCEDURE — 36415 COLL VENOUS BLD VENIPUNCTURE: CPT

## 2022-12-21 NOTE — PROGRESS NOTES
Hematology/Oncology Outpatient Follow Up    PATIENT NAME:Alaina Hartman  :1965  MRN: [id number]  PRIMARY CARE PHYSICIAN: Fabiola Joyner MD  REFERRING PHYSICIAN: Fabiola Joyner MD    Chief Complaint   Patient presents with   • Follow-up     Malignant neoplasm of central portion of right breast in female, estrogen receptor positive (HCC)        HISTORY OF PRESENT ILLNESS:     This is a 56-year-old female who was clinically asymptomatic and had a routine screening mammogram which showed an abnormality on the right breast.  Prior to that patient had left breast biopsy in  for benign disease.     Review of her screening mammogram which was performed on 6/3/2022 showed a new 2 cm focal asymmetry with architectural distortion in the mid to posterior third depth of the outer right breast the left breast was benign.  Right diagnostic mammogram and ultrasound was recommended.  On 2022 patient underwent right diagnostic mammogram and ultrasound which showed a 2 cm irregular mass in the lateral inferior breast.  Same day she had an ultrasound which showed a 1.6 cm on the right breast the right axilla did not show any abnormal lymph nodes.     Patient then underwent ultrasound-guided biopsy of the right breast mass pathology revealed moderately differentiated invasive ductal carcinoma estrogen receptor positive at 95% progesterone receptor positive at 50%, HER2/bertha was negative at 1+ on immunohistochemistry.        Patient had a partial hysterectomy many years ago.  She is  and has 3 children.  She does not smoke, does not drink alcohol     Family history significant for mother with breast cancer in her 50s, her father had skin cancers, paternal grandmother had colon cancer at the age of 70        She is a       · 2022 estradiol level was less than 5 and FSH was 33 consistent with postmenopausal state  · 2022 patient underwent right lumpectomy with sentinel lymph node biopsy.   Pathology revealed residual moderately differentiated ductal carcinoma measuring 1.7 cm completely excised total of 5 lymph nodes were removed and all of them were negative for metastatic disease.  Pathology stage is pT1 cpN0 M0.  There was no evidence of DCIS all margins were negative distance to the closest margin was anterior margin at 2 mm ER positive at 95, KY positive at 50% and HER2/bertha was negative.  · 9/27/2022 patient had Oncotype DX assay done which returned with a recurrence score of 28.  This is consistent with 17% risk of recurrence at 9 years distantly, and more than 15% chemotherapy benefit.  On the validation assay estrogen receptor was positive, KY was positive and HER2/bertha was negative.  · 10/27/2022: C1 D1 Taxotere and Cytoxan received.  Neulasta support held due to leukocytosis.  · 11/4/2022: WBC 2.28, hemoglobin 11.9, platelets 297,000, ANC 0.08.  Patient was initiated on Neupogen 480 mg subcu given 11/4/2022, 11/5/2022, 11/6/2022.  Patient to receive Neulasta again with next cycle of treatment.  · 11/17/2022: Patient received cycle 2 of Taxotere Cytoxan with Neulasta support  · 12/8/2022: Patient received cycle 3 of Taxotere and Cytoxan with Neulasta support      Past Medical History:   Diagnosis Date   • Ankle edema     at times   • Breast cancer, right (HCC) 08/2022   • Diabetes mellitus (HCC)    • GERD (gastroesophageal reflux disease)    • Hiatal hernia    • Hyperlipidemia    • Morbid obesity (HCC)    • Sleep apnea     CPAP- to bring dos   • Type 2 diabetes mellitus (HCC)        Past Surgical History:   Procedure Laterality Date   • BREAST BIOPSY Right 08/18/2022    Procedure: Right breast lumpectomy;  Surgeon: Jagdeep Mcwilliams MD;  Location: [facility];  Service: General;  Laterality: Right;   • BREAST LUMPECTOMY Left 2003    x2   • PORTACATH PLACEMENT N/A 10/6/2022    Procedure: INSERTION OF PORTACATH;  Surgeon: Jagdeep Mcwilliams MD;  Location: [facility] OR;   Service: General;  Laterality: N/A;   • SENTINEL NODE BIOPSY Right 08/18/2022    Procedure: SENTINEL NODE BIOPSY;  Surgeon: Jagdeep Mcwilliams MD;  Location: [facility] OR;  Service: General;  Laterality: Right;   • SUBTOTAL HYSTERECTOMY           Current Outpatient Medications:   •  ALPRAZolam (XANAX) 0.5 MG tablet, Take 1 tablet by mouth Daily As Needed for Anxiety., Disp: 20 tablet, Rfl: 0  •  atorvastatin (LIPITOR) 40 MG tablet, Take 1 tablet by mouth Daily., Disp: 90 tablet, Rfl: 4  •  BIOTIN PO, Take  by mouth., Disp: , Rfl:   •  Cyanocobalamin (Vitamin B-12) 1000 MCG sublingual tablet, Place 1 tablet under the tongue Daily., Disp: 100 each, Rfl: 4  •  dexamethasone (DECADRON) 4 MG tablet, Take 2 tablets oral twice a day for 3 consecutive days beginning the day before chemotherapy and continue for 6 doses., Disp: 12 tablet, Rfl: 3  •  glucose blood (Accu-Chek Guide) test strip, 1 each by Other route 4 (Four) Times a Day. Use as instructed, Disp: 150 each, Rfl: 11  •  Insulin Lispro Prot & Lispro (HumaLOG Mix 75/25 KwikPen) (75-25) 100 UNIT/ML suspension pen-injector pen, Inject 55 units subcu 3 times a day before each meal. On days taking steroids, take 65 units TID, Disp: 30 mL, Rfl: 5  •  Insulin Pen Needle (Pen Needles) 32G X 4 MM misc, 1 each 4 (Four) Times a Day. Use to inject insulin / DX E11.65, Disp: 150 each, Rfl: 5  •  lidocaine-prilocaine (EMLA) 2.5-2.5 % cream, Apply 1 application topically to the appropriate area as directed Every 2 (Two) Hours As Needed for Mild Pain. Apply to port site one hour prior to chemotherapy appointment, Disp: 1 g, Rfl: 5  •  lisinopril (PRINIVIL,ZESTRIL) 5 MG tablet, Take 1 tablet by mouth Daily. Appointment needed for additional refills. (Patient taking differently: Take 5 mg by mouth Every Morning. Appointment needed for additional refills.    HOLD 24 hours before surgery), Disp: 90 tablet, Rfl: 2  •  metFORMIN ER (GLUCOPHAGE-XR) 500 MG 24 hr tablet, Take 2  tablets by mouth Daily. Appointment needed for additional refills. (Patient taking differently: Take 1,000 mg by mouth every night at bedtime. Appointment needed for additional refills.  HOLD 48 hours before surgery), Disp: 180 tablet, Rfl: 2  •  multivitamin with minerals tablet tablet, Take 1 tablet by mouth Daily., Disp: , Rfl:   •  omeprazole (priLOSEC) 40 MG capsule, Take 1 capsule by mouth Daily. Appointment needed for additional refills., Disp: 90 capsule, Rfl: 2  •  ondansetron (ZOFRAN) 8 MG tablet, Take 1 tablet by mouth 3 (Three) Times a Day As Needed for Nausea or Vomiting., Disp: 30 tablet, Rfl: 5  •  Vitamin D, Ergocalciferol, 50 MCG (2000 UT) capsule, Take 2,000 Units by mouth Daily., Disp: 100 capsule, Rfl: 4  •  HYDROcodone-acetaminophen (NORCO) 5-325 MG per tablet, Take 1 tablet by mouth Every 6 (Six) Hours As Needed for Moderate Pain., Disp: 12 tablet, Rfl: 0    No Known Allergies    Family History   Problem Relation Age of Onset   • Breast cancer Mother    • Heart disease Mother    • Thyroid disease Mother    • Stroke Mother    • Clotting disorder Father    • Diabetes Brother    • Heart disease Brother    • Cancer Paternal Grandmother    • Colon cancer Paternal Grandmother    • Diabetes Brother    • Stroke Brother        Cancer-related family history includes Breast cancer in her mother; Cancer in her paternal grandmother; Colon cancer in her paternal grandmother.    Social History     Tobacco Use   • Smoking status: Former     Packs/day: 0.25     Years: 10.00     Pack years: 2.50     Types: Cigarettes     Quit date: 2010     Years since quittin.9   • Smokeless tobacco: Never   Vaping Use   • Vaping Use: Never used   Substance Use Topics   • Alcohol use: Not Currently     Comment: rare   • Drug use: Never      I have reviewed and confirmed the accuracy of the patient's history: Chief complaint, HPI, ROS and Subjective as entered by the MA/LPN/RN. Valentina Cotto, APRN 22  "      SUBJECTIVE:  Patient is here for routine follow-up.  She has completed 3 cycles of chemotherapy.  She states she still has difficulty with bone pain and body aches post Neulasta and is requesting a refill on her hydrocodone.  She also states compliance with using Claritin.  She has noted for the last week some increase in swelling in her legs and feet but it does improve with elevation and compression socks and she has no shortness of breath or swelling above the knee.  She also has no increase in her baseline level of fatigue.  No other complaints noted.        REVIEW OF SYSTEMS:    Review of Systems   Constitutional: Positive for fatigue. Negative for chills and fever.   HENT: Negative for congestion, drooling, ear discharge, rhinorrhea, sinus pressure and tinnitus.    Eyes: Negative for photophobia, pain and discharge.   Respiratory: Negative for apnea, choking and stridor.    Cardiovascular: Positive for leg swelling. Negative for palpitations.   Gastrointestinal: Negative for abdominal distention, abdominal pain and anal bleeding.   Endocrine: Negative for polydipsia and polyphagia.   Genitourinary: Negative for decreased urine volume, flank pain and genital sores.   Musculoskeletal: Positive for arthralgias. Negative for gait problem, neck pain and neck stiffness.   Skin: Negative for color change, rash and wound.   Neurological: Negative for tremors, seizures, syncope, facial asymmetry and speech difficulty.   Hematological: Negative for adenopathy.   Psychiatric/Behavioral: Negative for agitation, confusion, hallucinations and self-injury. The patient is not hyperactive.        OBJECTIVE:    Vitals:    12/22/22 0804   BP: 156/81   Pulse: 101   Temp: 98 °F (36.7 °C)   TempSrc: Oral   SpO2: 95%   Weight: 116 kg (255 lb)   Height: 162.6 cm (64\")   PainSc: 0-No pain     Body mass index is 43.77 kg/m².    ECOG    (0) Fully active, able to carry on all predisease performance without restriction    Physical " Exam  Vitals and nursing note reviewed.   Constitutional:       General: She is not in acute distress.     Appearance: She is not diaphoretic.   HENT:      Head: Normocephalic and atraumatic.   Eyes:      General: No scleral icterus.        Right eye: No discharge.         Left eye: No discharge.      Conjunctiva/sclera: Conjunctivae normal.   Neck:      Thyroid: No thyromegaly.   Cardiovascular:      Rate and Rhythm: Normal rate and regular rhythm.      Heart sounds: Normal heart sounds.     No friction rub. No gallop.   Pulmonary:      Effort: Pulmonary effort is normal. No respiratory distress.      Breath sounds: No stridor. No wheezing.   Abdominal:      General: Bowel sounds are normal.      Palpations: Abdomen is soft. There is no mass.      Tenderness: There is no abdominal tenderness. There is no guarding or rebound.   Musculoskeletal:         General: No tenderness. Normal range of motion.      Cervical back: Normal range of motion and neck supple.      Right lower leg: Edema (1+) present.      Left lower leg: Edema (1+) present.   Lymphadenopathy:      Cervical: No cervical adenopathy.   Skin:     General: Skin is warm.      Findings: No erythema or rash.   Neurological:      Mental Status: She is alert and oriented to person, place, and time.      Motor: No abnormal muscle tone.   Psychiatric:         Behavior: Behavior normal.     I have reexamined the patient and the results are consistent with the previously documented exam. Valentina Cotto, ADRIANNA       RECENT LABS    WBC   Date Value Ref Range Status   12/22/2022 14.81 (H) 3.40 - 10.80 10*3/mm3 Final   08/26/2020 11.0 (H) 3.4 - 10.8 x10E3/uL Final     RBC   Date Value Ref Range Status   12/22/2022 3.32 (L) 3.77 - 5.28 10*6/mm3 Final   08/26/2020 4.53 3.77 - 5.28 x10E6/uL Final     Hemoglobin   Date Value Ref Range Status   12/22/2022 9.8 (L) 12.0 - 15.9 g/dL Final     Hematocrit   Date Value Ref Range Status   12/22/2022 30.7 (L) 34.0 - 46.6 %  Final     MCV   Date Value Ref Range Status   12/22/2022 92.5 79.0 - 97.0 fL Final     MCH   Date Value Ref Range Status   12/22/2022 29.5 26.6 - 33.0 pg Final     MCHC   Date Value Ref Range Status   12/22/2022 31.9 31.5 - 35.7 g/dL Final     RDW   Date Value Ref Range Status   12/22/2022 21.2 (H) 12.3 - 15.4 % Final     RDW-SD   Date Value Ref Range Status   12/22/2022 65.4 (H) 37.0 - 54.0 fl Final     MPV   Date Value Ref Range Status   12/22/2022 8.8 6.0 - 12.0 fL Final     Platelets   Date Value Ref Range Status   12/22/2022 217 140 - 450 10*3/mm3 Final     Neutrophil %   Date Value Ref Range Status   12/22/2022 69.7 42.7 - 76.0 % Final     Lymphocyte %   Date Value Ref Range Status   12/22/2022 23.3 19.6 - 45.3 % Final     Monocyte %   Date Value Ref Range Status   12/22/2022 5.5 5.0 - 12.0 % Final     Eosinophil %   Date Value Ref Range Status   12/22/2022 0.3 0.3 - 6.2 % Final     Basophil %   Date Value Ref Range Status   12/22/2022 1.2 0.0 - 1.5 % Final     Immature Grans %   Date Value Ref Range Status   08/09/2022 0.5 0.0 - 0.5 % Final     Neutrophils, Absolute   Date Value Ref Range Status   12/22/2022 10.32 (H) 1.70 - 7.00 10*3/mm3 Final     Lymphocytes, Absolute   Date Value Ref Range Status   12/22/2022 3.45 (H) 0.70 - 3.10 10*3/mm3 Final     Monocytes, Absolute   Date Value Ref Range Status   12/22/2022 0.82 0.10 - 0.90 10*3/mm3 Final     Eosinophils, Absolute   Date Value Ref Range Status   12/22/2022 0.04 0.00 - 0.40 10*3/mm3 Final     Basophils, Absolute   Date Value Ref Range Status   12/22/2022 0.18 0.00 - 0.20 10*3/mm3 Final     Immature Grans, Absolute   Date Value Ref Range Status   08/09/2022 0.05 0.00 - 0.05 10*3/mm3 Final     nRBC   Date Value Ref Range Status   08/09/2022 0.0 0.0 - 0.2 /100 WBC Final       Lab Results   Component Value Date    GLUCOSE 192 (H) 12/15/2022    BUN 9 12/15/2022    CREATININE 0.55 (L) 12/15/2022    EGFRIFNONA 90 12/01/2021    EGFRIFAFRI 115 08/26/2020    BCR  16.4 12/15/2022    K 4.0 12/15/2022    CO2 26.0 12/15/2022    CALCIUM 8.8 12/15/2022    PROTENTOTREF 7.0 08/26/2020    ALBUMIN 3.50 12/15/2022    LABIL2 1.8 08/26/2020    AST 24 12/15/2022    ALT 19 12/15/2022         Assessment & Plan     Malignant neoplasm of central portion of right breast in female, estrogen receptor positive (HCC)  - CBC & Differential    Bone pain due to G-CSF  - HYDROcodone-acetaminophen (NORCO) 5-325 MG per tablet      ASSESSMENT:      1. Moderately differentiated invasive ductal carcinoma of the right breast.  Status post right lumpectomy with sentinel lymph node biopsy.  pT1 cpN0 M0.  ER positive at 95%, KY positive at 50% and HER2/bertha was negative.  T1 cN0 M0.  Ongoing management  2. Oncotype DX assay with a high recurrence score at 28, consistent with 17% risk of distant recurrence at 9 years with tamoxifen alone and group absolute chemotherapy benefit of more than 15%  3. Patient is on adjuvant chemotherapy with Cytoxan and Taxotere.  She has received 2 out of 4 cycles  4. Status post partial hysterectomy   5. Chemotherapy-induced fatigue  6. Chemotherapy-induced nausea  7. Sinusitis: Begin Levaquin 500 mg p.o. daily for 10 days  8. Neulasta induced body aches and pains: Responding to hydrocodone at least twice a day  9. Postmenopausal state following lab confirmation  10. Family history of breast cancer in her mother at the age of 50, paternal grandmother with colon cancer at 17 and her father had skin cancer.  11. Assessment has been reviewed and updated.    12. Chemotherapy-induced neutropenia.  Patient is afebrile.     Discussion     Reviewed the results of her Oncotype DX assay which came back with a high recurrence score at 28.  She has some up to 15% benefit with chemotherapy.  She also has a risk of relapse of 17% despite adjuvant hormonal treatment.  For these reasons chemotherapy have been recommended.  Patient was reluctant about pursuing adjuvant chemotherapy due to concerns  of side effects.  Discussed that it is important for her to receive chemotherapy given the high Oncotype DX assay report.  She has comorbidities but she is relatively healthy and remains very functional.  Patient is willing to take Taxotere and Cytoxan.    Taxotere and Cytoxan every 21 days for 4 cycles.    This will be followed by adjuvant radiation and then adjuvant endocrine therapy     We discussed supportive care that will be offered to her while undergoing chemotherapy    We discussed the side effects to include but not limited to:    Chemotherapy side effects include, but not limited to, nausea, vomiting, bone marrow suppression, which can result in blood, platelet transfusion. There is also risk of permanent bone marrow destruction, which can cause myelodysplastic syndrome or leukemia years down the line. There is risk of infection which can result in hospitalization and even death. There is also risk of fatigue, asthenia, alopecia which could become permanent. Chemo will help to reduce risk of relapse of cancer, but does not eliminate risk completely.    Discussed that specifically Taxotere can lead to hypersensitivity reaction, peripheral neuropathy, cardiac arrhythmia, swelling.       Plans:     · CBC reviewed  · Continue docetaxel and Cytoxan-due for cycle 4 next week  · Continue Neulasta  · Refill hydrocodone for last cycle-uses for bone pain with Neulasta  · Continue compression socks, elevation of lower extremities at rest, limit sodium to 2 g / 24 hours.  If no improvement or any worsening patient to let us know may need to consider decreasing steroids with her cycle 4.  · Follow-up with Dr. Rust in 3 weeks as previously scheduled  ·  following  · Follow-up in lymphedema clinic  · Gave information on cancer genetics for her to review.  Patient to let me know when she is ready to proceed  · She will be a candidate for AI   · Patient will be seen by radiation oncologist following  adjuvant chemotherapy  · All questions answered       Patient verbalized understanding and is in agreement of the above plan.             I spent 30 total minutes, face-to-face, caring for Alaina today.  90% of this time involved counseling and/or coordination of care as documented within this note.      Electronically signed by ADRIANNA Paz, 12/22/22, 11:57 EST

## 2022-12-22 ENCOUNTER — APPOINTMENT (OUTPATIENT)
Dept: LAB | Facility: HOSPITAL | Age: 57
End: 2022-12-22
Payer: COMMERCIAL

## 2022-12-22 ENCOUNTER — OFFICE VISIT (OUTPATIENT)
Dept: ONCOLOGY | Facility: CLINIC | Age: 57
End: 2022-12-22

## 2022-12-22 VITALS
TEMPERATURE: 98 F | BODY MASS INDEX: 43.54 KG/M2 | HEART RATE: 101 BPM | OXYGEN SATURATION: 95 % | WEIGHT: 255 LBS | DIASTOLIC BLOOD PRESSURE: 81 MMHG | SYSTOLIC BLOOD PRESSURE: 156 MMHG | HEIGHT: 64 IN

## 2022-12-22 DIAGNOSIS — M89.8X9 BONE PAIN DUE TO G-CSF: ICD-10-CM

## 2022-12-22 DIAGNOSIS — Z17.0 MALIGNANT NEOPLASM OF CENTRAL PORTION OF RIGHT BREAST IN FEMALE, ESTROGEN RECEPTOR POSITIVE: Primary | ICD-10-CM

## 2022-12-22 DIAGNOSIS — C50.111 MALIGNANT NEOPLASM OF CENTRAL PORTION OF RIGHT BREAST IN FEMALE, ESTROGEN RECEPTOR POSITIVE: Primary | ICD-10-CM

## 2022-12-22 LAB
BASOPHILS # BLD AUTO: 0.18 10*3/MM3 (ref 0–0.2)
BASOPHILS NFR BLD AUTO: 1.2 % (ref 0–1.5)
DEPRECATED RDW RBC AUTO: 65.4 FL (ref 37–54)
EOSINOPHIL # BLD AUTO: 0.04 10*3/MM3 (ref 0–0.4)
EOSINOPHIL NFR BLD AUTO: 0.3 % (ref 0.3–6.2)
ERYTHROCYTE [DISTWIDTH] IN BLOOD BY AUTOMATED COUNT: 21.2 % (ref 12.3–15.4)
HCT VFR BLD AUTO: 30.7 % (ref 34–46.6)
HGB BLD-MCNC: 9.8 G/DL (ref 12–15.9)
HOLD SPECIMEN: NORMAL
HOLD SPECIMEN: NORMAL
LYMPHOCYTES # BLD AUTO: 3.45 10*3/MM3 (ref 0.7–3.1)
LYMPHOCYTES NFR BLD AUTO: 23.3 % (ref 19.6–45.3)
MCH RBC QN AUTO: 29.5 PG (ref 26.6–33)
MCHC RBC AUTO-ENTMCNC: 31.9 G/DL (ref 31.5–35.7)
MCV RBC AUTO: 92.5 FL (ref 79–97)
MONOCYTES # BLD AUTO: 0.82 10*3/MM3 (ref 0.1–0.9)
MONOCYTES NFR BLD AUTO: 5.5 % (ref 5–12)
NEUTROPHILS NFR BLD AUTO: 10.32 10*3/MM3 (ref 1.7–7)
NEUTROPHILS NFR BLD AUTO: 69.7 % (ref 42.7–76)
PLATELET # BLD AUTO: 217 10*3/MM3 (ref 140–450)
PMV BLD AUTO: 8.8 FL (ref 6–12)
RBC # BLD AUTO: 3.32 10*6/MM3 (ref 3.77–5.28)
WBC NRBC COR # BLD: 14.81 10*3/MM3 (ref 3.4–10.8)

## 2022-12-22 PROCEDURE — 85025 COMPLETE CBC W/AUTO DIFF WBC: CPT | Performed by: NURSE PRACTITIONER

## 2022-12-22 PROCEDURE — 99214 OFFICE O/P EST MOD 30 MIN: CPT | Performed by: NURSE PRACTITIONER

## 2022-12-22 PROCEDURE — 36415 COLL VENOUS BLD VENIPUNCTURE: CPT

## 2022-12-22 RX ORDER — SODIUM CHLORIDE 9 MG/ML
250 INJECTION, SOLUTION INTRAVENOUS ONCE
Status: CANCELLED | OUTPATIENT
Start: 2022-12-29

## 2022-12-22 RX ORDER — DIPHENHYDRAMINE HYDROCHLORIDE 50 MG/ML
50 INJECTION INTRAMUSCULAR; INTRAVENOUS AS NEEDED
Status: CANCELLED | OUTPATIENT
Start: 2022-12-29

## 2022-12-22 RX ORDER — FAMOTIDINE 10 MG/ML
20 INJECTION, SOLUTION INTRAVENOUS AS NEEDED
Status: CANCELLED | OUTPATIENT
Start: 2022-12-29

## 2022-12-22 RX ORDER — HYDROCODONE BITARTRATE AND ACETAMINOPHEN 5; 325 MG/1; MG/1
1 TABLET ORAL EVERY 6 HOURS PRN
Qty: 12 TABLET | Refills: 0 | Status: SHIPPED | OUTPATIENT
Start: 2022-12-22 | End: 2022-12-29 | Stop reason: SDUPTHER

## 2022-12-22 RX ORDER — PALONOSETRON 0.05 MG/ML
0.25 INJECTION, SOLUTION INTRAVENOUS ONCE
Status: CANCELLED | OUTPATIENT
Start: 2022-12-29

## 2022-12-28 DIAGNOSIS — M89.8X9 BONE PAIN DUE TO G-CSF: ICD-10-CM

## 2022-12-28 RX ORDER — HYDROCODONE BITARTRATE AND ACETAMINOPHEN 5; 325 MG/1; MG/1
1 TABLET ORAL EVERY 6 HOURS PRN
Qty: 12 TABLET | Refills: 0 | Status: CANCELLED | OUTPATIENT
Start: 2022-12-28

## 2022-12-28 RX ORDER — PEN NEEDLE, DIABETIC 30 GX3/16"
1 NEEDLE, DISPOSABLE MISCELLANEOUS 4 TIMES DAILY
Qty: 150 EACH | Refills: 5 | Status: SHIPPED | OUTPATIENT
Start: 2022-12-28

## 2022-12-28 RX ORDER — INSULIN LISPRO 100 [IU]/ML
INJECTION, SUSPENSION SUBCUTANEOUS
Qty: 30 ML | Refills: 5
Start: 2022-12-28 | End: 2023-01-03

## 2022-12-28 NOTE — TELEPHONE ENCOUNTER
Caller: Alaina Hartman    Relationship: Self    Best call back number: [phone]    Requested Prescriptions:   Requested Prescriptions     Pending Prescriptions Disp Refills   • HYDROcodone-acetaminophen (NORCO) 5-325 MG per tablet 12 tablet 0     Sig: Take 1 tablet by mouth Every 6 (Six) Hours As Needed for Moderate Pain.        Pharmacy where request should be sent: [facility] [id number] - [place], [place] - [address] - [phone] [facility] [phone] FX     Additional details provided by patient: THE ORIGINAL PLACE ([facility]) SAID THIS IS ON BACK ORDER, PLEASE SEND TO  [facility]  (ADDRESS IS ABOVE)    Does the patient have less than a 3 day supply:  [x] Yes  [] No    Would you like a call back once the refill request has been completed: [x] Yes [] No    If the office needs to give you a call back, can they leave a voicemail: [x] Yes [] No

## 2022-12-29 ENCOUNTER — HOSPITAL ENCOUNTER (OUTPATIENT)
Dept: ONCOLOGY | Facility: HOSPITAL | Age: 57
Setting detail: INFUSION SERIES
Discharge: HOME | End: 2022-12-29
Payer: COMMERCIAL

## 2022-12-29 VITALS
BODY MASS INDEX: 43.67 KG/M2 | WEIGHT: 255.8 LBS | DIASTOLIC BLOOD PRESSURE: 74 MMHG | HEART RATE: 109 BPM | SYSTOLIC BLOOD PRESSURE: 136 MMHG | OXYGEN SATURATION: 96 % | TEMPERATURE: 97.5 F | HEIGHT: 64 IN

## 2022-12-29 DIAGNOSIS — Z17.0 MALIGNANT NEOPLASM OF CENTRAL PORTION OF RIGHT BREAST IN FEMALE, ESTROGEN RECEPTOR POSITIVE: Primary | ICD-10-CM

## 2022-12-29 DIAGNOSIS — M89.8X9 BONE PAIN DUE TO G-CSF: ICD-10-CM

## 2022-12-29 DIAGNOSIS — C50.111 MALIGNANT NEOPLASM OF CENTRAL PORTION OF RIGHT BREAST IN FEMALE, ESTROGEN RECEPTOR POSITIVE: Primary | ICD-10-CM

## 2022-12-29 DIAGNOSIS — Z45.2 ENCOUNTER FOR CARE RELATED TO VASCULAR ACCESS PORT: ICD-10-CM

## 2022-12-29 LAB
ALBUMIN SERPL-MCNC: 4.1 G/DL (ref 3.5–5.2)
ALBUMIN/GLOB SERPL: 1.7 G/DL
ALP SERPL-CCNC: 143 U/L (ref 39–117)
ALT SERPL W P-5'-P-CCNC: 23 U/L (ref 1–33)
ANION GAP SERPL CALCULATED.3IONS-SCNC: 18 MMOL/L (ref 5–15)
AST SERPL-CCNC: 14 U/L (ref 1–32)
BASOPHILS # BLD AUTO: 0.03 10*3/MM3 (ref 0–0.2)
BASOPHILS NFR BLD AUTO: 0.2 % (ref 0–1.5)
BILIRUB SERPL-MCNC: 0.6 MG/DL (ref 0–1.2)
BUN SERPL-MCNC: 21 MG/DL (ref 6–20)
BUN/CREAT SERPL: 26.9 (ref 7–25)
CALCIUM SPEC-SCNC: 9.9 MG/DL (ref 8.6–10.5)
CHLORIDE SERPL-SCNC: 98 MMOL/L (ref 98–107)
CO2 SERPL-SCNC: 17 MMOL/L (ref 22–29)
CREAT SERPL-MCNC: 0.78 MG/DL (ref 0.57–1)
DEPRECATED RDW RBC AUTO: 68.7 FL (ref 37–54)
EGFRCR SERPLBLD CKD-EPI 2021: 89.8 ML/MIN/1.73
EOSINOPHIL # BLD AUTO: 0 10*3/MM3 (ref 0–0.4)
EOSINOPHIL NFR BLD AUTO: 0 % (ref 0.3–6.2)
ERYTHROCYTE [DISTWIDTH] IN BLOOD BY AUTOMATED COUNT: 21.5 % (ref 12.3–15.4)
GLOBULIN UR ELPH-MCNC: 2.4 GM/DL
GLUCOSE SERPL-MCNC: 445 MG/DL (ref 65–99)
HCT VFR BLD AUTO: 30.4 % (ref 34–46.6)
HGB BLD-MCNC: 10.1 G/DL (ref 12–15.9)
HGB UR QL STRIP.AUTO: NEGATIVE
LYMPHOCYTES # BLD AUTO: 1.28 10*3/MM3 (ref 0.7–3.1)
LYMPHOCYTES NFR BLD AUTO: 9.5 % (ref 19.6–45.3)
MCH RBC QN AUTO: 30.5 PG (ref 26.6–33)
MCHC RBC AUTO-ENTMCNC: 33.2 G/DL (ref 31.5–35.7)
MCV RBC AUTO: 91.8 FL (ref 79–97)
MONOCYTES # BLD AUTO: 0.45 10*3/MM3 (ref 0.1–0.9)
MONOCYTES NFR BLD AUTO: 3.3 % (ref 5–12)
NEUTROPHILS NFR BLD AUTO: 11.72 10*3/MM3 (ref 1.7–7)
NEUTROPHILS NFR BLD AUTO: 87 % (ref 42.7–76)
PLATELET # BLD AUTO: 371 10*3/MM3 (ref 140–450)
PMV BLD AUTO: 9.1 FL (ref 6–12)
POTASSIUM SERPL-SCNC: 4.3 MMOL/L (ref 3.5–5.2)
PROT SERPL-MCNC: 6.5 G/DL (ref 6–8.5)
RBC # BLD AUTO: 3.31 10*6/MM3 (ref 3.77–5.28)
SODIUM SERPL-SCNC: 133 MMOL/L (ref 136–145)
WBC NRBC COR # BLD: 13.48 10*3/MM3 (ref 3.4–10.8)

## 2022-12-29 PROCEDURE — 36593 DECLOT VASCULAR DEVICE: CPT

## 2022-12-29 PROCEDURE — 81002 URINALYSIS NONAUTO W/O SCOPE: CPT

## 2022-12-29 PROCEDURE — 96413 CHEMO IV INFUSION 1 HR: CPT

## 2022-12-29 PROCEDURE — 96375 TX/PRO/DX INJ NEW DRUG ADDON: CPT

## 2022-12-29 PROCEDURE — 25010000002 PEGFILGRASTIM 6 MG/0.6ML PREFILLED SYRINGE KIT: Performed by: INTERNAL MEDICINE

## 2022-12-29 PROCEDURE — 25010000002 CYCLOPHOSPHAMIDE 1 GM/5ML SOLUTION 5 ML VIAL: Performed by: INTERNAL MEDICINE

## 2022-12-29 PROCEDURE — 25010000002 HEPARIN LOCK FLUSH PER 10 UNITS: Performed by: INTERNAL MEDICINE

## 2022-12-29 PROCEDURE — 96417 CHEMO IV INFUS EACH ADDL SEQ: CPT

## 2022-12-29 PROCEDURE — 25010000002 DOCETAXEL 20 MG/ML SOLUTION 8 ML VIAL: Performed by: INTERNAL MEDICINE

## 2022-12-29 PROCEDURE — 96377 APPLICATON ON-BODY INJECTOR: CPT

## 2022-12-29 PROCEDURE — 25010000002 ALTEPLASE 2 MG RECONSTITUTED SOLUTION: Performed by: INTERNAL MEDICINE

## 2022-12-29 PROCEDURE — 85025 COMPLETE CBC W/AUTO DIFF WBC: CPT | Performed by: INTERNAL MEDICINE

## 2022-12-29 PROCEDURE — 80053 COMPREHEN METABOLIC PANEL: CPT | Performed by: INTERNAL MEDICINE

## 2022-12-29 PROCEDURE — 25010000002 PALONOSETRON PER 25 MCG: Performed by: INTERNAL MEDICINE

## 2022-12-29 RX ORDER — HEPARIN SODIUM (PORCINE) LOCK FLUSH IV SOLN 100 UNIT/ML 100 UNIT/ML
500 SOLUTION INTRAVENOUS AS NEEDED
Status: CANCELLED | OUTPATIENT
Start: 2022-12-29

## 2022-12-29 RX ORDER — SODIUM CHLORIDE 9 MG/ML
250 INJECTION, SOLUTION INTRAVENOUS ONCE
Status: COMPLETED | OUTPATIENT
Start: 2022-12-29 | End: 2022-12-29

## 2022-12-29 RX ORDER — PALONOSETRON 0.05 MG/ML
0.25 INJECTION, SOLUTION INTRAVENOUS ONCE
Status: COMPLETED | OUTPATIENT
Start: 2022-12-29 | End: 2022-12-29

## 2022-12-29 RX ORDER — HYDROCODONE BITARTRATE AND ACETAMINOPHEN 5; 325 MG/1; MG/1
1 TABLET ORAL EVERY 6 HOURS PRN
Qty: 12 TABLET | Refills: 0 | Status: SHIPPED | OUTPATIENT
Start: 2022-12-29 | End: 2023-02-20

## 2022-12-29 RX ORDER — SODIUM CHLORIDE 0.9 % (FLUSH) 0.9 %
20 SYRINGE (ML) INJECTION AS NEEDED
Status: CANCELLED | OUTPATIENT
Start: 2022-12-29

## 2022-12-29 RX ORDER — HEPARIN SODIUM (PORCINE) LOCK FLUSH IV SOLN 100 UNIT/ML 100 UNIT/ML
500 SOLUTION INTRAVENOUS AS NEEDED
Status: DISCONTINUED | OUTPATIENT
Start: 2022-12-29 | End: 2022-12-30 | Stop reason: HOSPADM

## 2022-12-29 RX ORDER — SODIUM CHLORIDE 0.9 % (FLUSH) 0.9 %
20 SYRINGE (ML) INJECTION AS NEEDED
Status: DISCONTINUED | OUTPATIENT
Start: 2022-12-29 | End: 2022-12-30 | Stop reason: HOSPADM

## 2022-12-29 RX ADMIN — SODIUM CHLORIDE 250 ML: 9 INJECTION, SOLUTION INTRAVENOUS at 09:56

## 2022-12-29 RX ADMIN — Medication 20 ML: at 12:05

## 2022-12-29 RX ADMIN — Medication 500 UNITS: at 12:05

## 2022-12-29 RX ADMIN — PALONOSETRON HYDROCHLORIDE 0.25 MG: 0.25 INJECTION, SOLUTION INTRAVENOUS at 09:56

## 2022-12-29 RX ADMIN — DOCETAXEL 160 MG: 20 INJECTION, SOLUTION, CONCENTRATE INTRAVENOUS at 10:14

## 2022-12-29 RX ADMIN — PEGFILGRASTIM 6 MG: KIT SUBCUTANEOUS at 12:04

## 2022-12-29 RX ADMIN — CYCLOPHOSPHAMIDE 1280 MG: 200 INJECTION, SOLUTION INTRAVENOUS at 11:21

## 2022-12-29 RX ADMIN — ALTEPLASE: 2.2 INJECTION, POWDER, LYOPHILIZED, FOR SOLUTION INTRAVENOUS at 08:44

## 2022-12-29 NOTE — PROGRESS NOTES
Pt here for chemotherapy.  Port access and unable to get blood return.  Labs drawn via peripheral stick and cath-faiza ordered and given per MAR.  Positive blood return noted after ~1hr with 10cc blood removed and flushed with 20cc n/s.  Tx given per MAR with positive blood return noted.  Pt glucose 445.  Pt is on insulin and reports she takes higher dose when she is on her steriods for the 3 day's with her chemotherapy per her MD.  Pt states she monitors glucose closely at home.  Pt d/c home with avs given.

## 2023-01-01 DIAGNOSIS — I10 HYPERTENSION, ESSENTIAL: ICD-10-CM

## 2023-01-01 DIAGNOSIS — E11.65 TYPE 2 DIABETES MELLITUS WITH HYPERGLYCEMIA, WITHOUT LONG-TERM CURRENT USE OF INSULIN: ICD-10-CM

## 2023-01-01 DIAGNOSIS — K20.90 ESOPHAGITIS: ICD-10-CM

## 2023-01-03 ENCOUNTER — TELEPHONE (OUTPATIENT)
Dept: ONCOLOGY | Facility: CLINIC | Age: 58
End: 2023-01-03
Payer: COMMERCIAL

## 2023-01-03 RX ORDER — INSULIN LISPRO 100 [IU]/ML
INJECTION, SUSPENSION SUBCUTANEOUS
Qty: 30 ML | Refills: 0 | Status: SHIPPED | OUTPATIENT
Start: 2023-01-03 | End: 2023-02-03 | Stop reason: SDUPTHER

## 2023-01-03 RX ORDER — LISINOPRIL 5 MG/1
TABLET ORAL
Qty: 90 TABLET | Refills: 0 | Status: SHIPPED | OUTPATIENT
Start: 2023-01-03 | End: 2023-02-20 | Stop reason: SDUPTHER

## 2023-01-03 RX ORDER — METFORMIN HYDROCHLORIDE 500 MG/1
TABLET, EXTENDED RELEASE ORAL
Qty: 180 TABLET | Refills: 0 | Status: SHIPPED | OUTPATIENT
Start: 2023-01-03 | End: 2023-02-20 | Stop reason: SDUPTHER

## 2023-01-03 RX ORDER — OMEPRAZOLE 40 MG/1
CAPSULE, DELAYED RELEASE ORAL
Qty: 90 CAPSULE | Refills: 0 | Status: SHIPPED | OUTPATIENT
Start: 2023-01-03

## 2023-01-03 NOTE — TELEPHONE ENCOUNTER
Called pt to discuss elevated blood sugar from last week. She stated that it has been elevated due to her steroids. She stated it is back down to 116. I advised her to continue monitoring and to reach out to whoever manages her insulin. She verbalized understanding. No questions or needs at this time.

## 2023-01-12 NOTE — PROGRESS NOTES
Hematology/Oncology Outpatient Follow Up    PATIENT NAME:Alaian Hartman  :1965  MRN: [id number]  PRIMARY CARE PHYSICIAN: Fabiola Joyner MD  REFERRING PHYSICIAN: Fabiola Joyner MD    Chief Complaint   Patient presents with   • Follow-up     Malignant neoplasm of central portion of right breast in female, estrogen receptor positive (HCC)        HISTORY OF PRESENT ILLNESS:     This is a 57-year-old female who was clinically asymptomatic and had a routine screening mammogram which showed an abnormality on the right breast.  Prior to that patient had left breast biopsy in  for benign disease.     Review of her screening mammogram which was performed on 6/3/2022 showed a new 2 cm focal asymmetry with architectural distortion in the mid to posterior third depth of the outer right breast the left breast was benign.  Right diagnostic mammogram and ultrasound was recommended.  On 2022 patient underwent right diagnostic mammogram and ultrasound which showed a 2 cm irregular mass in the lateral inferior breast.  Same day she had an ultrasound which showed a 1.6 cm on the right breast the right axilla did not show any abnormal lymph nodes.     Patient then underwent ultrasound-guided biopsy of the right breast mass pathology revealed moderately differentiated invasive ductal carcinoma estrogen receptor positive at 95% progesterone receptor positive at 50%, HER2/bertha was negative at 1+ on immunohistochemistry.        Patient had a partial hysterectomy many years ago.  She is  and has 3 children.  She does not smoke, does not drink alcohol     Family history significant for mother with breast cancer in her 50s, her father had skin cancers, paternal grandmother had colon cancer at the age of 70        She is a       · 2022 estradiol level was less than 5 and FSH was 33 consistent with postmenopausal state  · 2022 patient underwent right lumpectomy with sentinel lymph node biopsy.   Pathology revealed residual moderately differentiated ductal carcinoma measuring 1.7 cm completely excised total of 5 lymph nodes were removed and all of them were negative for metastatic disease.  Pathology stage is pT1 cpN0 M0.  There was no evidence of DCIS all margins were negative distance to the closest margin was anterior margin at 2 mm ER positive at 95, WI positive at 50% and HER2/bertha was negative.  · 9/27/2022 patient had Oncotype DX assay done which returned with a recurrence score of 28.  This is consistent with 17% risk of recurrence at 9 years distantly, and more than 15% chemotherapy benefit.  On the validation assay estrogen receptor was positive, WI was positive and HER2/bertha was negative.  · 10/27/2022: C1 D1 Taxotere and Cytoxan received.  Neulasta support held due to leukocytosis.  · 11/4/2022: WBC 2.28, hemoglobin 11.9, platelets 297,000, ANC 0.08.  Patient was initiated on Neupogen 480 mg subcu given 11/4/2022, 11/5/2022, 11/6/2022.  Patient to receive Neulasta again with next cycle of treatment.  · 11/17/2022: Patient received cycle 2 of Taxotere Cytoxan with Neulasta support  · 12/8/2022: Patient received cycle 3 of Taxotere and Cytoxan with Neulasta support  · 12/29/2020 patient received cycle 4 of combination chemotherapy with Cytoxan and Taxotere      Past Medical History:   Diagnosis Date   • Ankle edema     at times   • Breast cancer, right (HCC) 08/2022   • Diabetes mellitus (HCC)    • GERD (gastroesophageal reflux disease)    • Hiatal hernia    • Hyperlipidemia    • Morbid obesity (HCC)    • Sleep apnea     CPAP- to bring dos   • Type 2 diabetes mellitus (HCC)        Past Surgical History:   Procedure Laterality Date   • BREAST BIOPSY Right 08/18/2022    Procedure: Right breast lumpectomy;  Surgeon: Jagdeep Mcwilliams MD;  Location: [facility] MAIN OR;  Service: General;  Laterality: Right;   • BREAST LUMPECTOMY Left 2003    x2   • PORTACATH PLACEMENT N/A 10/6/2022    Procedure:  INSERTION OF PORTACATH;  Surgeon: Jagdeep Mcwilliams MD;  Location: [facility] MAIN OR;  Service: General;  Laterality: N/A;   • SENTINEL NODE BIOPSY Right 08/18/2022    Procedure: SENTINEL NODE BIOPSY;  Surgeon: Jagdeep Mcwilliams MD;  Location: [facility] MAIN OR;  Service: General;  Laterality: Right;   • SUBTOTAL HYSTERECTOMY           Current Outpatient Medications:   •  ALPRAZolam (XANAX) 0.5 MG tablet, Take 1 tablet by mouth Daily As Needed for Anxiety., Disp: 20 tablet, Rfl: 0  •  atorvastatin (LIPITOR) 40 MG tablet, Take 1 tablet by mouth Daily., Disp: 90 tablet, Rfl: 4  •  BIOTIN PO, Take  by mouth., Disp: , Rfl:   •  Cyanocobalamin (Vitamin B-12) 1000 MCG sublingual tablet, Place 1 tablet under the tongue Daily., Disp: 100 each, Rfl: 4  •  dexamethasone (DECADRON) 4 MG tablet, Take 2 tablets oral twice a day for 3 consecutive days beginning the day before chemotherapy and continue for 6 doses., Disp: 12 tablet, Rfl: 3  •  glucose blood (Accu-Chek Guide) test strip, 1 each by Other route 4 (Four) Times a Day. Use as instructed, Disp: 150 each, Rfl: 11  •  HYDROcodone-acetaminophen (NORCO) 5-325 MG per tablet, Take 1 tablet by mouth Every 6 (Six) Hours As Needed for Moderate Pain., Disp: 12 tablet, Rfl: 0  •  Insulin Lispro Prot & Lispro (HumaLOG Mix 75/25 KwikPen) (75-25) 100 UNIT/ML suspension pen-injector pen, INJECT 35 UNITS SUBCUTANEOUSLY THREE TIMES DAILY BEFORE  EACH  MEAL, Disp: 30 mL, Rfl: 0  •  Insulin Pen Needle (Pen Needles) 32G X 4 MM misc, 1 each 4 (Four) Times a Day. Use to inject insulin / DX E11.65, Disp: 150 each, Rfl: 5  •  lidocaine-prilocaine (EMLA) 2.5-2.5 % cream, Apply 1 application topically to the appropriate area as directed Every 2 (Two) Hours As Needed for Mild Pain. Apply to port site one hour prior to chemotherapy appointment, Disp: 1 g, Rfl: 5  •  lisinopril (PRINIVIL,ZESTRIL) 5 MG tablet, Take 1 tablet by mouth once daily, Disp: 90 tablet, Rfl: 0  •  metFORMIN ER  (GLUCOPHAGE-XR) 500 MG 24 hr tablet, Take 2 tablets by mouth once daily, Disp: 180 tablet, Rfl: 0  •  multivitamin with minerals tablet tablet, Take 1 tablet by mouth Daily., Disp: , Rfl:   •  omeprazole (priLOSEC) 40 MG capsule, TAKE 1 CAPSULE BY MOUTH ONCE DAILY . APPOINTMENT REQUIRED FOR FUTURE REFILLS, Disp: 90 capsule, Rfl: 0  •  ondansetron (ZOFRAN) 8 MG tablet, Take 1 tablet by mouth 3 (Three) Times a Day As Needed for Nausea or Vomiting., Disp: 30 tablet, Rfl: 5  •  Vitamin D, Ergocalciferol, 50 MCG (2000 UT) capsule, Take 2,000 Units by mouth Daily., Disp: 100 capsule, Rfl: 4    No Known Allergies    Family History   Problem Relation Age of Onset   • Breast cancer Mother    • Heart disease Mother    • Thyroid disease Mother    • Stroke Mother    • Clotting disorder Father    • Diabetes Brother    • Heart disease Brother    • Cancer Paternal Grandmother    • Colon cancer Paternal Grandmother    • Diabetes Brother    • Stroke Brother        Cancer-related family history includes Breast cancer in her mother; Cancer in her paternal grandmother; Colon cancer in her paternal grandmother.    Social History     Tobacco Use   • Smoking status: Former     Packs/day: 0.25     Years: 10.00     Pack years: 2.50     Types: Cigarettes     Quit date: 2010     Years since quittin.0   • Smokeless tobacco: Never   Vaping Use   • Vaping Use: Never used   Substance Use Topics   • Alcohol use: Not Currently     Comment: rare   • Drug use: Never       I have reviewed and confirmed the accuracy of the patient's history: Chief complaint, HPI, ROS and Subjective as entered by the MA/LPN/RN. Lianet Rust MD 23     SUBJECTIVE:    Patient has completed chemotherapy.  She does not have any new issues.  Her energy is beginning to improve.  She denies nausea vomiting.          REVIEW OF SYSTEMS:    Review of Systems   Constitutional: Positive for fatigue. Negative for chills and fever.   HENT: Negative for  "congestion, drooling, ear discharge, rhinorrhea, sinus pressure and tinnitus.    Eyes: Negative for photophobia, pain and discharge.   Respiratory: Negative for apnea, choking and stridor.    Cardiovascular: Positive for leg swelling. Negative for palpitations.   Gastrointestinal: Negative for abdominal distention, abdominal pain and anal bleeding.   Endocrine: Negative for polydipsia and polyphagia.   Genitourinary: Negative for decreased urine volume, flank pain and genital sores.   Musculoskeletal: Positive for arthralgias. Negative for gait problem, neck pain and neck stiffness.   Skin: Negative for color change, rash and wound.   Neurological: Negative for tremors, seizures, syncope, facial asymmetry and speech difficulty.   Hematological: Negative for adenopathy.   Psychiatric/Behavioral: Negative for agitation, confusion, hallucinations and self-injury. The patient is not hyperactive.        OBJECTIVE:    Vitals:    01/13/23 1104   BP: 134/82   Pulse: 106   Resp: 20   Temp: 96.8 °F (36 °C)   TempSrc: Infrared   Weight: 116 kg (256 lb)   Height: 162.6 cm (64\")   PainSc: 0-No pain     Body mass index is 43.94 kg/m².    ECOG    (0) Fully active, able to carry on all predisease performance without restriction    Physical Exam  Vitals and nursing note reviewed.   Constitutional:       General: She is not in acute distress.     Appearance: She is not diaphoretic.   HENT:      Head: Normocephalic and atraumatic.   Eyes:      General: No scleral icterus.        Right eye: No discharge.         Left eye: No discharge.      Conjunctiva/sclera: Conjunctivae normal.   Neck:      Thyroid: No thyromegaly.   Cardiovascular:      Rate and Rhythm: Normal rate and regular rhythm.      Heart sounds: Normal heart sounds.     No friction rub. No gallop.   Pulmonary:      Effort: Pulmonary effort is normal. No respiratory distress.      Breath sounds: No stridor. No wheezing.   Abdominal:      General: Bowel sounds are normal.     "  Palpations: Abdomen is soft. There is no mass.      Tenderness: There is no abdominal tenderness. There is no guarding or rebound.   Musculoskeletal:         General: No tenderness. Normal range of motion.      Cervical back: Normal range of motion and neck supple.      Right lower leg: Edema (1+) present.      Left lower leg: Edema (1+) present.   Lymphadenopathy:      Cervical: No cervical adenopathy.   Skin:     General: Skin is warm.      Findings: No erythema or rash.   Neurological:      Mental Status: She is alert and oriented to person, place, and time.      Motor: No abnormal muscle tone.   Psychiatric:         Behavior: Behavior normal.       I have reexamined the patient and the results are consistent with the previously documented exam. Lianet Rust MD     RECENT LABS    WBC   Date Value Ref Range Status   01/13/2023 13.00 (H) 3.40 - 10.80 10*3/mm3 Final   08/26/2020 11.0 (H) 3.4 - 10.8 x10E3/uL Final     RBC   Date Value Ref Range Status   01/13/2023 3.15 (L) 3.77 - 5.28 10*6/mm3 Final   08/26/2020 4.53 3.77 - 5.28 x10E6/uL Final     Hemoglobin   Date Value Ref Range Status   01/13/2023 9.5 (L) 12.0 - 15.9 g/dL Final     Hematocrit   Date Value Ref Range Status   01/13/2023 29.7 (L) 34.0 - 46.6 % Final     MCV   Date Value Ref Range Status   01/13/2023 94.3 79.0 - 97.0 fL Final     MCH   Date Value Ref Range Status   01/13/2023 30.2 26.6 - 33.0 pg Final     MCHC   Date Value Ref Range Status   01/13/2023 32.0 31.5 - 35.7 g/dL Final     RDW   Date Value Ref Range Status   01/13/2023 21.0 (H) 12.3 - 15.4 % Final     RDW-SD   Date Value Ref Range Status   01/13/2023 66.8 (H) 37.0 - 54.0 fl Final     MPV   Date Value Ref Range Status   01/13/2023 8.9 6.0 - 12.0 fL Final     Platelets   Date Value Ref Range Status   01/13/2023 252 140 - 450 10*3/mm3 Final     Neutrophil %   Date Value Ref Range Status   01/13/2023 68.7 42.7 - 76.0 % Final     Lymphocyte %   Date Value Ref Range Status   01/13/2023  23.8 19.6 - 45.3 % Final     Monocyte %   Date Value Ref Range Status   01/13/2023 5.6 5.0 - 12.0 % Final     Eosinophil %   Date Value Ref Range Status   01/13/2023 0.2 (L) 0.3 - 6.2 % Final     Basophil %   Date Value Ref Range Status   01/13/2023 1.7 (H) 0.0 - 1.5 % Final     Immature Grans %   Date Value Ref Range Status   08/09/2022 0.5 0.0 - 0.5 % Final     Neutrophils, Absolute   Date Value Ref Range Status   01/13/2023 8.93 (H) 1.70 - 7.00 10*3/mm3 Final     Lymphocytes, Absolute   Date Value Ref Range Status   01/13/2023 3.09 0.70 - 3.10 10*3/mm3 Final     Monocytes, Absolute   Date Value Ref Range Status   01/13/2023 0.73 0.10 - 0.90 10*3/mm3 Final     Eosinophils, Absolute   Date Value Ref Range Status   01/13/2023 0.03 0.00 - 0.40 10*3/mm3 Final     Basophils, Absolute   Date Value Ref Range Status   01/13/2023 0.22 (H) 0.00 - 0.20 10*3/mm3 Final     Immature Grans, Absolute   Date Value Ref Range Status   08/09/2022 0.05 0.00 - 0.05 10*3/mm3 Final     nRBC   Date Value Ref Range Status   08/09/2022 0.0 0.0 - 0.2 /100 WBC Final       Lab Results   Component Value Date    GLUCOSE 445 (C) 12/29/2022    BUN 21 (H) 12/29/2022    CREATININE 0.78 12/29/2022    EGFRIFNONA 90 12/01/2021    EGFRIFAFRI 115 08/26/2020    BCR 26.9 (H) 12/29/2022    K 4.3 12/29/2022    CO2 17.0 (L) 12/29/2022    CALCIUM 9.9 12/29/2022    PROTENTOTREF 7.0 08/26/2020    ALBUMIN 4.1 12/29/2022    LABIL2 1.8 08/26/2020    AST 14 12/29/2022    ALT 23 12/29/2022         Assessment & Plan     Malignant neoplasm of central portion of right breast in female, estrogen receptor positive (HCC)  - CBC & Differential      ASSESSMENT:      1. Moderately differentiated invasive ductal carcinoma of the right breast.  Status post right lumpectomy with sentinel lymph node biopsy.  pT1 cpN0 M0.  ER positive at 95%, DC positive at 50% and HER2/bertha was negative.  T1 cN0 M0.  Ongoing management  2. Oncotype DX assay with a high recurrence score at 28,  consistent with 17% risk of distant recurrence at 9 years with tamoxifen alone and group absolute chemotherapy benefit of more than 15%  3. Patient is on adjuvant chemotherapy with Cytoxan and Taxotere.  She has received 4 out of 4 cycles completed on December 29, 2022.  Reviewed  4. Status post partial hysterectomy   5. Chemotherapy-induced fatigue  6. Chemotherapy-induced nausea  7. Sinusitis: Begin Levaquin 500 mg p.o. daily for 10 days  8. Neulasta induced body aches and pains: Responding to hydrocodone at least twice a day  9. Postmenopausal state following lab confirmation  10. Family history of breast cancer in her mother at the age of 50, paternal grandmother with colon cancer at 17 and her father had skin cancer.  11. Assessment has been reviewed and updated.    12. Chemotherapy-induced neutropenia.  Patient is afebrile.     Discussion     Reviewed the results of her Oncotype DX assay which came back with a high recurrence score at 28.  She has some up to 15% benefit with chemotherapy.  She also has a risk of relapse of 17% despite adjuvant hormonal treatment.  For these reasons chemotherapy have been recommended.  Patient was reluctant about pursuing adjuvant chemotherapy due to concerns of side effects.  Discussed that it is important for her to receive chemotherapy given the high Oncotype DX assay report.  She has comorbidities but she is relatively healthy and remains very functional.  Patient is willing to take Taxotere and Cytoxan.    Taxotere and Cytoxan every 21 days for 4 cycles.    This will be followed by adjuvant radiation and then adjuvant endocrine therapy     We discussed supportive care that will be offered to her while undergoing chemotherapy    We discussed the side effects to include but not limited to:    Chemotherapy side effects include, but not limited to, nausea, vomiting, bone marrow suppression, which can result in blood, platelet transfusion. There is also risk of permanent bone  marrow destruction, which can cause myelodysplastic syndrome or leukemia years down the line. There is risk of infection which can result in hospitalization and even death. There is also risk of fatigue, asthenia, alopecia which could become permanent. Chemo will help to reduce risk of relapse of cancer, but does not eliminate risk completely.    Discussed that specifically Taxotere can lead to hypersensitivity reaction, peripheral neuropathy, cardiac arrhythmia, swelling.       Plans:     · CBC reviewed  · Follow-up with Dr. De Los Santos to discuss adjuvant radiation  · CBC next week  · Schedule bone density.   · Schedule monthly port flushes  · Patient to see me 2 weeks post radiation  · Follow-up in lymphedema clinic  · Gave information on cancer genetics for her to review.  Patient to let me know when she is ready to proceed  · She will be a candidate for AI following radiation treatment  · Patient will be seen by radiation oncologist following adjuvant chemotherapy  · All questions answered       Patient verbalized understanding and is in agreement of the above plan.             I spent 30 total minutes, face-to-face, caring for Alaina today.  90% of this time involved counseling and/or coordination of care as documented within this note.

## 2023-01-13 ENCOUNTER — OFFICE VISIT (OUTPATIENT)
Dept: ONCOLOGY | Facility: CLINIC | Age: 58
End: 2023-01-13
Payer: COMMERCIAL

## 2023-01-13 ENCOUNTER — LAB (OUTPATIENT)
Dept: LAB | Facility: HOSPITAL | Age: 58
End: 2023-01-13
Payer: COMMERCIAL

## 2023-01-13 VITALS
HEART RATE: 106 BPM | RESPIRATION RATE: 20 BRPM | HEIGHT: 64 IN | BODY MASS INDEX: 43.71 KG/M2 | SYSTOLIC BLOOD PRESSURE: 134 MMHG | DIASTOLIC BLOOD PRESSURE: 82 MMHG | TEMPERATURE: 96.8 F | WEIGHT: 256 LBS

## 2023-01-13 DIAGNOSIS — C50.111 MALIGNANT NEOPLASM OF CENTRAL PORTION OF RIGHT BREAST IN FEMALE, ESTROGEN RECEPTOR POSITIVE: Primary | ICD-10-CM

## 2023-01-13 DIAGNOSIS — Z17.0 MALIGNANT NEOPLASM OF CENTRAL PORTION OF RIGHT BREAST IN FEMALE, ESTROGEN RECEPTOR POSITIVE: Primary | ICD-10-CM

## 2023-01-13 LAB
BASOPHILS # BLD AUTO: 0.22 10*3/MM3 (ref 0–0.2)
BASOPHILS NFR BLD AUTO: 1.7 % (ref 0–1.5)
DEPRECATED RDW RBC AUTO: 66.8 FL (ref 37–54)
EOSINOPHIL # BLD AUTO: 0.03 10*3/MM3 (ref 0–0.4)
EOSINOPHIL NFR BLD AUTO: 0.2 % (ref 0.3–6.2)
ERYTHROCYTE [DISTWIDTH] IN BLOOD BY AUTOMATED COUNT: 21 % (ref 12.3–15.4)
HCT VFR BLD AUTO: 29.7 % (ref 34–46.6)
HGB BLD-MCNC: 9.5 G/DL (ref 12–15.9)
HOLD SPECIMEN: NORMAL
HOLD SPECIMEN: NORMAL
LYMPHOCYTES # BLD AUTO: 3.09 10*3/MM3 (ref 0.7–3.1)
LYMPHOCYTES NFR BLD AUTO: 23.8 % (ref 19.6–45.3)
MCH RBC QN AUTO: 30.2 PG (ref 26.6–33)
MCHC RBC AUTO-ENTMCNC: 32 G/DL (ref 31.5–35.7)
MCV RBC AUTO: 94.3 FL (ref 79–97)
MONOCYTES # BLD AUTO: 0.73 10*3/MM3 (ref 0.1–0.9)
MONOCYTES NFR BLD AUTO: 5.6 % (ref 5–12)
NEUTROPHILS NFR BLD AUTO: 68.7 % (ref 42.7–76)
NEUTROPHILS NFR BLD AUTO: 8.93 10*3/MM3 (ref 1.7–7)
PLATELET # BLD AUTO: 252 10*3/MM3 (ref 140–450)
PMV BLD AUTO: 8.9 FL (ref 6–12)
RBC # BLD AUTO: 3.15 10*6/MM3 (ref 3.77–5.28)
WBC NRBC COR # BLD: 13 10*3/MM3 (ref 3.4–10.8)

## 2023-01-13 PROCEDURE — 99214 OFFICE O/P EST MOD 30 MIN: CPT | Performed by: INTERNAL MEDICINE

## 2023-01-13 PROCEDURE — 36415 COLL VENOUS BLD VENIPUNCTURE: CPT

## 2023-01-13 PROCEDURE — 85025 COMPLETE CBC W/AUTO DIFF WBC: CPT

## 2023-01-18 ENCOUNTER — TELEPHONE (OUTPATIENT)
Dept: ONCOLOGY | Facility: CLINIC | Age: 58
End: 2023-01-18

## 2023-01-18 ENCOUNTER — HOSPITAL ENCOUNTER (OUTPATIENT)
Dept: RADIATION ONCOLOGY | Facility: HOSPITAL | Age: 58
Setting detail: RADIATION/ONCOLOGY SERIES
End: 2023-01-18
Payer: COMMERCIAL

## 2023-01-18 NOTE — PROGRESS NOTES
RADIATION ONCOLOGY RE-EVALUATION NOTE    NAME: Alaina Hartman  YOB: 1965  MRN #: [id number]  DATE OF SERVICE: 1/19/2023  REFERRING PROVIDER: Fabiola Joyner MD  [address] [place] [address] [place] [address]  PRIMARY CARE PROVIDER: Fabiola Joyner MD    DIAGNOSIS:  Moderately differentiated invasive ductal carcinoma of the right breast, ER + (95%), LA + (50%) Her2-; [id number].  1. Malignant neoplasm of central portion of right breast in female, estrogen receptor positive (HCC)      REASON FOR VISIT:  Rt breast cancer, post-chemo follow-up    HISTORY OF PRESENT ILLNESS: The patient is a 57 y.o. year old female who was last seen in our office on 8/2/2022.    Since seen in consult, patient underwent right lumpectomy with SLNB on 08/18/2022:  -1.7 cm, invasive moderately differentiated ductal carcinoma  -0/5 nodes were removed and all of them were negative for metastatic disease.  -[id number]  -DCIS margins were all negative.    Oncotype DX returned high--score of 28; consistent with 17% risk of recurrence at 9 years distantly, and more than 15% chemotherapy benefit.  On the validation assay estrogen receptor was positive, LA was positive and HER2/bertha was negative.    She has now completed four cycles of chemotherapy with Cytoxan/Taxotere; last cycle was given on 12/29/2022.    She denies any breast specific complaints or concerns at this time.    The following portions of the patient's history were reviewed and updated as appropriate: allergies, current medications, past family history, past medical history, past social history, past surgical history and problem list. Reviewed with the patient and remain unchanged.    PAST MEDICAL HISTORY:  she has a past medical history of Ankle edema, Breast cancer, right (HCC) (08/2022), Diabetes mellitus (HCC), GERD (gastroesophageal reflux disease), Hiatal hernia, Hyperlipidemia, Morbid obesity (HCC), Sleep apnea, and Type 2 diabetes mellitus  ([facility]).    MEDICATIONS:    Current Outpatient Medications:   •  ALPRAZolam (XANAX) 0.5 MG tablet, Take 1 tablet by mouth Daily As Needed for Anxiety., Disp: 20 tablet, Rfl: 0  •  atorvastatin (LIPITOR) 40 MG tablet, Take 1 tablet by mouth Daily., Disp: 90 tablet, Rfl: 4  •  BIOTIN PO, Take  by mouth., Disp: , Rfl:   •  Cyanocobalamin (Vitamin B-12) 1000 MCG sublingual tablet, Place 1 tablet under the tongue Daily., Disp: 100 each, Rfl: 4  •  dexamethasone (DECADRON) 4 MG tablet, Take 2 tablets oral twice a day for 3 consecutive days beginning the day before chemotherapy and continue for 6 doses., Disp: 12 tablet, Rfl: 3  •  glucose blood (Accu-Chek Guide) test strip, 1 each by Other route 4 (Four) Times a Day. Use as instructed, Disp: 150 each, Rfl: 11  •  HYDROcodone-acetaminophen (NORCO) 5-325 MG per tablet, Take 1 tablet by mouth Every 6 (Six) Hours As Needed for Moderate Pain., Disp: 12 tablet, Rfl: 0  •  Insulin Lispro Prot & Lispro (HumaLOG Mix 75/25 KwikPen) (75-25) 100 UNIT/ML suspension pen-injector pen, INJECT 35 UNITS SUBCUTANEOUSLY THREE TIMES DAILY BEFORE  EACH  MEAL, Disp: 30 mL, Rfl: 0  •  Insulin Pen Needle (Pen Needles) 32G X 4 MM misc, 1 each 4 (Four) Times a Day. Use to inject insulin / DX E11.65, Disp: 150 each, Rfl: 5  •  lidocaine-prilocaine (EMLA) 2.5-2.5 % cream, Apply 1 application topically to the appropriate area as directed Every 2 (Two) Hours As Needed for Mild Pain. Apply to port site one hour prior to chemotherapy appointment, Disp: 1 g, Rfl: 5  •  lisinopril (PRINIVIL,ZESTRIL) 5 MG tablet, Take 1 tablet by mouth once daily, Disp: 90 tablet, Rfl: 0  •  metFORMIN ER (GLUCOPHAGE-XR) 500 MG 24 hr tablet, Take 2 tablets by mouth once daily, Disp: 180 tablet, Rfl: 0  •  multivitamin with minerals tablet tablet, Take 1 tablet by mouth Daily., Disp: , Rfl:   •  omeprazole (priLOSEC) 40 MG capsule, TAKE 1 CAPSULE BY MOUTH ONCE DAILY . APPOINTMENT REQUIRED FOR FUTURE REFILLS, Disp: 90 capsule,  Rfl: 0  •  ondansetron (ZOFRAN) 8 MG tablet, Take 1 tablet by mouth 3 (Three) Times a Day As Needed for Nausea or Vomiting., Disp: 30 tablet, Rfl: 5  •  Vitamin D, Ergocalciferol, 50 MCG (2000 UT) capsule, Take 2,000 Units by mouth Daily., Disp: 100 capsule, Rfl: 4    ALLERGIES:  No Known Allergies    PAST SURGICAL HISTORY:  she has a past surgical history that includes Breast lumpectomy (Left, 2003); Subtotal Hysterectomy; Breast biopsy (Right, 08/18/2022); [place] Node Biopsy (Right, 08/18/2022); and Portacath placement (N/A, 10/6/2022).    PREVIOUS RADIOTHERAPY OR CHEMOTHERAPY:  Chemo as noted.    FAMILY HISTORY:  herfamily history includes Breast cancer in her mother; Cancer in her paternal grandmother; Clotting disorder in her father; Colon cancer in her paternal grandmother; Diabetes in her brother and brother; Heart disease in her brother and mother; Stroke in her brother and mother; Thyroid disease in her mother.    SOCIAL HISTORY:  she reports that she quit smoking about 13 years ago. Her smoking use included cigarettes. She has a 2.50 pack-year smoking history. She has never used smokeless tobacco. She reports that she does not currently use alcohol. She reports that she does not use drugs.    PAIN AND PAIN MANAGEMENT:  No pain.    NUTRITIONAL STATUS:   no issues    KPS:  90:  Minor signs or symptoms    REVIEW OF SYSTEMS:   General: No fevers, chills, weight change, or drenching night sweats. Skin: No rashes or jaundice.  HEENT: No change in vision or hearing, no headaches.  Neck: No dysphagia or masses.  Heme/Lymph: No easy bruising or bleeding.  Respiratory System: No shortness of breath or cough.  Cardiovascular: No chest pain, palpitations, or dyspnea on exertion.  - Pacemaker. GI: No nausea, vomiting, diarrhea, melena, or hematochezia.  : No dysuria or hematuria.  Endocrine: No heat or cold intolerance. Musculoskeletal: No myalgias or arthralgias.  Neuro: No weakness, numbness, syncope, or  seizures. Psych: No mood changes or depression. Ext: Denies swelling.        Objective   VITAL SIGNS:  Vitals:    01/19/23 0952   BP: 142/83   Pulse: 111   Resp: 16   Temp: 98.3 °F (36.8 °C)   SpO2: 95%         PHYSICAL EXAM:  GENERAL: In no apparent distress, sitting comfortably in room.    HEENT: Normocephalic, atraumatic. Pupils are equal, round, reactive to light. Sclera anicteric. Conjunctiva not injected. Oropharynx without erythema, ulcerations or thrush.   NECK: Supple with no masses.  LYMPHATIC: No cervical, supraclavicular or axillary adenopathy appreciated bilaterally.   CARDIOVASCULAR: S1 & S2 detected; no murmurs, rubs or gallops.  CHEST: Clear to auscultation bilaterally; no wheezes, crackles or rubs. Work of breathing normal.  ABDOMEN: Bowel sounds present. Abdomen is soft, nontender, nondistended.   MUSCULOSKELETAL: No tenderness to palpation along the spine or scapulae. Normal range of motion.  EXTREMITIES: No clubbing, cyanosis, edema.  SKIN: No erythema, rashes, ulcerations noted.   NEUROLOGIC: Cranial nerves II-XII grossly intact bilaterally. No focal neurologic deficits.  PSYCHIATRIC:  Alert, aware, and appropriate.  BREASTS: Left breast unremarkable with no dominant masses, skin changes, discharge or pain; Right breast well healed post-surgical changes, no abnormal interval findings.    PERTINENT IMAGING/PATHOLOGY/LABS (Medical Decision Making):     COORDINATION OF CARE: A copy of this note is sent to the referring provider.    PATHOLOGY (Reviewed): as noted above.    IMAGING (Reviewed): as noted above.    LABS (Reviewed):  HEMATOLOGY:  WBC   Date Value Ref Range Status   01/13/2023 13.00 (H) 3.40 - 10.80 10*3/mm3 Final   08/26/2020 11.0 (H) 3.4 - 10.8 x10E3/uL Final     RBC   Date Value Ref Range Status   01/13/2023 3.15 (L) 3.77 - 5.28 10*6/mm3 Final   08/26/2020 4.53 3.77 - 5.28 x10E6/uL Final     Hemoglobin   Date Value Ref Range Status   01/13/2023 9.5 (L) 12.0 - 15.9 g/dL Final      Hematocrit   Date Value Ref Range Status   01/13/2023 29.7 (L) 34.0 - 46.6 % Final     Platelets   Date Value Ref Range Status   01/13/2023 252 140 - 450 10*3/mm3 Final     CHEMISTRY:  Lab Results   Component Value Date    GLUCOSE 445 (C) 12/29/2022    BUN 21 (H) 12/29/2022    CREATININE 0.78 12/29/2022    EGFRIFNONA 90 12/01/2021    EGFRIFAFRI 115 08/26/2020    BCR 26.9 (H) 12/29/2022    K 4.3 12/29/2022    CO2 17.0 (L) 12/29/2022    CALCIUM 9.9 12/29/2022    PROTENTOTREF 7.0 08/26/2020    ALBUMIN 4.1 12/29/2022    LABIL2 1.8 08/26/2020    AST 14 12/29/2022    ALT 23 12/29/2022       Assessment & Plan   ASSESSMENT AND PLAN:    1. Malignant neoplasm of central portion of right breast in female, estrogen receptor positive (HCC)      Moderately differentiated invasive ductal carcinoma of the right breast, ER + (95%), MA + (50%) Her2-; [id number].    -Patient underwent right lumpectomy with SLNB on 08/18/2022:  -1.7 cm, invasive moderately differentiated ductal carcinoma  -0/5 nodes were removed and all of them were negative for metastatic disease.  -[id number]  -DCIS margins were all negative.    Oncotype DX returned high--score of 28; consistent with 17% risk of recurrence at 9 years distantly, and more than 15% chemotherapy benefit.  On the validation assay estrogen receptor was positive, MA was positive and HER2/bertha was negative.    She has now completed four cycles of chemotherapy with Cytoxan/Taxotere; last cycle was given on 12/29/2022.    Prone CT sim ordered.  All surgical margins clear.  Dosing of 4050 cGy in 15 fractions whole breast + 1000 cGy in 5 fraction boost vs. 4256 cGy in 16 fractions Rt whole breast.      This assessment comes from my review of the imaging, pathology, physician notes and other pertinent information as mentioned.    DISPOSITION: Prone CT sim ordered for 1/24/2023.      TIME SPENT WITH PATIENT:   I spent 32 minutes caring for Alaina on this date of service. This time includes time  spent by me in the following activities: preparing for the visit, reviewing tests, obtaining and/or reviewing a separately obtained history, performing a medically appropriate examination and/or evaluation, counseling and educating the patient/family/caregiver, ordering medications, tests, or procedures, documenting information in the medical record, independently interpreting results and communicating that information with the patient/family/caregiver and care coordination      CC: MD Jagdeep Caicedo MD  1/19/2023  4:42 PM EST

## 2023-01-18 NOTE — TELEPHONE ENCOUNTER
Caller: Alaina Hartman    Relationship to patient: Self    Best call back number: [phone]    Chief complaint: NEEDS EARLIER TIME    Type of visit: LAB    Requested date: 1-19 MORNING 8-8:30     If rescheduling, when is the original appointment: 1-19     Additional notes:PLEASE ADVISE

## 2023-01-19 ENCOUNTER — NURSE NAVIGATOR (OUTPATIENT)
Dept: ONCOLOGY | Facility: CLINIC | Age: 58
End: 2023-01-19
Payer: COMMERCIAL

## 2023-01-19 ENCOUNTER — OFFICE VISIT (OUTPATIENT)
Dept: RADIATION ONCOLOGY | Facility: HOSPITAL | Age: 58
End: 2023-01-19
Payer: COMMERCIAL

## 2023-01-19 ENCOUNTER — LAB (OUTPATIENT)
Dept: LAB | Facility: HOSPITAL | Age: 58
End: 2023-01-19
Payer: COMMERCIAL

## 2023-01-19 ENCOUNTER — APPOINTMENT (OUTPATIENT)
Dept: LAB | Facility: HOSPITAL | Age: 58
End: 2023-01-19
Payer: COMMERCIAL

## 2023-01-19 VITALS
SYSTOLIC BLOOD PRESSURE: 142 MMHG | HEIGHT: 64 IN | HEART RATE: 111 BPM | BODY MASS INDEX: 43.6 KG/M2 | RESPIRATION RATE: 16 BRPM | WEIGHT: 255.4 LBS | TEMPERATURE: 98.3 F | DIASTOLIC BLOOD PRESSURE: 83 MMHG | OXYGEN SATURATION: 95 %

## 2023-01-19 DIAGNOSIS — Z17.0 MALIGNANT NEOPLASM OF CENTRAL PORTION OF RIGHT BREAST IN FEMALE, ESTROGEN RECEPTOR POSITIVE: ICD-10-CM

## 2023-01-19 DIAGNOSIS — Z17.0 MALIGNANT NEOPLASM OF CENTRAL PORTION OF RIGHT BREAST IN FEMALE, ESTROGEN RECEPTOR POSITIVE: Primary | ICD-10-CM

## 2023-01-19 DIAGNOSIS — C50.111 MALIGNANT NEOPLASM OF CENTRAL PORTION OF RIGHT BREAST IN FEMALE, ESTROGEN RECEPTOR POSITIVE: ICD-10-CM

## 2023-01-19 DIAGNOSIS — C50.111 MALIGNANT NEOPLASM OF CENTRAL PORTION OF RIGHT BREAST IN FEMALE, ESTROGEN RECEPTOR POSITIVE: Primary | ICD-10-CM

## 2023-01-19 LAB
BASOPHILS # BLD AUTO: 0.11 10*3/MM3 (ref 0–0.2)
BASOPHILS NFR BLD AUTO: 1.3 % (ref 0–1.5)
DEPRECATED RDW RBC AUTO: 72.4 FL (ref 37–54)
EOSINOPHIL # BLD AUTO: 0.12 10*3/MM3 (ref 0–0.4)
EOSINOPHIL NFR BLD AUTO: 1.4 % (ref 0.3–6.2)
ERYTHROCYTE [DISTWIDTH] IN BLOOD BY AUTOMATED COUNT: 21.6 % (ref 12.3–15.4)
HCT VFR BLD AUTO: 31.4 % (ref 34–46.6)
HGB BLD-MCNC: 9.8 G/DL (ref 12–15.9)
LYMPHOCYTES # BLD AUTO: 2.57 10*3/MM3 (ref 0.7–3.1)
LYMPHOCYTES NFR BLD AUTO: 29.6 % (ref 19.6–45.3)
MCH RBC QN AUTO: 30 PG (ref 26.6–33)
MCHC RBC AUTO-ENTMCNC: 31.2 G/DL (ref 31.5–35.7)
MCV RBC AUTO: 96 FL (ref 79–97)
MONOCYTES # BLD AUTO: 0.66 10*3/MM3 (ref 0.1–0.9)
MONOCYTES NFR BLD AUTO: 7.6 % (ref 5–12)
NEUTROPHILS NFR BLD AUTO: 5.22 10*3/MM3 (ref 1.7–7)
NEUTROPHILS NFR BLD AUTO: 60.1 % (ref 42.7–76)
PLATELET # BLD AUTO: 339 10*3/MM3 (ref 140–450)
PMV BLD AUTO: 8.6 FL (ref 6–12)
RBC # BLD AUTO: 3.27 10*6/MM3 (ref 3.77–5.28)
WBC NRBC COR # BLD: 8.68 10*3/MM3 (ref 3.4–10.8)

## 2023-01-19 PROCEDURE — 36415 COLL VENOUS BLD VENIPUNCTURE: CPT

## 2023-01-19 PROCEDURE — G0463 HOSPITAL OUTPT CLINIC VISIT: HCPCS | Performed by: RADIOLOGY

## 2023-01-19 PROCEDURE — 99214 OFFICE O/P EST MOD 30 MIN: CPT | Performed by: RADIOLOGY

## 2023-01-19 PROCEDURE — 85025 COMPLETE CBC W/AUTO DIFF WBC: CPT

## 2023-01-19 NOTE — PROGRESS NOTES
I accompanied the patient and her daughter to her appointment with Dr. De Los Santos today.    Dr. De Los Santos re-discussed the role for radiation therapy and side effects.  He stated that fatigue and dermatitis were the most common.  He discussed the need for skin care and that she would have skin care education.    Dr. De Los Santos discussed prone radiation therapy with the patient.  He stated that he would like to try the prone positioning on planning day.  The patient agreed.  The patient is scheduled for CT sim on 1/24/2023 at 9:00am.

## 2023-01-23 ENCOUNTER — TELEPHONE (OUTPATIENT)
Dept: ONCOLOGY | Facility: CLINIC | Age: 58
End: 2023-01-23

## 2023-01-23 NOTE — TELEPHONE ENCOUNTER
Caller: Alaina Hartman    Relationship to patient: Self    Best call back number: [phone]    Chief complaint: PT WANTED TO RESCHEDULE     Type of visit: VAD FLUSH    Requested date: 1-24-23     If rescheduling, when is the original appointment: 1-26-23

## 2023-01-24 ENCOUNTER — HOSPITAL ENCOUNTER (OUTPATIENT)
Dept: RADIATION ONCOLOGY | Facility: HOSPITAL | Age: 58
Discharge: HOME | End: 2023-01-24

## 2023-01-24 ENCOUNTER — HOSPITAL ENCOUNTER (OUTPATIENT)
Dept: ONCOLOGY | Facility: HOSPITAL | Age: 58
Setting detail: INFUSION SERIES
Discharge: HOME | End: 2023-01-24
Payer: COMMERCIAL

## 2023-01-24 DIAGNOSIS — Z45.2 ENCOUNTER FOR CARE RELATED TO VASCULAR ACCESS PORT: Primary | ICD-10-CM

## 2023-01-24 PROCEDURE — 77334 RADIATION TREATMENT AID(S): CPT | Performed by: RADIOLOGY

## 2023-01-24 PROCEDURE — 77290 THER RAD SIMULAJ FIELD CPLX: CPT | Performed by: RADIOLOGY

## 2023-01-24 PROCEDURE — 96523 IRRIG DRUG DELIVERY DEVICE: CPT

## 2023-01-24 PROCEDURE — 25010000002 HEPARIN LOCK FLUSH PER 10 UNITS: Performed by: INTERNAL MEDICINE

## 2023-01-24 RX ORDER — HEPARIN SODIUM (PORCINE) LOCK FLUSH IV SOLN 100 UNIT/ML 100 UNIT/ML
500 SOLUTION INTRAVENOUS AS NEEDED
Status: CANCELLED | OUTPATIENT
Start: 2023-01-24

## 2023-01-24 RX ORDER — SODIUM CHLORIDE 0.9 % (FLUSH) 0.9 %
20 SYRINGE (ML) INJECTION AS NEEDED
Status: DISCONTINUED | OUTPATIENT
Start: 2023-01-24 | End: 2023-01-25 | Stop reason: HOSPADM

## 2023-01-24 RX ORDER — HEPARIN SODIUM (PORCINE) LOCK FLUSH IV SOLN 100 UNIT/ML 100 UNIT/ML
500 SOLUTION INTRAVENOUS AS NEEDED
Status: DISCONTINUED | OUTPATIENT
Start: 2023-01-24 | End: 2023-01-25 | Stop reason: HOSPADM

## 2023-01-24 RX ORDER — SODIUM CHLORIDE 0.9 % (FLUSH) 0.9 %
20 SYRINGE (ML) INJECTION AS NEEDED
Status: CANCELLED | OUTPATIENT
Start: 2023-01-24

## 2023-01-24 RX ADMIN — Medication 20 ML: at 08:55

## 2023-01-24 RX ADMIN — HEPARIN 500 UNITS: 100 SYRINGE at 08:57

## 2023-01-24 NOTE — PROGRESS NOTES
Patient here for port flush. Port flushed without difficulty after obtained a good blood return. Patient aware of next appointment

## 2023-01-26 ENCOUNTER — APPOINTMENT (OUTPATIENT)
Dept: ONCOLOGY | Facility: HOSPITAL | Age: 58
End: 2023-01-26
Payer: COMMERCIAL

## 2023-01-27 ENCOUNTER — TELEPHONE (OUTPATIENT)
Dept: ONCOLOGY | Facility: CLINIC | Age: 58
End: 2023-01-27
Payer: COMMERCIAL

## 2023-01-27 DIAGNOSIS — C50.111 MALIGNANT NEOPLASM OF CENTRAL PORTION OF RIGHT BREAST IN FEMALE, ESTROGEN RECEPTOR POSITIVE: Primary | ICD-10-CM

## 2023-01-27 DIAGNOSIS — Z17.0 MALIGNANT NEOPLASM OF CENTRAL PORTION OF RIGHT BREAST IN FEMALE, ESTROGEN RECEPTOR POSITIVE: Primary | ICD-10-CM

## 2023-01-27 PROCEDURE — 77263 THER RADIOLOGY TX PLNG CPLX: CPT | Performed by: RADIOLOGY

## 2023-01-27 NOTE — TELEPHONE ENCOUNTER
OSW received a voicemail from patient stating she gave her FMLA forms to the [employer] to get signed by the MD.     She reports she spoke to her , who's yet to receive them.     Patient is requesting a call back.     Concha Ibarra, LSW, CSW, MSW  Oncology MSW  [facility]

## 2023-01-27 NOTE — TELEPHONE ENCOUNTER
I spoke with the patient and notified her that her FMLA forms had been completed and faxed.  She voiced understanding.

## 2023-01-30 PROCEDURE — 77334 RADIATION TREATMENT AID(S): CPT | Performed by: RADIOLOGY

## 2023-01-30 PROCEDURE — 77300 RADIATION THERAPY DOSE PLAN: CPT | Performed by: RADIOLOGY

## 2023-01-30 PROCEDURE — 77295 3-D RADIOTHERAPY PLAN: CPT | Performed by: RADIOLOGY

## 2023-01-31 ENCOUNTER — HOSPITAL ENCOUNTER (OUTPATIENT)
Dept: BONE DENSITY | Facility: HOSPITAL | Age: 58
Discharge: HOME | End: 2023-01-31
Admitting: INTERNAL MEDICINE
Payer: COMMERCIAL

## 2023-01-31 ENCOUNTER — HOSPITAL ENCOUNTER (OUTPATIENT)
Dept: RADIATION ONCOLOGY | Facility: HOSPITAL | Age: 58
Discharge: HOME | End: 2023-01-31

## 2023-01-31 DIAGNOSIS — C50.111 MALIGNANT NEOPLASM OF CENTRAL PORTION OF RIGHT BREAST IN FEMALE, ESTROGEN RECEPTOR POSITIVE: ICD-10-CM

## 2023-01-31 DIAGNOSIS — Z17.0 MALIGNANT NEOPLASM OF CENTRAL PORTION OF RIGHT BREAST IN FEMALE, ESTROGEN RECEPTOR POSITIVE: ICD-10-CM

## 2023-01-31 LAB
RAD ONC ARIA COURSE ID: NORMAL
RAD ONC ARIA COURSE LAST TREATMENT DATE: NORMAL
RAD ONC ARIA COURSE START DATE: NORMAL
RAD ONC ARIA COURSE TREATMENT ELAPSED DAYS: 0
RAD ONC ARIA FIRST TREATMENT DATE: NORMAL
RAD ONC ARIA PLAN FRACTIONS TREATED TO DATE: 1
RAD ONC ARIA PLAN ID: NORMAL
RAD ONC ARIA PLAN PRESCRIBED DOSE PER FRACTION: 2.66 GY
RAD ONC ARIA PLAN PRIMARY REFERENCE POINT: NORMAL
RAD ONC ARIA PLAN TOTAL FRACTIONS PRESCRIBED: 16
RAD ONC ARIA PLAN TOTAL PRESCRIBED DOSE: 4256 CGY
RAD ONC ARIA REFERENCE POINT DOSAGE GIVEN TO DATE: 2.66 GY
RAD ONC ARIA REFERENCE POINT ID: NORMAL
RAD ONC ARIA REFERENCE POINT SESSION DOSAGE GIVEN: 2.66 GY

## 2023-01-31 PROCEDURE — 77427 RADIATION TX MANAGEMENT X5: CPT | Performed by: RADIOLOGY

## 2023-01-31 PROCEDURE — 77280 THER RAD SIMULAJ FIELD SMPL: CPT | Performed by: RADIOLOGY

## 2023-01-31 PROCEDURE — 77412 RADIATION TX DELIVERY LVL 3: CPT | Performed by: RADIOLOGY

## 2023-01-31 PROCEDURE — 77080 DXA BONE DENSITY AXIAL: CPT

## 2023-02-01 ENCOUNTER — HOSPITAL ENCOUNTER (OUTPATIENT)
Dept: RADIATION ONCOLOGY | Facility: HOSPITAL | Age: 58
Discharge: HOME | End: 2023-02-01

## 2023-02-01 ENCOUNTER — TELEPHONE (OUTPATIENT)
Dept: ENDOCRINOLOGY | Facility: CLINIC | Age: 58
End: 2023-02-01
Payer: COMMERCIAL

## 2023-02-01 ENCOUNTER — HOSPITAL ENCOUNTER (OUTPATIENT)
Dept: RADIATION ONCOLOGY | Facility: HOSPITAL | Age: 58
Setting detail: RADIATION/ONCOLOGY SERIES
End: 2023-02-01
Payer: COMMERCIAL

## 2023-02-01 DIAGNOSIS — E11.65 TYPE 2 DIABETES MELLITUS WITH HYPERGLYCEMIA, WITHOUT LONG-TERM CURRENT USE OF INSULIN: Primary | ICD-10-CM

## 2023-02-01 LAB
RAD ONC ARIA COURSE ID: NORMAL
RAD ONC ARIA COURSE LAST TREATMENT DATE: NORMAL
RAD ONC ARIA COURSE START DATE: NORMAL
RAD ONC ARIA COURSE TREATMENT ELAPSED DAYS: 1
RAD ONC ARIA FIRST TREATMENT DATE: NORMAL
RAD ONC ARIA PLAN FRACTIONS TREATED TO DATE: 2
RAD ONC ARIA PLAN ID: NORMAL
RAD ONC ARIA PLAN PRESCRIBED DOSE PER FRACTION: 2.66 GY
RAD ONC ARIA PLAN PRIMARY REFERENCE POINT: NORMAL
RAD ONC ARIA PLAN TOTAL FRACTIONS PRESCRIBED: 16
RAD ONC ARIA PLAN TOTAL PRESCRIBED DOSE: 4256 CGY
RAD ONC ARIA REFERENCE POINT DOSAGE GIVEN TO DATE: 5.32 GY
RAD ONC ARIA REFERENCE POINT ID: NORMAL
RAD ONC ARIA REFERENCE POINT SESSION DOSAGE GIVEN: 2.66 GY

## 2023-02-01 PROCEDURE — 77412 RADIATION TX DELIVERY LVL 3: CPT | Performed by: RADIOLOGY

## 2023-02-01 PROCEDURE — 77417 THER RADIOLOGY PORT IMAGE(S): CPT | Performed by: RADIOLOGY

## 2023-02-01 NOTE — TELEPHONE ENCOUNTER
Is it ok to fill pts insulin at 55 units instead of the 35units. Last correspondence 11/11/22. Please advise.

## 2023-02-02 ENCOUNTER — HOSPITAL ENCOUNTER (OUTPATIENT)
Dept: RADIATION ONCOLOGY | Facility: HOSPITAL | Age: 58
Discharge: HOME | End: 2023-02-02

## 2023-02-02 LAB
RAD ONC ARIA COURSE ID: NORMAL
RAD ONC ARIA COURSE LAST TREATMENT DATE: NORMAL
RAD ONC ARIA COURSE START DATE: NORMAL
RAD ONC ARIA COURSE TREATMENT ELAPSED DAYS: 2
RAD ONC ARIA FIRST TREATMENT DATE: NORMAL
RAD ONC ARIA PLAN FRACTIONS TREATED TO DATE: 3
RAD ONC ARIA PLAN ID: NORMAL
RAD ONC ARIA PLAN PRESCRIBED DOSE PER FRACTION: 2.66 GY
RAD ONC ARIA PLAN PRIMARY REFERENCE POINT: NORMAL
RAD ONC ARIA PLAN TOTAL FRACTIONS PRESCRIBED: 16
RAD ONC ARIA PLAN TOTAL PRESCRIBED DOSE: 4256 CGY
RAD ONC ARIA REFERENCE POINT DOSAGE GIVEN TO DATE: 7.98 GY
RAD ONC ARIA REFERENCE POINT ID: NORMAL
RAD ONC ARIA REFERENCE POINT SESSION DOSAGE GIVEN: 2.66 GY

## 2023-02-02 PROCEDURE — 77336 RADIATION PHYSICS CONSULT: CPT | Performed by: RADIOLOGY

## 2023-02-02 PROCEDURE — 77412 RADIATION TX DELIVERY LVL 3: CPT | Performed by: RADIOLOGY

## 2023-02-03 ENCOUNTER — HOSPITAL ENCOUNTER (OUTPATIENT)
Dept: RADIATION ONCOLOGY | Facility: HOSPITAL | Age: 58
Discharge: HOME | End: 2023-02-03

## 2023-02-03 LAB
RAD ONC ARIA COURSE ID: NORMAL
RAD ONC ARIA COURSE LAST TREATMENT DATE: NORMAL
RAD ONC ARIA COURSE START DATE: NORMAL
RAD ONC ARIA COURSE TREATMENT ELAPSED DAYS: 3
RAD ONC ARIA FIRST TREATMENT DATE: NORMAL
RAD ONC ARIA PLAN FRACTIONS TREATED TO DATE: 4
RAD ONC ARIA PLAN ID: NORMAL
RAD ONC ARIA PLAN PRESCRIBED DOSE PER FRACTION: 2.66 GY
RAD ONC ARIA PLAN PRIMARY REFERENCE POINT: NORMAL
RAD ONC ARIA PLAN TOTAL FRACTIONS PRESCRIBED: 16
RAD ONC ARIA PLAN TOTAL PRESCRIBED DOSE: 4256 CGY
RAD ONC ARIA REFERENCE POINT DOSAGE GIVEN TO DATE: 10.64 GY
RAD ONC ARIA REFERENCE POINT ID: NORMAL
RAD ONC ARIA REFERENCE POINT SESSION DOSAGE GIVEN: 2.66 GY

## 2023-02-03 PROCEDURE — 77412 RADIATION TX DELIVERY LVL 3: CPT | Performed by: RADIOLOGY

## 2023-02-03 RX ORDER — INSULIN LISPRO 100 [IU]/ML
INJECTION, SUSPENSION SUBCUTANEOUS
Qty: 30 ML | Refills: 0 | Status: CANCELLED | OUTPATIENT
Start: 2023-02-03

## 2023-02-03 RX ORDER — INSULIN LISPRO 100 [IU]/ML
55 INJECTION, SUSPENSION SUBCUTANEOUS
Qty: 33 ML | Refills: 2 | Status: SHIPPED | OUTPATIENT
Start: 2023-02-03 | End: 2023-02-20 | Stop reason: SDUPTHER

## 2023-02-03 NOTE — TELEPHONE ENCOUNTER
Chirag Cho MD  You 16 hours ago (4:40 PM)       If she is taking insulin 55 units 3 times a day then okay to refill that.

## 2023-02-06 ENCOUNTER — HOSPITAL ENCOUNTER (OUTPATIENT)
Dept: RADIATION ONCOLOGY | Facility: HOSPITAL | Age: 58
Discharge: HOME | End: 2023-02-06

## 2023-02-06 ENCOUNTER — RADIATION ONCOLOGY WEEKLY ASSESSMENT (OUTPATIENT)
Dept: RADIATION ONCOLOGY | Facility: HOSPITAL | Age: 58
End: 2023-02-06
Payer: COMMERCIAL

## 2023-02-06 VITALS
BODY MASS INDEX: 44.9 KG/M2 | WEIGHT: 261.6 LBS | RESPIRATION RATE: 18 BRPM | OXYGEN SATURATION: 96 % | DIASTOLIC BLOOD PRESSURE: 79 MMHG | SYSTOLIC BLOOD PRESSURE: 138 MMHG | HEART RATE: 87 BPM

## 2023-02-06 DIAGNOSIS — Z17.0 MALIGNANT NEOPLASM OF CENTRAL PORTION OF RIGHT BREAST IN FEMALE, ESTROGEN RECEPTOR POSITIVE: Primary | ICD-10-CM

## 2023-02-06 DIAGNOSIS — C50.111 MALIGNANT NEOPLASM OF CENTRAL PORTION OF RIGHT BREAST IN FEMALE, ESTROGEN RECEPTOR POSITIVE: Primary | ICD-10-CM

## 2023-02-06 LAB
RAD ONC ARIA COURSE ID: NORMAL
RAD ONC ARIA COURSE LAST TREATMENT DATE: NORMAL
RAD ONC ARIA COURSE START DATE: NORMAL
RAD ONC ARIA COURSE TREATMENT ELAPSED DAYS: 6
RAD ONC ARIA FIRST TREATMENT DATE: NORMAL
RAD ONC ARIA PLAN FRACTIONS TREATED TO DATE: 5
RAD ONC ARIA PLAN ID: NORMAL
RAD ONC ARIA PLAN PRESCRIBED DOSE PER FRACTION: 2.66 GY
RAD ONC ARIA PLAN PRIMARY REFERENCE POINT: NORMAL
RAD ONC ARIA PLAN TOTAL FRACTIONS PRESCRIBED: 16
RAD ONC ARIA PLAN TOTAL PRESCRIBED DOSE: 4256 CGY
RAD ONC ARIA REFERENCE POINT DOSAGE GIVEN TO DATE: 13.3 GY
RAD ONC ARIA REFERENCE POINT ID: NORMAL
RAD ONC ARIA REFERENCE POINT SESSION DOSAGE GIVEN: 2.66 GY

## 2023-02-06 PROCEDURE — 77412 RADIATION TX DELIVERY LVL 3: CPT | Performed by: RADIOLOGY

## 2023-02-06 PROCEDURE — FACE2FACE: Performed by: RADIOLOGY

## 2023-02-06 NOTE — PROGRESS NOTES
"Patient Name: Alaina Hartman Date: 2023       : 1965        MRN #: [id number] Diagnosis:   Encounter Diagnosis   Name Primary?   • Malignant neoplasm of central portion of right breast in female, estrogen receptor positive (HCC) Yes                     RADIATION ON TREATMENT VISIT NOTE - BREAST    Treatment Summary    Treatment Site Ref. ID Energy Dose/Fx (cGy) #Fx Dose Correction (cGy) Total Dose (cGy) Start Date End Date Elapsed Days   RtBreast RtBreast 18X/6X 266  0 1,330 2023 6         General:           Review of Systems    [x] No new complaints  [] Skin itching   [] Skin breakdown  [] Breast swelling   [] Dysphagia   [] Dry cough  [] Productive cough   [] Fever/chills  [] Skin soreness with pain, severity: ----------------   [] Breast pain,  severity: ----------------, location:   [x] Fatigue,  severity: 0 None      Skin regimen: Other: Eucerin + GTS BID    [x] Patient given standard skin care instructions for breast cancer treatment including no aluminum containing antiperspirants & no unapproved lotions or soaps.  Patient informed to notify radiation staff if irritation/itching occurs.    Comments/Notes:   Doing very well, no issues  Discussed feeling \"some heaviness\"  With treatment position--no role now but discussed ibuprofen.    KPS: 90%       Vital Signs: /79   Pulse 87   Resp 18   Wt 119 kg (261 lb 9.6 oz)   SpO2 96%   BMI 44.90 kg/m²     Weight:   Wt Readings from Last 3 Encounters:   23 119 kg (261 lb 9.6 oz)   23 116 kg (255 lb 6.4 oz)   23 116 kg (256 lb)       Medication:   Current Outpatient Medications:   •  ALPRAZolam (XANAX) 0.5 MG tablet, Take 1 tablet by mouth Daily As Needed for Anxiety., Disp: 20 tablet, Rfl: 0  •  atorvastatin (LIPITOR) 40 MG tablet, Take 1 tablet by mouth Daily., Disp: 90 tablet, Rfl: 4  •  BIOTIN PO, Take  by mouth., Disp: , Rfl:   •  Cyanocobalamin (Vitamin B-12) 1000 MCG sublingual tablet, Place 1 tablet " under the tongue Daily., Disp: 100 each, Rfl: 4  •  dexamethasone (DECADRON) 4 MG tablet, Take 2 tablets oral twice a day for 3 consecutive days beginning the day before chemotherapy and continue for 6 doses., Disp: 12 tablet, Rfl: 3  •  glucose blood (Accu-Chek Guide) test strip, 1 each by Other route 4 (Four) Times a Day. Use as instructed, Disp: 150 each, Rfl: 11  •  HYDROcodone-acetaminophen (NORCO) 5-325 MG per tablet, Take 1 tablet by mouth Every 6 (Six) Hours As Needed for Moderate Pain., Disp: 12 tablet, Rfl: 0  •  Insulin Lispro Prot & Lispro (HumaLOG Mix 75/25 KwikPen) (75-25) 100 UNIT/ML suspension pen-injector pen, Inject 55 Units under the skin into the appropriate area as directed 3 (Three) Times a Day Before Meals. INJECT 35 UNITS SUBCUTANEOUSLY THREE TIMES DAILY BEFORE  EACH  MEAL, Disp: 33 mL, Rfl: 2  •  Insulin Pen Needle (Pen Needles) 32G X 4 MM misc, 1 each 4 (Four) Times a Day. Use to inject insulin / DX E11.65, Disp: 150 each, Rfl: 5  •  lidocaine-prilocaine (EMLA) 2.5-2.5 % cream, Apply 1 application topically to the appropriate area as directed Every 2 (Two) Hours As Needed for Mild Pain. Apply to port site one hour prior to chemotherapy appointment, Disp: 1 g, Rfl: 5  •  lisinopril (PRINIVIL,ZESTRIL) 5 MG tablet, Take 1 tablet by mouth once daily, Disp: 90 tablet, Rfl: 0  •  metFORMIN ER (GLUCOPHAGE-XR) 500 MG 24 hr tablet, Take 2 tablets by mouth once daily, Disp: 180 tablet, Rfl: 0  •  multivitamin with minerals tablet tablet, Take 1 tablet by mouth Daily., Disp: , Rfl:   •  omeprazole (priLOSEC) 40 MG capsule, TAKE 1 CAPSULE BY MOUTH ONCE DAILY . APPOINTMENT REQUIRED FOR FUTURE REFILLS, Disp: 90 capsule, Rfl: 0  •  ondansetron (ZOFRAN) 8 MG tablet, Take 1 tablet by mouth 3 (Three) Times a Day As Needed for Nausea or Vomiting., Disp: 30 tablet, Rfl: 5  •  Vitamin D, Ergocalciferol, 50 MCG (2000 UT) capsule, Take 2,000 Units by mouth Daily., Disp: 100 capsule, Rfl: 4       LABS  (Reviewed):  Hematology WBC   Date Value Ref Range Status   01/19/2023 8.68 3.40 - 10.80 10*3/mm3 Final   08/26/2020 11.0 (H) 3.4 - 10.8 x10E3/uL Final     RBC   Date Value Ref Range Status   01/19/2023 3.27 (L) 3.77 - 5.28 10*6/mm3 Final   08/26/2020 4.53 3.77 - 5.28 x10E6/uL Final     Hemoglobin   Date Value Ref Range Status   01/19/2023 9.8 (L) 12.0 - 15.9 g/dL Final     Hematocrit   Date Value Ref Range Status   01/19/2023 31.4 (L) 34.0 - 46.6 % Final     Platelets   Date Value Ref Range Status   01/19/2023 339 140 - 450 10*3/mm3 Final      Chemistry   Lab Results   Component Value Date    GLUCOSE 445 (C) 12/29/2022    BUN 21 (H) 12/29/2022    CREATININE 0.78 12/29/2022    EGFRIFNONA 90 12/01/2021    EGFRIFAFRI 115 08/26/2020    BCR 26.9 (H) 12/29/2022    K 4.3 12/29/2022    CO2 17.0 (L) 12/29/2022    CALCIUM 9.9 12/29/2022    PROTENTOTREF 7.0 08/26/2020    ALBUMIN 4.1 12/29/2022    LABIL2 1.8 08/26/2020    AST 14 12/29/2022    ALT 23 12/29/2022         Physical Exam:           Neck: [] Lymphadenopathy  Lungs: [x] Clear to auscultation  CVS: [x] Regular rate & rhythm  Skin: [x] stGstrstastdstest:st st1st Comments/Notes:     [x] Review of labs, images, dosimetry, dose delivered, & treatment parameters.    Comments:     [x] Patient treatment setup reviewed.    Comments:     Recommendations: continue XRT and supportive measures    [x] Continue RT  [] Change RT Plan [] Hold RT, length:        Approved Electronically By:  Jagdeep De Los Santos MD, 2/6/2023, 08:07 EST          Confidentiality of this medical record shall be maintained except when use or disclosure is required or permitted by law, regulation or written authorization by the patient.

## 2023-02-07 ENCOUNTER — HOSPITAL ENCOUNTER (OUTPATIENT)
Dept: RADIATION ONCOLOGY | Facility: HOSPITAL | Age: 58
Discharge: HOME | End: 2023-02-07

## 2023-02-07 ENCOUNTER — TELEPHONE (OUTPATIENT)
Dept: ONCOLOGY | Facility: CLINIC | Age: 58
End: 2023-02-07

## 2023-02-07 DIAGNOSIS — E11.65 TYPE 2 DIABETES MELLITUS WITH HYPERGLYCEMIA, WITHOUT LONG-TERM CURRENT USE OF INSULIN: ICD-10-CM

## 2023-02-07 LAB
RAD ONC ARIA COURSE ID: NORMAL
RAD ONC ARIA COURSE LAST TREATMENT DATE: NORMAL
RAD ONC ARIA COURSE START DATE: NORMAL
RAD ONC ARIA COURSE TREATMENT ELAPSED DAYS: 7
RAD ONC ARIA FIRST TREATMENT DATE: NORMAL
RAD ONC ARIA PLAN FRACTIONS TREATED TO DATE: 6
RAD ONC ARIA PLAN ID: NORMAL
RAD ONC ARIA PLAN PRESCRIBED DOSE PER FRACTION: 2.66 GY
RAD ONC ARIA PLAN PRIMARY REFERENCE POINT: NORMAL
RAD ONC ARIA PLAN TOTAL FRACTIONS PRESCRIBED: 16
RAD ONC ARIA PLAN TOTAL PRESCRIBED DOSE: 4256 CGY
RAD ONC ARIA REFERENCE POINT DOSAGE GIVEN TO DATE: 15.96 GY
RAD ONC ARIA REFERENCE POINT ID: NORMAL
RAD ONC ARIA REFERENCE POINT SESSION DOSAGE GIVEN: 2.66 GY

## 2023-02-07 PROCEDURE — 77417 THER RADIOLOGY PORT IMAGE(S): CPT | Performed by: RADIOLOGY

## 2023-02-07 PROCEDURE — 77412 RADIATION TX DELIVERY LVL 3: CPT | Performed by: RADIOLOGY

## 2023-02-07 PROCEDURE — 77427 RADIATION TX MANAGEMENT X5: CPT | Performed by: RADIOLOGY

## 2023-02-07 NOTE — TELEPHONE ENCOUNTER
Caller: Alaina Hartman    Relationship to patient: Self    Best call back number: [phone]    Chief complaint: PT NEEDS TO SCHEDULE HER FOLLOW UP, SHE IS DONE WITH RADIATION ON 2-21-23     Type of visit: FOLLOW UP    Requested date: 3-7-23

## 2023-02-08 ENCOUNTER — HOSPITAL ENCOUNTER (OUTPATIENT)
Dept: RADIATION ONCOLOGY | Facility: HOSPITAL | Age: 58
Discharge: HOME | End: 2023-02-08

## 2023-02-08 LAB
RAD ONC ARIA COURSE ID: NORMAL
RAD ONC ARIA COURSE LAST TREATMENT DATE: NORMAL
RAD ONC ARIA COURSE START DATE: NORMAL
RAD ONC ARIA COURSE TREATMENT ELAPSED DAYS: 8
RAD ONC ARIA FIRST TREATMENT DATE: NORMAL
RAD ONC ARIA PLAN FRACTIONS TREATED TO DATE: 7
RAD ONC ARIA PLAN ID: NORMAL
RAD ONC ARIA PLAN PRESCRIBED DOSE PER FRACTION: 2.66 GY
RAD ONC ARIA PLAN PRIMARY REFERENCE POINT: NORMAL
RAD ONC ARIA PLAN TOTAL FRACTIONS PRESCRIBED: 16
RAD ONC ARIA PLAN TOTAL PRESCRIBED DOSE: 4256 CGY
RAD ONC ARIA REFERENCE POINT DOSAGE GIVEN TO DATE: 18.62 GY
RAD ONC ARIA REFERENCE POINT ID: NORMAL
RAD ONC ARIA REFERENCE POINT SESSION DOSAGE GIVEN: 2.66 GY

## 2023-02-08 PROCEDURE — 77412 RADIATION TX DELIVERY LVL 3: CPT | Performed by: RADIOLOGY

## 2023-02-09 ENCOUNTER — HOSPITAL ENCOUNTER (OUTPATIENT)
Dept: RADIATION ONCOLOGY | Facility: HOSPITAL | Age: 58
Discharge: HOME | End: 2023-02-09

## 2023-02-09 LAB
RAD ONC ARIA COURSE ID: NORMAL
RAD ONC ARIA COURSE LAST TREATMENT DATE: NORMAL
RAD ONC ARIA COURSE START DATE: NORMAL
RAD ONC ARIA COURSE TREATMENT ELAPSED DAYS: 9
RAD ONC ARIA FIRST TREATMENT DATE: NORMAL
RAD ONC ARIA PLAN FRACTIONS TREATED TO DATE: 8
RAD ONC ARIA PLAN ID: NORMAL
RAD ONC ARIA PLAN PRESCRIBED DOSE PER FRACTION: 2.66 GY
RAD ONC ARIA PLAN PRIMARY REFERENCE POINT: NORMAL
RAD ONC ARIA PLAN TOTAL FRACTIONS PRESCRIBED: 16
RAD ONC ARIA PLAN TOTAL PRESCRIBED DOSE: 4256 CGY
RAD ONC ARIA REFERENCE POINT DOSAGE GIVEN TO DATE: 21.28 GY
RAD ONC ARIA REFERENCE POINT ID: NORMAL
RAD ONC ARIA REFERENCE POINT SESSION DOSAGE GIVEN: 2.66 GY

## 2023-02-09 PROCEDURE — 77336 RADIATION PHYSICS CONSULT: CPT | Performed by: RADIOLOGY

## 2023-02-09 PROCEDURE — 77412 RADIATION TX DELIVERY LVL 3: CPT | Performed by: RADIOLOGY

## 2023-02-10 ENCOUNTER — HOSPITAL ENCOUNTER (OUTPATIENT)
Dept: RADIATION ONCOLOGY | Facility: HOSPITAL | Age: 58
Discharge: HOME | End: 2023-02-10

## 2023-02-10 LAB
RAD ONC ARIA COURSE ID: NORMAL
RAD ONC ARIA COURSE LAST TREATMENT DATE: NORMAL
RAD ONC ARIA COURSE START DATE: NORMAL
RAD ONC ARIA COURSE TREATMENT ELAPSED DAYS: 10
RAD ONC ARIA FIRST TREATMENT DATE: NORMAL
RAD ONC ARIA PLAN FRACTIONS TREATED TO DATE: 9
RAD ONC ARIA PLAN ID: NORMAL
RAD ONC ARIA PLAN PRESCRIBED DOSE PER FRACTION: 2.66 GY
RAD ONC ARIA PLAN PRIMARY REFERENCE POINT: NORMAL
RAD ONC ARIA PLAN TOTAL FRACTIONS PRESCRIBED: 16
RAD ONC ARIA PLAN TOTAL PRESCRIBED DOSE: 4256 CGY
RAD ONC ARIA REFERENCE POINT DOSAGE GIVEN TO DATE: 23.94 GY
RAD ONC ARIA REFERENCE POINT ID: NORMAL
RAD ONC ARIA REFERENCE POINT SESSION DOSAGE GIVEN: 2.66 GY

## 2023-02-10 PROCEDURE — 77412 RADIATION TX DELIVERY LVL 3: CPT | Performed by: RADIOLOGY

## 2023-02-13 ENCOUNTER — LAB (OUTPATIENT)
Dept: LAB | Facility: HOSPITAL | Age: 58
End: 2023-02-13
Payer: COMMERCIAL

## 2023-02-13 ENCOUNTER — HOSPITAL ENCOUNTER (OUTPATIENT)
Dept: RADIATION ONCOLOGY | Facility: HOSPITAL | Age: 58
Discharge: HOME | End: 2023-02-13
Payer: COMMERCIAL

## 2023-02-13 ENCOUNTER — RADIATION ONCOLOGY WEEKLY ASSESSMENT (OUTPATIENT)
Dept: RADIATION ONCOLOGY | Facility: HOSPITAL | Age: 58
End: 2023-02-13
Payer: COMMERCIAL

## 2023-02-13 VITALS
DIASTOLIC BLOOD PRESSURE: 84 MMHG | OXYGEN SATURATION: 97 % | RESPIRATION RATE: 18 BRPM | SYSTOLIC BLOOD PRESSURE: 156 MMHG | WEIGHT: 256 LBS | BODY MASS INDEX: 43.94 KG/M2 | HEART RATE: 88 BPM

## 2023-02-13 DIAGNOSIS — E11.65 TYPE 2 DIABETES MELLITUS WITH HYPERGLYCEMIA, WITHOUT LONG-TERM CURRENT USE OF INSULIN: Primary | ICD-10-CM

## 2023-02-13 DIAGNOSIS — E78.2 MIXED HYPERLIPIDEMIA: ICD-10-CM

## 2023-02-13 DIAGNOSIS — E11.65 TYPE 2 DIABETES MELLITUS WITH HYPERGLYCEMIA, WITHOUT LONG-TERM CURRENT USE OF INSULIN: ICD-10-CM

## 2023-02-13 DIAGNOSIS — Z17.0 MALIGNANT NEOPLASM OF CENTRAL PORTION OF RIGHT BREAST IN FEMALE, ESTROGEN RECEPTOR POSITIVE: Primary | ICD-10-CM

## 2023-02-13 DIAGNOSIS — C50.111 MALIGNANT NEOPLASM OF CENTRAL PORTION OF RIGHT BREAST IN FEMALE, ESTROGEN RECEPTOR POSITIVE: Primary | ICD-10-CM

## 2023-02-13 DIAGNOSIS — I10 HYPERTENSION, ESSENTIAL: ICD-10-CM

## 2023-02-13 LAB
ALBUMIN SERPL-MCNC: 4.2 G/DL (ref 3.5–5.2)
ALBUMIN/GLOB SERPL: 1.9 G/DL
ALP SERPL-CCNC: 108 U/L (ref 39–117)
ALT SERPL W P-5'-P-CCNC: 20 U/L (ref 1–33)
ANION GAP SERPL CALCULATED.3IONS-SCNC: 11.1 MMOL/L (ref 5–15)
AST SERPL-CCNC: 16 U/L (ref 1–32)
BILIRUB SERPL-MCNC: 0.4 MG/DL (ref 0–1.2)
BUN SERPL-MCNC: 15 MG/DL (ref 6–20)
BUN/CREAT SERPL: 25 (ref 7–25)
CALCIUM SPEC-SCNC: 9.7 MG/DL (ref 8.6–10.5)
CHLORIDE SERPL-SCNC: 103 MMOL/L (ref 98–107)
CHOLEST SERPL-MCNC: 136 MG/DL (ref 0–200)
CO2 SERPL-SCNC: 25.9 MMOL/L (ref 22–29)
CREAT SERPL-MCNC: 0.6 MG/DL (ref 0.57–1)
EGFRCR SERPLBLD CKD-EPI 2021: 106.2 ML/MIN/1.73
GLOBULIN UR ELPH-MCNC: 2.2 GM/DL
GLUCOSE SERPL-MCNC: 217 MG/DL (ref 65–99)
HBA1C MFR BLD: 7 % (ref 3.5–5.6)
HDLC SERPL-MCNC: 33 MG/DL (ref 40–60)
LDLC SERPL CALC-MCNC: 65 MG/DL (ref 0–100)
LDLC/HDLC SERPL: 1.7 {RATIO}
POTASSIUM SERPL-SCNC: 4.4 MMOL/L (ref 3.5–5.2)
PROT SERPL-MCNC: 6.4 G/DL (ref 6–8.5)
RAD ONC ARIA COURSE ID: NORMAL
RAD ONC ARIA COURSE LAST TREATMENT DATE: NORMAL
RAD ONC ARIA COURSE START DATE: NORMAL
RAD ONC ARIA COURSE TREATMENT ELAPSED DAYS: 13
RAD ONC ARIA FIRST TREATMENT DATE: NORMAL
RAD ONC ARIA PLAN FRACTIONS TREATED TO DATE: 10
RAD ONC ARIA PLAN ID: NORMAL
RAD ONC ARIA PLAN PRESCRIBED DOSE PER FRACTION: 2.66 GY
RAD ONC ARIA PLAN PRIMARY REFERENCE POINT: NORMAL
RAD ONC ARIA PLAN TOTAL FRACTIONS PRESCRIBED: 16
RAD ONC ARIA PLAN TOTAL PRESCRIBED DOSE: 4256 CGY
RAD ONC ARIA REFERENCE POINT DOSAGE GIVEN TO DATE: 26.6 GY
RAD ONC ARIA REFERENCE POINT ID: NORMAL
RAD ONC ARIA REFERENCE POINT SESSION DOSAGE GIVEN: 2.66 GY
SODIUM SERPL-SCNC: 140 MMOL/L (ref 136–145)
TRIGL SERPL-MCNC: 234 MG/DL (ref 0–150)
VLDLC SERPL-MCNC: 38 MG/DL (ref 5–40)

## 2023-02-13 PROCEDURE — 80053 COMPREHEN METABOLIC PANEL: CPT

## 2023-02-13 PROCEDURE — 83036 HEMOGLOBIN GLYCOSYLATED A1C: CPT

## 2023-02-13 PROCEDURE — 77412 RADIATION TX DELIVERY LVL 3: CPT | Performed by: RADIOLOGY

## 2023-02-13 PROCEDURE — FACE2FACE: Performed by: RADIOLOGY

## 2023-02-13 PROCEDURE — 36415 COLL VENOUS BLD VENIPUNCTURE: CPT

## 2023-02-13 PROCEDURE — 80061 LIPID PANEL: CPT

## 2023-02-13 NOTE — PROGRESS NOTES
Patient Name: Alaina Hartman Date: 2023       : 1965        MRN #: [id number] Diagnosis:   Encounter Diagnosis   Name Primary?   • Malignant neoplasm of central portion of right breast in female, estrogen receptor positive (HCC) Yes                     RADIATION ON TREATMENT VISIT NOTE - BREAST    Treatment Summary    Treatment Site Ref. ID Energy Dose/Fx (cGy) #Fx Dose Correction (cGy) Total Dose (cGy) Start Date End Date Elapsed Days   RtBreast RtBreast 18X/6X 266 10 / 16 0 2,660 2023 13         General:           Review of Systems    [] No new complaints  [] Skin itching   [] Skin breakdown  [] Breast swelling   [] Dysphagia   [] Dry cough  [] Productive cough   [] Fever/chills  [x] Skin soreness with pain, severity: 0   [x] Breast pain,  severity: 0, location:   [x] Fatigue,  severity: 1 Relief w/ Rest      Skin regimen: Other: Eucerin + GTS BID    [x] Patient given standard skin care instructions for breast cancer treatment including no aluminum containing antiperspirants & no unapproved lotions or soaps.  Patient informed to notify radiation staff if irritation/itching occurs.    Comments/Notes:  Noticing more redness but no pain.    KPS: 90%       Vital Signs: /84   Pulse 88   Resp 18   Wt 116 kg (256 lb)   SpO2 97%   BMI 43.94 kg/m²     Weight:   Wt Readings from Last 3 Encounters:   23 116 kg (256 lb)   23 119 kg (261 lb 9.6 oz)   23 116 kg (255 lb 6.4 oz)       Medication:   Current Outpatient Medications:   •  ALPRAZolam (XANAX) 0.5 MG tablet, Take 1 tablet by mouth Daily As Needed for Anxiety., Disp: 20 tablet, Rfl: 0  •  atorvastatin (LIPITOR) 40 MG tablet, Take 1 tablet by mouth Daily., Disp: 90 tablet, Rfl: 4  •  BIOTIN PO, Take  by mouth., Disp: , Rfl:   •  Cyanocobalamin (Vitamin B-12) 1000 MCG sublingual tablet, Place 1 tablet under the tongue Daily., Disp: 100 each, Rfl: 4  •  dexamethasone (DECADRON) 4 MG tablet, Take 2 tablets oral twice  a day for 3 consecutive days beginning the day before chemotherapy and continue for 6 doses., Disp: 12 tablet, Rfl: 3  •  glucose blood (Accu-Chek Guide) test strip, 1 each by Other route 4 (Four) Times a Day. Use as instructed, Disp: 150 each, Rfl: 11  •  HYDROcodone-acetaminophen (NORCO) 5-325 MG per tablet, Take 1 tablet by mouth Every 6 (Six) Hours As Needed for Moderate Pain., Disp: 12 tablet, Rfl: 0  •  Insulin Lispro Prot & Lispro (HumaLOG Mix 75/25 KwikPen) (75-25) 100 UNIT/ML suspension pen-injector pen, Inject 55 Units under the skin into the appropriate area as directed 3 (Three) Times a Day Before Meals. INJECT 35 UNITS SUBCUTANEOUSLY THREE TIMES DAILY BEFORE  EACH  MEAL, Disp: 33 mL, Rfl: 2  •  Insulin Pen Needle (Pen Needles) 32G X 4 MM misc, 1 each 4 (Four) Times a Day. Use to inject insulin / DX E11.65, Disp: 150 each, Rfl: 5  •  lidocaine-prilocaine (EMLA) 2.5-2.5 % cream, Apply 1 application topically to the appropriate area as directed Every 2 (Two) Hours As Needed for Mild Pain. Apply to port site one hour prior to chemotherapy appointment, Disp: 1 g, Rfl: 5  •  lisinopril (PRINIVIL,ZESTRIL) 5 MG tablet, Take 1 tablet by mouth once daily, Disp: 90 tablet, Rfl: 0  •  metFORMIN ER (GLUCOPHAGE-XR) 500 MG 24 hr tablet, Take 2 tablets by mouth once daily, Disp: 180 tablet, Rfl: 0  •  multivitamin with minerals tablet tablet, Take 1 tablet by mouth Daily., Disp: , Rfl:   •  omeprazole (priLOSEC) 40 MG capsule, TAKE 1 CAPSULE BY MOUTH ONCE DAILY . APPOINTMENT REQUIRED FOR FUTURE REFILLS, Disp: 90 capsule, Rfl: 0  •  ondansetron (ZOFRAN) 8 MG tablet, Take 1 tablet by mouth 3 (Three) Times a Day As Needed for Nausea or Vomiting., Disp: 30 tablet, Rfl: 5  •  Vitamin D, Ergocalciferol, 50 MCG (2000 UT) capsule, Take 2,000 Units by mouth Daily., Disp: 100 capsule, Rfl: 4       LABS (Reviewed):  Hematology WBC   Date Value Ref Range Status   01/19/2023 8.68 3.40 - 10.80 10*3/mm3 Final   08/26/2020 11.0 (H)  3.4 - 10.8 x10E3/uL Final     RBC   Date Value Ref Range Status   01/19/2023 3.27 (L) 3.77 - 5.28 10*6/mm3 Final   08/26/2020 4.53 3.77 - 5.28 x10E6/uL Final     Hemoglobin   Date Value Ref Range Status   01/19/2023 9.8 (L) 12.0 - 15.9 g/dL Final     Hematocrit   Date Value Ref Range Status   01/19/2023 31.4 (L) 34.0 - 46.6 % Final     Platelets   Date Value Ref Range Status   01/19/2023 339 140 - 450 10*3/mm3 Final      Chemistry   Lab Results   Component Value Date    GLUCOSE 445 (C) 12/29/2022    BUN 21 (H) 12/29/2022    CREATININE 0.78 12/29/2022    EGFRIFNONA 90 12/01/2021    EGFRIFAFRI 115 08/26/2020    BCR 26.9 (H) 12/29/2022    K 4.3 12/29/2022    CO2 17.0 (L) 12/29/2022    CALCIUM 9.9 12/29/2022    PROTENTOTREF 7.0 08/26/2020    ALBUMIN 4.1 12/29/2022    LABIL2 1.8 08/26/2020    AST 14 12/29/2022    ALT 23 12/29/2022         Physical Exam:           Neck: [] Lymphadenopathy  Lungs: [x] Clear to auscultation  CVS: [x] Regular rate & rhythm  Skin: [x] ndGndrndanddndend:nd nd2nd Comments/Notes:     [x] Review of labs, images, dosimetry, dose delivered, & treatment parameters.    Comments:     [x] Patient treatment setup reviewed.    Comments:     Recommendations: no itching--discussed mometasone  Continue XRT and supportive measures      [x] Continue RT  [] Change RT Plan [] Hold RT, length:        Approved Electronically By:  Jagdeep De Los Santos MD, 2/13/2023, 10:02 EST          Confidentiality of this medical record shall be maintained except when use or disclosure is required or permitted by law, regulation or written authorization by the patient.

## 2023-02-14 ENCOUNTER — HOSPITAL ENCOUNTER (OUTPATIENT)
Dept: RADIATION ONCOLOGY | Facility: HOSPITAL | Age: 58
Discharge: HOME | End: 2023-02-14

## 2023-02-14 LAB
RAD ONC ARIA COURSE ID: NORMAL
RAD ONC ARIA COURSE LAST TREATMENT DATE: NORMAL
RAD ONC ARIA COURSE START DATE: NORMAL
RAD ONC ARIA COURSE TREATMENT ELAPSED DAYS: 14
RAD ONC ARIA FIRST TREATMENT DATE: NORMAL
RAD ONC ARIA PLAN FRACTIONS TREATED TO DATE: 11
RAD ONC ARIA PLAN ID: NORMAL
RAD ONC ARIA PLAN PRESCRIBED DOSE PER FRACTION: 2.66 GY
RAD ONC ARIA PLAN PRIMARY REFERENCE POINT: NORMAL
RAD ONC ARIA PLAN TOTAL FRACTIONS PRESCRIBED: 16
RAD ONC ARIA PLAN TOTAL PRESCRIBED DOSE: 4256 CGY
RAD ONC ARIA REFERENCE POINT DOSAGE GIVEN TO DATE: 29.26 GY
RAD ONC ARIA REFERENCE POINT ID: NORMAL
RAD ONC ARIA REFERENCE POINT SESSION DOSAGE GIVEN: 2.66 GY

## 2023-02-14 PROCEDURE — 77417 THER RADIOLOGY PORT IMAGE(S): CPT | Performed by: RADIOLOGY

## 2023-02-14 PROCEDURE — 77412 RADIATION TX DELIVERY LVL 3: CPT | Performed by: RADIOLOGY

## 2023-02-14 PROCEDURE — 77427 RADIATION TX MANAGEMENT X5: CPT | Performed by: RADIOLOGY

## 2023-02-15 ENCOUNTER — HOSPITAL ENCOUNTER (OUTPATIENT)
Dept: RADIATION ONCOLOGY | Facility: HOSPITAL | Age: 58
Discharge: HOME | End: 2023-02-15

## 2023-02-15 LAB
RAD ONC ARIA COURSE ID: NORMAL
RAD ONC ARIA COURSE LAST TREATMENT DATE: NORMAL
RAD ONC ARIA COURSE START DATE: NORMAL
RAD ONC ARIA COURSE TREATMENT ELAPSED DAYS: 15
RAD ONC ARIA FIRST TREATMENT DATE: NORMAL
RAD ONC ARIA PLAN FRACTIONS TREATED TO DATE: 12
RAD ONC ARIA PLAN ID: NORMAL
RAD ONC ARIA PLAN PRESCRIBED DOSE PER FRACTION: 2.66 GY
RAD ONC ARIA PLAN PRIMARY REFERENCE POINT: NORMAL
RAD ONC ARIA PLAN TOTAL FRACTIONS PRESCRIBED: 16
RAD ONC ARIA PLAN TOTAL PRESCRIBED DOSE: 4256 CGY
RAD ONC ARIA REFERENCE POINT DOSAGE GIVEN TO DATE: 31.92 GY
RAD ONC ARIA REFERENCE POINT ID: NORMAL
RAD ONC ARIA REFERENCE POINT SESSION DOSAGE GIVEN: 2.66 GY

## 2023-02-15 PROCEDURE — 77412 RADIATION TX DELIVERY LVL 3: CPT | Performed by: RADIOLOGY

## 2023-02-15 RX ORDER — ACETAMINOPHEN 160 MG
1 TABLET,DISINTEGRATING ORAL DAILY
COMMUNITY
Start: 2023-01-22

## 2023-02-16 ENCOUNTER — HOSPITAL ENCOUNTER (OUTPATIENT)
Dept: RADIATION ONCOLOGY | Facility: HOSPITAL | Age: 58
Discharge: HOME | End: 2023-02-16

## 2023-02-16 LAB
RAD ONC ARIA COURSE ID: NORMAL
RAD ONC ARIA COURSE LAST TREATMENT DATE: NORMAL
RAD ONC ARIA COURSE START DATE: NORMAL
RAD ONC ARIA COURSE TREATMENT ELAPSED DAYS: 16
RAD ONC ARIA FIRST TREATMENT DATE: NORMAL
RAD ONC ARIA PLAN FRACTIONS TREATED TO DATE: 13
RAD ONC ARIA PLAN ID: NORMAL
RAD ONC ARIA PLAN PRESCRIBED DOSE PER FRACTION: 2.66 GY
RAD ONC ARIA PLAN PRIMARY REFERENCE POINT: NORMAL
RAD ONC ARIA PLAN TOTAL FRACTIONS PRESCRIBED: 16
RAD ONC ARIA PLAN TOTAL PRESCRIBED DOSE: 4256 CGY
RAD ONC ARIA REFERENCE POINT DOSAGE GIVEN TO DATE: 34.58 GY
RAD ONC ARIA REFERENCE POINT ID: NORMAL
RAD ONC ARIA REFERENCE POINT SESSION DOSAGE GIVEN: 2.66 GY

## 2023-02-16 PROCEDURE — 77336 RADIATION PHYSICS CONSULT: CPT | Performed by: RADIOLOGY

## 2023-02-16 PROCEDURE — 77412 RADIATION TX DELIVERY LVL 3: CPT | Performed by: RADIOLOGY

## 2023-02-17 ENCOUNTER — LAB (OUTPATIENT)
Dept: LAB | Facility: HOSPITAL | Age: 58
End: 2023-02-17
Payer: COMMERCIAL

## 2023-02-17 ENCOUNTER — TELEPHONE (OUTPATIENT)
Dept: ONCOLOGY | Facility: CLINIC | Age: 58
End: 2023-02-17
Payer: COMMERCIAL

## 2023-02-17 ENCOUNTER — HOSPITAL ENCOUNTER (OUTPATIENT)
Dept: RADIATION ONCOLOGY | Facility: HOSPITAL | Age: 58
Discharge: HOME | End: 2023-02-17

## 2023-02-17 ENCOUNTER — RADIATION ONCOLOGY WEEKLY ASSESSMENT (OUTPATIENT)
Dept: RADIATION ONCOLOGY | Facility: HOSPITAL | Age: 58
End: 2023-02-17
Payer: COMMERCIAL

## 2023-02-17 VITALS
HEART RATE: 96 BPM | RESPIRATION RATE: 18 BRPM | HEIGHT: 64 IN | BODY MASS INDEX: 44.76 KG/M2 | TEMPERATURE: 98.1 F | WEIGHT: 262.2 LBS | OXYGEN SATURATION: 96 % | SYSTOLIC BLOOD PRESSURE: 157 MMHG | DIASTOLIC BLOOD PRESSURE: 87 MMHG

## 2023-02-17 DIAGNOSIS — E11.65 TYPE 2 DIABETES MELLITUS WITH HYPERGLYCEMIA, WITHOUT LONG-TERM CURRENT USE OF INSULIN: ICD-10-CM

## 2023-02-17 DIAGNOSIS — C50.111 MALIGNANT NEOPLASM OF CENTRAL PORTION OF RIGHT BREAST IN FEMALE, ESTROGEN RECEPTOR POSITIVE: Primary | ICD-10-CM

## 2023-02-17 DIAGNOSIS — Z17.0 MALIGNANT NEOPLASM OF CENTRAL PORTION OF RIGHT BREAST IN FEMALE, ESTROGEN RECEPTOR POSITIVE: Primary | ICD-10-CM

## 2023-02-17 LAB
ALBUMIN UR-MCNC: <1.2 MG/DL
CREAT UR-MCNC: 27.5 MG/DL
MICROALBUMIN/CREAT UR: NORMAL MG/G{CREAT}
RAD ONC ARIA COURSE ID: NORMAL
RAD ONC ARIA COURSE LAST TREATMENT DATE: NORMAL
RAD ONC ARIA COURSE START DATE: NORMAL
RAD ONC ARIA COURSE TREATMENT ELAPSED DAYS: 17
RAD ONC ARIA FIRST TREATMENT DATE: NORMAL
RAD ONC ARIA PLAN FRACTIONS TREATED TO DATE: 14
RAD ONC ARIA PLAN ID: NORMAL
RAD ONC ARIA PLAN PRESCRIBED DOSE PER FRACTION: 2.66 GY
RAD ONC ARIA PLAN PRIMARY REFERENCE POINT: NORMAL
RAD ONC ARIA PLAN TOTAL FRACTIONS PRESCRIBED: 16
RAD ONC ARIA PLAN TOTAL PRESCRIBED DOSE: 4256 CGY
RAD ONC ARIA REFERENCE POINT DOSAGE GIVEN TO DATE: 37.24 GY
RAD ONC ARIA REFERENCE POINT ID: NORMAL
RAD ONC ARIA REFERENCE POINT SESSION DOSAGE GIVEN: 2.66 GY

## 2023-02-17 PROCEDURE — 77412 RADIATION TX DELIVERY LVL 3: CPT | Performed by: RADIOLOGY

## 2023-02-17 PROCEDURE — FACE2FACE: Performed by: RADIOLOGY

## 2023-02-17 PROCEDURE — 82570 ASSAY OF URINE CREATININE: CPT

## 2023-02-17 PROCEDURE — 82043 UR ALBUMIN QUANTITATIVE: CPT

## 2023-02-17 NOTE — PROGRESS NOTES
"NAME: Alaina Hartman DATE: 2023   : 1965    MRN: [id number] DIAGNOSIS:   Encounter Diagnosis   Name Primary?   • Malignant neoplasm of central portion of right breast in female, estrogen receptor positive (HCC) Yes             RADIATION ON TREATMENT VISIT NOTE - BREAST    Treatment Summary     Treatment Site Ref. ID Energy Dose/Fx (cGy) #Fx Dose Correction (cGy) Total Dose (cGy) Start Date End Date Elapsed Days   RtBreast RtBreast 18X/6X 266  0 3,724 2023 17           General:     Review of Systems  [] No new complaints   [] Skin itching   [] Skin breakdown  [] Breast swelling   [] Dysphagia   [] Dry cough  [] Productive cough   [] Fever/chills  [x] Skin soreness, severity: 0 [x] Fatigue, severity: 1  [x] Breast pain, severity:  0   Skin regimen:  Eucerin + GTS BIS    [x] Breast skin care teaching was completed on first day of radiation treatments.    Subjective:  She states that her breast is a little tender/ sore, but not painful. She also mentions a spot on the bra line that is a little raw. She has no other concerns or complaints.    KPS: 90     Vital Signs: /87   Pulse 96   Temp 98.1 °F (36.7 °C) (Oral)   Resp 18   Ht 162.6 cm (64\")   Wt 119 kg (262 lb 3.2 oz)   SpO2 96%   BMI 45.01 kg/m²     Weight:   Wt Readings from Last 3 Encounters:   23 119 kg (262 lb 3.2 oz)   23 116 kg (256 lb)   23 119 kg (261 lb 9.6 oz)     Medication:   Current Outpatient Medications:   •  ALPRAZolam (XANAX) 0.5 MG tablet, Take 1 tablet by mouth Daily As Needed for Anxiety., Disp: 20 tablet, Rfl: 0  •  atorvastatin (LIPITOR) 40 MG tablet, Take 1 tablet by mouth Daily., Disp: 90 tablet, Rfl: 4  •  BIOTIN PO, Take  by mouth., Disp: , Rfl:   •  Cholecalciferol (Vitamin D3) 50 MCG (2000 UT) capsule, Take 1 capsule by mouth Daily., Disp: , Rfl:   •  Cyanocobalamin (Vitamin B-12) 1000 MCG sublingual tablet, Place 1 tablet under the tongue Daily., Disp: 100 each, Rfl: 4  •  " dexamethasone (DECADRON) 4 MG tablet, Take 2 tablets oral twice a day for 3 consecutive days beginning the day before chemotherapy and continue for 6 doses., Disp: 12 tablet, Rfl: 3  •  glucose blood (Accu-Chek Guide) test strip, 1 each by Other route 4 (Four) Times a Day. Use as instructed, Disp: 150 each, Rfl: 11  •  HYDROcodone-acetaminophen (NORCO) 5-325 MG per tablet, Take 1 tablet by mouth Every 6 (Six) Hours As Needed for Moderate Pain., Disp: 12 tablet, Rfl: 0  •  Insulin Lispro Prot & Lispro (HumaLOG Mix 75/25 KwikPen) (75-25) 100 UNIT/ML suspension pen-injector pen, Inject 55 Units under the skin into the appropriate area as directed 3 (Three) Times a Day Before Meals. INJECT 35 UNITS SUBCUTANEOUSLY THREE TIMES DAILY BEFORE  EACH  MEAL, Disp: 33 mL, Rfl: 2  •  Insulin Pen Needle (Pen Needles) 32G X 4 MM misc, 1 each 4 (Four) Times a Day. Use to inject insulin / DX E11.65, Disp: 150 each, Rfl: 5  •  lidocaine-prilocaine (EMLA) 2.5-2.5 % cream, Apply 1 application topically to the appropriate area as directed Every 2 (Two) Hours As Needed for Mild Pain. Apply to port site one hour prior to chemotherapy appointment, Disp: 1 g, Rfl: 5  •  lisinopril (PRINIVIL,ZESTRIL) 5 MG tablet, Take 1 tablet by mouth once daily, Disp: 90 tablet, Rfl: 0  •  metFORMIN ER (GLUCOPHAGE-XR) 500 MG 24 hr tablet, Take 2 tablets by mouth once daily, Disp: 180 tablet, Rfl: 0  •  multivitamin with minerals tablet tablet, Take 1 tablet by mouth Daily., Disp: , Rfl:   •  omeprazole (priLOSEC) 40 MG capsule, TAKE 1 CAPSULE BY MOUTH ONCE DAILY . APPOINTMENT REQUIRED FOR FUTURE REFILLS, Disp: 90 capsule, Rfl: 0  •  ondansetron (ZOFRAN) 8 MG tablet, Take 1 tablet by mouth 3 (Three) Times a Day As Needed for Nausea or Vomiting., Disp: 30 tablet, Rfl: 5  •  silver sulfadiazine (SILVADENE, SSD) 1 % cream, Apply 1 application topically to the appropriate area as directed 2 (Two) Times a Day., Disp: 400 g, Rfl: 1  •  Vitamin D, Ergocalciferol,  50 MCG (2000 UT) capsule, Take 2,000 Units by mouth Daily., Disp: 100 capsule, Rfl: 4     LABS (Reviewed):  Hematology  WBC   Date Value Ref Range Status   01/19/2023 8.68 3.40 - 10.80 10*3/mm3 Final   08/26/2020 11.0 (H) 3.4 - 10.8 x10E3/uL Final     RBC   Date Value Ref Range Status   01/19/2023 3.27 (L) 3.77 - 5.28 10*6/mm3 Final   08/26/2020 4.53 3.77 - 5.28 x10E6/uL Final     Hemoglobin   Date Value Ref Range Status   01/19/2023 9.8 (L) 12.0 - 15.9 g/dL Final     Hematocrit   Date Value Ref Range Status   01/19/2023 31.4 (L) 34.0 - 46.6 % Final     Platelets   Date Value Ref Range Status   01/19/2023 339 140 - 450 10*3/mm3 Final     Chemistry   Lab Results   Component Value Date    GLUCOSE 217 (H) 02/13/2023    BUN 15 02/13/2023    CREATININE 0.60 02/13/2023    EGFRIFNONA 90 12/01/2021    EGFRIFAFRI 115 08/26/2020    BCR 25.0 02/13/2023    K 4.4 02/13/2023    CO2 25.9 02/13/2023    CALCIUM 9.7 02/13/2023    PROTENTOTREF 7.0 08/26/2020    ALBUMIN 4.2 02/13/2023    LABIL2 1.8 08/26/2020    AST 16 02/13/2023    ALT 20 02/13/2023     Physical Exam:     Neck: [] Lymphadenopathy  Lungs: [x] Clear to auscultation  CVS: [x] Regular rate & rhythm  Skin: [x] small area of desquamation RLIQ    Comments/Notes:     [x] Review of labs, images, dosimetry, dose delivered, & treatment parameters.    Comments:     [x] Patient treatment setup reviewed.    Comments:     Recommendations: Silvadene Rx sent in  Will see her again on her final treatment day, next Tuesday.    [x] Continue RT  [] Change RT Plan [] Hold RT, length:        Approved Electronically By:  Jagdeep De Los Santos MD, 2/17/2023, 08:07 EST      Confidentiality of this medical record shall be maintained except when use or disclosure is required or permitted by law, regulation or written authorization by the patient.

## 2023-02-17 NOTE — TELEPHONE ENCOUNTER
OSW received an email from [employer], who manages patient's leave request.     OSW called patient to ascertain how much intermittent leave she is needing/requiring. OSW and patient came to an agreeable amount of leave needed, and patient reports utilizing PTO most of the time.     OSW faxed form back to [employer].     .BASIM Valdes, CSW, MSW  Oncology MSW  [facility]

## 2023-02-20 ENCOUNTER — OFFICE VISIT (OUTPATIENT)
Dept: ENDOCRINOLOGY | Facility: CLINIC | Age: 58
End: 2023-02-20
Payer: COMMERCIAL

## 2023-02-20 ENCOUNTER — HOSPITAL ENCOUNTER (OUTPATIENT)
Dept: RADIATION ONCOLOGY | Facility: HOSPITAL | Age: 58
Discharge: HOME | End: 2023-02-20

## 2023-02-20 VITALS
HEIGHT: 64 IN | BODY MASS INDEX: 44.39 KG/M2 | DIASTOLIC BLOOD PRESSURE: 70 MMHG | WEIGHT: 260 LBS | OXYGEN SATURATION: 97 % | SYSTOLIC BLOOD PRESSURE: 115 MMHG | HEART RATE: 94 BPM

## 2023-02-20 DIAGNOSIS — E66.01 CLASS 3 SEVERE OBESITY DUE TO EXCESS CALORIES WITHOUT SERIOUS COMORBIDITY WITH BODY MASS INDEX (BMI) OF 40.0 TO 44.9 IN ADULT: ICD-10-CM

## 2023-02-20 DIAGNOSIS — E11.65 TYPE 2 DIABETES MELLITUS WITH HYPERGLYCEMIA, WITHOUT LONG-TERM CURRENT USE OF INSULIN: ICD-10-CM

## 2023-02-20 DIAGNOSIS — Z79.4 TYPE 2 DIABETES MELLITUS WITH HYPERGLYCEMIA, WITH LONG-TERM CURRENT USE OF INSULIN: Primary | ICD-10-CM

## 2023-02-20 DIAGNOSIS — K20.90 ESOPHAGITIS: ICD-10-CM

## 2023-02-20 DIAGNOSIS — E78.2 MIXED HYPERLIPIDEMIA: ICD-10-CM

## 2023-02-20 DIAGNOSIS — E11.65 TYPE 2 DIABETES MELLITUS WITH HYPERGLYCEMIA, WITH LONG-TERM CURRENT USE OF INSULIN: Primary | ICD-10-CM

## 2023-02-20 DIAGNOSIS — I10 HYPERTENSION, ESSENTIAL: ICD-10-CM

## 2023-02-20 LAB
GLUCOSE BLDC GLUCOMTR-MCNC: 191 MG/DL (ref 70–105)
RAD ONC ARIA COURSE ID: NORMAL
RAD ONC ARIA COURSE LAST TREATMENT DATE: NORMAL
RAD ONC ARIA COURSE START DATE: NORMAL
RAD ONC ARIA COURSE TREATMENT ELAPSED DAYS: 20
RAD ONC ARIA FIRST TREATMENT DATE: NORMAL
RAD ONC ARIA PLAN FRACTIONS TREATED TO DATE: 15
RAD ONC ARIA PLAN ID: NORMAL
RAD ONC ARIA PLAN PRESCRIBED DOSE PER FRACTION: 2.66 GY
RAD ONC ARIA PLAN PRIMARY REFERENCE POINT: NORMAL
RAD ONC ARIA PLAN TOTAL FRACTIONS PRESCRIBED: 16
RAD ONC ARIA PLAN TOTAL PRESCRIBED DOSE: 4256 CGY
RAD ONC ARIA REFERENCE POINT DOSAGE GIVEN TO DATE: 39.9 GY
RAD ONC ARIA REFERENCE POINT ID: NORMAL
RAD ONC ARIA REFERENCE POINT SESSION DOSAGE GIVEN: 2.66 GY

## 2023-02-20 PROCEDURE — 82962 GLUCOSE BLOOD TEST: CPT | Performed by: INTERNAL MEDICINE

## 2023-02-20 PROCEDURE — 99214 OFFICE O/P EST MOD 30 MIN: CPT | Performed by: INTERNAL MEDICINE

## 2023-02-20 PROCEDURE — 77412 RADIATION TX DELIVERY LVL 3: CPT | Performed by: STUDENT IN AN ORGANIZED HEALTH CARE EDUCATION/TRAINING PROGRAM

## 2023-02-20 RX ORDER — METFORMIN HYDROCHLORIDE 500 MG/1
1000 TABLET, EXTENDED RELEASE ORAL DAILY
Qty: 180 TABLET | Refills: 6 | Status: SHIPPED | OUTPATIENT
Start: 2023-02-20

## 2023-02-20 RX ORDER — ATORVASTATIN CALCIUM 40 MG/1
40 TABLET, FILM COATED ORAL DAILY
Qty: 90 TABLET | Refills: 4 | Status: SHIPPED | OUTPATIENT
Start: 2023-02-20

## 2023-02-20 RX ORDER — LISINOPRIL 5 MG/1
5 TABLET ORAL DAILY
Qty: 90 TABLET | Refills: 4 | Status: SHIPPED | OUTPATIENT
Start: 2023-02-20

## 2023-02-20 RX ORDER — INSULIN LISPRO 100 [IU]/ML
55 INJECTION, SUSPENSION SUBCUTANEOUS
Qty: 60 ML | Refills: 6 | Status: SHIPPED | OUTPATIENT
Start: 2023-02-20

## 2023-02-20 NOTE — PROGRESS NOTES
Endocrine Progress Note Outpatient     Patient Care Team:  Fabiola Joyner MD as PCP - General (Family Medicine)  Seipel, Joseph F, MD as Consulting Physician (Sleep Medicine)  Jagdeep Mcwilliams MD as Surgeon (General Surgery)  Lianet Rust MD as Consulting Physician (Hematology and Oncology)  Melba Gamboa, RN as Nurse Navigator    Chief Complaint: Follow-up type 2 diabetes    HPI: This is a 57-year-old female with history of type 2 diabetes, hypertension and hyperlipidemia is here for follow-up.     For type 2 diabetes: Currently on Humalog mix 75/25 insulin, she is taking 55 units subcu 3 times a day before each meal along with Metformin 500 mg twice a day.  The past she has not been able to tolerate glipizide and Actos.  She has tried Jardiance as well but it did not help her sugars.  He tells me her blood sugars are about 1  most of the time at home.  She is not having any blood sugar less than 70.  She is following her diet as best as she can.  She is tolerating her medications well at this time    Hypertension: Her blood pressure is doing well.     Hyperlipidemia: Currently on atorvastatin.  .    Past Medical History:   Diagnosis Date   • Ankle edema     at times   • Breast cancer, right (HCC) 2022   • Diabetes mellitus (HCC)    • GERD (gastroesophageal reflux disease)    • Hiatal hernia    • Hyperlipidemia    • Morbid obesity (HCC)    • Sleep apnea     CPAP- to bring dos   • Type 2 diabetes mellitus (HCC)        Social History     Socioeconomic History   • Marital status:      Spouse name: Bora   • Number of children: 3   • Years of education: 14   Tobacco Use   • Smoking status: Former     Packs/day: 0.25     Years: 10.00     Pack years: 2.50     Types: Cigarettes     Quit date: 2010     Years since quittin.1   • Smokeless tobacco: Never   Vaping Use   • Vaping Use: Never used   Substance and Sexual Activity   • Alcohol use: Not Currently     Comment: rare   •  Drug use: Never   • Sexual activity: Yes     Partners: Male     Birth control/protection: Hysterectomy       Family History   Problem Relation Age of Onset   • Breast cancer Mother    • Heart disease Mother    • Thyroid disease Mother    • Stroke Mother    • Clotting disorder Father    • Diabetes Brother    • Heart disease Brother    • Cancer Paternal Grandmother    • Colon cancer Paternal Grandmother    • Diabetes Brother    • Stroke Brother        No Known Allergies    ROS:   Constitutional:  Denies fatigue, tiredness.    Eyes:  Denies change in visual acuity   HENT:  Denies nasal congestion or sore throat   Respiratory: denies cough, shortness of breath.   Cardiovascular:  denies chest pain, edema   GI:  Denies abdominal pain, nausea, vomiting.   Musculoskeletal:  Denies back pain or joint pain   Integument:  Denies dry skin and rash   Neurologic:  Denies headache, focal weakness or sensory changes   Endocrine:  Denies polyuria or polydipsia   Psychiatric:  Denies depression or anxiety      Vitals:    02/20/23 0822   BP: 145/80   Pulse: 94   SpO2: 97%     Body mass index is 44.63 kg/m².     Physical Exam:  GEN: NAD, conversant, obese  EYES: EOMI, PERRL, no conjunctival erythema  NECK: no thyromegaly, full ROM   CV: RRR, no murmurs/rubs/gallops, no peripheral edema  LUNG: CTAB, no wheezes/rales/ronchi  SKIN: no rashes, no acanthosis  MSK: no deformities, full ROM of all extremities  NEURO: no tremors, DTR normal  PSYCH: AOX3, appropriate mood, affect normal      Results Review:     I reviewed the patient's new clinical results.    Lab Results   Component Value Date    HGBA1C 7.0 (H) 02/13/2023    HGBA1C 11.1 (H) 07/07/2022    HGBA1C 11.7 (H) 12/01/2021      Lab Results   Component Value Date    GLUCOSE 217 (H) 02/13/2023    BUN 15 02/13/2023    CREATININE 0.60 02/13/2023    EGFRIFNONA 90 12/01/2021    EGFRIFAFRI 115 08/26/2020    BCR 25.0 02/13/2023    K 4.4 02/13/2023    CO2 25.9 02/13/2023    CALCIUM 9.7  02/13/2023    PROTENTOTREF 7.0 08/26/2020    ALBUMIN 4.2 02/13/2023    LABIL2 1.8 08/26/2020    AST 16 02/13/2023    ALT 20 02/13/2023    CHOL 136 02/13/2023    TRIG 234 (H) 02/13/2023    LDL 65 02/13/2023    HDL 33 (L) 02/13/2023     Lab Results   Component Value Date    TSH 2.430 06/08/2021         Medication Review: Reviewed.       Current Outpatient Medications:   •  ALPRAZolam (XANAX) 0.5 MG tablet, Take 1 tablet by mouth Daily As Needed for Anxiety., Disp: 20 tablet, Rfl: 0  •  atorvastatin (LIPITOR) 40 MG tablet, Take 1 tablet by mouth Daily., Disp: 90 tablet, Rfl: 4  •  BIOTIN PO, Take  by mouth., Disp: , Rfl:   •  Cholecalciferol (Vitamin D3) 50 MCG (2000 UT) capsule, Take 1 capsule by mouth Daily., Disp: , Rfl:   •  Cyanocobalamin (Vitamin B-12) 1000 MCG sublingual tablet, Place 1 tablet under the tongue Daily., Disp: 100 each, Rfl: 4  •  glucose blood (Accu-Chek Guide) test strip, 1 each by Other route 4 (Four) Times a Day. Use as instructed, Disp: 150 each, Rfl: 11  •  Insulin Lispro Prot & Lispro (HumaLOG Mix 75/25 KwikPen) (75-25) 100 UNIT/ML suspension pen-injector pen, Inject 55 Units under the skin into the appropriate area as directed 3 (Three) Times a Day Before Meals. INJECT 35 UNITS SUBCUTANEOUSLY THREE TIMES DAILY BEFORE  EACH  MEAL (Patient taking differently: Inject 55 Units under the skin into the appropriate area as directed 3 (Three) Times a Day Before Meals.), Disp: 33 mL, Rfl: 2  •  Insulin Pen Needle (Pen Needles) 32G X 4 MM misc, 1 each 4 (Four) Times a Day. Use to inject insulin / DX E11.65, Disp: 150 each, Rfl: 5  •  lidocaine-prilocaine (EMLA) 2.5-2.5 % cream, Apply 1 application topically to the appropriate area as directed Every 2 (Two) Hours As Needed for Mild Pain. Apply to port site one hour prior to chemotherapy appointment, Disp: 1 g, Rfl: 5  •  lisinopril (PRINIVIL,ZESTRIL) 5 MG tablet, Take 1 tablet by mouth once daily, Disp: 90 tablet, Rfl: 0  •  metFORMIN ER  (GLUCOPHAGE-XR) 500 MG 24 hr tablet, Take 2 tablets by mouth once daily, Disp: 180 tablet, Rfl: 0  •  multivitamin with minerals tablet tablet, Take 1 tablet by mouth Daily., Disp: , Rfl:   •  omeprazole (priLOSEC) 40 MG capsule, TAKE 1 CAPSULE BY MOUTH ONCE DAILY . APPOINTMENT REQUIRED FOR FUTURE REFILLS, Disp: 90 capsule, Rfl: 0  •  ondansetron (ZOFRAN) 8 MG tablet, Take 1 tablet by mouth 3 (Three) Times a Day As Needed for Nausea or Vomiting., Disp: 30 tablet, Rfl: 5  •  silver sulfadiazine (SILVADENE, SSD) 1 % cream, Apply 1 application topically to the appropriate area as directed 2 (Two) Times a Day., Disp: 400 g, Rfl: 1  •  Vitamin D, Ergocalciferol, 50 MCG (2000 UT) capsule, Take 2,000 Units by mouth Daily., Disp: 100 capsule, Rfl: 4      Assessment & Plan   1.  Diabetes mellitus type 2 with hyperglycemia: Overall doing much better with A1c at 7% and she is not having any significant issues with low blood sugars.  We will continue Humalog mix 75/25 insulin at 55 units 3 times a day before each meal along with metformin.  Advised to always keep glucose source in case of low blood sugars and continue to work on diet and activity.  Recommend annual eye exam.     2.  Hypertension: Well controlled. CPM    3.  Hyperlipidemia: Much better, continue atorvastatin.    4.  Obesity: Continue to work on diet and activity and try to lose some weight.    Assessment and plan from July 11, 2022 reviewed and updated.            Chirag Cho MD FACE.

## 2023-02-20 NOTE — PATIENT INSTRUCTIONS
Continue current management  Continue to work on your diet and activity  Always keep glucose source in case of low blood sugar  Annual eye exam  Labs before follow-up.

## 2023-02-21 ENCOUNTER — HOSPITAL ENCOUNTER (OUTPATIENT)
Dept: RADIATION ONCOLOGY | Facility: HOSPITAL | Age: 58
Discharge: HOME | End: 2023-02-21

## 2023-02-21 ENCOUNTER — RADIATION ONCOLOGY WEEKLY ASSESSMENT (OUTPATIENT)
Dept: RADIATION ONCOLOGY | Facility: HOSPITAL | Age: 58
End: 2023-02-21
Payer: COMMERCIAL

## 2023-02-21 VITALS
SYSTOLIC BLOOD PRESSURE: 156 MMHG | OXYGEN SATURATION: 98 % | HEART RATE: 85 BPM | RESPIRATION RATE: 18 BRPM | WEIGHT: 262 LBS | DIASTOLIC BLOOD PRESSURE: 89 MMHG | BODY MASS INDEX: 44.97 KG/M2

## 2023-02-21 DIAGNOSIS — C50.111 MALIGNANT NEOPLASM OF CENTRAL PORTION OF RIGHT BREAST IN FEMALE, ESTROGEN RECEPTOR POSITIVE: Primary | ICD-10-CM

## 2023-02-21 DIAGNOSIS — Z17.0 MALIGNANT NEOPLASM OF CENTRAL PORTION OF RIGHT BREAST IN FEMALE, ESTROGEN RECEPTOR POSITIVE: Primary | ICD-10-CM

## 2023-02-21 LAB
RAD ONC ARIA COURSE ID: NORMAL
RAD ONC ARIA COURSE LAST TREATMENT DATE: NORMAL
RAD ONC ARIA COURSE START DATE: NORMAL
RAD ONC ARIA COURSE TREATMENT ELAPSED DAYS: 21
RAD ONC ARIA FIRST TREATMENT DATE: NORMAL
RAD ONC ARIA PLAN FRACTIONS TREATED TO DATE: 16
RAD ONC ARIA PLAN ID: NORMAL
RAD ONC ARIA PLAN PRESCRIBED DOSE PER FRACTION: 2.66 GY
RAD ONC ARIA PLAN PRIMARY REFERENCE POINT: NORMAL
RAD ONC ARIA PLAN TOTAL FRACTIONS PRESCRIBED: 16
RAD ONC ARIA PLAN TOTAL PRESCRIBED DOSE: 4256 CGY
RAD ONC ARIA REFERENCE POINT DOSAGE GIVEN TO DATE: 42.56 GY
RAD ONC ARIA REFERENCE POINT ID: NORMAL
RAD ONC ARIA REFERENCE POINT SESSION DOSAGE GIVEN: 2.66 GY

## 2023-02-21 PROCEDURE — FACE2FACE: Performed by: RADIOLOGY

## 2023-02-21 PROCEDURE — 77412 RADIATION TX DELIVERY LVL 3: CPT | Performed by: RADIOLOGY

## 2023-02-21 RX ORDER — MOMETASONE FUROATE 1 MG/G
CREAM TOPICAL
Qty: 15 G | Refills: 1 | Status: SHIPPED | OUTPATIENT
Start: 2023-02-21 | End: 2023-04-01 | Stop reason: ALTCHOICE

## 2023-02-21 NOTE — PROGRESS NOTES
Patient Name: Alaina Hartman Date: 2023       : 1965        MRN #: [id number] Diagnosis:   Encounter Diagnosis   Name Primary?   • Malignant neoplasm of central portion of right breast in female, estrogen receptor positive (HCC) Yes                     RADIATION ON TREATMENT VISIT NOTE - BREAST    Treatment Summary  Treatment Site Ref. ID Energy Dose/Fx (cGy) #Fx Dose Correction (cGy) Total Dose (cGy) Start Date End Date Elapsed Days   RtBreast RtBreast 18X/6X 266  0 4,256 2023 21   Diagnosis: Moderately differentiated invasive ductal carcinoma of the right breast, ER + (95%), [place] + (50%) Her2-; [id number].  General:     Intent:curative, adjuvant        Review of Systems    [] No new complaints  [] Skin itching   [] Skin breakdown  [] Breast swelling   [] Dysphagia   [] Dry cough  [] Productive cough   [] Fever/chills  [x] Skin soreness with pain, severity: 2 bra line  [] Breast pain,  severity: ----------------, location:   [x] Fatigue,  severity: 0 None      Skin regimen: NONE  Eucerin + GTS BID, Slivadene to medial area on bra line    [x] Patient given standard skin care instructions for breast cancer treatment including no aluminum containing antiperspirants & no unapproved lotions or soaps.  Patient informed to notify radiation staff if irritation/itching occurs.    Comments/Notes:  Silvadene has helped  Some inner quadrant itching-sending in Mometasone RX    KPS: 90%       Vital Signs: /89   Pulse 85   Resp 18   Wt 119 kg (262 lb)   SpO2 98%   BMI 44.97 kg/m²     Weight:   Wt Readings from Last 3 Encounters:   23 119 kg (262 lb)   23 118 kg (260 lb)   23 119 kg (262 lb 3.2 oz)       Medication:   Current Outpatient Medications:   •  ALPRAZolam (XANAX) 0.5 MG tablet, Take 1 tablet by mouth Daily As Needed for Anxiety., Disp: 20 tablet, Rfl: 0  •  atorvastatin (LIPITOR) 40 MG tablet, Take 1 tablet by mouth Daily., Disp: 90 tablet, Rfl: 4  •  BIOTIN  PO, Take  by mouth., Disp: , Rfl:   •  Cholecalciferol (Vitamin D3) 50 MCG (2000 UT) capsule, Take 1 capsule by mouth Daily., Disp: , Rfl:   •  Cyanocobalamin (Vitamin B-12) 1000 MCG sublingual tablet, Place 1 tablet under the tongue Daily., Disp: 100 each, Rfl: 4  •  glucose blood (Accu-Chek Guide) test strip, 1 each by Other route 4 (Four) Times a Day. Use as instructed, Disp: 150 each, Rfl: 11  •  Insulin Lispro Prot & Lispro (HumaLOG Mix 75/25 KwikPen) (75-25) 100 UNIT/ML suspension pen-injector pen, Inject 55 Units under the skin into the appropriate area as directed 3 (Three) Times a Day Before Meals., Disp: 60 mL, Rfl: 6  •  Insulin Pen Needle (Pen Needles) 32G X 4 MM misc, 1 each 4 (Four) Times a Day. Use to inject insulin / DX E11.65, Disp: 150 each, Rfl: 5  •  lidocaine-prilocaine (EMLA) 2.5-2.5 % cream, Apply 1 application topically to the appropriate area as directed Every 2 (Two) Hours As Needed for Mild Pain. Apply to port site one hour prior to chemotherapy appointment, Disp: 1 g, Rfl: 5  •  lisinopril (PRINIVIL,ZESTRIL) 5 MG tablet, Take 1 tablet by mouth Daily., Disp: 90 tablet, Rfl: 4  •  metFORMIN ER (GLUCOPHAGE-XR) 500 MG 24 hr tablet, Take 2 tablets by mouth Daily., Disp: 180 tablet, Rfl: 6  •  multivitamin with minerals tablet tablet, Take 1 tablet by mouth Daily., Disp: , Rfl:   •  omeprazole (priLOSEC) 40 MG capsule, TAKE 1 CAPSULE BY MOUTH ONCE DAILY . APPOINTMENT REQUIRED FOR FUTURE REFILLS, Disp: 90 capsule, Rfl: 0  •  ondansetron (ZOFRAN) 8 MG tablet, Take 1 tablet by mouth 3 (Three) Times a Day As Needed for Nausea or Vomiting., Disp: 30 tablet, Rfl: 5  •  silver sulfadiazine (SILVADENE, SSD) 1 % cream, Apply 1 application topically to the appropriate area as directed 2 (Two) Times a Day., Disp: 400 g, Rfl: 1  •  Vitamin D, Ergocalciferol, 50 MCG (2000 UT) capsule, Take 2,000 Units by mouth Daily., Disp: 100 capsule, Rfl: 4       LABS (Reviewed):  Hematology WBC   Date Value Ref Range  Status   01/19/2023 8.68 3.40 - 10.80 10*3/mm3 Final   08/26/2020 11.0 (H) 3.4 - 10.8 x10E3/uL Final     RBC   Date Value Ref Range Status   01/19/2023 3.27 (L) 3.77 - 5.28 10*6/mm3 Final   08/26/2020 4.53 3.77 - 5.28 x10E6/uL Final     Hemoglobin   Date Value Ref Range Status   01/19/2023 9.8 (L) 12.0 - 15.9 g/dL Final     Hematocrit   Date Value Ref Range Status   01/19/2023 31.4 (L) 34.0 - 46.6 % Final     Platelets   Date Value Ref Range Status   01/19/2023 339 140 - 450 10*3/mm3 Final      Chemistry   Lab Results   Component Value Date    GLUCOSE 217 (H) 02/13/2023    BUN 15 02/13/2023    CREATININE 0.60 02/13/2023    EGFRIFNONA 90 12/01/2021    EGFRIFAFRI 115 08/26/2020    BCR 25.0 02/13/2023    K 4.4 02/13/2023    CO2 25.9 02/13/2023    CALCIUM 9.7 02/13/2023    PROTENTOTREF 7.0 08/26/2020    ALBUMIN 4.2 02/13/2023    LABIL2 1.8 08/26/2020    AST 16 02/13/2023    ALT 20 02/13/2023         Physical Exam:           Neck: [] Lymphadenopathy  Lungs: [x] Clear to auscultation  CVS: [x] Regular rate & rhythm  Skin: [x] stGstrstastdstest:st st1st Comments/Notes: very small area of grade II reaction; silvadene has helped    [x] Review of labs, images, dosimetry, dose delivered, & treatment parameters.    Comments:     [x] Patient treatment setup reviewed.    Comments:     Recommendations: continue Silvadene  1 week skin check  Rx for mometasone sent in    Completed planned XRT course today.      Approved Electronically By:  Jagdeep De Los Santos MD, 2/21/2023, 09:06 EST          Confidentiality of this medical record shall be maintained except when use or disclosure is required or permitted by law, regulation or written authorization by the patient.

## 2023-02-21 NOTE — PROGRESS NOTES
Patient Name: Alaina Hartman   Date: 2023  : 1965       MRN: [id number]     Referring Physician: Fabiola Joyner MD  DX:   Encounter Diagnosis   Name Primary?   • Malignant neoplasm of central portion of right breast in female, estrogen receptor positive (HCC) Yes        COMPLETION NOTE      Primary Radiation Oncologist: Jagdeep De Los Santos MD    PRIMARY SITE AND HISTOPATHOLOGY:   Right breast, lateral slightly inferior breast, posterior depth; Moderately diffferentiated invasive ductal carcinoma, grade 2      STAGE: [id number], ER + (95%), CO + (50%)  Her2-; [id number]        SIGNIFICANT LAB AND X-RAY FINDINGS: patient underwent right lumpectomy with SLNB on 2022:  -1.7 cm, invasive moderately differentiated ductal carcinoma  -0/5 nodes were removed and all of them were negative for metastatic disease.  -[id number]  -DCIS margins were all negative.     Oncotype DX returned high--score of 28; consistent with 17% risk of recurrence at 9 years distantly, and more than 15% chemotherapy benefit.  On the validation assay estrogen receptor was positive, CO was positive and HER2/bertha was negative.     She has now completed four cycles of chemotherapy with Cytoxan/Taxotere; last cycle was given on 2022.      PLAN OF TREATMENT:  Curative, Adjuvant whole breast radiation therapy  -Set-up: Prone  3D conformal XRT      DATE STARTED:  2023   DATE COMPLETED:  2023      TOTAL DOSE: 4256 cGy in 16 fractions   DOSE/FRACTION: 266 cGy per fraction  ENERGY:  18x/6x photons   STATUS OF TUMOR/PATIENT AT COMPLETION OF RADIOTHERAPY: no unexpected toxicities or treatment breaks      TOLERANCE: grade 1 fatigue and itching; small area lower inner quadrant/bra line with grade II reaction---Silvadene and supportive measures helping.  Mometasone for itching and eucerin/aquaphor for moisturizing otherwise.      DISPOSITION:  1 week skin check offered; 1 month f/u here given.      Current Outpatient Medications:   •   ALPRAZolam (XANAX) 0.5 MG tablet, Take 1 tablet by mouth Daily As Needed for Anxiety., Disp: 20 tablet, Rfl: 0  •  atorvastatin (LIPITOR) 40 MG tablet, Take 1 tablet by mouth Daily., Disp: 90 tablet, Rfl: 4  •  BIOTIN PO, Take  by mouth., Disp: , Rfl:   •  Cholecalciferol (Vitamin D3) 50 MCG (2000 UT) capsule, Take 1 capsule by mouth Daily., Disp: , Rfl:   •  Cyanocobalamin (Vitamin B-12) 1000 MCG sublingual tablet, Place 1 tablet under the tongue Daily., Disp: 100 each, Rfl: 4  •  glucose blood (Accu-Chek Guide) test strip, 1 each by Other route 4 (Four) Times a Day. Use as instructed, Disp: 150 each, Rfl: 11  •  Insulin Lispro Prot & Lispro (HumaLOG Mix 75/25 KwikPen) (75-25) 100 UNIT/ML suspension pen-injector pen, Inject 55 Units under the skin into the appropriate area as directed 3 (Three) Times a Day Before Meals., Disp: 60 mL, Rfl: 6  •  Insulin Pen Needle (Pen Needles) 32G X 4 MM misc, 1 each 4 (Four) Times a Day. Use to inject insulin / DX E11.65, Disp: 150 each, Rfl: 5  •  lidocaine-prilocaine (EMLA) 2.5-2.5 % cream, Apply 1 application topically to the appropriate area as directed Every 2 (Two) Hours As Needed for Mild Pain. Apply to port site one hour prior to chemotherapy appointment, Disp: 1 g, Rfl: 5  •  lisinopril (PRINIVIL,ZESTRIL) 5 MG tablet, Take 1 tablet by mouth Daily., Disp: 90 tablet, Rfl: 4  •  metFORMIN ER (GLUCOPHAGE-XR) 500 MG 24 hr tablet, Take 2 tablets by mouth Daily., Disp: 180 tablet, Rfl: 6  •  mometasone (Elocon) 0.1 % cream, Apply to affect skin 2-3 times daily during radiation therapy course as needed (itching/irritation), Disp: 15 g, Rfl: 1  •  multivitamin with minerals tablet tablet, Take 1 tablet by mouth Daily., Disp: , Rfl:   •  omeprazole (priLOSEC) 40 MG capsule, TAKE 1 CAPSULE BY MOUTH ONCE DAILY . APPOINTMENT REQUIRED FOR FUTURE REFILLS, Disp: 90 capsule, Rfl: 0  •  ondansetron (ZOFRAN) 8 MG tablet, Take 1 tablet by mouth 3 (Three) Times a Day As Needed for Nausea or  Vomiting., Disp: 30 tablet, Rfl: 5  •  silver sulfadiazine (SILVADENE, SSD) 1 % cream, Apply 1 application topically to the appropriate area as directed 2 (Two) Times a Day., Disp: 400 g, Rfl: 1  •  Vitamin D, Ergocalciferol, 50 MCG (2000 UT) capsule, Take 2,000 Units by mouth Daily., Disp: 100 capsule, Rfl: 4    KPS: 90            cc: MD Fabiola Caicedo MD        Approved Electronically By:  Jagdeep De Los Santos MD, 2/21/2023, 09:09 EST                              Confidentiality of this medical record shall be maintained except when use or disclosure is required or permitted by law, regulation or written authorization by the patient.

## 2023-02-21 NOTE — PROGRESS NOTES
RADIATION THERAPY COMPLETION and DISCHARGE     Alaina Hartman completed radiation therapy on 02/21/2023 for right breast cancer. The summary of her treatment is as follows:    TREATMENT SITE:   Right Breast START DATE:   1/31/2023   TOTAL DOSE (cGy):   4256 END DATE:   2/21/2023   DOSE/FRACTION:   266 ELAPSED DAYS:   21   TOTAL FACTIONS:   16 PHYSICIAN:    Jagdeep De Los Santos MD     She is scheduled for a one-month follow-up appointment with Dr. De Los Santos on 3/23/2023 at 9:40AM.     The following instructions were provided to the patient and/or family in their printed after visit summary (AVS) as well as discussed in-person with the radiation oncology nurse. The patient and/or family member had the opportunity to ask questions and verbalized their questions were adequately answered.   _______________________________________________________________________      RADIATION THERAPY DISCHARGE INSTRUCTIONS  Breast    CONGRATULATIONS! You completed 16 radiation treatments for treatment of your right breast cancer. These discharge instructions are important for you to follow until your one-month follow up appointment with Dr. De Los Santos. Please make sure to review these instructions and call the Radiation Oncology Department if you have any questions or concerns with symptoms you may experience. Thank you for trusting us with your cancer treatment!    DIET  • Eat a regular, well balanced diet that is high in protein such as meat, eggs, cheese, and nut butters  • Drink 48 to 64 ounces of fluid daily    MEDICATIONS  • Use Tylenol as needed to decrease breast discomfort and irritation due to swelling and skin reaction    SKIN CARE  • Wash treated skin gently with your hands using a mild, non-drying soap such as Dove® or Aveeno® until skin returns to normal  • Pat skin dry - do not rub  • Keep treated skin moist with frequent applications of Aquaphor® or unscented lotion (such as Eucerin)®  • Always protect your treated skin when outdoors by  wearing protective clothing and applying sunscreen SPF 15 or higher at least 30 minutes before going outdoors and reapply frequently  • Resume use of deodorant to the armpit of the affected arm when skin reactions improve     ACTIVITY  Fatigue is a normal side effect of radiation therapy and should improve over time  • Alternate rest and activity  • Exercise such as walking may help to improve your fatigue    FOLLOW-UP  • Continue follow-up appointments with all other doctors as necessary  • Continue to have regular mammograms as ordered by your physician  • Call your radiation oncology doctor if you are concerned with any side effects you are experiencing: [phone]    SPECIAL INSTRUCTIONS  • Perform self-breast exams monthly  • (if applicable) Continue all range of motion and mobility exercise as instructed   • (if applicable) Never allow blood draws or blood pressures to be taken on your affected arm unless in an emergency situation   • Practice careful nail care and avoid open skin to your affected arm and hand  • Continue performing the on-treatment skin care routine recommended by your radiation oncology until your 1-month follow-up appointment  • Call your physician if you notice swelling of your affected arm or hand that is unusual or doesn't go away    _______________________________________________________________________    Completed by: Melba Cobb RN, Radiation Oncology Nurse on 02/21/2023  at 07:27 EST

## 2023-02-21 NOTE — PATIENT INSTRUCTIONS
RADIATION THERAPY DISCHARGE INSTRUCTIONS  Breast    CONGRATULATIONS! You completed 16 radiation treatments for treatment of your right breast cancer. These discharge instructions are important for you to follow until your one-month follow up appointment with Dr. De Los Santos. Please make sure to review these instructions and call the Radiation Oncology Department if you have any questions or concerns with symptoms you may experience. Thank you for trusting us with your cancer treatment!    DIET  Eat a regular, well balanced diet that is high in protein such as meat, eggs, cheese, and nut butters  Drink 48 to 64 ounces of fluid daily    MEDICATIONS  Use Tylenol as needed to decrease breast discomfort and irritation due to swelling and skin reaction    SKIN CARE  Wash treated skin gently with your hands using a mild, non-drying soap such as Dove® or Aveeno® until skin returns to normal  Pat skin dry - do not rub  Keep treated skin moist with frequent applications of Aquaphor® or unscented lotion (such as Eucerin)®  Always protect your treated skin when outdoors by wearing protective clothing and applying sunscreen SPF 15 or higher at least 30 minutes before going outdoors and reapply frequently  Resume use of deodorant to the armpit of the affected arm when skin reactions improve     ACTIVITY  Fatigue is a normal side effect of radiation therapy and should improve over time  Alternate rest and activity  Exercise such as walking may help to improve your fatigue    FOLLOW-UP  Continue follow-up appointments with all other doctors as necessary  Continue to have regular mammograms as ordered by your physician  Call your radiation oncology doctor if you are concerned with any side effects you are experiencing: [phone]    SPECIAL INSTRUCTIONS  Perform self-breast exams monthly  (if applicable) Continue all range of motion and mobility exercise as instructed   (if applicable) Never allow blood draws or blood pressures to be  taken on your affected arm unless in an emergency situation   Practice careful nail care and avoid open skin to your affected arm and hand  Continue performing the on-treatment skin care routine recommended by your radiation oncology until your 1-month follow-up appointment  Call your physician if you notice swelling of your affected arm or hand that is unusual or doesn't go away    _______________________________________________________________________    Completed by: Melba Cobb RN on 2/21/2023 at 07:29 EST

## 2023-02-23 ENCOUNTER — HOSPITAL ENCOUNTER (OUTPATIENT)
Dept: ONCOLOGY | Facility: HOSPITAL | Age: 58
Discharge: HOME | End: 2023-02-23
Admitting: INTERNAL MEDICINE
Payer: COMMERCIAL

## 2023-02-23 DIAGNOSIS — C50.111 MALIGNANT NEOPLASM OF CENTRAL PORTION OF RIGHT BREAST IN FEMALE, ESTROGEN RECEPTOR POSITIVE: ICD-10-CM

## 2023-02-23 DIAGNOSIS — Z17.0 MALIGNANT NEOPLASM OF CENTRAL PORTION OF RIGHT BREAST IN FEMALE, ESTROGEN RECEPTOR POSITIVE: ICD-10-CM

## 2023-02-23 DIAGNOSIS — Z45.2 ENCOUNTER FOR CARE RELATED TO VASCULAR ACCESS PORT: Primary | ICD-10-CM

## 2023-02-23 PROCEDURE — 25010000002 HEPARIN LOCK FLUSH PER 10 UNITS: Performed by: INTERNAL MEDICINE

## 2023-02-23 PROCEDURE — 36593 DECLOT VASCULAR DEVICE: CPT

## 2023-02-23 PROCEDURE — 25010000002 ALTEPLASE 2 MG RECONSTITUTED SOLUTION: Performed by: INTERNAL MEDICINE

## 2023-02-23 RX ORDER — SODIUM CHLORIDE 0.9 % (FLUSH) 0.9 %
20 SYRINGE (ML) INJECTION AS NEEDED
Status: DISCONTINUED | OUTPATIENT
Start: 2023-02-23 | End: 2023-02-24 | Stop reason: HOSPADM

## 2023-02-23 RX ORDER — HEPARIN SODIUM (PORCINE) LOCK FLUSH IV SOLN 100 UNIT/ML 100 UNIT/ML
500 SOLUTION INTRAVENOUS AS NEEDED
Status: CANCELLED | OUTPATIENT
Start: 2023-02-23

## 2023-02-23 RX ORDER — HEPARIN SODIUM (PORCINE) LOCK FLUSH IV SOLN 100 UNIT/ML 100 UNIT/ML
500 SOLUTION INTRAVENOUS AS NEEDED
Status: DISCONTINUED | OUTPATIENT
Start: 2023-02-23 | End: 2023-02-24 | Stop reason: HOSPADM

## 2023-02-23 RX ORDER — SODIUM CHLORIDE 0.9 % (FLUSH) 0.9 %
20 SYRINGE (ML) INJECTION AS NEEDED
Status: CANCELLED | OUTPATIENT
Start: 2023-02-23

## 2023-02-23 RX ADMIN — Medication 500 UNITS: at 08:36

## 2023-02-23 RX ADMIN — Medication 20 ML: at 08:36

## 2023-02-23 RX ADMIN — ALTEPLASE: 2.2 INJECTION, POWDER, LYOPHILIZED, FOR SOLUTION INTRAVENOUS at 09:00

## 2023-02-23 NOTE — PROGRESS NOTES
Patient came in for port flush without complaints. Port flushed with 10cc normal saline, cannot get blood return at this time. Flushed port further and inserted heparin. Patient taken to the back for cathflo. Patient has next apts.

## 2023-02-23 NOTE — PROGRESS NOTES
Blood return obtained with Cath-flow.  Port flushed with good blood return noted.  Port flushed with saline prior to needle removal. Pt discharged with out complaints.

## 2023-02-28 ENCOUNTER — OFFICE VISIT (OUTPATIENT)
Dept: RADIATION ONCOLOGY | Facility: HOSPITAL | Age: 58
End: 2023-02-28
Payer: COMMERCIAL

## 2023-02-28 DIAGNOSIS — C50.111 MALIGNANT NEOPLASM OF CENTRAL PORTION OF RIGHT BREAST IN FEMALE, ESTROGEN RECEPTOR POSITIVE: Primary | ICD-10-CM

## 2023-02-28 DIAGNOSIS — Z17.0 MALIGNANT NEOPLASM OF CENTRAL PORTION OF RIGHT BREAST IN FEMALE, ESTROGEN RECEPTOR POSITIVE: Primary | ICD-10-CM

## 2023-02-28 PROCEDURE — 99024 POSTOP FOLLOW-UP VISIT: CPT | Performed by: RADIOLOGY

## 2023-03-01 ENCOUNTER — TELEPHONE (OUTPATIENT)
Dept: FAMILY MEDICINE CLINIC | Facility: CLINIC | Age: 58
End: 2023-03-01

## 2023-03-01 ENCOUNTER — PATIENT OUTREACH (OUTPATIENT)
Dept: ONCOLOGY | Facility: CLINIC | Age: 58
End: 2023-03-01
Payer: COMMERCIAL

## 2023-03-01 ENCOUNTER — OFFICE VISIT (OUTPATIENT)
Dept: SURGERY | Facility: CLINIC | Age: 58
End: 2023-03-01
Payer: COMMERCIAL

## 2023-03-01 VITALS
SYSTOLIC BLOOD PRESSURE: 148 MMHG | BODY MASS INDEX: 42.58 KG/M2 | HEART RATE: 62 BPM | WEIGHT: 249.4 LBS | OXYGEN SATURATION: 98 % | DIASTOLIC BLOOD PRESSURE: 89 MMHG | TEMPERATURE: 97.8 F | RESPIRATION RATE: 18 BRPM | HEIGHT: 64 IN

## 2023-03-01 DIAGNOSIS — C50.111 MALIGNANT NEOPLASM OF CENTRAL PORTION OF RIGHT BREAST IN FEMALE, ESTROGEN RECEPTOR POSITIVE: Primary | ICD-10-CM

## 2023-03-01 DIAGNOSIS — Z17.0 MALIGNANT NEOPLASM OF CENTRAL PORTION OF RIGHT BREAST IN FEMALE, ESTROGEN RECEPTOR POSITIVE: Primary | ICD-10-CM

## 2023-03-01 PROCEDURE — 99213 OFFICE O/P EST LOW 20 MIN: CPT | Performed by: SURGERY

## 2023-03-01 RX ORDER — CHLORHEXIDINE GLUCONATE 0.12 MG/ML
RINSE ORAL
COMMUNITY
Start: 2023-02-24 | End: 2023-04-01 | Stop reason: ALTCHOICE

## 2023-03-01 RX ORDER — AMOXICILLIN 500 MG/1
1 CAPSULE ORAL 3 TIMES DAILY
COMMUNITY
Start: 2023-02-24 | End: 2023-03-08

## 2023-03-01 RX ORDER — IBUPROFEN 800 MG/1
800 TABLET ORAL 3 TIMES DAILY PRN
COMMUNITY
Start: 2023-02-24

## 2023-03-01 RX ORDER — METHYLPREDNISOLONE 4 MG/1
TABLET ORAL
COMMUNITY
Start: 2023-02-24 | End: 2023-03-08

## 2023-03-01 NOTE — PROGRESS NOTES
GENERAL SURGERY ESTABLISHED PATIENT NOTE    Patient Care Team:  Fabiola Joyner MD as PCP - General (Family Medicine)  Seipel, Joseph F, MD as Consulting Physician (Sleep Medicine)  Jagdeep Mcwilliams MD as Surgeon (General Surgery)  Lianet Rust MD as Consulting Physician (Hematology and Oncology)  Melba Gamboa, MIGUEL as Nurse Navigator    Reason for follow-up: 6 month follow-up for breast cancer    Subjective     Patient is a 57 y.o. female presents for 6-month follow-up from right lumpectomy and sentinel lymph node biopsy performed on 8/18/2022.  The patient has completed chemotherapy, and radiation therapy.  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     Review of Systems   Genitourinary: Negative for breast discharge, breast lump and breast pain.   Hematological: Negative for adenopathy.        History  Past Medical History:   Diagnosis Date   • Ankle edema     at times   • Breast cancer, right (HCC) 08/2022   • Diabetes mellitus (HCC)    • GERD (gastroesophageal reflux disease)    • Hiatal hernia    • Hyperlipidemia    • Morbid obesity (HCC)    • Sleep apnea     CPAP- to bring dos   • Type 2 diabetes mellitus (HCC)      Past Surgical History:   Procedure Laterality Date   • BREAST BIOPSY Right 08/18/2022    Procedure: Right breast lumpectomy;  Surgeon: Jagdeep Mcwilliams MD;  Location: [facility] MAIN OR;  Service: General;  Laterality: Right;   • BREAST LUMPECTOMY Left 2003    x2   • PORTACATH PLACEMENT N/A 10/06/2022    Procedure: INSERTION OF PORTACATH;  Surgeon: Jagdeep Mcwilliams MD;  Location: [facility] MAIN OR;  Service: General;  Laterality: N/A;   • SENTINEL NODE BIOPSY Right 08/18/2022    Procedure: SENTINEL NODE BIOPSY;  Surgeon: Jagdeep Mcwilliams MD;  Location: [facility] OR;   Service: General;  Laterality: Right;   • SUBTOTAL HYSTERECTOMY       Family History   Problem Relation Age of Onset   • Breast cancer Mother    • Heart disease Mother    • Thyroid disease Mother    • Stroke Mother    • Cancer Mother    • Clotting disorder Father    • Diabetes Brother    • Heart disease Brother    • Cancer Paternal Grandmother    • Colon cancer Paternal Grandmother    • Diabetes Brother    • Stroke Brother    • Diabetes Brother      Social History     Tobacco Use   • Smoking status: Former     Packs/day: 0.25     Years: 10.00     Pack years: 2.50     Types: Cigarettes     Quit date: 2010     Years since quittin.1     Passive exposure: Past   • Smokeless tobacco: Never   Vaping Use   • Vaping Use: Never used   Substance Use Topics   • Alcohol use: Not Currently     Comment: rare   • Drug use: Never     (Not in a hospital admission)    Allergies:  Patient has no known allergies.    Objective     Vital Signs  Temp:  [97.8 °F (36.6 °C)] 97.8 °F (36.6 °C)  Heart Rate:  [62] 62  Resp:  [18] 18  BP: (148)/(89) 148/89    Physical Exam  Vitals reviewed. Exam conducted with a chaperone present.   Constitutional:       Appearance: She is well-developed.   HENT:      Head: Normocephalic and atraumatic.   Eyes:      Pupils: Pupils are equal, round, and reactive to light.   Cardiovascular:      Rate and Rhythm: Normal rate and regular rhythm.   Pulmonary:      Effort: Pulmonary effort is normal.      Breath sounds: Normal breath sounds.   Chest:      Comments: Both breasts were examined the upright position.  Both breasts exhibit pendulous morphology.  There is a well-healed curvilinear incision on the lateral aspect of the right breast.  No palpable masses bilaterally, no skin changes, thickening, dimpling, or rashes are noted.  No discharge from the nipple  Abdominal:      General: There is no distension.      Palpations: Abdomen is soft.      Tenderness: There is no abdominal tenderness.      Hernia:  No hernia is present.   Musculoskeletal:         General: Normal range of motion.      Cervical back: Normal range of motion.   Lymphadenopathy:      Cervical: No cervical adenopathy.      Upper Body:      Right upper body: No supraclavicular or axillary adenopathy.      Left upper body: No supraclavicular or axillary adenopathy.   Skin:     General: Skin is warm and dry.      Findings: No rash.   Neurological:      Mental Status: She is alert and oriented to person, place, and time.         Results Review:   Lab Results (last 24 hours)     ** No results found for the last 24 hours. **        No radiology results for the last day      I reviewed the patient's new imaging results and agree with the interpretation.  I reviewed the patient's other test results and agree with the interpretation    Assessment & Plan   Right breast cancer    Pathology reviewed, patient noted to have 1.7 cm residual moderately differentiated ductal carcinoma which was completely excised.  5 lymph nodes were removed, and all negative.  The patient had an Oncotype DX performed, and elected to undergo adjuvant chemotherapy.  She has now completed radiation therapy.  Recommend follow-up with physical therapy for lymphedema exercises  Recommend follow-up with medical oncology as directed  Recommend follow-up with radiation oncology as directed  Follow-up with me in 6 months or sooner if issues arise  If the patient no longer needs her Port-A-Cath, can arrange to have Port-A-Cath removed at her 6-month visit    I discussed the patients findings and my recommendations with the patient.     Jagdeep Mcwilliams MD  03/01/23  09:14 EST

## 2023-03-01 NOTE — TELEPHONE ENCOUNTER
Caller: Alaina Hartman    Relationship: Self    Best call back number: [phone]    What is the best time to reach you: ANY    Who are you requesting to speak with (clinical staff, provider,  specific staff member): CLINICAL    What was the call regarding:     PATIENT WAS SEEING DOCTOR ALEXIS AND WILL ESTABLISH CARE WITH DOCTOR MARC IN MAY.     SHE HAS BEEN GOING THROUGH CANCER TREATMENT AND ITS GETTING VERY HARD TO WALK AND NEEDS A PAPER FILLED OUT FOR A HANDICAP STICKER.     WOULD LIKE TO KNOW IF SHE DROPS THAT OFF COULD DOCTOR MARC DO THAT FOR HER?    Do you require a callback: YES

## 2023-03-02 NOTE — TELEPHONE ENCOUNTER
"Dr haynes advised: \"Yes, I can fill that out for her if she drops it off \" called patient and let her know to get this paperwork from [facility], fill out her portion and drop it off. We will call her when it is finished. She was very appreciative   "

## 2023-03-03 ENCOUNTER — HOSPITAL ENCOUNTER (OUTPATIENT)
Dept: RADIATION ONCOLOGY | Facility: HOSPITAL | Age: 58
Setting detail: RADIATION/ONCOLOGY SERIES
End: 2023-03-03
Payer: COMMERCIAL

## 2023-03-07 ENCOUNTER — PATIENT ROUNDING (BHMG ONLY) (OUTPATIENT)
Dept: SURGERY | Facility: CLINIC | Age: 58
End: 2023-03-07
Payer: COMMERCIAL

## 2023-03-07 ENCOUNTER — OFFICE VISIT (OUTPATIENT)
Dept: RADIATION ONCOLOGY | Facility: HOSPITAL | Age: 58
End: 2023-03-07
Payer: COMMERCIAL

## 2023-03-07 DIAGNOSIS — C50.111 MALIGNANT NEOPLASM OF CENTRAL PORTION OF RIGHT BREAST IN FEMALE, ESTROGEN RECEPTOR POSITIVE: Primary | ICD-10-CM

## 2023-03-07 DIAGNOSIS — Z17.0 MALIGNANT NEOPLASM OF CENTRAL PORTION OF RIGHT BREAST IN FEMALE, ESTROGEN RECEPTOR POSITIVE: Primary | ICD-10-CM

## 2023-03-07 PROCEDURE — 99024 POSTOP FOLLOW-UP VISIT: CPT | Performed by: RADIOLOGY

## 2023-03-07 PROCEDURE — G0463 HOSPITAL OUTPT CLINIC VISIT: HCPCS

## 2023-03-07 NOTE — PROGRESS NOTES
Hematology/Oncology Outpatient Follow Up    PATIENT NAME:Alaina Hartman  :1965  MRN: [id number]  PRIMARY CARE PHYSICIAN: Fabiola Joyner MD  REFERRING PHYSICIAN: Fabiola Joyner MD    Chief Complaint   Patient presents with   • Follow-up     Malignant neoplasm of central portion of right breast in female, estrogen receptor positive (HCC)        HISTORY OF PRESENT ILLNESS:     This is a 57-year-old female who was clinically asymptomatic and had a routine screening mammogram which showed an abnormality on the right breast.  Prior to that patient had left breast biopsy in  for benign disease.     Review of her screening mammogram which was performed on 6/3/2022 showed a new 2 cm focal asymmetry with architectural distortion in the mid to posterior third depth of the outer right breast the left breast was benign.  Right diagnostic mammogram and ultrasound was recommended.  On 2022 patient underwent right diagnostic mammogram and ultrasound which showed a 2 cm irregular mass in the lateral inferior breast.  Same day she had an ultrasound which showed a 1.6 cm on the right breast the right axilla did not show any abnormal lymph nodes.     Patient then underwent ultrasound-guided biopsy of the right breast mass pathology revealed moderately differentiated invasive ductal carcinoma estrogen receptor positive at 95% progesterone receptor positive at 50%, HER2/bertha was negative at 1+ on immunohistochemistry.        Patient had a partial hysterectomy many years ago.  She is  and has 3 children.  She does not smoke, does not drink alcohol     Family history significant for mother with breast cancer in her 50s, her father had skin cancers, paternal grandmother had colon cancer at the age of 70        She is a       · 2022 estradiol level was less than 5 and FSH was 33 consistent with postmenopausal state  · 2022 patient underwent right lumpectomy with sentinel lymph node biopsy.   Pathology revealed residual moderately differentiated ductal carcinoma measuring 1.7 cm completely excised total of 5 lymph nodes were removed and all of them were negative for metastatic disease.  Pathology stage is pT1 cpN0 M0.  There was no evidence of DCIS all margins were negative distance to the closest margin was anterior margin at 2 mm ER positive at 95, OK positive at 50% and HER2/bertha was negative.  · 9/27/2022 patient had Oncotype DX assay done which returned with a recurrence score of 28.  This is consistent with 17% risk of recurrence at 9 years distantly, and more than 15% chemotherapy benefit.  On the validation assay estrogen receptor was positive, OK was positive and HER2/bertha was negative.  · 10/27/2022: C1 D1 Taxotere and Cytoxan received.  Neulasta support held due to leukocytosis.  · 11/4/2022: WBC 2.28, hemoglobin 11.9, platelets 297,000, ANC 0.08.  Patient was initiated on Neupogen 480 mg subcu given 11/4/2022, 11/5/2022, 11/6/2022.  Patient to receive Neulasta again with next cycle of treatment.  · 11/17/2022: Patient received cycle 2 of Taxotere Cytoxan with Neulasta support  · 12/8/2022: Patient received cycle 3 of Taxotere and Cytoxan with Neulasta support  · 12/29/2020 patient received cycle 4 of combination chemotherapy with Cytoxan and Taxotere  · Patient completed adjuvant breast radiation February 2023      Past Medical History:   Diagnosis Date   • Ankle edema     at times   • Breast cancer, right (HCC) 08/2022   • Diabetes mellitus (HCC)    • GERD (gastroesophageal reflux disease)    • Hiatal hernia    • Hyperlipidemia    • Morbid obesity (HCC)    • Sleep apnea     CPAP- to bring dos   • Type 2 diabetes mellitus (HCC)        Past Surgical History:   Procedure Laterality Date   • BREAST BIOPSY Right 08/18/2022    Procedure: Right breast lumpectomy;  Surgeon: Jagdeep Mcwilliams MD;  Location: [facility] MAIN OR;  Service: General;  Laterality: Right;   • BREAST LUMPECTOMY Left 2003     x2   • PORTACATH PLACEMENT N/A 10/06/2022    Procedure: INSERTION OF PORTACATH;  Surgeon: Jagdeep Mcwilliams MD;  Location: [facility] MAIN OR;  Service: General;  Laterality: N/A;   • SENTINEL NODE BIOPSY Right 08/18/2022    Procedure: SENTINEL NODE BIOPSY;  Surgeon: Jagdeep Mcwilliams MD;  Location: [facility] MAIN OR;  Service: General;  Laterality: Right;   • SUBTOTAL HYSTERECTOMY           Current Outpatient Medications:   •  ALPRAZolam (XANAX) 0.5 MG tablet, Take 1 tablet by mouth Daily As Needed for Anxiety., Disp: 20 tablet, Rfl: 0  •  atorvastatin (LIPITOR) 40 MG tablet, Take 1 tablet by mouth Daily., Disp: 90 tablet, Rfl: 4  •  BIOTIN PO, Take  by mouth., Disp: , Rfl:   •  Calcium Carbonate-Vitamin D (Oscal 500 D-3) 500-5 MG-MCG tablet, Take 500 mg by mouth Every 12 (Twelve) Hours., Disp: 60 tablet, Rfl: 6  •  chlorhexidine (PERIDEX) 0.12 % solution, SWISH AND SPIT 15 ML IN MOUTH TWICE DAILY, Disp: , Rfl:   •  Cholecalciferol (Vitamin D3) 50 MCG (2000 UT) capsule, Take 1 capsule by mouth Daily., Disp: , Rfl:   •  Cyanocobalamin (Vitamin B-12) 1000 MCG sublingual tablet, Place 1 tablet under the tongue Daily., Disp: 100 each, Rfl: 4  •  exemestane (Aromasin) 25 MG chemo tablet, Take 1 tablet by mouth Daily., Disp: 30 tablet, Rfl: 6  •  glucose blood (Accu-Chek Guide) test strip, 1 each by Other route 4 (Four) Times a Day. Use as instructed, Disp: 150 each, Rfl: 11  •  ibuprofen (ADVIL,MOTRIN) 800 MG tablet, Take 1 tablet by mouth 3 (Three) Times a Day As Needed. for pain, Disp: , Rfl:   •  Insulin Lispro Prot & Lispro (HumaLOG Mix 75/25 KwikPen) (75-25) 100 UNIT/ML suspension pen-injector pen, Inject 55 Units under the skin into the appropriate area as directed 3 (Three) Times a Day Before Meals., Disp: 60 mL, Rfl: 6  •  Insulin Pen Needle (Pen Needles) 32G X 4 MM misc, 1 each 4 (Four) Times a Day. Use to inject insulin / DX E11.65, Disp: 150 each, Rfl: 5  •  lidocaine-prilocaine (EMLA) 2.5-2.5 % cream,  Apply 1 application topically to the appropriate area as directed Every 2 (Two) Hours As Needed for Mild Pain. Apply to port site one hour prior to chemotherapy appointment, Disp: 1 g, Rfl: 5  •  lisinopril (PRINIVIL,ZESTRIL) 5 MG tablet, Take 1 tablet by mouth Daily., Disp: 90 tablet, Rfl: 4  •  metFORMIN ER (GLUCOPHAGE-XR) 500 MG 24 hr tablet, Take 2 tablets by mouth Daily., Disp: 180 tablet, Rfl: 6  •  mometasone (Elocon) 0.1 % cream, Apply to affect skin 2-3 times daily during radiation therapy course as needed (itching/irritation), Disp: 15 g, Rfl: 1  •  multivitamin with minerals tablet tablet, Take 1 tablet by mouth Daily., Disp: , Rfl:   •  nystatin (MYCOSTATIN) 100,000 unit/mL suspension, SWISH AND SPIT 5 ML FOUR TIMES DAILY, AND TREAT DENTURE WITH RINSE AS WELL TO PREVENT CROSS CONTAMINATION, Disp: , Rfl:   •  omeprazole (priLOSEC) 40 MG capsule, TAKE 1 CAPSULE BY MOUTH ONCE DAILY . APPOINTMENT REQUIRED FOR FUTURE REFILLS, Disp: 90 capsule, Rfl: 0  •  ondansetron (ZOFRAN) 8 MG tablet, Take 1 tablet by mouth 3 (Three) Times a Day As Needed for Nausea or Vomiting., Disp: 30 tablet, Rfl: 5  •  silver sulfadiazine (SILVADENE, SSD) 1 % cream, Apply 1 application topically to the appropriate area as directed 2 (Two) Times a Day., Disp: 400 g, Rfl: 1  •  Vitamin D, Ergocalciferol, 50 MCG (2000 UT) capsule, Take 2,000 Units by mouth Daily., Disp: 100 capsule, Rfl: 4    No Known Allergies    Family History   Problem Relation Age of Onset   • Breast cancer Mother    • Heart disease Mother    • Thyroid disease Mother    • Stroke Mother    • Cancer Mother    • Clotting disorder Father    • Diabetes Brother    • Heart disease Brother    • Cancer Paternal Grandmother    • Colon cancer Paternal Grandmother    • Diabetes Brother    • Stroke Brother    • Diabetes Brother        Cancer-related family history includes Breast cancer in her mother; Cancer in her mother and paternal grandmother; Colon cancer in her paternal  "grandmother.    Social History     Tobacco Use   • Smoking status: Former     Packs/day: 0.25     Years: 10.00     Pack years: 2.50     Types: Cigarettes     Quit date: 2010     Years since quittin.1     Passive exposure: Past   • Smokeless tobacco: Never   Vaping Use   • Vaping Use: Never used   Substance Use Topics   • Alcohol use: Not Currently     Comment: rare   • Drug use: Never       I have reviewed and confirmed the accuracy of the patient's history: Chief complaint, HPI, ROS and Subjective as entered by the MA/LPN/RN. Lianet Rust MD 23       SUBJECTIVE:    Patient denies any new issues          REVIEW OF SYSTEMS:    Review of Systems   Constitutional: Positive for fatigue. Negative for chills and fever.   HENT: Negative for congestion, drooling, ear discharge, rhinorrhea, sinus pressure and tinnitus.    Eyes: Negative for photophobia, pain and discharge.   Respiratory: Negative for apnea, choking and stridor.    Cardiovascular: Positive for leg swelling. Negative for palpitations.   Gastrointestinal: Negative for abdominal distention, abdominal pain and anal bleeding.   Endocrine: Negative for polydipsia and polyphagia.   Genitourinary: Negative for decreased urine volume, flank pain and genital sores.   Musculoskeletal: Positive for arthralgias. Negative for gait problem, neck pain and neck stiffness.   Skin: Negative for color change, rash and wound.   Neurological: Negative for tremors, seizures, syncope, facial asymmetry and speech difficulty.   Hematological: Negative for adenopathy.   Psychiatric/Behavioral: Negative for agitation, confusion, hallucinations and self-injury. The patient is not hyperactive.        OBJECTIVE:    Vitals:    23 0957   BP: 155/83   Pulse: 90   Resp: 20   Temp: 97 °F (36.1 °C)   TempSrc: Infrared   Weight: 117 kg (257 lb)   Height: 162.6 cm (64\")   PainSc: 0-No pain     Body mass index is 44.11 kg/m².    ECOG    (0) Fully active, able to carry " on all predisease performance without restriction    Physical Exam  Vitals and nursing note reviewed.   Constitutional:       General: She is not in acute distress.     Appearance: She is not diaphoretic.   HENT:      Head: Normocephalic and atraumatic.   Eyes:      General: No scleral icterus.        Right eye: No discharge.         Left eye: No discharge.      Conjunctiva/sclera: Conjunctivae normal.   Neck:      Thyroid: No thyromegaly.   Cardiovascular:      Rate and Rhythm: Normal rate and regular rhythm.      Heart sounds: Normal heart sounds.     No friction rub. No gallop.   Pulmonary:      Effort: Pulmonary effort is normal. No respiratory distress.      Breath sounds: No stridor. No wheezing.   Abdominal:      General: Bowel sounds are normal.      Palpations: Abdomen is soft. There is no mass.      Tenderness: There is no abdominal tenderness. There is no guarding or rebound.   Musculoskeletal:         General: No tenderness. Normal range of motion.      Cervical back: Normal range of motion and neck supple.      Right lower leg: Edema (1+) present.      Left lower leg: Edema (1+) present.   Lymphadenopathy:      Cervical: No cervical adenopathy.   Skin:     General: Skin is warm.      Findings: No erythema or rash.   Neurological:      Mental Status: She is alert and oriented to person, place, and time.      Motor: No abnormal muscle tone.   Psychiatric:         Behavior: Behavior normal.       I have reexamined the patient and the results are consistent with the previously documented exam. Lianet Rust MD     RECENT LABS    WBC   Date Value Ref Range Status   03/08/2023 7.70 3.40 - 10.80 10*3/mm3 Final   08/26/2020 11.0 (H) 3.4 - 10.8 x10E3/uL Final     RBC   Date Value Ref Range Status   03/08/2023 4.07 3.77 - 5.28 10*6/mm3 Final   08/26/2020 4.53 3.77 - 5.28 x10E6/uL Final     Hemoglobin   Date Value Ref Range Status   03/08/2023 11.3 (L) 12.0 - 15.9 g/dL Final     Hematocrit   Date Value  Ref Range Status   03/08/2023 36.8 34.0 - 46.6 % Final     MCV   Date Value Ref Range Status   03/08/2023 90.4 79.0 - 97.0 fL Final     MCH   Date Value Ref Range Status   03/08/2023 27.8 26.6 - 33.0 pg Final     MCHC   Date Value Ref Range Status   03/08/2023 30.7 (L) 31.5 - 35.7 g/dL Final     RDW   Date Value Ref Range Status   03/08/2023 14.6 12.3 - 15.4 % Final     RDW-SD   Date Value Ref Range Status   03/08/2023 47.3 37.0 - 54.0 fl Final     MPV   Date Value Ref Range Status   03/08/2023 9.2 6.0 - 12.0 fL Final     Platelets   Date Value Ref Range Status   03/08/2023 313 140 - 450 10*3/mm3 Final     Neutrophil %   Date Value Ref Range Status   03/08/2023 51.4 42.7 - 76.0 % Final     Lymphocyte %   Date Value Ref Range Status   03/08/2023 32.9 19.6 - 45.3 % Final     Monocyte %   Date Value Ref Range Status   03/08/2023 5.2 5.0 - 12.0 % Final     Eosinophil %   Date Value Ref Range Status   03/08/2023 9.5 (H) 0.3 - 6.2 % Final     Basophil %   Date Value Ref Range Status   03/08/2023 1.0 0.0 - 1.5 % Final     Immature Grans %   Date Value Ref Range Status   08/09/2022 0.5 0.0 - 0.5 % Final     Neutrophils, Absolute   Date Value Ref Range Status   03/08/2023 3.96 1.70 - 7.00 10*3/mm3 Final     Lymphocytes, Absolute   Date Value Ref Range Status   03/08/2023 2.53 0.70 - 3.10 10*3/mm3 Final     Monocytes, Absolute   Date Value Ref Range Status   03/08/2023 0.40 0.10 - 0.90 10*3/mm3 Final     Eosinophils, Absolute   Date Value Ref Range Status   03/08/2023 0.73 (H) 0.00 - 0.40 10*3/mm3 Final     Basophils, Absolute   Date Value Ref Range Status   03/08/2023 0.08 0.00 - 0.20 10*3/mm3 Final     Immature Grans, Absolute   Date Value Ref Range Status   08/09/2022 0.05 0.00 - 0.05 10*3/mm3 Final     nRBC   Date Value Ref Range Status   08/09/2022 0.0 0.0 - 0.2 /100 WBC Final       Lab Results   Component Value Date    GLUCOSE 217 (H) 02/13/2023    BUN 15 02/13/2023    CREATININE 0.60 02/13/2023    EGFRIFNONA 90  12/01/2021    EGFRIFAFRI 115 08/26/2020    BCR 25.0 02/13/2023    K 4.4 02/13/2023    CO2 25.9 02/13/2023    CALCIUM 9.7 02/13/2023    PROTENTOTREF 7.0 08/26/2020    ALBUMIN 4.2 02/13/2023    LABIL2 1.8 08/26/2020    AST 16 02/13/2023    ALT 20 02/13/2023         Assessment & Plan     Malignant neoplasm of central portion of right breast in female, estrogen receptor positive (HCC)  - CBC & Differential  - Ambulatory Referral to General Surgery      ASSESSMENT:      1. Moderately differentiated invasive ductal carcinoma of the right breast.  Status post right lumpectomy with sentinel lymph node biopsy.  pT1 cpN0 M0.  ER positive at 95%, TN positive at 50% and HER2/bertha was negative.  T1 cN0 M0.  Ongoing management  2. Oncotype DX assay with a high recurrence score at 28, consistent with 17% risk of distant recurrence at 9 years with tamoxifen alone and group absolute chemotherapy benefit of more than 15%  3. Patient is on adjuvant chemotherapy with Cytoxan and Taxotere.  She has received 4 out of 4 cycles completed on December 29, 2022.  Reviewed  4. Status post right breast radiation completed February 2023  5. Radiation-induced dermatitis  6. Status post partial hysterectomy   7. Postmenopausal state following lab confirmation  8. Family history of breast cancer in her mother at the age of 50, paternal grandmother with colon cancer at 17 and her father had skin cancer.  9. Assessment has been reviewed and updated.    10. Chemotherapy-induced neutropenia.  Patient is afebrile.     Discussion     Reviewed the results of her Oncotype DX assay which came back with a high recurrence score at 28.  She has some up to 15% benefit with chemotherapy.  She also has a risk of relapse of 17% despite adjuvant hormonal treatment.  For these reasons chemotherapy have been recommended.  Patient was reluctant about pursuing adjuvant chemotherapy due to concerns of side effects.  Discussed that it is important for her to receive  chemotherapy given the high Oncotype DX assay report.  She has comorbidities but she is relatively healthy and remains very functional.  Patient is willing to take Taxotere and Cytoxan.    Taxotere and Cytoxan every 21 days for 4 cycles.    This will be followed by adjuvant radiation and then adjuvant endocrine therapy     She is a candidate for aromatase inhibitor.  I recommended Aromasin 25 mg p.o. daily    Discussed the benefits and the side effects in detail with patient  Side effects of aromatase inhibitors include risk of musculoskeletal side effect including arthralgia, myalgia, especially in patients who have underlying musculoskeletal disease such as osteoarthritis, hot flashes, mood changes. There is risk of vaginal dryness, dyspareunia and other sexual dysfunction. Other side effects include degree of cognitive symptoms compared to women not on endocrine therapy. There is possibility of fatigue, forgetfulness, poor sleep hygiene, osteoporosis, osteopenia, risk of fractures, cardiovascular disease and hypercholestolemia. There is also possibility of reactivation of ovarian function especially in women who were premenopausal at time of diagnosis and became amenorrheic while on chemotherapy. The duration of treatment is also for minimum of five years and possibly extending therapy in some women with higher risk features beyond five years. We also discussed that the AI have similar efficacy in the adjuvant setting. Therefore, no one AI is preferred over another. I will obtain a bone density at baseline if none has been done and every two years after that. If there is evidence of osteopenia or osteoporosis, there will be recommendation for bisphosphonate therapy for the duration of aromatase inhibitor therapy and possibly longer depending on bone health status at completion of therapy. We also discussed that labs will be done with follow ups and lipid panel will be done on an annual  basis.         Plans:     · CBC reviewed  · Begin Aromasin 25 mg p.o. daily on 3/22/2023  · Begin calcium with vitamin D 600 mg twice a day  · Continue post radiation care  · Should be a candidate for Zometa IV every 6 months for at least 3 years for breast cancer prevention, prevention of bone loss while on AI  · Bone density will be due again 1/31/2025   · Referred to Dr. Mcwilliams for port removal to be done in about a month from now  · Give patient information on Aromasin, Zometa  · Patient follow-up with her dentist also to be cleared for Zometa administration  · Schedule survivorship follow-up  · Follow-up in lymphedema clinic  · Gave information on cancer genetics for her to review.  Patient to let me know when she is ready to proceed  · All questions answered  · Follow-up in about 3 and half months from now or earlier as needed       Patient verbalized understanding and is in agreement of the above plan.             I spent 45 total minutes, face-to-face, caring for Alaina today.  90% of this time involved counseling and/or coordination of care as documented within this note.

## 2023-03-07 NOTE — PROGRESS NOTES
Radiation Oncology  DOS: 3/7/2023  Re: Skin check visit  Diagnosis: Rt breast IDC, [id number]      Completed radiation therapy on 02/21/2023; Rt whole breast 4256 cGy in 16 fractions.    Seen last on 2/28/2023 and Silvadene was recommended for axillary region, mepilex pads AG used for inframammary and now been using Silvadene at that site also for one day.    On exam, it is healing well and she can continue silvadene through the weekend before switching to Aquaphor.    RTC as planned 1 month from 02/21/2023 or earlier as needed.    Improving.    Approved by:     Jagdeep De Los Santos MD  03/07/23  09:00 EST

## 2023-03-08 ENCOUNTER — LAB (OUTPATIENT)
Dept: LAB | Facility: HOSPITAL | Age: 58
End: 2023-03-08
Payer: COMMERCIAL

## 2023-03-08 ENCOUNTER — OFFICE VISIT (OUTPATIENT)
Dept: ONCOLOGY | Facility: CLINIC | Age: 58
End: 2023-03-08
Payer: COMMERCIAL

## 2023-03-08 VITALS
RESPIRATION RATE: 20 BRPM | BODY MASS INDEX: 43.87 KG/M2 | HEART RATE: 90 BPM | WEIGHT: 257 LBS | TEMPERATURE: 97 F | DIASTOLIC BLOOD PRESSURE: 83 MMHG | HEIGHT: 64 IN | SYSTOLIC BLOOD PRESSURE: 155 MMHG

## 2023-03-08 DIAGNOSIS — C50.111 MALIGNANT NEOPLASM OF CENTRAL PORTION OF RIGHT BREAST IN FEMALE, ESTROGEN RECEPTOR POSITIVE: Primary | ICD-10-CM

## 2023-03-08 DIAGNOSIS — Z17.0 MALIGNANT NEOPLASM OF CENTRAL PORTION OF RIGHT BREAST IN FEMALE, ESTROGEN RECEPTOR POSITIVE: Primary | ICD-10-CM

## 2023-03-08 LAB
BASOPHILS # BLD AUTO: 0.08 10*3/MM3 (ref 0–0.2)
BASOPHILS NFR BLD AUTO: 1 % (ref 0–1.5)
DEPRECATED RDW RBC AUTO: 47.3 FL (ref 37–54)
EOSINOPHIL # BLD AUTO: 0.73 10*3/MM3 (ref 0–0.4)
EOSINOPHIL NFR BLD AUTO: 9.5 % (ref 0.3–6.2)
ERYTHROCYTE [DISTWIDTH] IN BLOOD BY AUTOMATED COUNT: 14.6 % (ref 12.3–15.4)
HCT VFR BLD AUTO: 36.8 % (ref 34–46.6)
HGB BLD-MCNC: 11.3 G/DL (ref 12–15.9)
HOLD SPECIMEN: NORMAL
HOLD SPECIMEN: NORMAL
LYMPHOCYTES # BLD AUTO: 2.53 10*3/MM3 (ref 0.7–3.1)
LYMPHOCYTES NFR BLD AUTO: 32.9 % (ref 19.6–45.3)
MCH RBC QN AUTO: 27.8 PG (ref 26.6–33)
MCHC RBC AUTO-ENTMCNC: 30.7 G/DL (ref 31.5–35.7)
MCV RBC AUTO: 90.4 FL (ref 79–97)
MONOCYTES # BLD AUTO: 0.4 10*3/MM3 (ref 0.1–0.9)
MONOCYTES NFR BLD AUTO: 5.2 % (ref 5–12)
NEUTROPHILS NFR BLD AUTO: 3.96 10*3/MM3 (ref 1.7–7)
NEUTROPHILS NFR BLD AUTO: 51.4 % (ref 42.7–76)
PLATELET # BLD AUTO: 313 10*3/MM3 (ref 140–450)
PMV BLD AUTO: 9.2 FL (ref 6–12)
RBC # BLD AUTO: 4.07 10*6/MM3 (ref 3.77–5.28)
WBC NRBC COR # BLD: 7.7 10*3/MM3 (ref 3.4–10.8)

## 2023-03-08 PROCEDURE — 85025 COMPLETE CBC W/AUTO DIFF WBC: CPT

## 2023-03-08 PROCEDURE — 36415 COLL VENOUS BLD VENIPUNCTURE: CPT

## 2023-03-08 PROCEDURE — 99215 OFFICE O/P EST HI 40 MIN: CPT | Performed by: INTERNAL MEDICINE

## 2023-03-08 RX ORDER — EXEMESTANE 25 MG/1
25 TABLET ORAL DAILY
Qty: 30 TABLET | Refills: 6 | Status: SHIPPED | OUTPATIENT
Start: 2023-03-08

## 2023-03-08 NOTE — PATIENT INSTRUCTIONS
"Aromasin        Generic Name: Exemestane (ex e MES tane)    Trade Name: Aromasin®    Exemestane is the generic name for the trade name drug Aromasin®. In some cases, health care professionals may use the trade name Aromasin® when referring to the generic drug name exemestane.    Drug Type:    Exemestane is an anti-cancer hormone therapy. It is classified as an \"aromatase inhibitor.\" (For more detail see \"How Exemestane Works\" below).    What Exemestane Is Used For:  Exemestane is used to treat advanced breast cancer in postmenopausal women.  Risk reduction for invasive breast cancer in postmenopausal women.  Note: If a drug has been approved for one use, physicians may elect to use this same drug for other problems if they believe it may be helpful.    How Exemestane Is Given:  Exemestane is a pill, taken by mouth.  You should take exemestane at about the same time each day. Take with food. If you miss a dose, do not take a double dose the next day.  You should not stop taking exemestane without discussing with your physician.  The amount of exemestane you receive depends on many factors. Your doctor will determine your dose and how long you will be taking exemestane.  Side Effects of Exemestane:  Important things to remember about the side effects of exemestane:    Most people do not experience all of the side effects listed.  Side effects are often predictable in terms of their onset and duration.  Side effects are almost always reversible and will go away after treatment is complete.  There are many options to help minimize or prevent side effects.  There is no relationship between the presence or severity of side effects and the effectiveness of the medication.  The following side effects are common (occurring in greater than 30%) for patients taking exemestane:    Hot Flashes  These side effects are less common side effects (occurring in about 10-29%) of patients receiving exemestane:    Bone pain/Decreased " bone mineral density  Joint pain (arthralgias)  Increased sweating  High blood pressure(hypertension)  Hair thinning  Insomnia (sleep problems)  Nausea(mild)  Fatigue  Abdominal pain  Depression  Pain  Not all side effects are listed above. Some that are rare (occurring in less than 10% of patients) are not listed here. However, you should always inform your health care provider if you experience any unusual symptoms.    When to contact your doctor or health care provider:  Contact your health care provider immediately, day or night, if you should experience any of the following symptoms:    Shortness of breath or difficulty breathing  Having thoughts or feeling like you may want to harm yourself or others  The following symptoms require medical attention, but are not an emergency. Contact your health care provider within 24 hours of noticing any of the following:    Vaginal bleeding (similar to a period)  Always inform your health care provider if you experience any unusual symptoms.    Precautions:  Before starting Exemestane treatment, make sure you tell your doctor about any other medications you are taking (including prescription, over-the-counter, vitamins, herbal remedies, etc.).  Exemestane can cause decreased bone mineral density due to decreases in estrogen levels. Your doctor will monitor your bone mineral density and may prescribe calcium supplements, vitamin D supplements, or other medications to help prevent osteoporosis and bone fractures.  Do not receive any kind of immunization or vaccination without your doctor's approval while taking Exemestane.  Inform your health care professional if you are pregnant or may be pregnant prior to starting this treatment. Pregnancy category X (Exemestane may cause fetal harm when given to a pregnant woman. This drug must not be given to a pregnant woman or a woman who intends to become pregnant. If a woman becomes pregnant while taking Exemestane, the medication  must be stopped immediately and the woman given appropriate counseling).  For both men and women: Use contraceptives, and do not conceive a child (get pregnant) while taking Exemestane. Barrier methods of contraception, such as condoms, are recommended. Discuss with your doctor when you may safely become pregnant or conceive a child after therapy.  Do not breast feed while taking Exemestane.  Self-Care Tips:  Take Exemestane after a meal; at about the same time every day.  Exemestane causes little nausea. But if you should experience nausea, take anti-nausea medications are prescribed by your doctor, and eat small frequent meals. Such on lozenges and chewing gum may also help.  In general, drinking alcoholic beverages should be kept to a minimum or avoided completely. You should discuss this with your doctor.  If you are experiencing hot flashes, wearing light clothing, staying in a cool environment, and putting cool cloths on your head may reduce symptoms. Consult your health care provider if these worsen, or become intolerable.  Acetaminophen or ibuprofen may help relieve discomfort generalized aches and pains. However, be sure to talk with your doctor before taking it.  Get plenty of rest.  Maintain good nutrition.  If you experience symptoms or side effects, be sure to discuss them with your health care team. They can prescribe medications and/or offer other suggestions that are effective in managing such problems.  Monitoring and Testing While Taking Exemestane:  You will be monitored regularly by your doctor while you are taking Exemestane, to monitor side effects and check your response to therapy. The doctor may check your vitamin D levels and bone mineral density when starting Exemestane, and will monitor your bone mineral density while taking Exemestane.    How Exemestane Works:  Hormones are chemical substances that are produced by glands in the body, which enter the bloodstream and cause effects in other  "tissues. For example, the hormone testosterone is made in the testicles and is responsible for male characteristics such as deepening voice and increased body hair. The use of hormone therapy to treat cancer is based on the observation that receptors for specific hormones that are needed for cell growth are on the surface of some tumor cells. Hormone therapies work by stopping the production of a certain hormone, blocking hormone receptors, or substituting chemically similar agents for the active hormone, which cannot be used by the tumor cell. The different types of hormone therapies are categorized by their function and/or the type of hormone that is affected.    Exemestane is an aromatase inhibitor. This means it blocks the enzyme aromatase (found in the body's muscle, skin, breast and fat), which is used to convert androgens (hormones produced by the adrenal glands) into estrogen. In the absence of estrogen, tumors dependent on this hormone for growth will shrink.    Note: We strongly encourage you to talk with your health care professional about your specific medical condition and treatments. The information contained in this website is meant to be helpful and educational, but is not a substitute for medical advice.                      Zometa        Generic Name: Zoledronic Acid    (BOBBY le jonathan ik AS id)    Trade names: Zometa®    Zoledronic acid is the generic name for the trade name drug Zometa®. In some cases, health care professionals may use the trade name Zometa® when referring to the generic drug name zoledronic acid.    Drug type: Zoledronic acid is a supportive care drug in the category of bisphosphonates. (For more detail, see \"How this drug works\" section below).    What This Drug Is Used For:  Zoledronic acid is used as a support medication to treat symptoms of cancer such as hypercalcemia (high blood calcium levels) or to decrease complications (such as fractures or pain) produced by bone " metastasis (spread of cancer to the bone).  Note: If a drug has been approved for one use, physicians may elect to use this same drug for other problems if they believe it may be helpful/    How This Drug Is Given:  As an infusion into the vein (intravenous, IV).  There is no pill form of this medication.  The amount of zoledronic acid that you will receive depends on many factors, including, your general health or other health problems, and the type of cancer or condition being treated. Your doctor will determine your dose and schedule.    Side Effects:  Important things to remember about the side effects of zoledronic acid:    Most people do not experience all of the side effects listed.  Side effects are often predictable in terms of their onset and duration.  Side effects are almost always reversible and will go away after treatment is complete.  There are many options to help minimize or prevent side effects.  There is no relationship between the presence or severity of side effects and the effectiveness of the medication.  The following side effects are common (occurring in greater than 30%) for patients taking zoledronic acid:    Ostealgia  Nausea  Fever usually mild and short lived  Fatigue  Anemia  Vomiting  Constipation  Flu-like symptoms; mild fever sometimes accompanied by malaise, chills, fatigue and flushing. Usually occurs with first treatment with zoledronic acid only.  These are less common side effects (occurring in 10-29%) for patients receiving zoledronic acid:    Shortness of breath  Diarrhea  Weakness  Myalgia (muscle pain)  Loss of appetite  Cough  Lower extremity swelling  Arthralgia (joint pain)  Headache  Dizziness  Decreased kidney function  Trouble sleeping  Abdominal pain  Weight loss  Paresthesia (abnormal sensation, typically tingling or prickling  Anxiety  Depression  Dehydration  Urinary tract infection  Confusion  Hypophosphatemia (low phosphorus in the blood)  Hypoesthesia (reduced  sense of touch or sensation)  Hair loss  Low potassium in the blood  Low blood counts  Candidiasis (yeast infection)  Bone pain  Low blood pressure  Chills  Rash  Low magnesium in the blood  Cold symptoms (Upper respiratory infection)  Heart burn  Chest pain  Hypocalcemia (low calcium in the blood)  Trouble Swallowing  Mouth sores  Infection (non-specific)  Note: Zoledronic acid may be used to treat a condition called hypercalcemia (high blood calcium) symptoms of hypercalcemia may include frequent urination, poor appetite, nausea, vomiting, constipation, weakness, fatigue, muscle twitching, confusion, stupor and coma. Presence of these symptoms while a person is receiving treatment with zoledronic acid may not be drug-related side effects, but related to hypercalcemia.    Osteonecrosis of the jaw has been reported rarely in patients with cancer receiving treatment regimens including bisphosphonates. Many of the reported cases were associated with dental procedures such as removal of a tooth. Many had signs of local infection including infection in the bone. A dental examination with appropriate preventative dentistry should be considered prior to treatment with bisphosphonates particularly in patients with additional risk factors (e.g. cancer, chemotherapy, corticosteroids, poor oral hygiene). Invasive dental procedures should be avoided during treatment.    Not all side effects are listed above, some that are rare (occurring in less than 1% of patients) are not listed here. However, you should always inform your health care provider if you experience any unusual symptoms.    When to contact your doctor or health care provider:  Seek emergency help immediately and notify your health care provider, if you experience the following symptoms:    Shortness of breath, wheezing, difficulty breathing, closing up of the throat, swelling of facial features, hives (possible allergic reaction).  Contact your health care  provider immediately, day or night, if you should experience the following:    Unusual muscle twitching or spasms (symptom of hypercalcemia)  Confusion (symptom of hypercalcemia)  Fever of 100.4° F (38° C), chills, sore throat (possible signs of infection)  Shortness of breath, chest pain or discomfort  The following symptoms require medical attention, but are not emergency situations. Contact your health care provider within 24 hours of noticing any of the following:    Vomiting (more than 4-5 episodes within a 24 hour period)  Nausea that interferes with eating and is not relieved by medications prescribed by your doctor.  Constipation unrelieved by laxative use  Loss of appetite  Fatigue and extreme tiredness (unable to perform self-care activities)  Feelings of confusion  Unable to eat or drink for 24 hours or have signs of dehydration: tiredness, thirst, dry mouth, dark and decrease amount of urine, or dizziness.  Precautions:  Before starting zoledronic acid treatment, make sure you tell your doctor about any other medications you are taking (including over-the-counter drugs, vitamins, or herbal remedies).  It is important to discuss with your health care professional any medications you are taking. Some medications may have an effect on the kidney, and combining them with zoledronic acid may cause kidney function to deteriorate. Examples of these types of medication are aspirin, non-steroidal anti-inflammatory drugs (NSAIDS) such as ibuprofen or naproxen, diuretics (water pills), ACE inhibitors such as analapril or fosinopril.  The use of zoledronic acid may be inadvisable if you have had an allergic reaction to this drug or other medications like it (bisphosphonates) in the past. Also alert your healthcare professional if you have a history of asthma or an allergy to aspirin.  Osteonecrosis of the jaw has been reported rarely in patients with cancer receiving treatment regimens including bisphosphonates.  Many of the reported cases were associated with dental procedures such as removal of a tooth. Many had signs of local infection including infection in the bone. A dental examination with appropriated preventative dentistry should be considered prior to treatment with bisphosphonates particularly in patients with additional risk factors (e.g. cancer, chemotherapy, corticosteroids, poor oral hygiene). Invasive dental procedures should be avoided during treatment.  Inform your health care professional if you are pregnant or may be pregnant prior to starting this treatment. Pregnancy category D (zoledronic acid may be hazardous to the fetus. Women who are pregnant or become pregnant must be advised of the potential hazard to the fetus).  For both men and women: Do not conceive a child (get pregnant) while taking zoledronic acid. Barrier methods of contraception, such as condoms, are recommended. Discuss with your doctor when you may safely become pregnant or conceive a child after therapy.  Do not breast feed while taking this medication.  Self-Care Tips:  It is important that you stay well hydrated during treatment to protect your kidneys. Drink two to three quarts of fluid every 24 hours, unless you are instructed otherwise.  Acetaminophen may help relieve discomfort from fever, headache and/or generalized aches and pains. However, be sure to talk with your doctor before taking it.  This medication causes little nausea. But if you should experience nausea, take anti-nausea medications as prescribed by your doctor, and eat small frequent meals. Sucking on lozenges and chewing gum may also help.  You may experience drowsiness or dizziness; avoid driving or engaging in tasks that require alertness until your response to the drug is known.  Get plenty of rest.  Maintain good nutrition.  If you experience symptoms or side effects, be sure to discuss them with your health care team. They can prescribe medications and/or  "offer other suggestions that are effective in managing such problems.  Monitoring and Testing:  You will be checked regularly by your health care professional while you are taking zoledronic acid, to monitor side effects and check your response to therapy. Periodic blood work to monitor your complete blood count (CBC), kidney function (specifically creatinine and BUN), and blood calcium levels will also be ordered by your doctor.    How This Drug Works:  Cancer cells that spread to the bone can secrete substances that can cause cells found in the bone called osteoclasts to dissolve or \"eat away\" a portion of the bone. These tumors or lesions weaken the bone and can lead to complications. Some of the complications resulting from this bone breakdown are bone pain, fractures and less commonly, hypercalcemia (increased levels of calcium in the blood).    Zoledronic acid is a bisphosphonate. Bisphosphonate medications are used to slow down the osteoclast's effects on the bone. In doing this it can be useful in slowing down or preventing the complications (bone pain, fractures, or high calcium levels) of the bone breakdown.    Note: We strongly encourage you to talk with your health care professional about your specific medical condition and treatments. The information contained in this website is meant to be helpful and educational, but is not a substitute for medical advice.  "

## 2023-03-09 ENCOUNTER — TELEPHONE (OUTPATIENT)
Dept: FAMILY MEDICINE CLINIC | Facility: CLINIC | Age: 58
End: 2023-03-09
Payer: COMMERCIAL

## 2023-03-09 ENCOUNTER — TELEPHONE (OUTPATIENT)
Dept: ONCOLOGY | Facility: CLINIC | Age: 58
End: 2023-03-09
Payer: COMMERCIAL

## 2023-03-09 NOTE — TELEPHONE ENCOUNTER
Detail Level: Generalized Spoke to pt 03/09/2023, 1:33 pm advised pt paperwork has been completed, ready for  at front office.   Detail Level: Zone

## 2023-03-14 ENCOUNTER — HOSPITAL ENCOUNTER (OUTPATIENT)
Dept: RADIATION ONCOLOGY | Facility: HOSPITAL | Age: 58
Setting detail: RADIATION/ONCOLOGY SERIES
End: 2023-03-14
Payer: COMMERCIAL

## 2023-03-21 NOTE — PROGRESS NOTES
RADIATION ONCOLOGY FOLLOW-UP NOTE    NAME: Alaina Hartman  YOB: 1965  MRN #: [id number]  DATE OF SERVICE: 3/23/2023  REFERRING PROVIDER: Fabiola Joyner MD  [address] [place] [address] Dr MARBELLA Rush [address] [place] [address],  IN [facility]  PRIMARY CARE PROVIDER: Fabiola Joyner MD    DIAGNOSIS:  Rt breast IDC, [id number]  1. Malignant neoplasm of central portion of right breast in female, estrogen receptor positive (HCC)      REASON FOR VISIT:  1M s/p XRT F/U    RADIATION TREATMENT COURSE:  4256 cGy in 16 fractions to whole right breast, completed 02/21/2023.    HISTORY OF PRESENT ILLNESS: The patient is a 57 y.o. year old female who was last seen in our office on 03/07/2023 for a short interval skin check. She was to continue silvadene through the weekend, and switch to aquaphor.    She follows with Dr. Rust, and was last seen on 03/08/2023. Their plan is to start Aromasin 25 mg daily (iniated 03/22/2023), start calcium with vitamin D, referred back to Dr. Mcwilliams for port removal in about 1 month, and follow up in about 3 and half months on 06/30/2023.    The patient is scheduled for follow up with Dr. Mcwilliams for port removal on 03/29/2023, and 6 month post lumpectomy follow up on 09/06/2023.    No imaging over the interval.    Resolution of symptoms--doing well on supportive skin care.      The following portions of the patient's history were reviewed and updated as appropriate: allergies, current medications, past family history, past medical history, past social history, past surgical history and problem list. Reviewed with the patient and remain unchanged.    PAST MEDICAL HISTORY:  she has a past medical history of Ankle edema, Breast cancer, right (HCC) (08/2022), Diabetes mellitus (HCC), GERD (gastroesophageal reflux disease), Hiatal hernia, Hyperlipidemia, Morbid obesity (HCC), Sleep apnea, and Type 2 diabetes mellitus (HCC).    MEDICATIONS:    Current Outpatient Medications:   •  ALPRAZolam (XANAX) 0.5 MG tablet,  Take 1 tablet by mouth Daily As Needed for Anxiety., Disp: 20 tablet, Rfl: 0  •  atorvastatin (LIPITOR) 40 MG tablet, Take 1 tablet by mouth Daily., Disp: 90 tablet, Rfl: 4  •  BIOTIN PO, Take  by mouth., Disp: , Rfl:   •  Calcium Carbonate-Vitamin D (Oscal 500 D-3) 500-5 MG-MCG tablet, Take 500 mg by mouth Every 12 (Twelve) Hours., Disp: 60 tablet, Rfl: 6  •  chlorhexidine (PERIDEX) 0.12 % solution, SWISH AND SPIT 15 ML IN MOUTH TWICE DAILY, Disp: , Rfl:   •  Cholecalciferol (Vitamin D3) 50 MCG (2000 UT) capsule, Take 1 capsule by mouth Daily., Disp: , Rfl:   •  Cyanocobalamin (Vitamin B-12) 1000 MCG sublingual tablet, Place 1 tablet under the tongue Daily., Disp: 100 each, Rfl: 4  •  exemestane (Aromasin) 25 MG chemo tablet, Take 1 tablet by mouth Daily., Disp: 30 tablet, Rfl: 6  •  glucose blood (Accu-Chek Guide) test strip, 1 each by Other route 4 (Four) Times a Day. Use as instructed, Disp: 150 each, Rfl: 11  •  ibuprofen (ADVIL,MOTRIN) 800 MG tablet, Take 1 tablet by mouth 3 (Three) Times a Day As Needed. for pain, Disp: , Rfl:   •  Insulin Lispro Prot & Lispro (HumaLOG Mix 75/25 KwikPen) (75-25) 100 UNIT/ML suspension pen-injector pen, Inject 55 Units under the skin into the appropriate area as directed 3 (Three) Times a Day Before Meals., Disp: 60 mL, Rfl: 6  •  Insulin Pen Needle (Pen Needles) 32G X 4 MM misc, 1 each 4 (Four) Times a Day. Use to inject insulin / DX E11.65, Disp: 150 each, Rfl: 5  •  lidocaine-prilocaine (EMLA) 2.5-2.5 % cream, Apply 1 application topically to the appropriate area as directed Every 2 (Two) Hours As Needed for Mild Pain. Apply to port site one hour prior to chemotherapy appointment, Disp: 1 g, Rfl: 5  •  lisinopril (PRINIVIL,ZESTRIL) 5 MG tablet, Take 1 tablet by mouth Daily., Disp: 90 tablet, Rfl: 4  •  metFORMIN ER (GLUCOPHAGE-XR) 500 MG 24 hr tablet, Take 2 tablets by mouth Daily., Disp: 180 tablet, Rfl: 6  •  mometasone (Elocon) 0.1 % cream, Apply to affect skin 2-3 times  daily during radiation therapy course as needed (itching/irritation), Disp: 15 g, Rfl: 1  •  multivitamin with minerals tablet tablet, Take 1 tablet by mouth Daily., Disp: , Rfl:   •  nystatin (MYCOSTATIN) 100,000 unit/mL suspension, SWISH AND SPIT 5 ML FOUR TIMES DAILY, AND TREAT DENTURE WITH RINSE AS WELL TO PREVENT CROSS CONTAMINATION, Disp: , Rfl:   •  omeprazole (priLOSEC) 40 MG capsule, TAKE 1 CAPSULE BY MOUTH ONCE DAILY . APPOINTMENT REQUIRED FOR FUTURE REFILLS, Disp: 90 capsule, Rfl: 0  •  ondansetron (ZOFRAN) 8 MG tablet, Take 1 tablet by mouth 3 (Three) Times a Day As Needed for Nausea or Vomiting., Disp: 30 tablet, Rfl: 5  •  silver sulfadiazine (SILVADENE, SSD) 1 % cream, Apply 1 application topically to the appropriate area as directed 2 (Two) Times a Day., Disp: 400 g, Rfl: 1  •  vitamin B-6 (PYRIDOXINE) 100 MG tablet, Take 1 tablet by mouth 2 (Two) Times a Day., Disp: , Rfl:   •  Vitamin D, Ergocalciferol, 50 MCG (2000 UT) capsule, Take 2,000 Units by mouth Daily., Disp: 100 capsule, Rfl: 4  No current facility-administered medications for this visit.    Facility-Administered Medications Ordered in Other Visits:   •  heparin injection 500 Units, 500 Units, Intravenous, PRN, Lianet Rust MD, 500 Units at 03/23/23 0845  •  sodium chloride 0.9 % flush 20 mL, 20 mL, Intravenous, PRN, Lianet Rust MD, 20 mL at 03/23/23 0843    ALLERGIES:  No Known Allergies    PAST SURGICAL HISTORY:  she has a past surgical history that includes Breast lumpectomy (Left, 2003); Subtotal Hysterectomy; Breast biopsy (Right, 08/18/2022); [place] Node Biopsy (Right, 08/18/2022); and Portacath placement (N/A, 10/06/2022).    PREVIOUS RADIOTHERAPY OR CHEMOTHERAPY:  XRT    FAMILY HISTORY:  herfamily history includes Breast cancer in her mother; Cancer in her mother and paternal grandmother; Clotting disorder in her father; Colon cancer in her paternal grandmother; Diabetes in her brother, brother, and  brother; Heart disease in her brother and mother; Stroke in her brother and mother; Thyroid disease in her mother.    SOCIAL HISTORY:  she reports that she quit smoking about 13 years ago. Her smoking use included cigarettes. She has a 2.50 pack-year smoking history. She has been exposed to tobacco smoke. She has never used smokeless tobacco. She reports that she does not currently use alcohol. She reports that she does not use drugs.    PAIN AND PAIN MANAGEMENT:  No pain.    NUTRITIONAL STATUS:  no issues    KPS:  90      REVIEW OF SYSTEMS:   General: No fevers, chills, weight change, or drenching night sweats. Skin: No rashes or jaundice.  HEENT: No change in vision or hearing, no headaches.  Neck: No dysphagia or masses.  Heme/Lymph: No easy bruising or bleeding.  Respiratory System: No shortness of breath or cough.  Cardiovascular: No chest pain, palpitations, or dyspnea on exertion.  - Pacemaker. GI: No nausea, vomiting, diarrhea, melena, or hematochezia.  : No dysuria or hematuria.  Endocrine: No heat or cold intolerance. Musculoskeletal: No myalgias or arthralgias.  Neuro: No weakness, numbness, syncope, or seizures. Psych: No mood changes or depression. Ext: Denies swelling.    Objective   VITAL SIGNS:  Vitals:    03/23/23 0902   BP: 145/87   Pulse: 82   Resp: 18   Temp: 98.3 °F (36.8 °C)   SpO2: 99%         PHYSICAL EXAM:  GENERAL: In no apparent distress, sitting comfortably in room.    HEENT: Normocephalic, atraumatic. Pupils are equal, round, reactive to light. Sclera anicteric. Conjunctiva not injected. Oropharynx without erythema, ulcerations or thrush.   NECK: Supple with no masses.  LYMPHATIC: No cervical, supraclavicular or axillary adenopathy appreciated bilaterally.   CARDIOVASCULAR: S1 & S2 detected; no murmurs, rubs or gallops.  CHEST: Clear to auscultation bilaterally; no wheezes, crackles or rubs. Work of breathing normal.  ABDOMEN: Bowel sounds present. Abdomen is soft, nontender,  nondistended.   MUSCULOSKELETAL: No tenderness to palpation along the spine or scapulae. Normal range of motion.  EXTREMITIES: No clubbing, cyanosis, edema.  SKIN: No erythema, rashes, ulcerations noted.   NEUROLOGIC: Cranial nerves II-XII grossly intact bilaterally. No focal neurologic deficits.  PSYCHIATRIC:  Alert, aware, and appropriate.  BREASTS: Left breast unremarkable with no dominant masses, skin changes, discharge or pain; Right breast with resolved desquamation, much much improved; no fibrosis.        PERTINENT IMAGING/PATHOLOGY/LABS (Medical Decision Making):     COORDINATION OF CARE: A copy of this note is sent to the referring provider.    PATHOLOGY (Reviewed):     IMAGING (Reviewed):     LABS (Reviewed):  HEMATOLOGY:  WBC   Date Value Ref Range Status   03/08/2023 7.70 3.40 - 10.80 10*3/mm3 Final   08/26/2020 11.0 (H) 3.4 - 10.8 x10E3/uL Final     RBC   Date Value Ref Range Status   03/08/2023 4.07 3.77 - 5.28 10*6/mm3 Final   08/26/2020 4.53 3.77 - 5.28 x10E6/uL Final     Hemoglobin   Date Value Ref Range Status   03/08/2023 11.3 (L) 12.0 - 15.9 g/dL Final     Hematocrit   Date Value Ref Range Status   03/08/2023 36.8 34.0 - 46.6 % Final     Platelets   Date Value Ref Range Status   03/08/2023 313 140 - 450 10*3/mm3 Final     CHEMISTRY:  Lab Results   Component Value Date    GLUCOSE 217 (H) 02/13/2023    BUN 15 02/13/2023    CREATININE 0.60 02/13/2023    EGFRIFNONA 90 12/01/2021    EGFRIFAFRI 115 08/26/2020    BCR 25.0 02/13/2023    K 4.4 02/13/2023    CO2 25.9 02/13/2023    CALCIUM 9.7 02/13/2023    PROTENTOTREF 7.0 08/26/2020    ALBUMIN 4.2 02/13/2023    LABIL2 1.8 08/26/2020    AST 16 02/13/2023    ALT 20 02/13/2023     Assessment & Plan   ASSESSMENT AND PLAN:    1. Malignant neoplasm of central portion of right breast in female, estrogen receptor positive (HCC)       Doing well  Vit E oil based lumpectomy bed/breast massages 2-3 times per week discussed  Continue skin care nd supportive  measures  -Aromasin per Dr. Rust    This assessment comes from my review of the imaging, pathology, physician notes and other pertinent information as mentioned.    DISPOSITION: 6 month f/u here          CC: MD Jagdeep Caicedo MD  3/23/2023  10:32 AM EDT

## 2023-03-22 NOTE — PROGRESS NOTES
SURVIVORSHIP OFFICE VISIT    PATIENT NAME:Alaina Hartman  :1965  MRN: [id number]    Chief Complaint   Patient presents with   • Follow-up     SURVIVORSHIP       SUBJECTIVE:  Alaina Hartman is a 57 y.o. female being followed by *Lianet Rust MD   for moderately differentiated invasive ductal carcinoma of the right breast. The patient is here today in our Cancer Survivorship [facility], to review her  survivorship care plan.        REVIEW OF SYSTEMS:  Review of Systems   Constitutional: Negative for chills and fever.   Respiratory: Negative for shortness of breath.    Cardiovascular: Negative for chest pain.   Gastrointestinal: Negative for nausea and vomiting.   Genitourinary: Negative for vaginal pain.   Musculoskeletal: Negative for arthralgias.   Neurological: Positive for numbness.   Psychiatric/Behavioral: Negative for sleep disturbance. The patient is not nervous/anxious.        Past Medical History:   Diagnosis Date   • Ankle edema     at times   • Breast cancer, right (HCC) 2022   • Diabetes mellitus (HCC)    • GERD (gastroesophageal reflux disease)    • Hiatal hernia    • Hyperlipidemia    • Morbid obesity (HCC)    • Sleep apnea     CPAP- to bring dos   • Type 2 diabetes mellitus (HCC)        Past Surgical History:   Procedure Laterality Date   • BREAST BIOPSY Right 2022    Procedure: Right breast lumpectomy;  Surgeon: Jagdeep Mcwilliams MD;  Location: [facility] OR;  Service: General;  Laterality: Right;   • BREAST LUMPECTOMY Left 2003    x2   • PORTACATH PLACEMENT N/A 10/06/2022    Procedure: INSERTION OF PORTACATH;  Surgeon: Jagdeep Mcwilliams MD;  Location: [facility] MAIN OR;  Service: General;  Laterality: N/A;   • SENTINEL NODE BIOPSY Right 2022    Procedure: SENTINEL NODE BIOPSY;  Surgeon: Jagdeep Mcwilliams MD;  Location: [facility] MAIN OR;  Service: General;  Laterality: Right;   • SUBTOTAL HYSTERECTOMY           Current Outpatient Medications:   •   ALPRAZolam (XANAX) 0.5 MG tablet, Take 1 tablet by mouth Daily As Needed for Anxiety., Disp: 20 tablet, Rfl: 0  •  atorvastatin (LIPITOR) 40 MG tablet, Take 1 tablet by mouth Daily., Disp: 90 tablet, Rfl: 4  •  BIOTIN PO, Take  by mouth., Disp: , Rfl:   •  Calcium Carbonate-Vitamin D (Oscal 500 D-3) 500-5 MG-MCG tablet, Take 500 mg by mouth Every 12 (Twelve) Hours., Disp: 60 tablet, Rfl: 6  •  chlorhexidine (PERIDEX) 0.12 % solution, SWISH AND SPIT 15 ML IN MOUTH TWICE DAILY, Disp: , Rfl:   •  Cholecalciferol (Vitamin D3) 50 MCG (2000 UT) capsule, Take 1 capsule by mouth Daily., Disp: , Rfl:   •  Cyanocobalamin (Vitamin B-12) 1000 MCG sublingual tablet, Place 1 tablet under the tongue Daily., Disp: 100 each, Rfl: 4  •  exemestane (Aromasin) 25 MG chemo tablet, Take 1 tablet by mouth Daily., Disp: 30 tablet, Rfl: 6  •  glucose blood (Accu-Chek Guide) test strip, 1 each by Other route 4 (Four) Times a Day. Use as instructed, Disp: 150 each, Rfl: 11  •  ibuprofen (ADVIL,MOTRIN) 800 MG tablet, Take 1 tablet by mouth 3 (Three) Times a Day As Needed. for pain, Disp: , Rfl:   •  Insulin Lispro Prot & Lispro (HumaLOG Mix 75/25 KwikPen) (75-25) 100 UNIT/ML suspension pen-injector pen, Inject 55 Units under the skin into the appropriate area as directed 3 (Three) Times a Day Before Meals., Disp: 60 mL, Rfl: 6  •  Insulin Pen Needle (Pen Needles) 32G X 4 MM misc, 1 each 4 (Four) Times a Day. Use to inject insulin / DX E11.65, Disp: 150 each, Rfl: 5  •  lidocaine-prilocaine (EMLA) 2.5-2.5 % cream, Apply 1 application topically to the appropriate area as directed Every 2 (Two) Hours As Needed for Mild Pain. Apply to port site one hour prior to chemotherapy appointment, Disp: 1 g, Rfl: 5  •  lisinopril (PRINIVIL,ZESTRIL) 5 MG tablet, Take 1 tablet by mouth Daily., Disp: 90 tablet, Rfl: 4  •  metFORMIN ER (GLUCOPHAGE-XR) 500 MG 24 hr tablet, Take 2 tablets by mouth Daily., Disp: 180 tablet, Rfl: 6  •  mometasone (Elocon) 0.1 %  cream, Apply to affect skin 2-3 times daily during radiation therapy course as needed (itching/irritation), Disp: 15 g, Rfl: 1  •  multivitamin with minerals tablet tablet, Take 1 tablet by mouth Daily., Disp: , Rfl:   •  nystatin (MYCOSTATIN) 100,000 unit/mL suspension, SWISH AND SPIT 5 ML FOUR TIMES DAILY, AND TREAT DENTURE WITH RINSE AS WELL TO PREVENT CROSS CONTAMINATION, Disp: , Rfl:   •  omeprazole (priLOSEC) 40 MG capsule, TAKE 1 CAPSULE BY MOUTH ONCE DAILY . APPOINTMENT REQUIRED FOR FUTURE REFILLS, Disp: 90 capsule, Rfl: 0  •  ondansetron (ZOFRAN) 8 MG tablet, Take 1 tablet by mouth 3 (Three) Times a Day As Needed for Nausea or Vomiting., Disp: 30 tablet, Rfl: 5  •  silver sulfadiazine (SILVADENE, SSD) 1 % cream, Apply 1 application topically to the appropriate area as directed 2 (Two) Times a Day., Disp: 400 g, Rfl: 1  •  vitamin B-6 (PYRIDOXINE) 100 MG tablet, Take 1 tablet by mouth 2 (Two) Times a Day., Disp: , Rfl:   •  Vitamin D, Ergocalciferol, 50 MCG (2000 UT) capsule, Take 2,000 Units by mouth Daily., Disp: 100 capsule, Rfl: 4  No current facility-administered medications for this visit.    Facility-Administered Medications Ordered in Other Visits:   •  heparin injection 500 Units, 500 Units, Intravenous, PRN, Lianet Rust MD, 500 Units at 03/23/23 0845  •  sodium chloride 0.9 % flush 20 mL, 20 mL, Intravenous, PRNBarrera Ifeoma Roseline, MD, 20 mL at 03/23/23 0843    No Known Allergies    Family History   Problem Relation Age of Onset   • Breast cancer Mother    • Heart disease Mother    • Thyroid disease Mother    • Stroke Mother    • Cancer Mother    • Clotting disorder Father    • Diabetes Brother    • Heart disease Brother    • Cancer Paternal Grandmother    • Colon cancer Paternal Grandmother    • Diabetes Brother    • Stroke Brother    • Diabetes Brother        Cancer-related family history includes Breast cancer in her mother; Cancer in her mother and paternal grandmother; Colon  cancer in her paternal grandmother.    Social History     Tobacco Use   • Smoking status: Former     Packs/day: 0.25     Years: 10.00     Pack years: 2.50     Types: Cigarettes     Quit date: 2010     Years since quittin.2     Passive exposure: Past   • Smokeless tobacco: Never   Vaping Use   • Vaping Use: Never used   Substance Use Topics   • Alcohol use: Not Currently     Comment: rare   • Drug use: Never       I have reviewed the history of present illness, past medical history, family history, social history, lab results, current medications, all notes and other records since the patient was last seen on 3/9/2023.    SURVIVORSHIP TREATMENT SUMMARY:     HEALTHCARE PROVIDERS  MEDICAL ONCOLOGIST: Lianet Rust MD  ([place], [place]) phone: [phone]  SURGEON: Dr. Jagdeep Mcwilliams  RADIATION ONCOLOGIST: Dr. Jagdeep De Los Santos  REFERRING PHYSICIAN: Fabiola Joyner MD  OTHER PROVIDERS:  PRIMARY CARE PHYSICIAN: Fabiola Joyner MD    BACKGROUND INFORMATION  Oncology/Hematology History   Malignant neoplasm of central portion of right breast in female, estrogen receptor positive (HCC)   2022 Initial Diagnosis    Malignant neoplasm of central portion of right breast in female, estrogen receptor positive (HCC)     10/27/2022 - 2022 Chemotherapy    OP BREAST TC DOCEtaxel / Cyclophosphamide     10/27/2022 -  Chemotherapy    OP CVAD OCCLUSION - ALTEPLASE     2022 -  Chemotherapy    OP SUPPORTIVE Pegfilgrastim / Filgrastim          Allergies as of 2023   • (No Known Allergies)       Diagnosis:   Cancer Type/Location/Histology Subtype: Moderately differentiated invasive ductal cancer the right breast       diagnosis date: 2022  Stage I [x]    II    []        III    []          IV  []        Not applicable []       TREATMENT  Surgery  Yes [x]   No   []     Surgery date(s): 2022    Surgical procedure/location on body/findings: Right lumpectomy with sentinel lymph  node biopsy    Radiation:     Yes   [x]   No   [] Body area treated: Right breast end date: 2/21/2023    Systemic Therapy (chemotherapy, immunotherapy, hormonal therapy, other)     Yes   [x]   No []  Names of Agents Used          End date:     Persistent symptoms of side effects at completion of treatment    Yes  []    No []    POTENTIAL SIDE EFFECTS OF ABOVE TREATMENT   - Peripheral neuropathy (numbness and tingling in your fingers or toes)  - Fatigue  - Lymphedema (swelling in your arms)  - Emotional changes (anxiety, depression, worry)  - Memory/concentration problems  - Skin and nail changes  - Cardiac changes  - Pain/tenderness at surgical site    GENETICS:  Familial Cancer Risk Assessment   Genetic/hereditary risk factor(s) or predisposing conditions  Patient with significant family history of cancer.  She has received genetic testing information from Dr. Rust and will let Dr. Rust know when she is ready to proceed with testing.  Genetic Counseling:    Yes   []   No  [x]    Genetic testing results: N/A  FOLLOW UP CARE PLAN  Need for ongoing (adjuvant) treatment for cancer Yes      No   Additional treatment name Start date  Planned duration  Possible side effects    Aromasin 25 mg p.o. daily  3/22/2023  minimum of 5 years arthralgia, myalgia, especially in patients who have underlying musculoskeletal disease such as osteoarthritis, hot flashes, mood changes. There is risk of vaginal dryness, dyspareunia and other sexual dysfunction. Other side effects include degree of cognitive symptoms compared to women not on endocrine therapy. There is possibility of fatigue, forgetfulness, poor sleep hygiene, osteoporosis, osteopenia, risk of fractures, cardiovascular disease and hypercholestolemia                 Schedule of clinic visits   Coordinating provider   When/How often   Dr. Rust                                every 3 months for years 1 and 2, every 4 months for years 3, every 6 months for year 4 and  5  SURVEILLANCE FOR RECURRENCE  Cancer surveillance tests for recurrence or other recommended related tests   Coordinating provider   What/When/How often   Dr. Rust                                annual mammogram  GENERAL HEALTH FOLLOW UP   The patient was encouraged to maintain a therapeutic relationship with a primary care physician throughout her lifetime. The patient was instructed to continue all standard non-cancer related health care with her primary care provider, Fabiola Joyner MD. The patient will see their PCP for all general health care recommended for a person their age, including routine cancer screening testing.The patient was advised to discuss with her primary care physician about when and how frequently to have routine screenings done for other types of cancer.  Additional health monitoring and routine immunizations should be provided under the care of a primary care physician.      RECOMMENDED LIFESTYLE MODIFICATIONS    Discussed NCCN recommendations for all cancer survivors of:   • Engage in 150 minutes/week moderate intensity exercise  • Achieve and maintain a healthy BMI  • Eat a healthy plant-based whole-food diet  • Avoid tobacco and second hand smoke  • Avoid alcohol or minimize alcohol intake - no more than 1 drink in a day for women, 2 drinks in a day for men  • Use sunscreen of at least SPF 30, wear hats and sunglasses   • Have routine colonoscopies and prostate or gynecological examinations     SIGNS/SYMPTOMS OF DISEASE RECURRENCE  Any new, persistent or concerning symptoms should be brought to the attention of her provider:  • Anything that represents a brand-new symptom  • Anything that represents a persistent symptom  • Anything the patient is worried about that might be related to the cancer coming back    Cancer survivors may experience issues with the following:   Emotional changes Memory loss  Health insurance  Work   Mental health  Reduced concentration  Parenting Hearing loss     Smoking cessation  Financial advice/assistance Sexual functioning Other   Weight changes  School  Insurance  Other    Physical functioning Fatigue  Fertility  Other   The patient was counseled to speak with her doctors or nurses to find out how to get help with the above issues.     RESOURCES  Below is a list of resources that she may find helpful when it comes to the above listed topics. The patient can also talk to a member of his healthcare team about any questions or concerns she may have so that we can help.      *[employer]: http://www.[place].org/  *American Cancer Society: https://www.cancer.org/health-care-professionals/national-cancer-survivorship-resource-center/tools-for-cancer-survivors-and-caregivers.html  *Smokefree.gov: https://smokefree.gov/  *Myplate: https://www.choosemyplate.gov/  *[employer]:https://www.Magnetic.org/what-we-do/program/livestrong-at-the-ymca  *ASCO: https://www.cancer.net/survivorship    A number of lifestyle/behaviors can affect ongoing health, including the risk for cancer coming back or developing another cancer.  The patient was instructed to discuss these recommendations with her doctor or nurse:   Tobacco use/cessation  Diet  Physical activity    Alcohol use  Sunscreen use  Weight management (gain/loss)     After a review of the Survivorship Treatment Summary & Care Plan, the patient verbalized understanding of recommendations for follow-up. As outlined in the care plan, she was advised to continue with follow-up care in accordance with the NCCN surveillance guidelines while transitioning back to their primary care physician for continued general preventive and healthcare needs. We discussed the importance of healthy diet, exercise, smoking cessation and alcohol use reduction. We reviewed current guidelines for routine screening of other cancers.     A copy of the Survivorship Treatment Summary & Care Plan for Ms. Hartman (see below) was provided to and  "forwarded to the providers identified on the care team.    Advance Care Planning   ADVANCE CARE PLANNING DISCUSSION:  Patient does not have advance care planning complete. Brief discussion and written information provided regarding advance care planning and appropriateness for all healthy adults, choosing a healthcare surrogate, prior experiences with loved ones who have been seriously ill, exploration of goals of care in the event of a sudden injury or illness, and the patient was notified we have a  who can assist with this.           SURVIVORSHIP DISCUSSION:   Primary patient goal(s): wellness     Management of disease and treatment related effects: Peripheral neuropathy still present and tips of fingers and toes and also the ball of the foot.  Discussed the continued use of vitamin B6 p.o. twice daily.    Psychosocial and spiritual: None identified          OBJECTIVE:    Vitals:    03/23/23 0935   BP: 145/87   Pulse: 82   Temp: 98.3 °F (36.8 °C)   TempSrc: Oral   SpO2: 99%   Weight: 115 kg (254 lb)   Height: 162.6 cm (64\")   PainSc: 0-No pain     Wt Readings from Last 3 Encounters:   03/23/23 115 kg (254 lb)   03/23/23 115 kg (254 lb 3.2 oz)   03/08/23 117 kg (257 lb)     Pain Score    03/23/23 0935   PainSc: 0-No pain     Body mass index is 43.6 kg/m².  ECOG  (1) Restricted in physically strenuous activity, ambulatory and able to do work of light nature    Physical Exam  Vitals reviewed.   Constitutional:       General: She is not in acute distress.  HENT:      Head: Normocephalic and atraumatic.   Pulmonary:      Effort: Pulmonary effort is normal.   Musculoskeletal:         General: Normal range of motion.      Cervical back: Normal range of motion.   Skin:     General: Skin is warm and dry.   Neurological:      Mental Status: She is alert and oriented to person, place, and time.   Psychiatric:         Mood and Affect: Mood normal.         Behavior: Behavior normal.         RECENT LABS  WBC "   Date Value Ref Range Status   03/08/2023 7.70 3.40 - 10.80 10*3/mm3 Final   08/26/2020 11.0 (H) 3.4 - 10.8 x10E3/uL Final     RBC   Date Value Ref Range Status   03/08/2023 4.07 3.77 - 5.28 10*6/mm3 Final   08/26/2020 4.53 3.77 - 5.28 x10E6/uL Final     Hemoglobin   Date Value Ref Range Status   03/08/2023 11.3 (L) 12.0 - 15.9 g/dL Final     Hematocrit   Date Value Ref Range Status   03/08/2023 36.8 34.0 - 46.6 % Final     MCV   Date Value Ref Range Status   03/08/2023 90.4 79.0 - 97.0 fL Final     MCH   Date Value Ref Range Status   03/08/2023 27.8 26.6 - 33.0 pg Final     MCHC   Date Value Ref Range Status   03/08/2023 30.7 (L) 31.5 - 35.7 g/dL Final     RDW   Date Value Ref Range Status   03/08/2023 14.6 12.3 - 15.4 % Final     RDW-SD   Date Value Ref Range Status   03/08/2023 47.3 37.0 - 54.0 fl Final     MPV   Date Value Ref Range Status   03/08/2023 9.2 6.0 - 12.0 fL Final     Platelets   Date Value Ref Range Status   03/08/2023 313 140 - 450 10*3/mm3 Final     Neutrophil %   Date Value Ref Range Status   03/08/2023 51.4 42.7 - 76.0 % Final     Lymphocyte %   Date Value Ref Range Status   03/08/2023 32.9 19.6 - 45.3 % Final     Monocyte %   Date Value Ref Range Status   03/08/2023 5.2 5.0 - 12.0 % Final     Eosinophil %   Date Value Ref Range Status   03/08/2023 9.5 (H) 0.3 - 6.2 % Final     Basophil %   Date Value Ref Range Status   03/08/2023 1.0 0.0 - 1.5 % Final     Immature Grans %   Date Value Ref Range Status   08/09/2022 0.5 0.0 - 0.5 % Final     Neutrophils, Absolute   Date Value Ref Range Status   03/08/2023 3.96 1.70 - 7.00 10*3/mm3 Final     Lymphocytes, Absolute   Date Value Ref Range Status   03/08/2023 2.53 0.70 - 3.10 10*3/mm3 Final     Monocytes, Absolute   Date Value Ref Range Status   03/08/2023 0.40 0.10 - 0.90 10*3/mm3 Final     Eosinophils, Absolute   Date Value Ref Range Status   03/08/2023 0.73 (H) 0.00 - 0.40 10*3/mm3 Final     Basophils, Absolute   Date Value Ref Range Status    03/08/2023 0.08 0.00 - 0.20 10*3/mm3 Final     Immature Grans, Absolute   Date Value Ref Range Status   08/09/2022 0.05 0.00 - 0.05 10*3/mm3 Final     nRBC   Date Value Ref Range Status   08/09/2022 0.0 0.0 - 0.2 /100 WBC Final       Lab Results   Component Value Date    GLUCOSE 217 (H) 02/13/2023    BUN 15 02/13/2023    CREATININE 0.60 02/13/2023    EGFRIFNONA 90 12/01/2021    EGFRIFAFRI 115 08/26/2020    BCR 25.0 02/13/2023    K 4.4 02/13/2023    CO2 25.9 02/13/2023    CALCIUM 9.7 02/13/2023    PROTENTOTREF 7.0 08/26/2020    ALBUMIN 4.2 02/13/2023    LABIL2 1.8 08/26/2020    AST 16 02/13/2023    ALT 20 02/13/2023       DIAGPROB@    Assessment & Plan     There are no diagnoses linked to this encounter.    ASSESSMENT:  1. Encounter for survivorship visit   2. Moderately differentiated invasive ductal carcinoma of the right breast.  Status post right lumpectomy with sentinel lymph node biopsy.  T1 cN0 M0.  Status post adjuvant chemotherapy with Cytoxan and Taxotere completed on December 29, 2022.  Status post right breast radiation completed in February 2023.    PLAN:  1. Return to clinic on 6/30/2023 this with Lianet Rust MD    2. Continue Arimidex 25 mg p.o. daily  3. Continue calcium and vitamin D supplement  4. Proceed with dental exam prior to the initiation of Zometa IV every 6 months for at least 3 years for breast cancer prevention, prevention of bone loss while on AI       Greater than 40 minutes spent with patient, more than fifty percent of that was spent face-to-face counseling patient extensively on treatment summary, surveillance for recurrence, late and long-term effects of disease and treatment, intimacy and self-image, preventative screening for other cancers, achieving/maintaining healthy BMI and link to risk reduction, adherence to current therapies, management of anxiety/uncertainty and self care strategies.     Electronically signed by ADRIANNA Paz, 03/23/23, 12:39 EDT  No  orders of the defined types were placed in this encounter.      I have reviewed labs results, imaging, vitals, and medications with the patient today. Will follow up in 3  months with Lianet Rust MD

## 2023-03-23 ENCOUNTER — APPOINTMENT (OUTPATIENT)
Dept: LAB | Facility: HOSPITAL | Age: 58
End: 2023-03-23
Payer: COMMERCIAL

## 2023-03-23 ENCOUNTER — OFFICE VISIT (OUTPATIENT)
Dept: ONCOLOGY | Facility: CLINIC | Age: 58
End: 2023-03-23
Payer: COMMERCIAL

## 2023-03-23 ENCOUNTER — HOSPITAL ENCOUNTER (OUTPATIENT)
Dept: ONCOLOGY | Facility: HOSPITAL | Age: 58
Discharge: HOME | End: 2023-03-23
Payer: COMMERCIAL

## 2023-03-23 ENCOUNTER — OFFICE VISIT (OUTPATIENT)
Dept: RADIATION ONCOLOGY | Facility: HOSPITAL | Age: 58
End: 2023-03-23
Payer: COMMERCIAL

## 2023-03-23 VITALS
WEIGHT: 254 LBS | OXYGEN SATURATION: 99 % | SYSTOLIC BLOOD PRESSURE: 145 MMHG | DIASTOLIC BLOOD PRESSURE: 87 MMHG | TEMPERATURE: 98.3 F | HEIGHT: 64 IN | BODY MASS INDEX: 43.36 KG/M2 | HEART RATE: 82 BPM

## 2023-03-23 VITALS
BODY MASS INDEX: 43.4 KG/M2 | RESPIRATION RATE: 18 BRPM | OXYGEN SATURATION: 99 % | WEIGHT: 254.2 LBS | DIASTOLIC BLOOD PRESSURE: 87 MMHG | HEART RATE: 82 BPM | SYSTOLIC BLOOD PRESSURE: 145 MMHG | TEMPERATURE: 98.3 F | HEIGHT: 64 IN

## 2023-03-23 DIAGNOSIS — Z17.0 MALIGNANT NEOPLASM OF CENTRAL PORTION OF RIGHT BREAST IN FEMALE, ESTROGEN RECEPTOR POSITIVE: Primary | ICD-10-CM

## 2023-03-23 DIAGNOSIS — C50.111 MALIGNANT NEOPLASM OF CENTRAL PORTION OF RIGHT BREAST IN FEMALE, ESTROGEN RECEPTOR POSITIVE: Primary | ICD-10-CM

## 2023-03-23 DIAGNOSIS — Z79.811 AROMATASE INHIBITOR USE: ICD-10-CM

## 2023-03-23 DIAGNOSIS — Z17.0 MALIGNANT NEOPLASM OF CENTRAL PORTION OF RIGHT BREAST IN FEMALE, ESTROGEN RECEPTOR POSITIVE: ICD-10-CM

## 2023-03-23 DIAGNOSIS — Z45.2 ENCOUNTER FOR CARE RELATED TO VASCULAR ACCESS PORT: Primary | ICD-10-CM

## 2023-03-23 DIAGNOSIS — C50.111 MALIGNANT NEOPLASM OF CENTRAL PORTION OF RIGHT BREAST IN FEMALE, ESTROGEN RECEPTOR POSITIVE: ICD-10-CM

## 2023-03-23 PROCEDURE — 25010000002 HEPARIN LOCK FLUSH PER 10 UNITS: Performed by: INTERNAL MEDICINE

## 2023-03-23 PROCEDURE — 99024 POSTOP FOLLOW-UP VISIT: CPT | Performed by: RADIOLOGY

## 2023-03-23 PROCEDURE — 96523 IRRIG DRUG DELIVERY DEVICE: CPT

## 2023-03-23 PROCEDURE — 99215 OFFICE O/P EST HI 40 MIN: CPT | Performed by: NURSE PRACTITIONER

## 2023-03-23 PROCEDURE — G0463 HOSPITAL OUTPT CLINIC VISIT: HCPCS | Performed by: RADIOLOGY

## 2023-03-23 RX ORDER — MULTIVITAMIN WITH IRON
100 TABLET ORAL 2 TIMES DAILY
COMMUNITY

## 2023-03-23 RX ORDER — HEPARIN SODIUM (PORCINE) LOCK FLUSH IV SOLN 100 UNIT/ML 100 UNIT/ML
500 SOLUTION INTRAVENOUS AS NEEDED
Status: DISCONTINUED | OUTPATIENT
Start: 2023-03-23 | End: 2023-03-24 | Stop reason: HOSPADM

## 2023-03-23 RX ORDER — HEPARIN SODIUM (PORCINE) LOCK FLUSH IV SOLN 100 UNIT/ML 100 UNIT/ML
500 SOLUTION INTRAVENOUS AS NEEDED
OUTPATIENT
Start: 2023-03-23

## 2023-03-23 RX ORDER — SODIUM CHLORIDE 0.9 % (FLUSH) 0.9 %
20 SYRINGE (ML) INJECTION AS NEEDED
Status: DISCONTINUED | OUTPATIENT
Start: 2023-03-23 | End: 2023-03-24 | Stop reason: HOSPADM

## 2023-03-23 RX ORDER — SODIUM CHLORIDE 0.9 % (FLUSH) 0.9 %
20 SYRINGE (ML) INJECTION AS NEEDED
OUTPATIENT
Start: 2023-03-23

## 2023-03-23 RX ADMIN — HEPARIN 500 UNITS: 100 SYRINGE at 08:45

## 2023-03-23 RX ADMIN — Medication 20 ML: at 08:43

## 2023-03-23 NOTE — PROGRESS NOTES
Patient here for port flush. Port flushed withiut difficulty after obtained a good blood return. Patient aware of next appointment.

## 2023-03-29 ENCOUNTER — PROCEDURE VISIT (OUTPATIENT)
Dept: SURGERY | Facility: CLINIC | Age: 58
End: 2023-03-29
Payer: COMMERCIAL

## 2023-03-29 VITALS
RESPIRATION RATE: 18 BRPM | WEIGHT: 257.2 LBS | HEIGHT: 64 IN | DIASTOLIC BLOOD PRESSURE: 78 MMHG | HEART RATE: 81 BPM | SYSTOLIC BLOOD PRESSURE: 161 MMHG | TEMPERATURE: 98.4 F | OXYGEN SATURATION: 99 % | BODY MASS INDEX: 43.91 KG/M2

## 2023-03-29 DIAGNOSIS — Z95.828 PORT-A-CATH IN PLACE: Primary | ICD-10-CM

## 2023-03-29 PROCEDURE — 36590 REMOVAL TUNNELED CV CATH: CPT | Performed by: SURGERY

## 2023-04-01 NOTE — PROGRESS NOTES
Operative Note    Patient Name:  Alaina Hartman  YOB: 1965    Date of Surgery:  3/29/2023     Indications: Patient is a 57-year-old lady who had a Port-A-Cath placed for breast cancer treatment, and she now no longer has need of a Port-A-Cath.  We discussed the risk, benefits, alternatives to surgery, the patient understands and agrees to proceed with Port-A-Cath removal.    Pre-op Diagnosis:   Port-A-Cath in place    Post-op Diagnosis:  Post-Op Diagnosis Codes:  Same    Procedure/CPT® Codes:  Port-A-Cath removal        Staff:  MD Oj-surgeon    Anesthesia: 1% lidocaine with epinephrine    Estimated Blood Loss: Minimal    Implants:    None    Specimen:          None    Findings: Port-A-Cath removed in its entirety    Complications: None, immediately    Description of Procedure: After obtaining informed consent in the procedure room, the patient was positioned in the supine position. The patient's neck and chest were then prepped and draped in the usual sterile fashion.  I began by infiltrating local anesthesia over the patient's prior incision site, and the making a skin incision and carried this down through the dermis to the subcutaneous fat layer.  Within the subcutaneous fat layer of the Port-A-Cath could be seen, and the help of the port was grasped with a clamp and elevated through the incision.  Using 3-0 Vicryl a U stitch was placed around the tract of the catheter, and the catheter was completely removed.  We then sutured the 3-0 Vicryl U stitch to close the catheter tract.  The entire port and catheter were removed from the subcutaneous space and passed off the field. The subcutaneous fat layer was then closed using interrupted 3-0 Vicryl suture, and the skin was reapproximated using 4-0 Vicryl in a running subcuticular fashion.  The wound was dressed using skin glue.  The patient tolerated the procedure well, and was able to leave the office under her own power without any  immediate complications.    Jagdeep Mcwilliams MD     Date: 4/1/2023  Time: 12:52 EDT

## 2023-05-05 ENCOUNTER — OFFICE VISIT (OUTPATIENT)
Dept: FAMILY MEDICINE CLINIC | Facility: CLINIC | Age: 58
End: 2023-05-05
Payer: COMMERCIAL

## 2023-05-05 VITALS
HEART RATE: 98 BPM | OXYGEN SATURATION: 96 % | SYSTOLIC BLOOD PRESSURE: 140 MMHG | DIASTOLIC BLOOD PRESSURE: 70 MMHG | WEIGHT: 255 LBS | TEMPERATURE: 97.1 F | RESPIRATION RATE: 16 BRPM | BODY MASS INDEX: 43.54 KG/M2 | HEIGHT: 64 IN

## 2023-05-05 DIAGNOSIS — Z11.59 ENCOUNTER FOR HEPATITIS C SCREENING TEST FOR LOW RISK PATIENT: ICD-10-CM

## 2023-05-05 DIAGNOSIS — T88.7XXA DRUG SIDE EFFECTS: ICD-10-CM

## 2023-05-05 DIAGNOSIS — R26.81 UNSTEADY GAIT: Primary | ICD-10-CM

## 2023-05-05 DIAGNOSIS — Z13.29 THYROID DISORDER SCREEN: ICD-10-CM

## 2023-05-05 DIAGNOSIS — R29.898 BILATERAL LEG WEAKNESS: ICD-10-CM

## 2023-05-05 DIAGNOSIS — Z12.11 COLON CANCER SCREENING: ICD-10-CM

## 2023-05-05 DIAGNOSIS — E66.01 CLASS 3 SEVERE OBESITY DUE TO EXCESS CALORIES WITHOUT SERIOUS COMORBIDITY WITH BODY MASS INDEX (BMI) OF 40.0 TO 44.9 IN ADULT: ICD-10-CM

## 2023-05-05 DIAGNOSIS — E55.9 VITAMIN D DEFICIENCY: ICD-10-CM

## 2023-05-05 NOTE — PROGRESS NOTES
Subjective   Alaina Hartman is a 57 y.o. female.   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  Her last one was negative and she is due for another.  Patient agreeable to another Cologuard  -Patient will be getting labs done via Dr. Corona in October.  Tacked on hepatitis C screening, vitamin D, vitamin B12 and thyroid screening to be done at that time    The following portions of the patient's history were reviewed and updated as appropriate: allergies, current medications, past family history, past medical history, past social history, past surgical history and problem list.    Patient Active Problem List   Diagnosis   • BMI 40.0-44.9, adult   • Bunion, right   • Decreased hearing of both ears   • Esophagitis   • Mixed hyperlipidemia   • Hypertension, essential   • Menopausal syndrome   • Obstructive sleep apnea syndrome   • Type 2 diabetes mellitus with hyperglycemia, with long-term current use of insulin   • Class 3 severe obesity due to excess calories without serious comorbidity with body mass index (BMI) of 40.0 to 44.9 in adult   • Malignant neoplasm of central portion of right breast in female, estrogen receptor positive   • Encounter for care related to vascular access port   • Chemotherapy-induced neutropenia       Current Outpatient Medications on File Prior to Visit   Medication Sig Dispense Refill   • ALPRAZolam (XANAX) 0.5 MG tablet Take 1 tablet by mouth Daily As Needed for Anxiety. 20 tablet 0   • atorvastatin (LIPITOR) 40 MG tablet Take 1  tablet by mouth Daily. 90 tablet 4   • BIOTIN PO Take  by mouth.     • Calcium Carbonate-Vitamin D (Oscal 500 D-3) 500-5 MG-MCG tablet Take 500 mg by mouth Every 12 (Twelve) Hours. 60 tablet 6   • Cholecalciferol (Vitamin D3) 50 MCG (2000 UT) capsule Take 1 capsule by mouth Daily.     • Cyanocobalamin (Vitamin B-12) 1000 MCG sublingual tablet Place 1 tablet under the tongue Daily. 100 each 4   • exemestane (Aromasin) 25 MG chemo tablet Take 1 tablet by mouth Daily. 30 tablet 6   • glucose blood (Accu-Chek Guide) test strip 1 each by Other route 4 (Four) Times a Day. Use as instructed 150 each 11   • ibuprofen (ADVIL,MOTRIN) 800 MG tablet Take 1 tablet by mouth 3 (Three) Times a Day As Needed. for pain     • Insulin Lispro Prot & Lispro (HumaLOG Mix 75/25 KwikPen) (75-25) 100 UNIT/ML suspension pen-injector pen Inject 55 Units under the skin into the appropriate area as directed 3 (Three) Times a Day Before Meals. 60 mL 6   • Insulin Pen Needle (Pen Needles) 32G X 4 MM misc 1 each 4 (Four) Times a Day. Use to inject insulin / DX E11.65 150 each 5   • lisinopril (PRINIVIL,ZESTRIL) 5 MG tablet Take 1 tablet by mouth Daily. 90 tablet 4   • metFORMIN ER (GLUCOPHAGE-XR) 500 MG 24 hr tablet Take 2 tablets by mouth Daily. 180 tablet 6   • multivitamin with minerals tablet tablet Take 1 tablet by mouth Daily.     • omeprazole (priLOSEC) 40 MG capsule TAKE 1 CAPSULE BY MOUTH ONCE DAILY . APPOINTMENT REQUIRED FOR FUTURE REFILLS 90 capsule 0   • ondansetron (ZOFRAN) 8 MG tablet Take 1 tablet by mouth 3 (Three) Times a Day As Needed for Nausea or Vomiting. 30 tablet 5   • vitamin B-6 (PYRIDOXINE) 100 MG tablet Take 1 tablet by mouth 2 (Two) Times a Day.     • Vitamin D, Ergocalciferol, 50 MCG (2000 UT) capsule Take 2,000 Units by mouth Daily. 100 capsule 4     No current facility-administered medications on file prior to visit.     Current outpatient and discharge medications have been reconciled for the patient.  Reviewed by:  "Lori Fields, DO      No Known Allergies      Objective   Visit Vitals  /70 (BP Location: Left arm, Patient Position: Sitting, Cuff Size: Large Adult)   Pulse 98   Temp 97.1 °F (36.2 °C) (Skin)   Resp 16   Ht 162.6 cm (64\")   Wt 116 kg (255 lb)   SpO2 96%   BMI 43.77 kg/m²       Physical Exam  HENT:      Head: Normocephalic and atraumatic.   Eyes:      Conjunctiva/sclera: Conjunctivae normal.   Musculoskeletal:      Comments: - Patient a little slow to get up but able to ambulate fairly normally   Neurological:      General: No focal deficit present.      Mental Status: She is alert and oriented to person, place, and time.   Psychiatric:         Mood and Affect: Mood normal.         Behavior: Behavior normal.           Diabetic foot exam:   Left: Filament test present   Pulses Dorsalis Pedis:  present  Posterior Tibial:  present   Sharp/dull discrimination normal       Right: Filament test present   Pulses Dorsalis Pedis:  present  Posterior Tibial:  present   Sharp/dull discrimination normal        Diagnoses and all orders for this visit:    1. Unsteady gait (Primary)/bilateral leg weakness  -     Ambulatory Referral to Physical Therapy Evaluate and treat: Printed, signed and handed to patient to go to the facility of her choice  -Patient preferred to follow-up as needed    3. Drug side effects  -     Vitamin B12    4. Vitamin D deficiency  -     Vitamin D,25-Hydroxy    5. Thyroid disorder screen  -     TSH Rfx On Abnormal To Free T4    6. Encounter for hepatitis C screening test for low risk patient  -     HCV Antibody Rfx To Qnt PCR    7. Colon cancer screening  -     Cologuard - Stool, Per Rectum; Future    8. Class 3 severe obesity due to excess calories without serious comorbidity with body mass index (BMI) of 40.0 to 44.9 in adult  Assessment & Plan:  Patient's (Body mass index is 43.77 kg/m².) indicates that they are morbidly/severely obese (BMI > 40 or > 35 with obesity - related health condition) " with health conditions that include hypertension, diabetes mellitus and dyslipidemias . Weight is unchanged. BMI  is above average; BMI management plan is completed. We discussed portion control and increasing exercise.                Follow Up  -July for annual physical exam    Expected course, medications, and adverse effects discussed as appropriate.  Call or return if worsening or persistent symptoms.  I wore protective equipment throughout this patient encounter to include mask and eye protection. Hand hygiene was performed before donning protective equipment and after removal when leaving the room.    This document is intended for medical expert use only. Reading of this document by patients and/or patient's family without participating medical staff guidance may result in misinterpretation and unintended morbidity. Any interpretation of such data is the responsibility of the patient and/or family member responsible for the patient in concert with their primary or specialist providers, not to be left for sources of online searches such as [employer], [employer] or similar queries. Relying on these approaches to knowledge may result in misinterpretation, misguided goals of care and even death should patients or family members try recommendations outside of the realm of professional medical care.

## 2023-05-05 NOTE — ASSESSMENT & PLAN NOTE
Patient's (Body mass index is 43.77 kg/m².) indicates that they are morbidly/severely obese (BMI > 40 or > 35 with obesity - related health condition) with health conditions that include hypertension, diabetes mellitus and dyslipidemias . Weight is unchanged. BMI  is above average; BMI management plan is completed. We discussed portion control and increasing exercise.

## 2023-05-21 DIAGNOSIS — K20.90 ESOPHAGITIS: ICD-10-CM

## 2023-05-22 RX ORDER — OMEPRAZOLE 40 MG/1
40 CAPSULE, DELAYED RELEASE ORAL DAILY
Qty: 90 CAPSULE | Refills: 3 | Status: SHIPPED | OUTPATIENT
Start: 2023-05-22

## 2023-05-31 ENCOUNTER — TREATMENT (OUTPATIENT)
Dept: PHYSICAL THERAPY | Facility: CLINIC | Age: 58
End: 2023-05-31

## 2023-05-31 DIAGNOSIS — R53.1 GENERALIZED WEAKNESS: ICD-10-CM

## 2023-05-31 DIAGNOSIS — M54.41 CHRONIC BILATERAL LOW BACK PAIN WITH RIGHT-SIDED SCIATICA: ICD-10-CM

## 2023-05-31 DIAGNOSIS — Z85.3 HISTORY OF BREAST CANCER IN ADULTHOOD: ICD-10-CM

## 2023-05-31 DIAGNOSIS — R26.81 UNSTEADY GAIT: Primary | ICD-10-CM

## 2023-05-31 DIAGNOSIS — R53.1 FUNCTIONAL WEAKNESS: ICD-10-CM

## 2023-05-31 DIAGNOSIS — G89.29 CHRONIC BILATERAL LOW BACK PAIN WITH RIGHT-SIDED SCIATICA: ICD-10-CM

## 2023-05-31 NOTE — PROGRESS NOTES
"  Physical Therapy Initial Evaluation and Plan of Care                           [address] [place] [address] [address], [address], [place]  [address]    Patient: Alaina Hartman   : 1965  Diagnosis/ICD-10 Code:  Unsteady gait [R26.81]  Referring practitioner: Lori Fields DO  Date of Initial Visit: 2023  Today's Date: 2023  Patient seen for 1 sessions           Subjective Questionnaire: LEFS: 42/80 = 53% function; 47% impairment; Tinetti: 15= high fall risk      Subjective Evaluation    History of Present Illness  Mechanism of injury: Pt referred to OP PT w/unsteady gait post chemo (malignant neoplasm of R breast). She had chemo, radiation (finished end of February), lumpectomy Aug 18, 2022.   No recent falls. She notes after sitting she has difficulty getting up and she has to steady herself and it takes a few minutes before she walks anywhere. She also notes difficulty with prolonged standing bc of pain radiating across low back and in B hips. She notes one of the meds she has to be on for a long time causes some joint pain.   She also notes stabbing pain in LUE, across low back (beltline), RLE, and B feet.       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  Lives with: spouse and young children (lives w/ and grandkid)    Treatments  Current treatment: physical therapy  Patient Goals  Patient goals for therapy: decreased edema, improved balance, increased strength, independence with ADLs/IADLs and return to sport/leisure activities  Patient goal: \"hiking, motorcycle riding (difficult bc of weakness getting legs over, holding it up\"     "       Objective          Functional Assessment     Single Leg Stance   Left: 0 seconds  Right: 0 seconds    Comments  30s STS: 7x     LE strength: grossly 4/5 except R knee extension 3+/5 w/pain behind knee; hip abd 3/5 B  SLR: 90 deg B, no back pain  Stairs: non reciprocal, hand rail req'd      SCT: Pt was provided with a hard copy of the HEP and instructed in use of it and all listed exercises.  Pt was instructed to cease any exercise that was painful, or that feels as though the form is incorrect.  Pt will return with any questions or difficulty at next session.  Pt acknowledged these instructions and agreed.View at [email or web address] using code: [email or web address] (abdominal bracing bridging w/hand at thighs, STS, romberg EO/EC, head turns/nods)    Assessment & Plan     Assessment  Impairments: abnormal coordination, abnormal gait, activity intolerance, impaired balance, impaired physical strength, lacks appropriate home exercise program and safety issue  Functional Limitations: carrying objects, walking, uncomfortable because of pain, sitting and standing  Assessment details: Pt is a 58 yo female presenting to OP PT w/hx of chemotherapy, radiation, and breast CA lumpectomy (Aug 2022). She exhibits B  hip weakness, gait and balance deficits (15/28 Tinetti - high fall risk), and functional limitations (difficulty w/stair climbing, rising after prolonged sitting for work). Pt currently amb without AD, lives w/spouse and 13 yo grandchild. Pt is an appropriate candidate for OP PT and will benefit from skilled services.   Prognosis: good    Goals  Plan Goals: STG to be achieved in 3 wks  1. Pt will be safe, independent, and compliant with HEP to optimize recovery and self management of symptoms.  2. Pt will improve 30s STS from 6 to at least 9 for rising from restaurant chairs & toilet.   3. Pt will improve Tinetti score from 15/28 to at least 19/28 to reflect improvement from high fall risk to moderate.    LTG to be  achieved in 12 wks  1. Pt will improve 30s STS to at least 14 for rising from restaurant chairs & toilet.   2. Pt will improve hip abd strength from 3/5 to at least 4/5 for getting out of bed without difficulty.   3. Pt will improve Tinetti score from 15/28 to at least 24/28 to reflect improvement from high fall risk to low.  4. Pt will demonstrate single leg balance for at least 5s to promote stair climbing safely.   5. Pt will ascend/descend 13 steps with item in hand safely & reciprocally for carrying laundry down/upstairs.   6. Pt will improve LEFS from 42/80 (53% function) to at least 60/80 (75% function).     Plan  Therapy options: will be seen for skilled therapy services  Planned modality interventions: electrical stimulation/Russian stimulation, TENS, thermotherapy (hydrocollator packs), traction, ultrasound, high voltage pulsed current (pain management) and cryotherapy  Planned therapy interventions: abdominal trunk stabilization, ADL retraining, balance/weight-bearing training, body mechanics training, fine motor coordination training, flexibility, home exercise program, gait training, motor coordination training, neuromuscular re-education, strengthening, stretching, therapeutic activities, transfer training, IADL retraining, functional ROM exercises, manual therapy and spinal/joint mobilization  Frequency: 2x week  Duration in weeks: 12  Treatment plan discussed with: patient        Timed:         Manual Therapy:         mins  45831;     Therapeutic Exercise:    10     mins  53144;     Neuromuscular Larisa:        mins  52392;    Therapeutic Activity:          mins  35547;     Gait Training:           mins  41133;     Ultrasound:          mins  09432;    Self-care  __15__ mins 93258  Tests & Measures              mins  89160      Un-Timed:  Electrical Stimulation:         mins  27484 ( );  Dry Needling          mins self-pay 20561  Traction          mins 50058  Low Eval          mins  05160  Mod  Eval     15     mins  78799  High Eval                            mins  84120  Canal repositioning ___  mins  23743        Timed Treatment:   25   mins   Total Treatment:     40   mins    PT SIGNATURE: Roxie Pop PT, DPT, cert. DN  [place] license # [id number]  Electronically signed by Roxie Pop PT, 05/31/23, 6:59 AM EDT    Initial Certification  Certification Period: 5/31/2023 through 8/28/2023  I certify that the therapy services are furnished while this patient is under my care.  The services outlined above are required by this patient, and will be reviewed every 90 days.     PHYSICIAN: Lori Fields DO    NPI: [id number]                                       DATE: ______________________________________________________________________________________________     Please sign and return via fax to [phone]. Thank you, [facility].

## 2023-06-07 ENCOUNTER — TREATMENT (OUTPATIENT)
Dept: PHYSICAL THERAPY | Facility: CLINIC | Age: 58
End: 2023-06-07
Payer: COMMERCIAL

## 2023-06-07 DIAGNOSIS — Z85.3 HISTORY OF BREAST CANCER IN ADULTHOOD: ICD-10-CM

## 2023-06-07 DIAGNOSIS — M54.41 CHRONIC BILATERAL LOW BACK PAIN WITH RIGHT-SIDED SCIATICA: ICD-10-CM

## 2023-06-07 DIAGNOSIS — R53.1 FUNCTIONAL WEAKNESS: ICD-10-CM

## 2023-06-07 DIAGNOSIS — R26.81 UNSTEADY GAIT: Primary | ICD-10-CM

## 2023-06-07 DIAGNOSIS — G89.29 CHRONIC BILATERAL LOW BACK PAIN WITH RIGHT-SIDED SCIATICA: ICD-10-CM

## 2023-06-07 DIAGNOSIS — R53.1 GENERALIZED WEAKNESS: ICD-10-CM

## 2023-06-07 NOTE — PROGRESS NOTES
Physical Therapy Daily Treatment Note      Patient: Alaina Hartman   : 1965  Diagnosis/ICD-10 Code:  Unsteady gait [R26.81]   Problems Addressed this Visit    None  Visit Diagnoses       Unsteady gait    -  Primary    Generalized weakness        Functional weakness        Chronic bilateral low back pain with right-sided sciatica        History of breast cancer in adulthood              Diagnoses         Codes Comments    Unsteady gait    -  Primary ICD-10-CM: R26.81  ICD-9-CM: 781.2     Generalized weakness     ICD-10-CM: R53.1  ICD-9-CM: 780.79     Functional weakness     ICD-10-CM: R53.1  ICD-9-CM: 780.79     Chronic bilateral low back pain with right-sided sciatica     ICD-10-CM: M54.41, G89.29  ICD-9-CM: 724.2, 724.3, 338.29     History of breast cancer in adulthood     ICD-10-CM: Z85.3  ICD-9-CM: V10.3            Referring practitioner: Lori Fields DO  Date of Initial Visit: Type: THERAPY  Noted: 2023  Today's Date: 2023    VISIT#: 2    Subjective Patient reports having increased R knee pain following initial visit. Was unable to perform sit to stand exercise due to increased pain.      Objective     See Exercise, Manual, and Modality Logs for complete treatment.     Assessment/Plan Patient with noted R Hip flexor/quad tightness, responded well to added frannie position stretch. Patient tolerated all performed therapeutic exercise well with no reports of increased symptoms. Patient will continue to benefit from improved LE strengthening and stretching for improved activity tolerance and stability with daily functional activity.   Goals  Plan Goals: STG to be achieved in 3 wks  1. Pt will be safe, independent, and compliant with HEP to optimize recovery and self management of symptoms.  2. Pt will improve 30s STS from 6 to at least 9 for rising from restaurant chairs & toilet.   3. Pt will improve Tinetti score from 15/28 to at least 19/28 to reflect improvement from high fall risk to  moderate.    LTG to be achieved in 12 wks  1. Pt will improve 30s STS to at least 14 for rising from restaurant chairs & toilet.   2. Pt will improve hip abd strength from 3/5 to at least 4/5 for getting out of bed without difficulty.   3. Pt will improve Tinetti score from 15/28 to at least 24/28 to reflect improvement from high fall risk to low.  4. Pt will demonstrate single leg balance for at least 5s to promote stair climbing safely.   5. Pt will ascend/descend 13 steps with item in hand safely & reciprocally for carrying laundry down/upstairs.   6. Pt will improve LEFS from 42/80 (53% function) to at least 60/80 (75% function).     Progress per Plan of Care and Progress strengthening /stabilization /functional activity         Timed:         Manual Therapy:         mins  64664;     Therapeutic Exercise:    30     mins  32157;     Neuromuscular Larisa:        mins  50146;    Therapeutic Activity:     10     mins  73093;     Gait Training:           mins  34130;     Ultrasound:          mins  56893;    Ionto                                   mins   75491  Self Care                    _____  mins   69447  Canalith Repos                   mins  83553    Un-Timed:  Electrical Stimulation:         mins  12720 ( );  Dry Needling          mins self-pay   Traction          mins 32402    Timed Treatment:   40   mins   Total Treatment:     40   mins    Abhinav Arana PTA  [place] License [id number]  Physical Therapist Assistant

## 2023-06-29 LAB
25(OH)D3+25(OH)D2 SERPL-MCNC: 41 NG/ML (ref 30–100)
HCV AB SERPL QL IA: NORMAL
HCV IGG SERPL QL IA: NON REACTIVE
TSH SERPL DL<=0.005 MIU/L-ACNC: 3.53 UIU/ML (ref 0.45–4.5)
VIT B12 SERPL-MCNC: >2000 PG/ML (ref 232–1245)

## 2023-07-26 ENCOUNTER — TREATMENT (OUTPATIENT)
Dept: PHYSICAL THERAPY | Facility: CLINIC | Age: 58
End: 2023-07-26
Payer: COMMERCIAL

## 2023-07-26 DIAGNOSIS — R53.1 GENERALIZED WEAKNESS: ICD-10-CM

## 2023-07-26 DIAGNOSIS — G89.29 CHRONIC BILATERAL LOW BACK PAIN WITH RIGHT-SIDED SCIATICA: ICD-10-CM

## 2023-07-26 DIAGNOSIS — M54.41 CHRONIC BILATERAL LOW BACK PAIN WITH RIGHT-SIDED SCIATICA: ICD-10-CM

## 2023-07-26 DIAGNOSIS — R26.81 UNSTEADY GAIT: Primary | ICD-10-CM

## 2023-07-26 NOTE — PROGRESS NOTES
Physical Therapy Daily Treatment Note  [address], [address], [place]  [address]    Patient: Alaina Hartman   : 1965  Diagnosis/ICD-10 Code:  Unsteady gait [R26.81]   Problems Addressed this Visit    None  Visit Diagnoses       Unsteady gait    -  Primary    Generalized weakness        Chronic bilateral low back pain with right-sided sciatica              Diagnoses         Codes Comments    Unsteady gait    -  Primary ICD-10-CM: R26.81  ICD-9-CM: 781.2     Generalized weakness     ICD-10-CM: R53.1  ICD-9-CM: 780.79     Chronic bilateral low back pain with right-sided sciatica     ICD-10-CM: M54.41, G89.29  ICD-9-CM: 724.2, 724.3, 338.29           Referring practitioner: Lori Fields DO  Date of Initial Visit: Type: THERAPY  Noted: 2023  Today's Date: 2023    VISIT#: 6    Subjective     Alaina reports to PT today stating she feels about the same. [place] and [place] little sore from the boat.     Objective     See Exercise, Manual, and Modality Logs for complete treatment.     Assessment/Plan    Alaina tolerated treatment well today. She noted stiffness in B knees during seated resistance band leg press. She cont to demonstrate increased core strength in abdominal recruitment exercises. She is progressing in balance and LE strength exercises e.g. step ups in sag plane & standing lunges.     Tx was observed by and portions may have been performed by [employer] under direct supervision of treating therapist.    Progress strengthening /stabilization /functional activity         Timed:         Manual Therapy:         mins  56804;     Therapeutic Exercise:    20     mins  66354;     Neuromuscular Larisa:    10    mins  18165;    Therapeutic Activity:          mins  18715;     Gait Training:           mins  25672;     Ultrasound:          mins  70162;    Ionto                                   mins   93244  Self Care                            mins    58805  Canalith Repos                   mins  29867  Tests & Measures              mins   81059      Un-Timed:  Electrical Stimulation:         mins  77815 ( );  Dry Needling          mins 17237/88048  Traction          mins 93731  Low Eval          Mins  77158  Mod Eval          Mins  69803  High Eval                            Mins  86784  Re-Eval                               mins  95420    Timed Treatment:   30   mins   Total Treatment:     30   mins    Roxie Pop PT, DPT, cert. DN  Physical Therapist  [place] Lic # [id number]

## 2023-07-27 ENCOUNTER — HOSPITAL ENCOUNTER (OUTPATIENT)
Dept: CT IMAGING | Facility: HOSPITAL | Age: 58
Discharge: HOME | End: 2023-07-27
Admitting: STUDENT IN AN ORGANIZED HEALTH CARE EDUCATION/TRAINING PROGRAM
Payer: COMMERCIAL

## 2023-07-27 DIAGNOSIS — Z87.891 PERSONAL HISTORY OF NICOTINE DEPENDENCE: ICD-10-CM

## 2023-07-27 PROCEDURE — 71271 CT THORAX LUNG CANCER SCR C-: CPT

## 2023-07-28 ENCOUNTER — TELEPHONE (OUTPATIENT)
Dept: FAMILY MEDICINE CLINIC | Facility: CLINIC | Age: 58
End: 2023-07-28
Payer: COMMERCIAL

## 2023-07-28 DIAGNOSIS — I25.10 CORONARY ARTERY DISEASE INVOLVING NATIVE CORONARY ARTERY OF NATIVE HEART WITHOUT ANGINA PECTORIS: Primary | ICD-10-CM

## 2023-07-28 NOTE — TELEPHONE ENCOUNTER
----- Message from Lori Fields DO sent at 7/28/2023  6:51 AM EDT -----  Patient's chest CT came back negative for suspicious masses or suspicious pulmonary nodules.  She does have some calcified nodules in both lungs but those look benign.    The CT did find some coronary artery calcification/heart disease.  It looks like it might be somewhat mild but I would like to do a stress echo to make sure that she does not have any developing ischemia.  Let me know if she is agreeable and I can place that order

## 2023-08-03 NOTE — PROGRESS NOTES
"Chief Complaint  Sleep Apnea    Subjective          Alaina Hartman presents to [facility]UR[facility]  History of Present Illness  PETE, yearly f/u for CPAP compliance, patient doing well with pap therapy. Patient uses a nasal mask and goes through [facility] for supplies     Sleep testing history:    On HST 2018 ,  patient had Severe obstructive sleep apnea syndrome with apnea-hypopnea index of 101.1 per sleep hour, minimum SpO2 of 62%     PAP download:  The patient is on CPAP therapy at 12-16 cm/H2O.   Data indicates Excellent compliance. With 100% usage for more than 4 hours with an average usage of 8 hours 11 minutes. AHI down to 0.8 .  Average pressures 12.4.    The patient's hypersomnia has resolved       [place] Sleepiness Scale:  Sitting and reading 0 WatchingTV 0  Sitting, inactive, in a public place 0  As a passenger in a car for 1 hour w/o a break  0  Lying down to rest in the afternoon  1  Sitting and talking to someone  0  Sitting quietly after a lunch  0  In a car, while stopped for traffic or a light  0  Total 1    Review of Systems   HENT:  Positive for hearing loss.    Respiratory:  Positive for apnea.    All other systems reviewed and are negative.      Objective   Vital Signs:   /82   Pulse 81   Resp 18   Ht 162.6 cm (64\")   Wt 116 kg (256 lb)   BMI 43.94 kg/mý     Physical Exam  Vitals reviewed.   Pulmonary:      Effort: Pulmonary effort is normal. No respiratory distress.   Neurological:      General: No focal deficit present.      Mental Status: She is alert and oriented to person, place, and time.   Psychiatric:         Mood and Affect: Mood normal.      Result Review :                 Assessment and Plan    Diagnoses and all orders for this visit:    1. Obstructive sleep apnea syndrome (Primary)  -     PAP Therapy      Continue cpap at 12-16  The patient is compliant with and benefiting from PAP therapy.      Follow Up   Return in about 1 year (around " 8/7/2024).    Patient was given instructions and counseling regarding her condition or for health maintenance advice. Please see specific information pulled into the AVS if appropriate.       This document has been electronically signed by Joseph Seipel, MD on August 7, 2023 18:21 EDT

## 2023-08-07 ENCOUNTER — OFFICE VISIT (OUTPATIENT)
Dept: NEUROLOGY | Facility: CLINIC | Age: 58
End: 2023-08-07
Payer: COMMERCIAL

## 2023-08-07 VITALS
BODY MASS INDEX: 43.71 KG/M2 | SYSTOLIC BLOOD PRESSURE: 125 MMHG | WEIGHT: 256 LBS | HEIGHT: 64 IN | HEART RATE: 81 BPM | DIASTOLIC BLOOD PRESSURE: 82 MMHG | RESPIRATION RATE: 18 BRPM

## 2023-08-07 DIAGNOSIS — G47.33 OBSTRUCTIVE SLEEP APNEA SYNDROME: Primary | ICD-10-CM

## 2023-08-07 PROCEDURE — 99213 OFFICE O/P EST LOW 20 MIN: CPT | Performed by: PSYCHIATRY & NEUROLOGY

## 2023-08-09 NOTE — PROGRESS NOTES
"Radiation Oncology  DOS: 2/28/2023  Re: Skin check visit  Diagnosis: Rt breast IDC, [id number]    Completed radiation therapy on 2/21/2023, Rt whole breast 4256 cGy in 16 fractions.    Patient had grade 1 fatigue and itching during her treatment course and was noted to have a small  Area of lower inner quadrant/bra line skin with grade II reaction--silvadene and supportive measures were done.    She returns now for discussion/short interval skin check.    Silvadene has helped but wore bra yesterday and feels like further \"peeling and irritation\".  Ibuprofen does help.  A dry desquamation in right axilla--asked to put on silvadene in this area.  On exam, no s/s of infection.  Will start Mepilex AG pads on inframammary line--patient education provided and pads given.  FU here in 1 week for a skin check  Has 1 month f/u already as well.    We will remain available as needed over the interval for skin check.    Approved by:     Jagdeep De Los Santos MD  02/28/23  08:50 EST      " Acitretin Pregnancy And Lactation Text: This medication is Pregnancy Category X and should not be given to women who are pregnant or may become pregnant in the future. This medication is excreted in breast milk.

## 2023-08-14 RX ORDER — ACETAMINOPHEN 160 MG
2000 TABLET,DISINTEGRATING ORAL DAILY
Qty: 100 CAPSULE | Refills: 3 | Status: SHIPPED | OUTPATIENT
Start: 2023-08-14

## 2023-08-16 ENCOUNTER — TREATMENT (OUTPATIENT)
Dept: PHYSICAL THERAPY | Facility: CLINIC | Age: 58
End: 2023-08-16
Payer: COMMERCIAL

## 2023-08-16 DIAGNOSIS — R26.81 UNSTEADY GAIT: Primary | ICD-10-CM

## 2023-08-16 DIAGNOSIS — R53.1 FUNCTIONAL WEAKNESS: ICD-10-CM

## 2023-08-16 DIAGNOSIS — G89.29 CHRONIC BILATERAL LOW BACK PAIN WITH RIGHT-SIDED SCIATICA: ICD-10-CM

## 2023-08-16 DIAGNOSIS — M54.41 CHRONIC BILATERAL LOW BACK PAIN WITH RIGHT-SIDED SCIATICA: ICD-10-CM

## 2023-08-16 DIAGNOSIS — R53.1 GENERALIZED WEAKNESS: ICD-10-CM

## 2023-08-16 DIAGNOSIS — Z85.3 HISTORY OF BREAST CANCER IN ADULTHOOD: ICD-10-CM

## 2023-08-16 NOTE — PROGRESS NOTES
Physical Therapy Daily Treatment Note  [address], [address], [place]  [address]    Patient: Alaina Hartman   : 1965  Diagnosis/ICD-10 Code:  Unsteady gait [R26.81]   Problems Addressed this Visit    None  Visit Diagnoses       Unsteady gait    -  Primary    Generalized weakness        Chronic bilateral low back pain with right-sided sciatica        Functional weakness        History of breast cancer in adulthood              Diagnoses         Codes Comments    Unsteady gait    -  Primary ICD-10-CM: R26.81  ICD-9-CM: 781.2     Generalized weakness     ICD-10-CM: R53.1  ICD-9-CM: 780.79     Chronic bilateral low back pain with right-sided sciatica     ICD-10-CM: M54.41, G89.29  ICD-9-CM: 724.2, 724.3, 338.29     Functional weakness     ICD-10-CM: R53.1  ICD-9-CM: 780.79     History of breast cancer in adulthood     ICD-10-CM: Z85.3  ICD-9-CM: V10.3           Referring practitioner: Lori Fields DO  Date of Initial Visit: Type: THERAPY  Noted: 2023  Today's Date: 2023    VISIT#: 7    Subjective   Alaina notes her knee has been cranky lately.     Objective     See Exercise, Manual, and Modality Logs for complete treatment.     Assessment/Plan  Alaina tolerated progressed exercises well today. She denied pain. PT advised her she may be sore but to get up and move, stretch, walk to reduce DOMS.   Progress strengthening /stabilization /functional activity         Timed:         Manual Therapy:         mins  10491;     Therapeutic Exercise:    10     mins  35567;     Neuromuscular Larisa:        mins  61946;    Therapeutic Activity:     20     mins  36280;     Gait Training:           mins  60144;     Ultrasound:          mins  96425;    Ionto                                   mins   59106  Self Care                            mins   63116  Canalith Repos                   mins  62521  Tests & Measures              mins   24347      Un-Timed:  Electrical Stimulation:         mins  01354 (SAUNDRA  );  Dry Needling          mins 10610/09524  Traction          mins 70383  Low Eval          Mins  71129  Mod Eval          Mins  52207  High Eval                            Mins  20730  Re-Eval                               mins  14093    Timed Treatment:   30   mins   Total Treatment:     30   mins    Roxie Pop, PT, DPT, cert. DN  Physical Therapist  [place] Lic # [id number]

## 2023-08-23 ENCOUNTER — TREATMENT (OUTPATIENT)
Dept: PHYSICAL THERAPY | Facility: CLINIC | Age: 58
End: 2023-08-23
Payer: COMMERCIAL

## 2023-08-23 DIAGNOSIS — R53.1 FUNCTIONAL WEAKNESS: ICD-10-CM

## 2023-08-23 DIAGNOSIS — Z85.3 HISTORY OF BREAST CANCER IN ADULTHOOD: ICD-10-CM

## 2023-08-23 DIAGNOSIS — R26.81 UNSTEADY GAIT: Primary | ICD-10-CM

## 2023-08-23 DIAGNOSIS — M54.41 CHRONIC BILATERAL LOW BACK PAIN WITH RIGHT-SIDED SCIATICA: ICD-10-CM

## 2023-08-23 DIAGNOSIS — R53.1 GENERALIZED WEAKNESS: ICD-10-CM

## 2023-08-23 DIAGNOSIS — G89.29 CHRONIC BILATERAL LOW BACK PAIN WITH RIGHT-SIDED SCIATICA: ICD-10-CM

## 2023-08-23 NOTE — PROGRESS NOTES
Physical Therapy Daily Treatment Note  [address], [address], [place]  [address]    Patient: Alaina Hartman   : 1965  Diagnosis/ICD-10 Code:  Unsteady gait [R26.81]   Problems Addressed this Visit    None  Visit Diagnoses       Unsteady gait    -  Primary    Generalized weakness        Chronic bilateral low back pain with right-sided sciatica        Functional weakness        History of breast cancer in adulthood              Diagnoses         Codes Comments    Unsteady gait    -  Primary ICD-10-CM: R26.81  ICD-9-CM: 781.2     Generalized weakness     ICD-10-CM: R53.1  ICD-9-CM: 780.79     Chronic bilateral low back pain with right-sided sciatica     ICD-10-CM: M54.41, G89.29  ICD-9-CM: 724.2, 724.3, 338.29     Functional weakness     ICD-10-CM: R53.1  ICD-9-CM: 780.79     History of breast cancer in adulthood     ICD-10-CM: Z85.3  ICD-9-CM: V10.3           Referring practitioner: Lori Fields DO  Date of Initial Visit: Type: THERAPY  Noted: 2023  Today's Date: 2023    VISIT#: 8    Subjective   Alaina reports she was slightly muscle sore after last tx session, but had no pain. However, her knees cont to be intermittently painful.     Objective     See Exercise, Manual, and Modality Logs for complete treatment.     Assessment/Plan  Alaina tolerated inc'd resistance with exercises (see flowsheet) without inc'd pain. She cont to benefit from light UE support on TM arm bar. She had no LOB today. She returns to work in [place] and will come back to PT in 2 wks.     Progress per Plan of Care         Timed:         Manual Therapy:         mins  15784;     Therapeutic Exercise:    15     mins  39571;     Neuromuscular Larisa:    10    mins  97514;    Therapeutic Activity:     10     mins  72958;     Gait Training:           mins  11239;     Ultrasound:          mins  44826;    Ionto                                   mins   44394  Self Care                            mins   58673  [facility]                    mins  26322  Tests & Measures              mins   40116      Un-Timed:  Electrical Stimulation:         mins  26852 ( );  Dry Needling          mins 94090/83932  Traction          mins 52547  Low Eval          Mins  29535  Mod Eval          Mins  08752  High Eval                            Mins  12927  Re-Eval                               mins  03852    Timed Treatment:   35   mins   Total Treatment:     35   mins    Roxie Pop, PT, DPT, cert. DN  Physical Therapist  [place] Lic # [id number]

## 2023-08-29 NOTE — PROGRESS NOTES
Chief Complaint  URI    History of Present Illness  Alaina Hartman presents today for cough.    Upper Respiratory Infection: Patient complains of symptoms of a URI. Symptoms include congestion, cough, and sore throat. Onset of symptoms was 3 days ago, gradually worsening since that time. She also c/o congestion, nasal congestion, non productive cough, and shortness of breath for the past 5 days .  She is drinking plenty of fluids. Evaluation to date: none. Treatment to date: antihistamines and decongestants.  Daughter was positive for covid on Friday, 5 days ago.     Recovering from cancer treatements completed on Feb 28th. Continuing  Aromasin daily    Symptoms are mild other than cough is keeping awake cough drops and Vicks with acetomeophen, dextromethorfan  guiafenasin, phenylephrin. Help some.    Patient Care Team:  Lori Fields DO as PCP - General (Family Medicine)  Seipel, Joseph F, MD as Consulting Physician (Sleep Medicine)  Jagdeep Mcwilliams MD as Surgeon (General Surgery)  Lianet Rust MD as Consulting Physician (Hematology and Oncology)  Melba Gamboa RN as Nurse Navigator  Chirag Cho MD as Consulting Physician (Endocrinology)   Current Outpatient Medications on File Prior to Visit   Medication Sig    ALPRAZolam (XANAX) 0.5 MG tablet Take 1 tablet by mouth Daily As Needed for Anxiety.    atorvastatin (LIPITOR) 40 MG tablet Take 1 tablet by mouth Daily.    BIOTIN PO Take  by mouth.    Calcium Carbonate-Vitamin D (Oscal 500 D-3) 500-5 MG-MCG tablet Take 500 mg by mouth Every 12 (Twelve) Hours.    Cholecalciferol (Vitamin D3) 50 MCG (2000 UT) capsule Take 1 capsule by mouth once daily    Cyanocobalamin (Vitamin B-12) 1000 MCG sublingual tablet Place 1 tablet under the tongue Daily.    exemestane (Aromasin) 25 MG chemo tablet Take 1 tablet by mouth Daily.    glucose blood (Accu-Chek Guide) test strip 1 each by Other route 4 (Four) Times a Day. Use as instructed    ibuprofen  "(ADVIL,MOTRIN) 800 MG tablet Take 1 tablet by mouth 3 (Three) Times a Day As Needed. for pain    Insulin Lispro Prot & Lispro (HumaLOG Mix 75/25 KwikPen) (75-25) 100 UNIT/ML suspension pen-injector pen Inject 55 Units under the skin into the appropriate area as directed 3 (Three) Times a Day Before Meals.    Insulin Pen Needle (Pen Needles) 32G X 4 MM misc 1 each 4 (Four) Times a Day. Use to inject insulin / DX E11.65    lisinopril (PRINIVIL,ZESTRIL) 5 MG tablet Take 1 tablet by mouth Daily.    metFORMIN ER (GLUCOPHAGE-XR) 500 MG 24 hr tablet Take 2 tablets by mouth Daily.    multivitamin with minerals tablet tablet Take 1 tablet by mouth Daily.    omeprazole (priLOSEC) 40 MG capsule Take 1 capsule by mouth once daily    ondansetron (ZOFRAN) 8 MG tablet Take 1 tablet by mouth 3 (Three) Times a Day As Needed for Nausea or Vomiting.    vitamin B-6 (PYRIDOXINE) 100 MG tablet Take 1 tablet by mouth 2 (Two) Times a Day.    Vitamin D, Ergocalciferol, 50 MCG (2000 UT) capsule Take 2,000 Units by mouth Daily.     No current facility-administered medications on file prior to visit.       Objective   Vital Signs:   /70 (BP Location: Right arm, Patient Position: Sitting, Cuff Size: Adult)   Pulse 104   Temp 98 øF (36.7 øC) (Temporal)   Resp 18   Ht 161.3 cm (63.5\")   Wt 116 kg (256 lb 8 oz)   SpO2 97%   BMI 44.72 kg/mý          Physical Exam  Vitals and nursing note reviewed.   Constitutional:       General: She is not in acute distress.     Appearance: Normal appearance. She is well-developed. She is obese.   HENT:      Head: Normocephalic and atraumatic.      Right Ear: Tympanic membrane, ear canal and external ear normal. There is no impacted cerumen.      Left Ear: Tympanic membrane, ear canal and external ear normal. There is no impacted cerumen.      Nose: Nose normal. No rhinorrhea.   Eyes:      General:         Right eye: No discharge.         Left eye: No discharge.      Pupils: Pupils are equal, round, " and reactive to light.   Cardiovascular:      Rate and Rhythm: Normal rate and regular rhythm.      Heart sounds: No murmur heard.  Pulmonary:      Effort: Pulmonary effort is normal.      Breath sounds: Normal breath sounds. No wheezing or rales.   Musculoskeletal:         General: Normal range of motion.   Lymphadenopathy:      Cervical: No cervical adenopathy.   Skin:     General: Skin is warm and dry.      Findings: No rash.   Neurological:      Mental Status: She is alert and oriented to person, place, and time.   Psychiatric:         Mood and Affect: Mood normal.         Behavior: Behavior normal.         Thought Content: Thought content normal.         Judgment: Judgment normal.          Office Visit on 08/30/2023   Component Date Value Ref Range Status    SARS Antigen 08/30/2023 Detected (A)  Not Detected, Presumptive Negative Final    Influenza A Antigen SANDRA 08/30/2023 Not Detected  Not Detected Final    Influenza B Antigen SANDRA 08/30/2023 Not Detected  Not Detected Final    Internal Control 08/30/2023 Passed  Passed Final    Lot Number 08/30/2023 [id number]   Final    Expiration Date 08/30/2023 04/10/2024   Final     A1C Last 3 Results          2/13/2023    09:33   HGBA1C Last 3 Results   Hemoglobin A1C 7.0      Lab Results   Component Value Date    CHOL 136 02/13/2023    CHLPL 199 08/26/2020    TRIG 234 (H) 02/13/2023    HDL 33 (L) 02/13/2023    LDL 65 02/13/2023     Lab Results   Component Value Date    TSH 3.530 06/28/2023     Lab Results   Component Value Date    GLUCOSE 150 (H) 06/30/2023    BUN 11 06/30/2023    CREATININE 0.67 06/30/2023    EGFRIFNONA 90 12/01/2021    EGFRIFAFRI 115 08/26/2020    BCR 16.4 06/30/2023    K 4.0 06/30/2023    CO2 28.0 06/30/2023    CALCIUM 9.7 06/30/2023    PROTENTOTREF 7.0 08/26/2020    ALBUMIN 4.2 06/30/2023    LABIL2 1.8 08/26/2020    AST 19 06/30/2023    ALT 25 06/30/2023     Lab Results   Component Value Date    WBC 9.01 06/30/2023    HGB 12.3 06/30/2023    HCT 38.0  06/30/2023    MCV 88.0 06/30/2023     06/30/2023                      Assessment and Plan    Diagnoses and all orders for this visit:    1. Viral upper respiratory tract infection (Primary)  -     POCT SARS-CoV-2 Antigen SANDRA    2. Class 3 severe obesity due to excess calories without serious comorbidity with body mass index (BMI) of 40.0 to 44.9 in adult    3. COVID-19 virus infection  -     Discontinue: Hydrocod Matt-Chlorphe Matt ER (TUSSIONEX PENNKINETIC) 10-8 MG/5ML ER suspension; Take 5 mL by mouth Every 12 (Twelve) Hours As Needed for Cough.  Dispense: 60 mL; Refill: 0  -     guaiFENesin-codeine (ROMILAR-AC) 100-10 MG/5ML solution/syrup; Take 5 mL by mouth 4 (Four) Times a Day As Needed for Cough.  Dispense: 118 mL; Refill: 0    4. Acute cough  -     guaiFENesin-codeine (ROMILAR-AC) 100-10 MG/5ML solution/syrup; Take 5 mL by mouth 4 (Four) Times a Day As Needed for Cough.  Dispense: 118 mL; Refill: 0    5. Type 2 diabetes mellitus with hyperglycemia, without long-term current use of insulin    6. Hypertension, essential    7. BMI 40.0-44.9, adult    COVID infection  onset 3 days ago.  Patient is high risk of developing severe infection due to  diabetes, morbid obesity hypertension and recent breast cancer treatment and question of possible coronary artery disease, however with mild symptoms except cough waking her up at night.  She declines emergency authorized of wrist paxlovid or other antiviral treatment.  Advised if symptoms increase within the next couple of days could start as late as Friday if she changes her mind.  Otherwise advised to continue symptomatic treatment with acetomeophen, dextromethorfan  guiafenasin, and phenylephrin as needed.  Do recommend saline nasal spray and saline gargles if needed.  Attempted to add Tussionex to help with sleep but this is on back order, changed to guaifenesin with codeine.  Work note to return Wednesday in 1 week patient is able to work from home and may  do so as she feels capable    Medications Discontinued During This Encounter   Medication Reason    Hydrocod Matt-Chlorphe Matt ER (TUSSIONEX PENNKINETIC) 10-8 MG/5ML ER suspension Availability         Follow Up     Return if symptoms worsen or fail to improve.    Patient was given instructions and counseling regarding her condition or for health maintenance advice. Please see specific information pulled into the AVS if appropriate.

## 2023-08-30 ENCOUNTER — OFFICE VISIT (OUTPATIENT)
Dept: FAMILY MEDICINE CLINIC | Facility: CLINIC | Age: 58
End: 2023-08-30
Payer: COMMERCIAL

## 2023-08-30 VITALS
BODY MASS INDEX: 43.79 KG/M2 | OXYGEN SATURATION: 97 % | WEIGHT: 256.5 LBS | TEMPERATURE: 98 F | RESPIRATION RATE: 18 BRPM | HEART RATE: 104 BPM | SYSTOLIC BLOOD PRESSURE: 124 MMHG | DIASTOLIC BLOOD PRESSURE: 70 MMHG | HEIGHT: 64 IN

## 2023-08-30 DIAGNOSIS — E66.01 CLASS 3 SEVERE OBESITY DUE TO EXCESS CALORIES WITHOUT SERIOUS COMORBIDITY WITH BODY MASS INDEX (BMI) OF 40.0 TO 44.9 IN ADULT: ICD-10-CM

## 2023-08-30 DIAGNOSIS — R05.1 ACUTE COUGH: ICD-10-CM

## 2023-08-30 DIAGNOSIS — J06.9 VIRAL UPPER RESPIRATORY TRACT INFECTION: Primary | ICD-10-CM

## 2023-08-30 DIAGNOSIS — E11.65 TYPE 2 DIABETES MELLITUS WITH HYPERGLYCEMIA, WITHOUT LONG-TERM CURRENT USE OF INSULIN: ICD-10-CM

## 2023-08-30 DIAGNOSIS — U07.1 COVID-19 VIRUS INFECTION: ICD-10-CM

## 2023-08-30 DIAGNOSIS — I10 HYPERTENSION, ESSENTIAL: ICD-10-CM

## 2023-08-30 LAB
EXPIRATION DATE: ABNORMAL
FLUAV AG UPPER RESP QL IA.RAPID: NOT DETECTED
FLUBV AG UPPER RESP QL IA.RAPID: NOT DETECTED
INTERNAL CONTROL: ABNORMAL
Lab: ABNORMAL
SARS-COV-2 AG UPPER RESP QL IA.RAPID: DETECTED

## 2023-08-30 PROCEDURE — 99214 OFFICE O/P EST MOD 30 MIN: CPT | Performed by: FAMILY MEDICINE

## 2023-08-30 PROCEDURE — 87428 SARSCOV & INF VIR A&B AG IA: CPT | Performed by: FAMILY MEDICINE

## 2023-08-30 RX ORDER — CODEINE PHOSPHATE AND GUAIFENESIN 10; 100 MG/5ML; MG/5ML
5 SOLUTION ORAL 4 TIMES DAILY PRN
Qty: 118 ML | Refills: 0 | Status: SHIPPED | OUTPATIENT
Start: 2023-08-30

## 2023-08-30 RX ORDER — HYDROCODONE POLISTIREX AND CHLORPHENIRAMINE POLISTIREX 10; 8 MG/5ML; MG/5ML
5 SUSPENSION, EXTENDED RELEASE ORAL EVERY 12 HOURS PRN
Qty: 60 ML | Refills: 0 | Status: SHIPPED | OUTPATIENT
Start: 2023-08-30 | End: 2023-08-30 | Stop reason: RX

## 2023-08-30 NOTE — LETTER
August 30, 2023     Patient: Alaina Hartman   YOB: 1965   Date of Visit: 8/30/2023       To Whom It May Concern:    It is my medical opinion that Alaina Hartman may return to work on 09/06/2023 due being covid positive.          Sincerely,        Alejandrina Singh MD

## 2023-09-06 ENCOUNTER — OFFICE VISIT (OUTPATIENT)
Dept: SURGERY | Facility: CLINIC | Age: 58
End: 2023-09-06
Payer: COMMERCIAL

## 2023-09-06 VITALS
TEMPERATURE: 98.2 F | SYSTOLIC BLOOD PRESSURE: 136 MMHG | DIASTOLIC BLOOD PRESSURE: 85 MMHG | WEIGHT: 255.4 LBS | HEART RATE: 86 BPM | BODY MASS INDEX: 43.6 KG/M2 | OXYGEN SATURATION: 98 % | HEIGHT: 64 IN | RESPIRATION RATE: 18 BRPM

## 2023-09-06 DIAGNOSIS — Z17.0 MALIGNANT NEOPLASM OF CENTRAL PORTION OF RIGHT BREAST IN FEMALE, ESTROGEN RECEPTOR POSITIVE: Primary | ICD-10-CM

## 2023-09-06 DIAGNOSIS — C50.111 MALIGNANT NEOPLASM OF CENTRAL PORTION OF RIGHT BREAST IN FEMALE, ESTROGEN RECEPTOR POSITIVE: Primary | ICD-10-CM

## 2023-09-06 PROCEDURE — 99213 OFFICE O/P EST LOW 20 MIN: CPT | Performed by: SURGERY

## 2023-09-12 NOTE — PROGRESS NOTES
GENERAL SURGERY ESTABLISHED PATIENT NOTE    Patient Care Team:  Lori Fields DO as PCP - General (Family Medicine)  Seipel, Joseph F, MD as Consulting Physician (Sleep Medicine)  Jagdeep Mcwilliams MD as Surgeon (General Surgery)  Lianet Rust MD as Consulting Physician (Hematology and Oncology)  Melba Gamboa RN as Nurse Navigator  Chirag Cho MD as Consulting Physician (Endocrinology)    Reason for follow-up: 6 month follow-up for breast cancer    Subjective     Patient is a 57 y.o. female presents for 6-month follow-up from right lumpectomy and sentinel lymph node biopsy performed on 8/18/2022.  The patient has completed chemotherapy, and radiation therapy.  Overall she is doing well without any significant complaints.  She most recently had a diagnostic mammogram of the breast performed bilaterally on 7/18/2023 which demonstrated interval posttreatment changes in the right breast and no mammographic findings to indicate malignancy in the left breast.  She is taking Aromasin without any significant complaints.  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  Overall she appears to be doing well    Review of Systems   Genitourinary:  Negative for breast discharge, breast lump and breast pain.   Hematological:  Negative for adenopathy.      History  Past Medical History:   Diagnosis Date    Ankle edema     at times    Breast cancer, right 08/2022    GERD (gastroesophageal reflux disease)     Hiatal hernia      Past Surgical History:   Procedure Laterality Date    BREAST BIOPSY Right 08/18/2022    Procedure: Right breast lumpectomy;  Surgeon: Jagdeep Mcwilliams MD;  Location: [facility] MAIN OR;  Service: General;  Laterality: Right;    BREAST LUMPECTOMY Left 2003    x2    PORTACATH PLACEMENT N/A 10/06/2022    Procedure: INSERTION OF PORTACATH;  Surgeon: Jagdeep Mcwilliams MD;  Location: [facility] MAIN OR;  Service: General;  Laterality: N/A;    SENTINEL NODE BIOPSY Right  2022    Procedure: SENTINEL NODE BIOPSY;  Surgeon: Jagdeep Mcwilliams MD;  Location: [facility] MAIN OR;  Service: General;  Laterality: Right;    SUBTOTAL HYSTERECTOMY  10/10/1991    had it done for bleeding. Ovaries still present    TOOTH EXTRACTION      has had all teeth (but one) removed     Family History   Problem Relation Age of Onset    Breast cancer Mother     Heart disease Mother     Thyroid disease Mother     Stroke Mother     Cancer Mother         lymphoma    Clotting disorder Father     Diabetes Brother     Heart disease Brother     Stroke Brother     Diabetes Brother     Colon cancer Paternal Grandmother      Social History     Tobacco Use    Smoking status: Former     Packs/day: 0.25     Years: 31.00     Pack years: 7.75     Types: Cigarettes     Start date:      Quit date: 2010     Years since quittin.7     Passive exposure: Past    Smokeless tobacco: Never    Tobacco comments:     Started smoking about age 14   Vaping Use    Vaping Use: Never used   Substance Use Topics    Alcohol use: Not Currently     Comment: rare    Drug use: Never     (Not in a hospital admission)    Allergies:  Patient has no known allergies.    Objective     Vital Signs       Physical Exam  Vitals reviewed. Exam conducted with a chaperone present.   Constitutional:       Appearance: She is well-developed.   HENT:      Head: Normocephalic and atraumatic.   Eyes:      Pupils: Pupils are equal, round, and reactive to light.   Cardiovascular:      Rate and Rhythm: Normal rate and regular rhythm.   Pulmonary:      Effort: Pulmonary effort is normal.      Breath sounds: Normal breath sounds.   Chest:      Comments: Both breasts were examined the upright position.  Both breasts exhibit pendulous morphology.  There is a well-healed curvilinear incision on the lateral aspect of the right breast.  No palpable masses bilaterally, no skin changes, thickening, dimpling, or rashes are noted.  No discharge from the  nipple  Abdominal:      General: There is no distension.      Palpations: Abdomen is soft.      Tenderness: There is no abdominal tenderness.      Hernia: No hernia is present.   Musculoskeletal:         General: Normal range of motion.      Cervical back: Normal range of motion.   Lymphadenopathy:      Cervical: No cervical adenopathy.      Upper Body:      Right upper body: No supraclavicular or axillary adenopathy.      Left upper body: No supraclavicular or axillary adenopathy.   Skin:     General: Skin is warm and dry.      Findings: No rash.   Neurological:      Mental Status: She is alert and oriented to person, place, and time.       Results Review:   Lab Results (last 24 hours)       ** No results found for the last 24 hours. **          No radiology results for the last day      I reviewed the patient's new imaging results and agree with the interpretation.  I reviewed the patient's other test results and agree with the interpretation    Assessment & Plan   Right breast cancer    Pathology reviewed, patient noted to have 1.7 cm residual moderately differentiated ductal carcinoma which was completely excised.  5 lymph nodes were removed, and all negative.  The patient had an Oncotype DX performed, and elected to undergo adjuvant chemotherapy.  She has now completed radiation therapy.  Recommend follow-up with physical therapy for lymphedema exercises  Recommend follow-up with medical oncology as directed  Recommend follow-up with radiation oncology as directed  Follow-up with me in 6 months or sooner if issues arise  Port-A-Cath has been removed    I discussed the patients findings and my recommendations with the patient.     Jagdeep Mcwilliams MD  09/12/23  12:41 EDT

## 2023-09-13 ENCOUNTER — TREATMENT (OUTPATIENT)
Dept: PHYSICAL THERAPY | Facility: CLINIC | Age: 58
End: 2023-09-13
Payer: COMMERCIAL

## 2023-09-13 DIAGNOSIS — M54.41 CHRONIC BILATERAL LOW BACK PAIN WITH RIGHT-SIDED SCIATICA: ICD-10-CM

## 2023-09-13 DIAGNOSIS — M25.562 CHRONIC PAIN OF LEFT KNEE: ICD-10-CM

## 2023-09-13 DIAGNOSIS — R53.1 FUNCTIONAL WEAKNESS: ICD-10-CM

## 2023-09-13 DIAGNOSIS — Z85.3 HISTORY OF BREAST CANCER IN ADULTHOOD: ICD-10-CM

## 2023-09-13 DIAGNOSIS — G89.29 CHRONIC BILATERAL LOW BACK PAIN WITH RIGHT-SIDED SCIATICA: ICD-10-CM

## 2023-09-13 DIAGNOSIS — R53.1 GENERALIZED WEAKNESS: ICD-10-CM

## 2023-09-13 DIAGNOSIS — R26.81 UNSTEADY GAIT: Primary | ICD-10-CM

## 2023-09-13 DIAGNOSIS — G89.29 CHRONIC PAIN OF RIGHT KNEE: ICD-10-CM

## 2023-09-13 DIAGNOSIS — G89.29 CHRONIC PAIN OF LEFT KNEE: ICD-10-CM

## 2023-09-13 DIAGNOSIS — M25.561 CHRONIC PAIN OF RIGHT KNEE: ICD-10-CM

## 2023-09-13 NOTE — PROGRESS NOTES
Re-Assessment / Re-Certification  [address] [place] [address] [address]. [address], [address], [place]  [address]      Patient: Alaina Hartman   : 1965  Diagnosis/ICD-10 Code:  Unsteady gait [R26.81]  Referring practitioner: Lori Fields DO  Date of Initial Visit: Type: THERAPY  Noted: 2023  Today's Date: 2023  Patient seen for 9 sessions      Subjective:   Alaina Hartman reports: she feels like she has a good handle on what she needs to do at home. She also takes care of her grandson and it makes getting to PT clinic before work difficult. She'd like to finish out remaining sched'd appts () and DC.  Subjective Questionnaire: PT Functional Test: LEFS = 71/80  Clinical Progress: improved  Home Program Compliance: Yes      Progress toward previous goals: Partially Met    30s STS: 10  SLS  R 2  L 5 w/fingertip touch to get into position    Assessment/Plan  Alaina has  made gradual but significant gains in terms of functional strength and activity tolerance. She is still limited in balance and has persistent B knee pain. She will likely DC after next appt.       Plan Goals: STG to be achieved in 3 wks  1. Pt will be safe, independent, and compliant with HEP to optimize recovery and self management of symptoms.  2. Pt will improve 30s STS from 6 to at least 9 for rising from restaurant chairs & toilet. - MET 9 on 23; 7 on 23  3. Pt will improve Tinetti score from 15/28 to at least 19/28 to reflect improvement from high fall risk to moderate.- MET 27 on 23    LTG to be achieved in 12 wks  1. Pt will improve 30s STS to at least 14 for rising from restaurant chairs & toilet. - NOT MET 7 on 23  2. Pt will improve hip abd strength from 3/5 to at least 4/5 for getting out of bed without difficulty. - MET  3. Pt will improve Tinetti score from 15/28 to at least 24/28 to reflect improvement from high fall risk to low.- MET  4. Pt will demonstrate single leg balance for at least 5s to promote stair climbing  safely. - NOT MET L2s, R 3s  5. Pt will ascend/descend 13 steps with item in hand safely & reciprocally for carrying laundry down/upstairs. - MET  6. Pt will improve LEFS from 42/80 (53% function) to at least 60/80 (75% function).          - MET 71/80 on 9/13/23; 57% F on 7/19/23        Recommendations: Continue as planned  Timeframe: 3 months  Frequency: 1x/wk  Prognosis to achieve goals: fair    PT Signature: Roxie Pop PT, DPT, cert DN  Physical Therapist  [place] Lic # [id number]  Electronically signed by Roxie Pop PT, 09/13/23, 7:06 AM EDT    Certification Period: 8/28/23 thru 11/29/23  I certify that the therapy services are furnished while this patient is under my care. The services outlined above are required by this patient and will be reviewed every 90 days.    PHYSICIAN: Lori Fields,  _____________________________________________________________  NPI: [id number]                                      DATE:    ___________________________________________      Timed:         Manual Therapy:         mins  22436;     Therapeutic Exercise:    15     mins  87825;     Neuromuscular Larisa:        mins  81217;    Therapeutic Activity:          mins  41112;     Gait Training:           mins  23529;     Ultrasound:          mins  64894;    Ionto                                   mins   76402  Self Care                            mins   63782  Tests & Measures        10     mins   71232    Un-Timed:  Electrical Stimulation:         mins  98876 ( );  Dry Needling          mins 68870/38497  Traction          mins 43027  Can Repos          mins 70286  Low Eval          Mins  83283  Mod Eval          Mins  84300  High Eval                            Mins  61032  Re-Eval                               mins  87811      Timed Treatment:   25   mins   Total Treatment:     25   mins

## 2023-09-19 NOTE — PROGRESS NOTES
RADIATION ONCOLOGY FOLLOW-UP NOTE    NAME: Alaina Hartman  YOB: 1965  MRN #: [id number]  DATE OF SERVICE: 9/21/2023  PRIMARY CARE PROVIDER: Lori Fields DO    DIAGNOSIS:  Right breast cancer, invasive ductal carcinoma, [id number], ER 95%, VT 50%, Her2-.    REASON FOR VISIT/CC: Right breast cancer,  6M F/U     RADIATION TREATMENT COURSE:  4256 cGy in 16 fractions to whole right breast, completed 02/21/2023.    HISTORY OF PRESENT ILLNESS: The patient is a 57 y.o. year old female who was last seen in our office on 03/23/2023. The plan was to continue Aromasin with Dr. Rust, continue skin care and supportive measures, start vitamin E oil based lumpectomy bed/ breast massages 2-3 times per week for 6 months, and follow up at about 6 months.    She was last seen by Dr. Rust on 06/30/2023. Their plan is to continue Aromasin, continue calcium with vitamin D, bilateral mammogram due in July 2023, and follow up in 3 months on 09/21/2023 with ADRIANNA Ervin.    Mammogram was completed 07/18/2023 at [facility], and showed interval posttreatment changes in the right breast, and no mammographic findings to indicate right or left breast malignancy, recommend 1 year follow up. BI-RADS 2, benign findings.    She denies any new masses or breast concerns.    The following portions of the patient's history were reviewed and updated as appropriate: allergies, current medications, past family history, past medical history, past social history, past surgical history and problem list. Reviewed with the patient and remain unchanged.    PAST MEDICAL HISTORY:  she has a past medical history of Ankle edema, Breast cancer, right (08/2022), GERD (gastroesophageal reflux disease), and Hiatal hernia.    MEDICATIONS:    Current Outpatient Medications:     ALPRAZolam (XANAX) 0.5 MG tablet, Take 1 tablet by mouth Daily As Needed for Anxiety., Disp: 20 tablet, Rfl: 0    atorvastatin (LIPITOR) 40 MG tablet, Take 1 tablet by mouth  Daily., Disp: 90 tablet, Rfl: 4    BIOTIN PO, Take  by mouth., Disp: , Rfl:     Calcium Carbonate-Vitamin D (Oscal 500 D-3) 500-5 MG-MCG tablet, Take 500 mg by mouth Every 12 (Twelve) Hours., Disp: 60 tablet, Rfl: 6    Cholecalciferol (Vitamin D3) 50 MCG (2000 UT) capsule, Take 1 capsule by mouth once daily, Disp: 100 capsule, Rfl: 3    Cyanocobalamin (Vitamin B-12) 1000 MCG sublingual tablet, Place 1 tablet under the tongue Daily., Disp: 100 each, Rfl: 4    exemestane (Aromasin) 25 MG chemo tablet, Take 1 tablet by mouth Daily., Disp: 30 tablet, Rfl: 6    glucose blood (Accu-Chek Guide) test strip, 1 each by Other route 4 (Four) Times a Day. Use as instructed, Disp: 150 each, Rfl: 11    guaiFENesin-codeine (ROMILAR-AC) 100-10 MG/5ML solution/syrup, Take 5 mL by mouth 4 (Four) Times a Day As Needed for Cough., Disp: 118 mL, Rfl: 0    ibuprofen (ADVIL,MOTRIN) 800 MG tablet, Take 1 tablet by mouth 3 (Three) Times a Day As Needed. for pain, Disp: , Rfl:     Insulin Lispro Prot & Lispro (HumaLOG Mix 75/25 KwikPen) (75-25) 100 UNIT/ML suspension pen-injector pen, Inject 55 Units under the skin into the appropriate area as directed 3 (Three) Times a Day Before Meals., Disp: 60 mL, Rfl: 6    Insulin Pen Needle (Pen Needles) 32G X 4 MM misc, 1 each 4 (Four) Times a Day. Use to inject insulin / DX E11.65, Disp: 150 each, Rfl: 5    lisinopril (PRINIVIL,ZESTRIL) 5 MG tablet, Take 1 tablet by mouth Daily., Disp: 90 tablet, Rfl: 4    metFORMIN ER (GLUCOPHAGE-XR) 500 MG 24 hr tablet, Take 2 tablets by mouth Daily., Disp: 180 tablet, Rfl: 6    multivitamin with minerals tablet tablet, Take 1 tablet by mouth Daily., Disp: , Rfl:     omeprazole (priLOSEC) 40 MG capsule, Take 1 capsule by mouth once daily, Disp: 90 capsule, Rfl: 3    ondansetron (ZOFRAN) 8 MG tablet, Take 1 tablet by mouth 3 (Three) Times a Day As Needed for Nausea or Vomiting., Disp: 30 tablet, Rfl: 5    vitamin B-6 (PYRIDOXINE) 100 MG tablet, Take 1 tablet by mouth  2 (Two) Times a Day., Disp: , Rfl:     Vitamin D, Ergocalciferol, 50 MCG (2000 UT) capsule, Take 2,000 Units by mouth Daily., Disp: 100 capsule, Rfl: 4    ALLERGIES:  No Known Allergies    PAST SURGICAL HISTORY:  she has a past surgical history that includes Breast lumpectomy (Left, 2003); Subtotal Hysterectomy (10/10/1991); Breast biopsy (Right, 08/18/2022); [place] Node Biopsy (Right, 08/18/2022); Portacath placement (N/A, 10/06/2022); and Tooth extraction.    PREVIOUS RADIOTHERAPY OR CHEMOTHERAPY:  XRT as noted; completed Cytoxan/Taxotere prior to XRT.    FAMILY HISTORY:  herfamily history includes Breast cancer in her mother; Cancer in her mother; Clotting disorder in her father; Colon cancer in her paternal grandmother; Diabetes in her brother and brother; Heart disease in her brother and mother; Stroke in her brother and mother; Thyroid disease in her mother.    SOCIAL HISTORY:  she reports that she quit smoking about 13 years ago. Her smoking use included cigarettes. She started smoking about 44 years ago. She has a 7.75 pack-year smoking history. She has been exposed to tobacco smoke. She has never used smokeless tobacco. She reports that she does not currently use alcohol. She reports that she does not use drugs.    PAIN AND PAIN MANAGEMENT:  denies pain.    NUTRITIONAL STATUS:  no issues    KPS:  100: Normal; no evidence of disease      REVIEW OF SYSTEMS:   General: No fevers, chills, weight change, or drenching night sweats. Skin: No rashes or jaundice.  HEENT: No change in vision or hearing, no headaches.  Neck: No dysphagia or masses.  Heme/Lymph: No easy bruising or bleeding.  Respiratory System: No shortness of breath or cough.  Cardiovascular: No chest pain, palpitations, or dyspnea on exertion.  - Pacemaker. GI: No nausea, vomiting, diarrhea, melena, or hematochezia.  : No dysuria or hematuria.  Endocrine: No heat or cold intolerance. Musculoskeletal: No myalgias or arthralgias.  Neuro: No  weakness, numbness, syncope, or seizures. Psych: No mood changes or depression. Ext: Denies swelling.    Objective   VITAL SIGNS:  Vitals:    09/21/23 0930   BP: 169/88   Pulse: 91   Resp: 17   Temp: 98.3 °F (36.8 °C)   SpO2: 96%       PHYSICAL EXAM:  GENERAL: In no apparent distress, sitting comfortably in room.    LYMPHATIC: No cervical, supraclavicular or axillary adenopathy appreciated bilaterally.   CARDIOVASCULAR: S1 & S2 detected; no murmurs, rubs or gallops.  CHEST: Clear to auscultation bilaterally; no wheezes, crackles or rubs. Work of breathing normal.  MUSCULOSKELETAL: Normal range of motion.  EXTREMITIES: No lymphedema.  SKIN: No erythema, rashes, ulcerations noted.   BREASTS: cNED.    PERTINENT IMAGING/PATHOLOGY/LABS (Medical Decision Making):     COORDINATION OF CARE: A copy of this note is sent to the referring provider.    PATHOLOGY (Reviewed):     IMAGING (Reviewed):     LABS (Reviewed):  HEMATOLOGY:  WBC   Date Value Ref Range Status   06/30/2023 9.01 3.40 - 10.80 10*3/mm3 Final   08/26/2020 11.0 (H) 3.4 - 10.8 x10E3/uL Final     RBC   Date Value Ref Range Status   06/30/2023 4.32 3.77 - 5.28 10*6/mm3 Final   08/26/2020 4.53 3.77 - 5.28 x10E6/uL Final     Hemoglobin   Date Value Ref Range Status   06/30/2023 12.3 12.0 - 15.9 g/dL Final     Hematocrit   Date Value Ref Range Status   06/30/2023 38.0 34.0 - 46.6 % Final     Platelets   Date Value Ref Range Status   06/30/2023 324 140 - 450 10*3/mm3 Final     CHEMISTRY:  Lab Results   Component Value Date    GLUCOSE 150 (H) 06/30/2023    BUN 11 06/30/2023    CREATININE 0.67 06/30/2023    EGFRIFNONA 90 12/01/2021    EGFRIFAFRI 115 08/26/2020    BCR 16.4 06/30/2023    K 4.0 06/30/2023    CO2 28.0 06/30/2023    CALCIUM 9.7 06/30/2023    PROTENTOTREF 7.0 08/26/2020    ALBUMIN 4.2 06/30/2023    LABIL2 1.8 08/26/2020    AST 19 06/30/2023    ALT 25 06/30/2023     Assessment & Plan   ASSESSMENT AND PLAN:    Right breast cancer  -Doing ok on Aromasin  -cNED by  exam and imaging  -FU here in 1 year, if continuing to do well will likely sign off as patient follows with med/onc closely.    This assessment comes from my review of the imaging, pathology, physician notes and other pertinent information as mentioned.      TIME SPENT WITH PATIENT:   I spent 22 minutes caring for Alaina on this date of service. This time includes time spent by me in the following activities: preparing for the visit, reviewing tests, obtaining and/or reviewing a separately obtained history, performing a medically appropriate examination and/or evaluation, counseling and educating the patient/family/caregiver, documenting information in the medical record, and independently interpreting results and communicating that information with the patient/family/caregiver    CC: Lianet Rust MD    Approved by:     Jagdeep De Los Santos MD

## 2023-09-20 ENCOUNTER — HOSPITAL ENCOUNTER (OUTPATIENT)
Dept: RADIATION ONCOLOGY | Facility: HOSPITAL | Age: 58
Setting detail: RADIATION/ONCOLOGY SERIES
End: 2023-09-20
Payer: COMMERCIAL

## 2023-09-20 ENCOUNTER — TREATMENT (OUTPATIENT)
Dept: PHYSICAL THERAPY | Facility: CLINIC | Age: 58
End: 2023-09-20
Payer: COMMERCIAL

## 2023-09-20 DIAGNOSIS — R53.1 FUNCTIONAL WEAKNESS: ICD-10-CM

## 2023-09-20 DIAGNOSIS — M54.41 CHRONIC BILATERAL LOW BACK PAIN WITH RIGHT-SIDED SCIATICA: ICD-10-CM

## 2023-09-20 DIAGNOSIS — Z85.3 HISTORY OF BREAST CANCER IN ADULTHOOD: ICD-10-CM

## 2023-09-20 DIAGNOSIS — M25.562 CHRONIC PAIN OF LEFT KNEE: ICD-10-CM

## 2023-09-20 DIAGNOSIS — R53.1 GENERALIZED WEAKNESS: ICD-10-CM

## 2023-09-20 DIAGNOSIS — G89.29 CHRONIC PAIN OF LEFT KNEE: ICD-10-CM

## 2023-09-20 DIAGNOSIS — R26.81 UNSTEADY GAIT: Primary | ICD-10-CM

## 2023-09-20 DIAGNOSIS — G89.29 CHRONIC BILATERAL LOW BACK PAIN WITH RIGHT-SIDED SCIATICA: ICD-10-CM

## 2023-09-20 DIAGNOSIS — G89.29 CHRONIC PAIN OF RIGHT KNEE: ICD-10-CM

## 2023-09-20 DIAGNOSIS — M25.561 CHRONIC PAIN OF RIGHT KNEE: ICD-10-CM

## 2023-09-20 NOTE — PROGRESS NOTES
Hematology/Oncology Outpatient Follow Up    PATIENT NAME:Alaina Hartman  :1965  MRN: [id number]  PRIMARY CARE PHYSICIAN: Lori Fields DO  REFERRING PHYSICIAN: Lori Fields DO    Chief Complaint   Patient presents with    Follow-up     3 month follow up  Malignant neoplasm of central portion of right breast in female, estrogen receptor positive        HISTORY OF PRESENT ILLNESS:     This is a 57-year-old female who was clinically asymptomatic and had a routine screening mammogram which showed an abnormality on the right breast.  Prior to that patient had left breast biopsy in  for benign disease.     Review of her screening mammogram which was performed on 6/3/2022 showed a new 2 cm focal asymmetry with architectural distortion in the mid to posterior third depth of the outer right breast the left breast was benign.  Right diagnostic mammogram and ultrasound was recommended.  On 2022 patient underwent right diagnostic mammogram and ultrasound which showed a 2 cm irregular mass in the lateral inferior breast.  Same day she had an ultrasound which showed a 1.6 cm on the right breast the right axilla did not show any abnormal lymph nodes.     Patient then underwent ultrasound-guided biopsy of the right breast mass pathology revealed moderately differentiated invasive ductal carcinoma estrogen receptor positive at 95% progesterone receptor positive at 50%, HER2/bertha was negative at 1+ on immunohistochemistry.        Patient had a partial hysterectomy many years ago.  She is  and has 3 children.  She does not smoke, does not drink alcohol     Family history significant for mother with breast cancer in her 50s, her father had skin cancers, paternal grandmother had colon cancer at the age of 70        She is a       2022 estradiol level was less than 5 and FSH was 33 consistent with postmenopausal state  2022 patient underwent right lumpectomy with sentinel lymph node  biopsy.  Pathology revealed residual moderately differentiated ductal carcinoma measuring 1.7 cm completely excised total of 5 lymph nodes were removed and all of them were negative for metastatic disease.  Pathology stage is pT1 cpN0 M0.  There was no evidence of DCIS all margins were negative distance to the closest margin was anterior margin at 2 mm ER positive at 95, NC positive at 50% and HER2/bertha was negative.  9/27/2022 patient had Oncotype DX assay done which returned with a recurrence score of 28.  This is consistent with 17% risk of recurrence at 9 years distantly, and more than 15% chemotherapy benefit.  On the validation assay estrogen receptor was positive, NC was positive and HER2/bertha was negative.  10/27/2022: C1 D1 Taxotere and Cytoxan received.  Neulasta support held due to leukocytosis.  11/4/2022: WBC 2.28, hemoglobin 11.9, platelets 297,000, ANC 0.08.  Patient was initiated on Neupogen 480 mg subcu given 11/4/2022, 11/5/2022, 11/6/2022.  Patient to receive Neulasta again with next cycle of treatment.  11/17/2022: Patient received cycle 2 of Taxotere Cytoxan with Neulasta support  12/8/2022: Patient received cycle 3 of Taxotere and Cytoxan with Neulasta support  12/29/2020 patient received cycle 4 of combination chemotherapy with Cytoxan and Taxotere  Patient completed adjuvant breast radiation February 2023 6/30/2023 bilateral diagnostic mammogram with no mammographic findings to indicate right or left breast malignancy      Past Medical History:   Diagnosis Date    Ankle edema     at times    Breast cancer, right 08/2022    GERD (gastroesophageal reflux disease)     Hiatal hernia        Past Surgical History:   Procedure Laterality Date    BREAST BIOPSY Right 08/18/2022    Procedure: Right breast lumpectomy;  Surgeon: Jagdeep Mcwilliams MD;  Location: [facility] MAIN OR;  Service: General;  Laterality: Right;    BREAST LUMPECTOMY Left 2003    x2    PORTACATH PLACEMENT N/A 10/06/2022     Procedure: INSERTION OF PORTACATH;  Surgeon: Jagdeep Mcwilliams MD;  Location: [facility] MAIN OR;  Service: General;  Laterality: N/A;    SENTINEL NODE BIOPSY Right 08/18/2022    Procedure: SENTINEL NODE BIOPSY;  Surgeon: Jagdeep Mcwilliams MD;  Location: [facility] MAIN OR;  Service: General;  Laterality: Right;    SUBTOTAL HYSTERECTOMY  10/10/1991    had it done for bleeding. Ovaries still present    TOOTH EXTRACTION      has had all teeth (but one) removed         Current Outpatient Medications:     ALPRAZolam (XANAX) 0.5 MG tablet, Take 1 tablet by mouth Daily As Needed for Anxiety., Disp: 20 tablet, Rfl: 0    atorvastatin (LIPITOR) 40 MG tablet, Take 1 tablet by mouth Daily., Disp: 90 tablet, Rfl: 4    BIOTIN PO, Take  by mouth., Disp: , Rfl:     Calcium Carbonate-Vitamin D (Oscal 500 D-3) 500-5 MG-MCG tablet, Take 500 mg by mouth Every 12 (Twelve) Hours., Disp: 60 tablet, Rfl: 6    Cholecalciferol (Vitamin D3) 50 MCG (2000 UT) capsule, Take 1 capsule by mouth once daily, Disp: 100 capsule, Rfl: 3    Cyanocobalamin (Vitamin B-12) 1000 MCG sublingual tablet, Place 1 tablet under the tongue Daily., Disp: 100 each, Rfl: 4    exemestane (Aromasin) 25 MG tablet, Take 1 tablet by mouth Daily., Disp: 90 tablet, Rfl: 2    glucose blood (Accu-Chek Guide) test strip, 1 each by Other route 4 (Four) Times a Day. Use as instructed, Disp: 150 each, Rfl: 11    guaiFENesin-codeine (ROMILAR-AC) 100-10 MG/5ML solution/syrup, Take 5 mL by mouth 4 (Four) Times a Day As Needed for Cough., Disp: 118 mL, Rfl: 0    ibuprofen (ADVIL,MOTRIN) 800 MG tablet, Take 1 tablet by mouth 3 (Three) Times a Day As Needed. for pain, Disp: , Rfl:     Insulin Lispro Prot & Lispro (HumaLOG Mix 75/25 KwikPen) (75-25) 100 UNIT/ML suspension pen-injector pen, Inject 55 Units under the skin into the appropriate area as directed 3 (Three) Times a Day Before Meals., Disp: 60 mL, Rfl: 6    Insulin Pen Needle (Pen Needles) 32G X 4 MM misc, 1 each 4  (Four) Times a Day. Use to inject insulin / DX E11.65, Disp: 150 each, Rfl: 5    lisinopril (PRINIVIL,ZESTRIL) 5 MG tablet, Take 1 tablet by mouth Daily., Disp: 90 tablet, Rfl: 4    metFORMIN ER (GLUCOPHAGE-XR) 500 MG 24 hr tablet, Take 2 tablets by mouth Daily., Disp: 180 tablet, Rfl: 6    multivitamin with minerals tablet tablet, Take 1 tablet by mouth Daily., Disp: , Rfl:     omeprazole (priLOSEC) 40 MG capsule, Take 1 capsule by mouth once daily, Disp: 90 capsule, Rfl: 3    ondansetron (ZOFRAN) 8 MG tablet, Take 1 tablet by mouth 3 (Three) Times a Day As Needed for Nausea or Vomiting., Disp: 30 tablet, Rfl: 5    vitamin B-6 (PYRIDOXINE) 100 MG tablet, Take 1 tablet by mouth 2 (Two) Times a Day., Disp: , Rfl:     Vitamin D, Ergocalciferol, 50 MCG (2000 UT) capsule, Take 2,000 Units by mouth Daily., Disp: 100 capsule, Rfl: 4    No Known Allergies    Family History   Problem Relation Age of Onset    Breast cancer Mother     Heart disease Mother     Thyroid disease Mother     Stroke Mother     Cancer Mother         lymphoma    Clotting disorder Father     Diabetes Brother     Heart disease Brother     Stroke Brother     Diabetes Brother     Colon cancer Paternal Grandmother        Cancer-related family history includes Breast cancer in her mother; Cancer in her mother; Colon cancer in her paternal grandmother.    Social History     Tobacco Use    Smoking status: Former     Packs/day: 0.25     Years: 31.00     Pack years: 7.75     Types: Cigarettes     Start date:      Quit date: 2010     Years since quittin.7     Passive exposure: Past    Smokeless tobacco: Never    Tobacco comments:     Started smoking about age 14   Vaping Use    Vaping Use: Never used   Substance Use Topics    Alcohol use: Not Currently     Comment: rare    Drug use: Never        I have reviewed and confirmed the accuracy of the patient's history: Chief complaint, HPI, ROS, and Subjective as entered by the MA/LPN/RN. Valentina Diehl  Yadiel, APRN 09/21/23        SUBJECTIVE:  Alaina is here today for her routine 3-month follow-up.  She is accompanied at the visit by her mother-in-law.  She also saw a radiation oncology today.  She reports that she is doing well.  She is compliant with her Aromasin.  She does not have side effects that are affecting her quality of life.  She did have continued neuropathy in the fingers and feet after chemotherapy but she states that this has been improving and is also not affecting function.  She is compliant with calcium and vitamin D supplementation while on AI but was unable to take bisphosphonate therapy due to dental disease per her dentist recommendation.  She has been getting up-to-date on her routine preventative care with her PCP and plans to receive some immunizations soon.  She denies any difficulty with lymphedema of her surgical arm.          REVIEW OF SYSTEMS:    Review of Systems   Constitutional:  Positive for fatigue. Negative for chills and fever.   HENT:  Negative for congestion, drooling, ear discharge, rhinorrhea, sinus pressure and tinnitus.    Eyes:  Negative for photophobia, pain and discharge.   Respiratory:  Negative for apnea, choking and stridor.    Cardiovascular:  Positive for leg swelling. Negative for palpitations.   Gastrointestinal:  Negative for abdominal distention, abdominal pain and anal bleeding.   Endocrine: Negative for polydipsia and polyphagia.   Genitourinary:  Negative for decreased urine volume, flank pain and genital sores.   Musculoskeletal:  Positive for arthralgias. Negative for gait problem, neck pain and neck stiffness.   Skin:  Negative for color change, rash and wound.   Neurological:  Negative for tremors, seizures, syncope, facial asymmetry and speech difficulty.   Hematological:  Negative for adenopathy.   Psychiatric/Behavioral:  Negative for agitation, confusion, hallucinations and self-injury. The patient is not hyperactive.      OBJECTIVE:    Vitals:  "   09/21/23 1017   BP: 138/86   Pulse: 83   Temp: 98.7 °F (37.1 °C)   TempSrc: Oral   SpO2: 95%   Weight: 118 kg (259 lb 9.6 oz)   Height: 161.3 cm (63.5\")   PainSc: 0-No pain     Body mass index is 45.26 kg/m².    ECOG    (0) Fully active, able to carry on all predisease performance without restriction    Physical Exam  Vitals and nursing note reviewed.   Constitutional:       General: She is not in acute distress.     Appearance: She is not diaphoretic.   HENT:      Head: Normocephalic and atraumatic.   Eyes:      General: No scleral icterus.        Right eye: No discharge.         Left eye: No discharge.      Conjunctiva/sclera: Conjunctivae normal.   Neck:      Thyroid: No thyromegaly.   Cardiovascular:      Rate and Rhythm: Normal rate and regular rhythm.      Heart sounds: Normal heart sounds.     No friction rub. No gallop.   Pulmonary:      Effort: Pulmonary effort is normal. No respiratory distress.      Breath sounds: No stridor. No wheezing.   Abdominal:      Palpations: Abdomen is soft. There is no mass.      Tenderness: There is no abdominal tenderness. There is no guarding or rebound.   Musculoskeletal:         General: No tenderness. Normal range of motion.      Cervical back: Normal range of motion and neck supple.   Lymphadenopathy:      Cervical: No cervical adenopathy.   Skin:     General: Skin is warm.      Findings: No erythema or rash.   Neurological:      Mental Status: She is alert and oriented to person, place, and time.      Motor: No abnormal muscle tone.   Psychiatric:         Mood and Affect: Mood normal.         Behavior: Behavior normal.   Patient had clinical breast exam performed today with her radiation oncologist  I have reexamined the patient and the results are consistent with the previously documented exam. ADRIANNA Paz      RECENT LABS    WBC   Date Value Ref Range Status   09/21/2023 9.86 3.40 - 10.80 10*3/mm3 Final   08/26/2020 11.0 (H) 3.4 - 10.8 x10E3/uL Final "     RBC   Date Value Ref Range Status   09/21/2023 4.48 3.77 - 5.28 10*6/mm3 Final   08/26/2020 4.53 3.77 - 5.28 x10E6/uL Final     Hemoglobin   Date Value Ref Range Status   09/21/2023 13.2 12.0 - 15.9 g/dL Final     Hematocrit   Date Value Ref Range Status   09/21/2023 39.4 34.0 - 46.6 % Final     MCV   Date Value Ref Range Status   09/21/2023 87.9 79.0 - 97.0 fL Final     MCH   Date Value Ref Range Status   09/21/2023 29.5 26.6 - 33.0 pg Final     MCHC   Date Value Ref Range Status   09/21/2023 33.5 31.5 - 35.7 g/dL Final     RDW   Date Value Ref Range Status   09/21/2023 14.7 12.3 - 15.4 % Final     RDW-SD   Date Value Ref Range Status   09/21/2023 46.1 37.0 - 54.0 fl Final     MPV   Date Value Ref Range Status   09/21/2023 9.8 6.0 - 12.0 fL Final     Platelets   Date Value Ref Range Status   09/21/2023 323 140 - 450 10*3/mm3 Final     Neutrophil %   Date Value Ref Range Status   09/21/2023 61.2 42.7 - 76.0 % Final     Lymphocyte %   Date Value Ref Range Status   09/21/2023 30.7 19.6 - 45.3 % Final     Monocyte %   Date Value Ref Range Status   09/21/2023 5.8 5.0 - 12.0 % Final     Eosinophil %   Date Value Ref Range Status   09/21/2023 1.7 0.3 - 6.2 % Final     Basophil %   Date Value Ref Range Status   09/21/2023 0.6 0.0 - 1.5 % Final     Immature Grans %   Date Value Ref Range Status   08/09/2022 0.5 0.0 - 0.5 % Final     Neutrophils, Absolute   Date Value Ref Range Status   09/21/2023 6.03 1.70 - 7.00 10*3/mm3 Final     Lymphocytes, Absolute   Date Value Ref Range Status   09/21/2023 3.03 0.70 - 3.10 10*3/mm3 Final     Monocytes, Absolute   Date Value Ref Range Status   09/21/2023 0.57 0.10 - 0.90 10*3/mm3 Final     Eosinophils, Absolute   Date Value Ref Range Status   09/21/2023 0.17 0.00 - 0.40 10*3/mm3 Final     Basophils, Absolute   Date Value Ref Range Status   09/21/2023 0.06 0.00 - 0.20 10*3/mm3 Final     Immature Grans, Absolute   Date Value Ref Range Status   08/09/2022 0.05 0.00 - 0.05 10*3/mm3  Final     [facility]   Date Value Ref Range Status   08/09/2022 0.0 0.0 - 0.2 /100 WBC Final       Lab Results   Component Value Date    GLUCOSE 150 (H) 06/30/2023    BUN 11 06/30/2023    CREATININE 0.67 06/30/2023    EGFRIFNONA 90 12/01/2021    EGFRIFAFRI 115 08/26/2020    BCR 16.4 06/30/2023    K 4.0 06/30/2023    CO2 28.0 06/30/2023    CALCIUM 9.7 06/30/2023    PROTENTOTREF 7.0 08/26/2020    ALBUMIN 4.2 06/30/2023    LABIL2 1.8 08/26/2020    AST 19 06/30/2023    ALT 25 06/30/2023         Assessment & Plan     Malignant neoplasm of central portion of right breast in female, estrogen receptor positive  - CBC & Differential  - Comprehensive Metabolic Panel  - exemestane (Aromasin) 25 MG tablet      ASSESSMENT:      Moderately differentiated invasive ductal carcinoma of the right breast.  Status post right lumpectomy with sentinel lymph node biopsy.  pT1 cpN0 M0.  ER positive at 95%, WV positive at 50% and HER2/bertha was negative.  T1 cN0 M0.  Ongoing management  Oncotype DX assay with a high recurrence score at 28, consistent with 17% risk of distant recurrence at 9 years with tamoxifen alone and group absolute chemotherapy benefit of more than 15%  Diagnosis post adjuvant chemotherapy with Cytoxan and Taxotere.  She has received 4 out of 4 cycles completed on December 29, 2022.  Reviewed  Status post right breast radiation completed February 2023  Radiation-induced dermatitis: Resolved  On Aromasin 25 mg p.o. daily initiated March 2023.  Patient will continue  Status post partial hysterectomy   Postmenopausal state following lab confirmation  Family history of breast cancer in her mother at the age of 50, paternal grandmother with colon cancer at 17 and her father had skin cancer.  Assessment has been reviewed and updated.    Chemotherapy-induced neutropenia.  Patient is afebrile.     Discussion     Reviewed the results of her Oncotype DX assay which came back with a high recurrence score at 28.  She has some up to 15%  benefit with chemotherapy.  She also has a risk of relapse of 17% despite adjuvant hormonal treatment.  For these reasons chemotherapy have been recommended.  Patient was reluctant about pursuing adjuvant chemotherapy due to concerns of side effects.  Discussed that it is important for her to receive chemotherapy given the high Oncotype DX assay report.  She has comorbidities but she is relatively healthy and remains very functional.  Patient is willing to take Taxotere and Cytoxan.    Taxotere and Cytoxan every 21 days for 4 cycles.    This will be followed by adjuvant radiation and then adjuvant endocrine therapy     She is a candidate for aromatase inhibitor.  I recommended Aromasin 25 mg p.o. daily    Discussed the benefits and the side effects in detail with patient  Side effects of aromatase inhibitors include risk of musculoskeletal side effect including arthralgia, myalgia, especially in patients who have underlying musculoskeletal disease such as osteoarthritis, hot flashes, mood changes. There is risk of vaginal dryness, dyspareunia and other sexual dysfunction. Other side effects include degree of cognitive symptoms compared to women not on endocrine therapy. There is possibility of fatigue, forgetfulness, poor sleep hygiene, osteoporosis, osteopenia, risk of fractures, cardiovascular disease and hypercholestolemia. There is also possibility of reactivation of ovarian function especially in women who were premenopausal at time of diagnosis and became amenorrheic while on chemotherapy. The duration of treatment is also for minimum of five years and possibly extending therapy in some women with higher risk features beyond five years. We also discussed that the AI have similar efficacy in the adjuvant setting. Therefore, no one AI is preferred over another. I will obtain a bone density at baseline if none has been done and every two years after that. If there is evidence of osteopenia or osteoporosis, there  will be recommendation for bisphosphonate therapy for the duration of aromatase inhibitor therapy and possibly longer depending on bone health status at completion of therapy. We also discussed that labs will be done with follow ups and lipid panel will be done on an annual basis.         Plans:     CBC reviewed today with patient  CMP ordered  Continue Aromasin 25 mg p.o. daily.  Refills sent today in a 90-day supply  Continue calcium with vitamin D 600 mg twice a day  Continue post radiation care per routine  Per patient her dentist advised against Zometa due to dental disease  Bone density will be due again 1/31/2025   Reviewed bilateral diagnostic mammogram from June 2023.  Will be due again June 2024.  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  Gave information on cancer genetics for her to review.  Patient to let me know when she is ready to proceed  All questions answered  Follow-up 3 months with Dr. Rust or sooner if needed       Patient verbalized understanding and is in agreement of the above plan.    I spent 30 total minutes, face-to-face, caring for Alaina today. 90% of this time involved counseling and/or coordination of care as documented within this note.

## 2023-09-20 NOTE — PROGRESS NOTES
Physical Therapy Daily Treatment Note  [address] [facility], [address], [place]  [address]    Patient: Alaina Hartman   : 1965  Diagnosis/ICD-10 Code:  Unsteady gait [R26.81]   Problems Addressed this Visit    None  Visit Diagnoses       Unsteady gait    -  Primary    Generalized weakness        Chronic bilateral low back pain with right-sided sciatica        Functional weakness        History of breast cancer in adulthood        Chronic pain of left knee        Chronic pain of right knee              Diagnoses         Codes Comments    Unsteady gait    -  Primary ICD-10-CM: R26.81  ICD-9-CM: 781.2     Generalized weakness     ICD-10-CM: R53.1  ICD-9-CM: 780.79     Chronic bilateral low back pain with right-sided sciatica     ICD-10-CM: M54.41, G89.29  ICD-9-CM: 724.2, 724.3, 338.29     Functional weakness     ICD-10-CM: R53.1  ICD-9-CM: 780.79     History of breast cancer in adulthood     ICD-10-CM: Z85.3  ICD-9-CM: V10.3     Chronic pain of left knee     ICD-10-CM: M25.562, G89.29  ICD-9-CM: 719.46, 338.29     Chronic pain of right knee     ICD-10-CM: M25.561, G89.29  ICD-9-CM: 719.46, 338.29           Referring practitioner: Lori Fields DO  Date of Initial Visit: Type: THERAPY  Noted: 2023  Today's Date: 2023    VISIT#: 10    Subjective   Alaina reports she would like to DC today per last session's discussion.     Objective     See Exercise, Manual, and Modality Logs for complete treatment.     Assessment/Plan  Pt will be DC - see PN 23    Plan Goals: STG to be achieved in 3 wks  1. Pt will be safe, independent, and compliant with HEP to optimize recovery and self management of symptoms.  2. Pt will improve 30s STS from 6 to at least 9 for rising from restaurant chairs & toilet. - MET 9 on 23; 7 on 23  3. Pt will improve Tinetti score from 1528 to at least 1928 to reflect improvement from high fall risk to moderate.- MET 27 on 23    LTG to be achieved in 12 wks  1. Pt  will improve 30s STS to at least 14 for rising from restaurant chairs & toilet. - NOT MET 7 on 7/19/23  2. Pt will improve hip abd strength from 3/5 to at least 4/5 for getting out of bed without difficulty. - MET  3. Pt will improve Tinetti score from 15/28 to at least 24/28 to reflect improvement from high fall risk to low.- MET  4. Pt will demonstrate single leg balance for at least 5s to promote stair climbing safely. - NOT MET L2s, R 3s  5. Pt will ascend/descend 13 steps with item in hand safely & reciprocally for carrying laundry down/upstairs. - MET  6. Pt will improve LEFS from 42/80 (53% function) to at least 60/80 (75% function).          - MET 71/80 on 9/13/23; 57% F on 7/19/23            Timed:         Manual Therapy:         mins  27168;     Therapeutic Exercise:    12     mins  77376;     Neuromuscular Larisa:    30    mins  64245;    Therapeutic Activity:          mins  87152;     Gait Training:           mins  33025;     Ultrasound:          mins  49210;    Ionto                                   mins   74238  Self Care                            mins   19086  Canalith Repos                   mins  64653  Tests & Measures              mins   30390      Un-Timed:  Electrical Stimulation:         mins  02453 ( );  Dry Needling          mins 60808/94206  Traction          mins 80080  Low Eval          Mins  48127  Mod Eval          Mins  75364  High Eval                            Mins  29485  Re-Eval                               mins  95529    Timed Treatment:   42   mins   Total Treatment:     42   mins    Roxie Pop, PT, DPT, cert. DN  Physical Therapist  [place] Lic # [id number]

## 2023-09-21 ENCOUNTER — APPOINTMENT (OUTPATIENT)
Dept: LAB | Facility: HOSPITAL | Age: 58
End: 2023-09-21
Payer: COMMERCIAL

## 2023-09-21 ENCOUNTER — OFFICE VISIT (OUTPATIENT)
Dept: RADIATION ONCOLOGY | Facility: HOSPITAL | Age: 58
End: 2023-09-21
Payer: COMMERCIAL

## 2023-09-21 ENCOUNTER — OFFICE VISIT (OUTPATIENT)
Dept: ONCOLOGY | Facility: CLINIC | Age: 58
End: 2023-09-21
Payer: COMMERCIAL

## 2023-09-21 VITALS
OXYGEN SATURATION: 96 % | TEMPERATURE: 98.3 F | DIASTOLIC BLOOD PRESSURE: 88 MMHG | RESPIRATION RATE: 17 BRPM | HEART RATE: 91 BPM | WEIGHT: 260.4 LBS | HEIGHT: 64 IN | BODY MASS INDEX: 44.46 KG/M2 | SYSTOLIC BLOOD PRESSURE: 169 MMHG

## 2023-09-21 VITALS
HEART RATE: 83 BPM | OXYGEN SATURATION: 95 % | HEIGHT: 64 IN | BODY MASS INDEX: 44.32 KG/M2 | SYSTOLIC BLOOD PRESSURE: 138 MMHG | TEMPERATURE: 98.7 F | DIASTOLIC BLOOD PRESSURE: 86 MMHG | WEIGHT: 259.6 LBS

## 2023-09-21 DIAGNOSIS — Z17.0 MALIGNANT NEOPLASM OF CENTRAL PORTION OF RIGHT BREAST IN FEMALE, ESTROGEN RECEPTOR POSITIVE: Primary | ICD-10-CM

## 2023-09-21 DIAGNOSIS — C50.111 MALIGNANT NEOPLASM OF CENTRAL PORTION OF RIGHT BREAST IN FEMALE, ESTROGEN RECEPTOR POSITIVE: Primary | ICD-10-CM

## 2023-09-21 LAB
ALBUMIN SERPL-MCNC: 4.4 G/DL (ref 3.5–5.2)
ALBUMIN/GLOB SERPL: 1.7 G/DL
ALP SERPL-CCNC: 149 U/L (ref 39–117)
ALT SERPL W P-5'-P-CCNC: 31 U/L (ref 1–33)
ANION GAP SERPL CALCULATED.3IONS-SCNC: 10 MMOL/L (ref 5–15)
AST SERPL-CCNC: 23 U/L (ref 1–32)
BASOPHILS # BLD AUTO: 0.06 10*3/MM3 (ref 0–0.2)
BASOPHILS NFR BLD AUTO: 0.6 % (ref 0–1.5)
BILIRUB SERPL-MCNC: 0.5 MG/DL (ref 0–1.2)
BUN SERPL-MCNC: 13 MG/DL (ref 6–20)
BUN/CREAT SERPL: 18.6 (ref 7–25)
CALCIUM SPEC-SCNC: 10 MG/DL (ref 8.6–10.5)
CHLORIDE SERPL-SCNC: 101 MMOL/L (ref 98–107)
CO2 SERPL-SCNC: 27 MMOL/L (ref 22–29)
CREAT SERPL-MCNC: 0.7 MG/DL (ref 0.57–1)
DEPRECATED RDW RBC AUTO: 46.1 FL (ref 37–54)
EGFRCR SERPLBLD CKD-EPI 2021: 102.3 ML/MIN/1.73
EOSINOPHIL # BLD AUTO: 0.17 10*3/MM3 (ref 0–0.4)
EOSINOPHIL NFR BLD AUTO: 1.7 % (ref 0.3–6.2)
ERYTHROCYTE [DISTWIDTH] IN BLOOD BY AUTOMATED COUNT: 14.7 % (ref 12.3–15.4)
GLOBULIN UR ELPH-MCNC: 2.6 GM/DL
GLUCOSE SERPL-MCNC: 245 MG/DL (ref 65–99)
HCT VFR BLD AUTO: 39.4 % (ref 34–46.6)
HGB BLD-MCNC: 13.2 G/DL (ref 12–15.9)
HOLD SPECIMEN: NORMAL
HOLD SPECIMEN: NORMAL
LYMPHOCYTES # BLD AUTO: 3.03 10*3/MM3 (ref 0.7–3.1)
LYMPHOCYTES NFR BLD AUTO: 30.7 % (ref 19.6–45.3)
MCH RBC QN AUTO: 29.5 PG (ref 26.6–33)
MCHC RBC AUTO-ENTMCNC: 33.5 G/DL (ref 31.5–35.7)
MCV RBC AUTO: 87.9 FL (ref 79–97)
MONOCYTES # BLD AUTO: 0.57 10*3/MM3 (ref 0.1–0.9)
MONOCYTES NFR BLD AUTO: 5.8 % (ref 5–12)
NEUTROPHILS NFR BLD AUTO: 6.03 10*3/MM3 (ref 1.7–7)
NEUTROPHILS NFR BLD AUTO: 61.2 % (ref 42.7–76)
PLATELET # BLD AUTO: 323 10*3/MM3 (ref 140–450)
PMV BLD AUTO: 9.8 FL (ref 6–12)
POTASSIUM SERPL-SCNC: 4.3 MMOL/L (ref 3.5–5.2)
PROT SERPL-MCNC: 7 G/DL (ref 6–8.5)
RBC # BLD AUTO: 4.48 10*6/MM3 (ref 3.77–5.28)
SODIUM SERPL-SCNC: 138 MMOL/L (ref 136–145)
WBC NRBC COR # BLD: 9.86 10*3/MM3 (ref 3.4–10.8)

## 2023-09-21 PROCEDURE — 36415 COLL VENOUS BLD VENIPUNCTURE: CPT

## 2023-09-21 PROCEDURE — 85025 COMPLETE CBC W/AUTO DIFF WBC: CPT | Performed by: NURSE PRACTITIONER

## 2023-09-21 PROCEDURE — G0463 HOSPITAL OUTPT CLINIC VISIT: HCPCS | Performed by: RADIOLOGY

## 2023-09-21 PROCEDURE — 80053 COMPREHEN METABOLIC PANEL: CPT | Performed by: NURSE PRACTITIONER

## 2023-09-21 RX ORDER — EXEMESTANE 25 MG/1
25 TABLET ORAL DAILY
Qty: 90 TABLET | Refills: 2 | Status: SHIPPED | OUTPATIENT
Start: 2023-09-21

## 2023-10-03 ENCOUNTER — HOSPITAL ENCOUNTER (OUTPATIENT)
Dept: CARDIOLOGY | Facility: HOSPITAL | Age: 58
Discharge: HOME | End: 2023-10-03
Payer: COMMERCIAL

## 2023-10-03 ENCOUNTER — TELEPHONE (OUTPATIENT)
Dept: FAMILY MEDICINE CLINIC | Facility: CLINIC | Age: 58
End: 2023-10-03
Payer: COMMERCIAL

## 2023-10-03 ENCOUNTER — LAB (OUTPATIENT)
Dept: LAB | Facility: HOSPITAL | Age: 58
End: 2023-10-03
Payer: COMMERCIAL

## 2023-10-03 DIAGNOSIS — Z79.4 TYPE 2 DIABETES MELLITUS WITH HYPERGLYCEMIA, WITH LONG-TERM CURRENT USE OF INSULIN: ICD-10-CM

## 2023-10-03 DIAGNOSIS — I25.10 CORONARY ARTERY DISEASE INVOLVING NATIVE CORONARY ARTERY OF NATIVE HEART WITHOUT ANGINA PECTORIS: Primary | ICD-10-CM

## 2023-10-03 DIAGNOSIS — E11.65 TYPE 2 DIABETES MELLITUS WITH HYPERGLYCEMIA, WITH LONG-TERM CURRENT USE OF INSULIN: ICD-10-CM

## 2023-10-03 LAB
ALBUMIN SERPL-MCNC: 4.4 G/DL (ref 3.5–5.2)
ALBUMIN UR-MCNC: <1.2 MG/DL
ALBUMIN/GLOB SERPL: 1.7 G/DL
ALP SERPL-CCNC: 136 U/L (ref 39–117)
ALT SERPL W P-5'-P-CCNC: 30 U/L (ref 1–33)
ANION GAP SERPL CALCULATED.3IONS-SCNC: 11.7 MMOL/L (ref 5–15)
AST SERPL-CCNC: 26 U/L (ref 1–32)
BILIRUB SERPL-MCNC: 0.5 MG/DL (ref 0–1.2)
BUN SERPL-MCNC: 10 MG/DL (ref 6–20)
BUN/CREAT SERPL: 15.9 (ref 7–25)
CALCIUM SPEC-SCNC: 9.8 MG/DL (ref 8.6–10.5)
CHLORIDE SERPL-SCNC: 104 MMOL/L (ref 98–107)
CHOLEST SERPL-MCNC: 143 MG/DL (ref 0–200)
CO2 SERPL-SCNC: 26.3 MMOL/L (ref 22–29)
CREAT SERPL-MCNC: 0.63 MG/DL (ref 0.57–1)
CREAT UR-MCNC: 31.2 MG/DL
EGFRCR SERPLBLD CKD-EPI 2021: 104.9 ML/MIN/1.73
GLOBULIN UR ELPH-MCNC: 2.6 GM/DL
GLUCOSE SERPL-MCNC: 153 MG/DL (ref 65–99)
HBA1C MFR BLD: 8.4 % (ref 4.8–5.6)
HDLC SERPL-MCNC: 37 MG/DL (ref 40–60)
LDLC SERPL CALC-MCNC: 81 MG/DL (ref 0–100)
LDLC/HDLC SERPL: 2.11 {RATIO}
MICROALBUMIN/CREAT UR: NORMAL MG/G{CREAT}
POTASSIUM SERPL-SCNC: 4.3 MMOL/L (ref 3.5–5.2)
PROT SERPL-MCNC: 7 G/DL (ref 6–8.5)
SODIUM SERPL-SCNC: 142 MMOL/L (ref 136–145)
TRIGL SERPL-MCNC: 139 MG/DL (ref 0–150)
VLDLC SERPL-MCNC: 25 MG/DL (ref 5–40)

## 2023-10-03 PROCEDURE — 82570 ASSAY OF URINE CREATININE: CPT

## 2023-10-03 PROCEDURE — 80053 COMPREHEN METABOLIC PANEL: CPT

## 2023-10-03 PROCEDURE — 36415 COLL VENOUS BLD VENIPUNCTURE: CPT

## 2023-10-03 PROCEDURE — 80061 LIPID PANEL: CPT

## 2023-10-03 PROCEDURE — 82043 UR ALBUMIN QUANTITATIVE: CPT

## 2023-10-03 PROCEDURE — 83036 HEMOGLOBIN GLYCOSYLATED A1C: CPT

## 2023-10-03 NOTE — TELEPHONE ENCOUNTER
Patient went in for treadmill stress test. Patient went in and told them she could not walk on treadmill. Huong simpson called over to let us know patient is upset and ask for lexiscan order. Provider is out on vacation. She will need to order this and approve that patient cannot walk on treadmill. Huong had to send the patient home. Routing to provider to order lexiscan stress test if she is ok with this.

## 2023-10-10 ENCOUNTER — OFFICE VISIT (OUTPATIENT)
Dept: ENDOCRINOLOGY | Facility: CLINIC | Age: 58
End: 2023-10-10
Payer: COMMERCIAL

## 2023-10-10 ENCOUNTER — SPECIALTY PHARMACY (OUTPATIENT)
Dept: ENDOCRINOLOGY | Facility: CLINIC | Age: 58
End: 2023-10-10
Payer: COMMERCIAL

## 2023-10-10 VITALS
HEIGHT: 64 IN | OXYGEN SATURATION: 98 % | SYSTOLIC BLOOD PRESSURE: 130 MMHG | BODY MASS INDEX: 44.22 KG/M2 | DIASTOLIC BLOOD PRESSURE: 75 MMHG | HEART RATE: 73 BPM | WEIGHT: 259 LBS

## 2023-10-10 DIAGNOSIS — E66.01 CLASS 3 SEVERE OBESITY DUE TO EXCESS CALORIES WITHOUT SERIOUS COMORBIDITY WITH BODY MASS INDEX (BMI) OF 40.0 TO 44.9 IN ADULT: ICD-10-CM

## 2023-10-10 DIAGNOSIS — I10 HYPERTENSION, ESSENTIAL: ICD-10-CM

## 2023-10-10 DIAGNOSIS — E11.65 TYPE 2 DIABETES MELLITUS WITH HYPERGLYCEMIA, WITHOUT LONG-TERM CURRENT USE OF INSULIN: ICD-10-CM

## 2023-10-10 DIAGNOSIS — Z79.4 TYPE 2 DIABETES MELLITUS WITH HYPERGLYCEMIA, WITH LONG-TERM CURRENT USE OF INSULIN: Primary | ICD-10-CM

## 2023-10-10 DIAGNOSIS — E11.65 TYPE 2 DIABETES MELLITUS WITH HYPERGLYCEMIA, WITH LONG-TERM CURRENT USE OF INSULIN: Primary | ICD-10-CM

## 2023-10-10 DIAGNOSIS — E78.2 MIXED HYPERLIPIDEMIA: ICD-10-CM

## 2023-10-10 LAB — GLUCOSE BLDC GLUCOMTR-MCNC: 184 MG/DL (ref 70–105)

## 2023-10-10 PROCEDURE — 82948 REAGENT STRIP/BLOOD GLUCOSE: CPT | Performed by: INTERNAL MEDICINE

## 2023-10-10 RX ORDER — INSULIN LISPRO 100 [IU]/ML
55 INJECTION, SUSPENSION SUBCUTANEOUS
Qty: 60 ML | Refills: 6 | Status: SHIPPED | OUTPATIENT
Start: 2023-10-10

## 2023-10-10 RX ORDER — TIRZEPATIDE 2.5 MG/.5ML
2.5 INJECTION, SOLUTION SUBCUTANEOUS WEEKLY
Qty: 2 ML | Refills: 0 | Status: SHIPPED | OUTPATIENT
Start: 2023-10-10

## 2023-10-10 RX ORDER — TIRZEPATIDE 5 MG/.5ML
5 INJECTION, SOLUTION SUBCUTANEOUS WEEKLY
Qty: 2 ML | Refills: 5 | Status: SHIPPED | OUTPATIENT
Start: 2023-10-10

## 2023-10-10 RX ORDER — METFORMIN HYDROCHLORIDE 500 MG/1
1000 TABLET, EXTENDED RELEASE ORAL DAILY
Qty: 180 TABLET | Refills: 6 | Status: SHIPPED | OUTPATIENT
Start: 2023-10-10

## 2023-10-10 RX ORDER — LISINOPRIL 5 MG/1
5 TABLET ORAL DAILY
Qty: 90 TABLET | Refills: 4 | Status: SHIPPED | OUTPATIENT
Start: 2023-10-10

## 2023-10-10 RX ORDER — ATORVASTATIN CALCIUM 40 MG/1
40 TABLET, FILM COATED ORAL DAILY
Qty: 90 TABLET | Refills: 4 | Status: SHIPPED | OUTPATIENT
Start: 2023-10-10

## 2023-10-10 NOTE — PROGRESS NOTES
Specialty Pharmacy Patient Management Program  Endocrinology Introduction to Services Outreach     Alaina Hartman is a 57 y.o. female seen by an Endocrinology provider for Type 2 Diabetes . This was an initial visit to introduce Endocrinology Patient Management Program and Specialty Pharmacy services offered by [facility], as the patient is taking specialty medication Mounjaro.     Alaina Hartman is undecided about filling specialty medication at [facility] and/or enrollment in the Endocrine Disorders Patient Management Program at this time and enrollment is therefore UNDER REVIEW. Patient remains eligible for services in the future if desired. Plan to follow-up at a future appointment.    Results of Benefits Investigation  Mounjaro required a prior authorization. Submitted PA and it was approved. Patient does have a high deductible plan ($6,000 per year) currently, but she has met it at this time, so the remainder of the year her copay should be $0. Discussed with patient to re-assess her prescription drug coverage during open enrollment in November and explained that the high deductible plan may not be the best option for her based on her current diagnosis, as this can limit the usage of medications based on lack of affordability. Patient expressed understanding and will look into this. Patient has decided to go ahead and initiate the mounjaro at this time and if the insurance changes, we may have to complete a new benefits investigation with the new insurance.    Relevant Past Medical History and Comorbidities  Past Medical History:   Diagnosis Date    Ankle edema     at times    Breast cancer, right 08/2022    GERD (gastroesophageal reflux disease)     Hiatal hernia      Social History     Socioeconomic History    Marital status:      Spouse name: Bora    Number of children: 3    Years of education: 14   Tobacco Use    Smoking status: Former     Packs/day: 0.25      Years: 31.00     Additional pack years: 0.00     Total pack years: 7.75     Types: Cigarettes     Start date:      Quit date: 2010     Years since quittin.7     Passive exposure: Past    Smokeless tobacco: Never    Tobacco comments:     Started smoking about age 14   Vaping Use    Vaping Use: Never used   Substance and Sexual Activity    Alcohol use: Not Currently     Comment: rare    Drug use: Never    Sexual activity: Yes     Partners: Male     Birth control/protection: Hysterectomy     Comment: monogamous with           Allergies  Patient has no known allergies.       Current Medication List    Current Outpatient Medications:     ALPRAZolam (XANAX) 0.5 MG tablet, Take 1 tablet by mouth Daily As Needed for Anxiety., Disp: 20 tablet, Rfl: 0    atorvastatin (LIPITOR) 40 MG tablet, Take 1 tablet by mouth Daily., Disp: 90 tablet, Rfl: 4    BIOTIN PO, Take  by mouth., Disp: , Rfl:     Calcium Carbonate-Vitamin D (Oscal 500 D-3) 500-5 MG-MCG tablet, Take 500 mg by mouth Every 12 (Twelve) Hours., Disp: 60 tablet, Rfl: 6    Cholecalciferol (Vitamin D3) 50 MCG (2000 UT) capsule, Take 1 capsule by mouth once daily, Disp: 100 capsule, Rfl: 3    Cyanocobalamin (Vitamin B-12) 1000 MCG sublingual tablet, Place 1 tablet under the tongue Daily., Disp: 100 each, Rfl: 4    exemestane (Aromasin) 25 MG tablet, Take 1 tablet by mouth Daily., Disp: 90 tablet, Rfl: 2    glucose blood (Accu-Chek Guide) test strip, 1 each by Other route 4 (Four) Times a Day. Use as instructed, Disp: 150 each, Rfl: 11    guaiFENesin-codeine (ROMILAR-AC) 100-10 MG/5ML solution/syrup, Take 5 mL by mouth 4 (Four) Times a Day As Needed for Cough., Disp: 118 mL, Rfl: 0    ibuprofen (ADVIL,MOTRIN) 800 MG tablet, Take 1 tablet by mouth 3 (Three) Times a Day As Needed. for pain, Disp: , Rfl:     Insulin Lispro Prot & Lispro (HumaLOG Mix 75/25 KwikPen) (75-25) 100 UNIT/ML suspension pen-injector pen, Inject 55 Units under the skin into the  appropriate area as directed 3 (Three) Times a Day Before Meals., Disp: 60 mL, Rfl: 6    Insulin Pen Needle (Pen Needles) 32G X 4 MM misc, 1 each 4 (Four) Times a Day. Use to inject insulin / DX E11.65, Disp: 150 each, Rfl: 5    lisinopril (PRINIVIL,ZESTRIL) 5 MG tablet, Take 1 tablet by mouth Daily., Disp: 90 tablet, Rfl: 4    metFORMIN ER (GLUCOPHAGE-XR) 500 MG 24 hr tablet, Take 2 tablets by mouth Daily., Disp: 180 tablet, Rfl: 6    multivitamin with minerals tablet tablet, Take 1 tablet by mouth Daily., Disp: , Rfl:     omeprazole (priLOSEC) 40 MG capsule, Take 1 capsule by mouth once daily, Disp: 90 capsule, Rfl: 3    ondansetron (ZOFRAN) 8 MG tablet, Take 1 tablet by mouth 3 (Three) Times a Day As Needed for Nausea or Vomiting., Disp: 30 tablet, Rfl: 5    Tirzepatide (Mounjaro) 2.5 MG/0.5ML solution pen-injector, Inject 0.5 mL under the skin into the appropriate area as directed 1 (One) Time Per Week. Inject 2.5 mg subcu weekly for 4 weeks., Disp: 2 mL, Rfl: 0    Tirzepatide (Mounjaro) 5 MG/0.5ML solution pen-injector, Inject 0.5 mL under the skin into the appropriate area as directed 1 (One) Time Per Week. Start 5 mg dose after finishing 2.5 mg dose for 4 weeks., Disp: 2 mL, Rfl: 5    vitamin B-6 (PYRIDOXINE) 100 MG tablet, Take 1 tablet by mouth 2 (Two) Times a Day., Disp: , Rfl:     Vitamin D, Ergocalciferol, 50 MCG (2000 UT) capsule, Take 2,000 Units by mouth Daily., Disp: 100 capsule, Rfl: 4       Provided education on mounjaro:  Mounjaror (tirzepatide)   How long will I be on this medication for?  The amount of time you will be on this medication will be determined by your doctor based on blood sugar and A1c control. You will most likely be on this medication or another diabetes medication throughout your lifetime. Do not abruptly stop this medication without talking to your doctor first.     How do I take this medication?  Take as directed on your prescription label. Mounjaro is supplied in a single-use  pen for each dose and you will use a new pre-filled pen each week.  It is injected under the skin (subcutaneously) of your stomach, thigh or upper arm.  You may inject in the same body area each week, on the same day each week, with or without food.      What are some possible side effects?  In addition to decreased appetite, the most common side effects are nausea and indigestion. Redness, itching, and/or swelling at the injection site may occur. You should also monitor for low blood sugar (hypoglycemia) if you are taking Mounjaro with other medications that cause low blood sugar.     What happens if I miss a dose?  If you miss a dose, take it as soon as you remember as long as there are at least 3 days until the next scheduled dose.  If there are less than 3 days, skip the missed dose and resume  Mounjaro on the regularly scheduled day.  Do not take 2 doses at the same time or extra doses.      Medication Safety    What are things I should warn my doctor immediately about?  Do not use Mounjaro if you or a family member have ever had medullary thyroid cancer (MTC) or Multiple Endocrine Neoplasia syndrome type 2 (MEN 2).  Tell your doctor if you have or have had problems with your kidneys or pancreas.  Talk to your doctor if you are pregnant, planning to become pregnant, or breastfeeding. Stop using Mounjaro and get medical help right away if you have severe pain in your stomach area that will not go away, or if you notice any signs/symptoms of an allergic reaction (rash, hives, difficulty breathing, etc.).    What are things that I should be cautious of?  Be cautious of any side effects from this medication. Talk to your doctor if any new ones develop or aren't getting better.    What are some medications that can interact with this one?  Taking Mounjaro with other medications that also lower your blood sugar such as insulin and glipizide/glimepiride/glyburide may increase the risk of low blood sugar. Your doctor  may reduce the dose of these medications when you start Mounjaro to minimize low blood sugars.  Always tell your doctor or pharmacist immediately if you start taking any new medications, including over-the-counter medications, vitamins, and herbal supplements.        Medication Storage/Handling    How should I handle this medication?  Keep this medication out of reach of pets/children and keep the pen capped when not in use.  Do not share your medicine pens with others.    How does this medication need to be stored?  Store Mounjaro in the refrigerator. Do not freeze and do not use if Mounjaro has been frozen.  You may store your Mounjaro pens at room temperature for up to 21 days.  Protect from excessive heat and sunlight.  Keep in the original carton until time of administration.    How should I dispose of this medication?  Used Mounjaro pens should discarded after each use in an approved sharps container after use.  If you do not have a sharps container, you may use a household container made of heavy-duty plastic with a tight-fitting lid that is leak resistant (e.g., heavy-duty plastic laundry detergent bottle). If your doctor decides to stop this medication, take to your local police station for proper disposal. Some pharmacies also have take-back bins for medication drop-off.       Resources/Support    How can I remind myself to take this medication?  You can download reminder apps to help you manage your refills. You may also set an alarm on your phone to remind you.     Is financial support available?   [employer] can provide co-pay cards if you have commercial insurance or patient assistance if you have Medicare or no insurance.     Which vaccines are recommended for me?  Talk to your doctor about these vaccines: Flu, Coronavirus (COVID-19), Pneumococcal (pneumonia), Tdap, Hepatitis B, Zoster (shingles)         Changes to Therapy Plan Discussed with Patient  Medication Therapy Changes by Provider Start  mounjaro 2.5 mg under the skin once weekly for 4 weeks, then increase to 5 mg weekly if able to tolerate. Continue Humalog 75/25 insulin 55 units three times daily and metformin  mg 2 tablets by mouth daily.  Additional Plans, Therapy Recommendations, or Therapy Problems to Be Addressed: Patient will look into alternative insurance plans during open enrollment in November as she currently has a high deductible plan ($6,000).   Discussed pharmacy services with patient. She expressed some interest. Will plan to follow up at a future visit.     Deidra Hughes, PharmD  Clinical Specialty Pharmacist, Endocrinology  10/10/2023  11:10 EDT

## 2023-10-10 NOTE — PATIENT INSTRUCTIONS
Start Mounjaro 2.5 mg subcu weekly for 4 weeks and if you are able to tolerate then increase the dose to 5 mg subcu weekly  Continue your Humalog mix 75/25 insulin at 55 units subcu 3 times a day before each meal along with metformin  Continue to work on your diet and activity  Always keep glucose source in case of low blood sugar  Pharmacy evaluation for Mounjaro  Labs before follow-up.

## 2023-10-10 NOTE — PROGRESS NOTES
Endocrine Progress Note Outpatient     Patient Care Team:  Lori Fields DO as PCP - General (Family Medicine)  Seipel, Joseph F, MD as Consulting Physician (Sleep Medicine)  Jagdeep Mcwilliams MD as Surgeon (General Surgery)  Lianet Rust MD as Consulting Physician (Hematology and Oncology)  Melba Gamboa, MIGUEL as Nurse Navigator  Chirag Cho MD as Consulting Physician (Endocrinology)    Chief Complaint: Follow-up type 2 diabetes    HPI: This is a 57-year-old female with history of type 2 diabetes, hypertension and hyperlipidemia is here for follow-up.     For type 2 diabetes: Currently on Humalog mix 75/25 insulin, she is taking 55 units subcu 3 times a day before each meal along with Metformin 500 mg twice a day.  The past she has not been able to tolerate glipizide and Actos.  She has tried Jardiance as well but it did not help her sugars.  She did not bring blood sugar records for review but tells me that usually runs about 155.  No low blood sugars of less than 70.    Hypertension: Her blood pressure is doing well.     Hyperlipidemia: Currently on atorvastatin.  .    Past Medical History:   Diagnosis Date    Ankle edema     at times    Breast cancer, right 2022    GERD (gastroesophageal reflux disease)     Hiatal hernia        Social History     Socioeconomic History    Marital status:      Spouse name: Bora    Number of children: 3    Years of education: 14   Tobacco Use    Smoking status: Former     Packs/day: 0.25     Years: 31.00     Additional pack years: 0.00     Total pack years: 7.75     Types: Cigarettes     Start date:      Quit date: 2010     Years since quittin.7     Passive exposure: Past    Smokeless tobacco: Never    Tobacco comments:     Started smoking about age 14   Vaping Use    Vaping Use: Never used   Substance and Sexual Activity    Alcohol use: Not Currently     Comment: rare    Drug use: Never    Sexual activity: Yes     Partners: Male      Birth control/protection: Hysterectomy     Comment: monogamous with        Family History   Problem Relation Age of Onset    Breast cancer Mother     Heart disease Mother     Thyroid disease Mother     Stroke Mother     Cancer Mother         lymphoma    Clotting disorder Father     Diabetes Brother     Heart disease Brother     Stroke Brother     Diabetes Brother     Colon cancer Paternal Grandmother        No Known Allergies    ROS:   Constitutional:  Denies fatigue, tiredness.    Eyes:  Denies change in visual acuity   HENT:  Denies nasal congestion or sore throat   Respiratory: denies cough, shortness of breath.   Cardiovascular:  denies chest pain, edema   GI:  Denies abdominal pain, nausea, vomiting.   Endocrine:  Denies polyuria or polydipsia   Psychiatric:  Denies depression or anxiety      Vitals:    10/10/23 0804   BP: 130/75   Pulse: 73   SpO2: 98%     Body mass index is 45.16 kg/mý.     Physical Exam:  GEN: NAD, conversant, obese  EYES: EOMI,   NECK: no thyromegaly,  CV: RRR,  LUNG: CTA  PSYCH: AOX3, appropriate mood, affect normal      Results Review:     I reviewed the patient's new clinical results.    Lab Results   Component Value Date    HGBA1C 8.40 (H) 10/03/2023    HGBA1C 7.0 (H) 02/13/2023    HGBA1C 11.1 (H) 07/07/2022      Lab Results   Component Value Date    GLUCOSE 153 (H) 10/03/2023    BUN 10 10/03/2023    CREATININE 0.63 10/03/2023    EGFRIFNONA 90 12/01/2021    EGFRIFAFRI 115 08/26/2020    BCR 15.9 10/03/2023    K 4.3 10/03/2023    CO2 26.3 10/03/2023    CALCIUM 9.8 10/03/2023    PROTENTOTREF 7.0 08/26/2020    ALBUMIN 4.4 10/03/2023    LABIL2 1.8 08/26/2020    AST 26 10/03/2023    ALT 30 10/03/2023    CHOL 143 10/03/2023    TRIG 139 10/03/2023    LDL 81 10/03/2023    HDL 37 (L) 10/03/2023     Lab Results   Component Value Date    TSH 3.530 06/28/2023         Medication Review: Reviewed.       Current Outpatient Medications:     ALPRAZolam (XANAX) 0.5 MG tablet, Take 1 tablet by mouth  Daily As Needed for Anxiety., Disp: 20 tablet, Rfl: 0    atorvastatin (LIPITOR) 40 MG tablet, Take 1 tablet by mouth Daily., Disp: 90 tablet, Rfl: 4    BIOTIN PO, Take  by mouth., Disp: , Rfl:     Calcium Carbonate-Vitamin D (Oscal 500 D-3) 500-5 MG-MCG tablet, Take 500 mg by mouth Every 12 (Twelve) Hours., Disp: 60 tablet, Rfl: 6    Cholecalciferol (Vitamin D3) 50 MCG (2000 UT) capsule, Take 1 capsule by mouth once daily, Disp: 100 capsule, Rfl: 3    Cyanocobalamin (Vitamin B-12) 1000 MCG sublingual tablet, Place 1 tablet under the tongue Daily., Disp: 100 each, Rfl: 4    exemestane (Aromasin) 25 MG tablet, Take 1 tablet by mouth Daily., Disp: 90 tablet, Rfl: 2    glucose blood (Accu-Chek Guide) test strip, 1 each by Other route 4 (Four) Times a Day. Use as instructed, Disp: 150 each, Rfl: 11    guaiFENesin-codeine (ROMILAR-AC) 100-10 MG/5ML solution/syrup, Take 5 mL by mouth 4 (Four) Times a Day As Needed for Cough., Disp: 118 mL, Rfl: 0    ibuprofen (ADVIL,MOTRIN) 800 MG tablet, Take 1 tablet by mouth 3 (Three) Times a Day As Needed. for pain, Disp: , Rfl:     Insulin Lispro Prot & Lispro (HumaLOG Mix 75/25 KwikPen) (75-25) 100 UNIT/ML suspension pen-injector pen, Inject 55 Units under the skin into the appropriate area as directed 3 (Three) Times a Day Before Meals., Disp: 60 mL, Rfl: 6    Insulin Pen Needle (Pen Needles) 32G X 4 MM misc, 1 each 4 (Four) Times a Day. Use to inject insulin / DX E11.65, Disp: 150 each, Rfl: 5    lisinopril (PRINIVIL,ZESTRIL) 5 MG tablet, Take 1 tablet by mouth Daily., Disp: 90 tablet, Rfl: 4    metFORMIN ER (GLUCOPHAGE-XR) 500 MG 24 hr tablet, Take 2 tablets by mouth Daily., Disp: 180 tablet, Rfl: 6    multivitamin with minerals tablet tablet, Take 1 tablet by mouth Daily., Disp: , Rfl:     omeprazole (priLOSEC) 40 MG capsule, Take 1 capsule by mouth once daily, Disp: 90 capsule, Rfl: 3    ondansetron (ZOFRAN) 8 MG tablet, Take 1 tablet by mouth 3 (Three) Times a Day As Needed for  Nausea or Vomiting., Disp: 30 tablet, Rfl: 5    vitamin B-6 (PYRIDOXINE) 100 MG tablet, Take 1 tablet by mouth 2 (Two) Times a Day., Disp: , Rfl:     Vitamin D, Ergocalciferol, 50 MCG (2000 UT) capsule, Take 2,000 Units by mouth Daily., Disp: 100 capsule, Rfl: 4      Assessment & Plan   1.  Diabetes mellitus type 2 with hyperglycemia: Uncontrolled with worsening of A1c at 8.4%.  She has been trying to work on her diet and has been taking her medications on regular basis.  At this time I will add Mounjaro 2.5 mg subcu weekly for 4 weeks and if she is able to tolerate then increase the dose to 5 mg subcu weekly.  She will continue Humalog mix 75/25 insulin at 55 units 3 times a day along with metformin.  Recommend to continue to work on diet and activity and always keep glucose source in case of low blood sugars.    2.  Hypertension: Well controlled. CPM    3.  Hyperlipidemia: Much better, continue atorvastatin.    4.  Obesity: Recommend to continue to work on diet and activity.    Assessment and plan from February 20, 2023 reviewed and updated.            Chirag Cho MD FACE.

## 2023-10-12 RX ORDER — PEN NEEDLE, DIABETIC 30 GX3/16"
1 NEEDLE, DISPOSABLE MISCELLANEOUS 4 TIMES DAILY
Qty: 150 EACH | Refills: 5 | Status: SHIPPED | OUTPATIENT
Start: 2023-10-12

## 2023-10-18 ENCOUNTER — TELEPHONE (OUTPATIENT)
Dept: ENDOCRINOLOGY | Facility: CLINIC | Age: 58
End: 2023-10-18
Payer: COMMERCIAL

## 2023-10-18 NOTE — TELEPHONE ENCOUNTER
Specialty Pharmacy      Alaina Hartman is a 57 y.o. female seen by an Endocrinology provider for Type 2 Diabetes and enrolled in the Endocrinology Patient Management program offered by [facility].      Pt called to report that after her first dose of mounjaro she experienced diarrhea for about 4 days. Pt took her second dose this past Monday and states that she started to get the symptoms again late Tuesday evening. She does state that the symptoms do not seem to be as severe this week. Pt asked if these side effects were normal.  I explained to the pt that the most common side effects include GI upset (nausea/vomiting/diarrhea/constipation) and that these symptoms will sometimes resolve after a couple weeks. Advised pt to see how she feels the remainder of this week and that she could use OTC imodium if needed to help with diarrhea symptoms. Pt will plan to call Monday to follow up.    Deidra Hughes, PharmD  Clinical Specialty Pharmacist, Endocrinology  10/18/2023  11:02 EDT

## 2023-11-20 ENCOUNTER — HOSPITAL ENCOUNTER (OUTPATIENT)
Dept: NUCLEAR MEDICINE | Facility: HOSPITAL | Age: 58
Discharge: HOME | End: 2023-11-20
Payer: COMMERCIAL

## 2023-11-20 LAB
BH CV REST NUCLEAR ISOTOPE DOSE: 9.1 MCI
BH CV STRESS BP STAGE 1: NORMAL
BH CV STRESS BP STAGE 2: NORMAL
BH CV STRESS COMMENTS STAGE 1: NORMAL
BH CV STRESS COMMENTS STAGE 2: NORMAL
BH CV STRESS DOSE REGADENOSON STAGE 1: 0.4
BH CV STRESS DURATION MIN STAGE 1: 0
BH CV STRESS DURATION MIN STAGE 2: 4
BH CV STRESS DURATION SEC STAGE 1: 10
BH CV STRESS DURATION SEC STAGE 2: 0
BH CV STRESS HR STAGE 1: 85
BH CV STRESS HR STAGE 2: 106
BH CV STRESS NUCLEAR ISOTOPE DOSE: 29 MCI
BH CV STRESS PROTOCOL 1: NORMAL
BH CV STRESS RECOVERY BP: NORMAL MMHG
BH CV STRESS RECOVERY HR: 103 BPM
BH CV STRESS STAGE 1: 1
BH CV STRESS STAGE 2: 2
MAXIMAL PREDICTED HEART RATE: 165 BPM
PERCENT MAX PREDICTED HR: 66.06 %
STRESS BASELINE BP: NORMAL MMHG
STRESS BASELINE HR: 85 BPM
STRESS PERCENT HR: 78 %
STRESS POST PEAK BP: NORMAL MMHG
STRESS POST PEAK HR: 109 BPM
STRESS TARGET HR: 140 BPM

## 2023-11-20 PROCEDURE — 0 TECHNETIUM TETROFOSMIN KIT: Performed by: STUDENT IN AN ORGANIZED HEALTH CARE EDUCATION/TRAINING PROGRAM

## 2023-11-20 PROCEDURE — 25010000002 REGADENOSON 0.4 MG/5ML SOLUTION: Performed by: STUDENT IN AN ORGANIZED HEALTH CARE EDUCATION/TRAINING PROGRAM

## 2023-11-20 PROCEDURE — 93017 CV STRESS TEST TRACING ONLY: CPT

## 2023-11-20 PROCEDURE — A9502 TC99M TETROFOSMIN: HCPCS | Performed by: STUDENT IN AN ORGANIZED HEALTH CARE EDUCATION/TRAINING PROGRAM

## 2023-11-20 PROCEDURE — 78452 HT MUSCLE IMAGE SPECT MULT: CPT

## 2023-11-20 PROCEDURE — 93018 CV STRESS TEST I&R ONLY: CPT | Performed by: INTERNAL MEDICINE

## 2023-11-20 PROCEDURE — 78452 HT MUSCLE IMAGE SPECT MULT: CPT | Performed by: INTERNAL MEDICINE

## 2023-11-20 RX ORDER — REGADENOSON 0.08 MG/ML
0.4 INJECTION, SOLUTION INTRAVENOUS
Status: COMPLETED | OUTPATIENT
Start: 2023-11-20 | End: 2023-11-20

## 2023-11-20 RX ADMIN — TETROFOSMIN 1 DOSE: 1.38 INJECTION, POWDER, LYOPHILIZED, FOR SOLUTION INTRAVENOUS at 09:51

## 2023-11-20 RX ADMIN — TETROFOSMIN 1 DOSE: 1.38 INJECTION, POWDER, LYOPHILIZED, FOR SOLUTION INTRAVENOUS at 08:45

## 2023-11-20 RX ADMIN — REGADENOSON 0.4 MG: 0.08 INJECTION, SOLUTION INTRAVENOUS at 09:52

## 2023-12-07 DIAGNOSIS — E11.65 TYPE 2 DIABETES MELLITUS WITH HYPERGLYCEMIA, WITHOUT LONG-TERM CURRENT USE OF INSULIN: ICD-10-CM

## 2023-12-07 RX ORDER — BLOOD SUGAR DIAGNOSTIC
1 STRIP MISCELLANEOUS 4 TIMES DAILY
Qty: 150 EACH | Refills: 11 | Status: SHIPPED | OUTPATIENT
Start: 2023-12-07

## 2023-12-13 NOTE — PROGRESS NOTES
Hematology/Oncology Outpatient Follow Up    PATIENT NAME:Alaina Hartman  :1965  MRN: [id number]  PRIMARY CARE PHYSICIAN: Lori Fields DO  REFERRING PHYSICIAN: No ref. provider found    Chief Complaint   Patient presents with    Follow-up     Malignant neoplasm of central portion of right breast in female, estrogen receptor positive        HISTORY OF PRESENT ILLNESS:     This is a 57-year-old female who was clinically asymptomatic and had a routine screening mammogram which showed an abnormality on the right breast.  Prior to that patient had left breast biopsy in  for benign disease.     Review of her screening mammogram which was performed on 6/3/2022 showed a new 2 cm focal asymmetry with architectural distortion in the mid to posterior third depth of the outer right breast the left breast was benign.  Right diagnostic mammogram and ultrasound was recommended.  On 2022 patient underwent right diagnostic mammogram and ultrasound which showed a 2 cm irregular mass in the lateral inferior breast.  Same day she had an ultrasound which showed a 1.6 cm on the right breast the right axilla did not show any abnormal lymph nodes.     Patient then underwent ultrasound-guided biopsy of the right breast mass pathology revealed moderately differentiated invasive ductal carcinoma estrogen receptor positive at 95% progesterone receptor positive at 50%, HER2/bertha was negative at 1+ on immunohistochemistry.        Patient had a partial hysterectomy many years ago.  She is  and has 3 children.  She does not smoke, does not drink alcohol     Family history significant for mother with breast cancer in her 50s, her father had skin cancers, paternal grandmother had colon cancer at the age of 70        She is a       2022 estradiol level was less than 5 and FSH was 33 consistent with postmenopausal state  2022 patient underwent right lumpectomy with sentinel lymph node biopsy.  Pathology  revealed residual moderately differentiated ductal carcinoma measuring 1.7 cm completely excised total of 5 lymph nodes were removed and all of them were negative for metastatic disease.  Pathology stage is pT1 cpN0 M0.  There was no evidence of DCIS all margins were negative distance to the closest margin was anterior margin at 2 mm ER positive at 95, NJ positive at 50% and HER2/bertha was negative.  9/27/2022 patient had Oncotype DX assay done which returned with a recurrence score of 28.  This is consistent with 17% risk of recurrence at 9 years distantly, and more than 15% chemotherapy benefit.  On the validation assay estrogen receptor was positive, NJ was positive and HER2/bertha was negative.  10/27/2022: C1 D1 Taxotere and Cytoxan received.  Neulasta support held due to leukocytosis.  11/4/2022: WBC 2.28, hemoglobin 11.9, platelets 297,000, ANC 0.08.  Patient was initiated on Neupogen 480 mg subcu given 11/4/2022, 11/5/2022, 11/6/2022.  Patient to receive Neulasta again with next cycle of treatment.  11/17/2022: Patient received cycle 2 of Taxotere Cytoxan with Neulasta support  12/8/2022: Patient received cycle 3 of Taxotere and Cytoxan with Neulasta support  12/29/2020 patient received cycle 4 of combination chemotherapy with Cytoxan and Taxotere  Patient completed adjuvant breast radiation February 2023 6/30/2023 bilateral diagnostic mammogram with no mammographic findings to indicate right or left breast malignancy      Past Medical History:   Diagnosis Date    Ankle edema     at times    Breast cancer, right 08/2022    GERD (gastroesophageal reflux disease)     Hiatal hernia        Past Surgical History:   Procedure Laterality Date    BREAST BIOPSY Right 08/18/2022    Procedure: Right breast lumpectomy;  Surgeon: Jagdeep Mcwilliams MD;  Location: [facility] MAIN OR;  Service: General;  Laterality: Right;    BREAST LUMPECTOMY Left 2003    x2    PORTACATH PLACEMENT N/A 10/06/2022    Procedure: INSERTION OF  PORTACATH;  Surgeon: Jagdeep Mcwilliams MD;  Location: [facility] MAIN OR;  Service: General;  Laterality: N/A;    SENTINEL NODE BIOPSY Right 08/18/2022    Procedure: SENTINEL NODE BIOPSY;  Surgeon: Jagdeep Mcwilliams MD;  Location: [facility] MAIN OR;  Service: General;  Laterality: Right;    SUBTOTAL HYSTERECTOMY  10/10/1991    had it done for bleeding. Ovaries still present    TOOTH EXTRACTION      has had all teeth (but one) removed         Current Outpatient Medications:     ALPRAZolam (XANAX) 0.5 MG tablet, Take 1 tablet by mouth Daily As Needed for Anxiety., Disp: 20 tablet, Rfl: 0    atorvastatin (LIPITOR) 40 MG tablet, Take 1 tablet by mouth Daily., Disp: 90 tablet, Rfl: 4    BIOTIN PO, Take  by mouth., Disp: , Rfl:     Calcium Carbonate-Vitamin D (Oscal 500 D-3) 500-5 MG-MCG tablet, Take 500 mg by mouth Every 12 (Twelve) Hours., Disp: 60 tablet, Rfl: 6    Cholecalciferol (Vitamin D3) 50 MCG (2000 UT) capsule, Take 1 capsule by mouth once daily, Disp: 100 capsule, Rfl: 3    Cyanocobalamin (Vitamin B-12) 1000 MCG sublingual tablet, Place 1 tablet under the tongue Daily., Disp: 100 each, Rfl: 4    exemestane (Aromasin) 25 MG tablet, Take 1 tablet by mouth Daily., Disp: 90 tablet, Rfl: 2    glucose blood (Accu-Chek Guide) test strip, 1 each by Other route 4 (Four) Times a Day. Use as instructed, Disp: 150 each, Rfl: 11    ibuprofen (ADVIL,MOTRIN) 800 MG tablet, Take 1 tablet by mouth 3 (Three) Times a Day As Needed. for pain, Disp: , Rfl:     Insulin Lispro Prot & Lispro (HumaLOG Mix 75/25 KwikPen) (75-25) 100 UNIT/ML suspension pen-injector pen, Inject 55 Units under the skin into the appropriate area as directed 3 (Three) Times a Day Before Meals., Disp: 60 mL, Rfl: 6    Insulin Pen Needle (Pen Needles) 32G X 4 MM misc, Use 1 each 4 (Four) Times a Day. Use to inject insulin / DX E11.65, Disp: 150 each, Rfl: 5    lisinopril (PRINIVIL,ZESTRIL) 5 MG tablet, Take 1 tablet by mouth Daily., Disp: 90 tablet,  Rfl: 4    metFORMIN ER (GLUCOPHAGE-XR) 500 MG 24 hr tablet, Take 2 tablets by mouth Daily., Disp: 180 tablet, Rfl: 6    multivitamin with minerals tablet tablet, Take 1 tablet by mouth Daily., Disp: , Rfl:     omeprazole (priLOSEC) 40 MG capsule, Take 1 capsule by mouth once daily, Disp: 90 capsule, Rfl: 3    ondansetron (ZOFRAN) 8 MG tablet, Take 1 tablet by mouth 3 (Three) Times a Day As Needed for Nausea or Vomiting., Disp: 30 tablet, Rfl: 5    Tirzepatide (Mounjaro) 5 MG/0.5ML solution pen-injector, Inject 0.5 mL under the skin into the appropriate area as directed 1 (One) Time Per Week. Start 5 mg dose after finishing 2.5 mg dose for 4 weeks., Disp: 2 mL, Rfl: 5    vitamin B-6 (PYRIDOXINE) 100 MG tablet, Take 1 tablet by mouth 2 (Two) Times a Day., Disp: , Rfl:     No Known Allergies    Family History   Problem Relation Age of Onset    Breast cancer Mother     Heart disease Mother     Thyroid disease Mother     Stroke Mother     Cancer Mother         lymphoma    Clotting disorder Father     Diabetes Brother     Heart disease Brother     Stroke Brother     Diabetes Brother     Colon cancer Paternal Grandmother        Cancer-related family history includes Breast cancer in her mother; Cancer in her mother; Colon cancer in her paternal grandmother.    Social History     Tobacco Use    Smoking status: Former     Packs/day: 0.25     Years: 31.00     Additional pack years: 0.00     Total pack years: 7.75     Types: Cigarettes     Start date:      Quit date: 2010     Years since quittin.9     Passive exposure: Past    Smokeless tobacco: Never    Tobacco comments:     Started smoking about age 14   Vaping Use    Vaping Use: Never used   Substance Use Topics    Alcohol use: Not Currently     Comment: rare    Drug use: Never       I have reviewed and confirmed the accuracy of the patient's history: Chief complaint, HPI, ROS, and Subjective as entered by the MA/LPN/RN. Lianet Rust MD 23   "      SUBJECTIVE:    Denies any new issues.  Continues to tolerate endocrine therapy without any difficulties.          REVIEW OF SYSTEMS:    Review of Systems   Constitutional:  Positive for fatigue. Negative for chills and fever.   HENT:  Negative for congestion, drooling, ear discharge, rhinorrhea, sinus pressure and tinnitus.    Eyes:  Negative for photophobia, pain and discharge.   Respiratory:  Negative for apnea, choking and stridor.    Cardiovascular:  Positive for leg swelling. Negative for palpitations.   Gastrointestinal:  Negative for abdominal distention, abdominal pain and anal bleeding.   Endocrine: Negative for polydipsia and polyphagia.   Genitourinary:  Negative for decreased urine volume, flank pain and genital sores.   Musculoskeletal:  Positive for arthralgias. Negative for gait problem, neck pain and neck stiffness.   Skin:  Negative for color change, rash and wound.   Neurological:  Negative for tremors, seizures, syncope, facial asymmetry and speech difficulty.   Hematological:  Negative for adenopathy.   Psychiatric/Behavioral:  Negative for agitation, confusion, hallucinations and self-injury. The patient is not hyperactive.        OBJECTIVE:    Vitals:    12/14/23 0937   BP: 138/85   Pulse: 95   Resp: 20   Temp: 98 °F (36.7 °C)   TempSrc: Infrared   SpO2: 95%   Weight: 117 kg (258 lb)   Height: 161.3 cm (63.5\")   PainSc: 0-No pain       Body mass index is 44.99 kg/m².    ECOG    (0) Fully active, able to carry on all predisease performance without restriction    Physical Exam  Vitals and nursing note reviewed.   Constitutional:       General: She is not in acute distress.     Appearance: She is not diaphoretic.   HENT:      Head: Normocephalic and atraumatic.   Eyes:      General: No scleral icterus.        Right eye: No discharge.         Left eye: No discharge.      Conjunctiva/sclera: Conjunctivae normal.   Neck:      Thyroid: No thyromegaly.   Cardiovascular:      Rate and Rhythm: " Normal rate and regular rhythm.      Heart sounds: Normal heart sounds.      No friction rub. No gallop.   Pulmonary:      Effort: Pulmonary effort is normal. No respiratory distress.      Breath sounds: No stridor. No wheezing.   Abdominal:      Palpations: Abdomen is soft. There is no mass.      Tenderness: There is no abdominal tenderness. There is no guarding or rebound.   Musculoskeletal:         General: No tenderness. Normal range of motion.      Cervical back: Normal range of motion and neck supple.   Lymphadenopathy:      Cervical: No cervical adenopathy.   Skin:     General: Skin is warm.      Findings: No erythema or rash.   Neurological:      Mental Status: She is alert and oriented to person, place, and time.      Motor: No abnormal muscle tone.   Psychiatric:         Mood and Affect: Mood normal.         Behavior: Behavior normal.     Patient had clinical breast exam performed today with her radiation oncologist    I have reexamined the patient and the results are consistent with the previously documented exam. Lianetsujey Rust MD      RECENT LABS    WBC   Date Value Ref Range Status   12/14/2023 9.96 3.40 - 10.80 10*3/mm3 Final   08/26/2020 11.0 (H) 3.4 - 10.8 x10E3/uL Final     RBC   Date Value Ref Range Status   12/14/2023 4.20 3.77 - 5.28 10*6/mm3 Final   08/26/2020 4.53 3.77 - 5.28 x10E6/uL Final     Hemoglobin   Date Value Ref Range Status   12/14/2023 12.2 12.0 - 15.9 g/dL Final     Hematocrit   Date Value Ref Range Status   12/14/2023 37.9 34.0 - 46.6 % Final     MCV   Date Value Ref Range Status   12/14/2023 90.2 79.0 - 97.0 fL Final     MCH   Date Value Ref Range Status   12/14/2023 29.0 26.6 - 33.0 pg Final     MCHC   Date Value Ref Range Status   12/14/2023 32.2 31.5 - 35.7 g/dL Final     RDW   Date Value Ref Range Status   12/14/2023 14.6 12.3 - 15.4 % Final     RDW-SD   Date Value Ref Range Status   12/14/2023 46.9 37.0 - 54.0 fl Final     MPV   Date Value Ref Range Status    12/14/2023 8.9 6.0 - 12.0 fL Final     Platelets   Date Value Ref Range Status   12/14/2023 302 140 - 450 10*3/mm3 Final     Neutrophil %   Date Value Ref Range Status   12/14/2023 56.4 42.7 - 76.0 % Final     Lymphocyte %   Date Value Ref Range Status   12/14/2023 36.2 19.6 - 45.3 % Final     Monocyte %   Date Value Ref Range Status   12/14/2023 5.2 5.0 - 12.0 % Final     Eosinophil %   Date Value Ref Range Status   12/14/2023 1.7 0.3 - 6.2 % Final     Basophil %   Date Value Ref Range Status   12/14/2023 0.5 0.0 - 1.5 % Final     Immature Grans %   Date Value Ref Range Status   08/09/2022 0.5 0.0 - 0.5 % Final     Neutrophils, Absolute   Date Value Ref Range Status   12/14/2023 5.61 1.70 - 7.00 10*3/mm3 Final     Lymphocytes, Absolute   Date Value Ref Range Status   12/14/2023 3.61 (H) 0.70 - 3.10 10*3/mm3 Final     Monocytes, Absolute   Date Value Ref Range Status   12/14/2023 0.52 0.10 - 0.90 10*3/mm3 Final     Eosinophils, Absolute   Date Value Ref Range Status   12/14/2023 0.17 0.00 - 0.40 10*3/mm3 Final     Basophils, Absolute   Date Value Ref Range Status   12/14/2023 0.05 0.00 - 0.20 10*3/mm3 Final     Immature Grans, Absolute   Date Value Ref Range Status   08/09/2022 0.05 0.00 - 0.05 10*3/mm3 Final     nRBC   Date Value Ref Range Status   08/09/2022 0.0 0.0 - 0.2 /100 WBC Final       Lab Results   Component Value Date    GLUCOSE 153 (H) 10/03/2023    BUN 10 10/03/2023    CREATININE 0.63 10/03/2023    EGFRIFNONA 90 12/01/2021    EGFRIFAFRI 115 08/26/2020    BCR 15.9 10/03/2023    K 4.3 10/03/2023    CO2 26.3 10/03/2023    CALCIUM 9.8 10/03/2023    PROTENTOTREF 7.0 08/26/2020    ALBUMIN 4.4 10/03/2023    LABIL2 1.8 08/26/2020    AST 26 10/03/2023    ALT 30 10/03/2023         Assessment & Plan     Malignant neoplasm of central portion of right breast in female, estrogen receptor positive  - CBC & Differential        ASSESSMENT:      Moderately differentiated invasive ductal carcinoma of the right breast.   Status post right lumpectomy with sentinel lymph node biopsy.  pT1 cpN0 M0.  ER positive at 95%, WV positive at 50% and HER2/bertha was negative.  T1 cN0 M0.  Ongoing management  Oncotype DX assay with a high recurrence score at 28, consistent with 17% risk of distant recurrence at 9 years with tamoxifen alone and group absolute chemotherapy benefit of more than 15%  Diagnosis post adjuvant chemotherapy with Cytoxan and Taxotere.  She has received 4 out of 4 cycles completed on December 29, 2022.  Reviewed  Status post right breast radiation completed February 2023  Radiation-induced dermatitis: Resolved  On Aromasin 25 mg p.o. daily initiated March 2023.  Will continue  Status post partial hysterectomy   Postmenopausal state following lab confirmation  Family history of breast cancer in her mother at the age of 50, paternal grandmother with colon cancer at 17 and her father had skin cancer.  Assessment has been reviewed and updated.    Chemotherapy-induced neutropenia.  Patient is afebrile.          Plans:     CBC reviewed  CMP ordered  Continue Aromasin 25 mg p.o. daily.  Refills sent today in a 90-day supply  Continue calcium with vitamin D 600 mg twice a day  Continue post radiation care per routine  Bilateral diagnostic mammogram will be due again July 2024  Per patient her dentist advised against Zometa due to dental disease  Bone density will be due again 1/31/2025   Reviewed bilateral diagnostic mammogram from June 2023.    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  Gave information on cancer genetics for her to review.  Patient to let me know when she is ready to proceed  All questions answered  Follow-up 3 months     Patient verbalized understanding and is in agreement of the above plan.    I spent 30 total minutes, face-to-face, caring for Alaina today. 90% of this time involved counseling and/or  coordination of care as documented within this note.

## 2023-12-14 ENCOUNTER — OFFICE VISIT (OUTPATIENT)
Dept: ONCOLOGY | Facility: CLINIC | Age: 58
End: 2023-12-14
Payer: COMMERCIAL

## 2023-12-14 ENCOUNTER — LAB (OUTPATIENT)
Dept: LAB | Facility: HOSPITAL | Age: 58
End: 2023-12-14
Payer: COMMERCIAL

## 2023-12-14 VITALS
WEIGHT: 258 LBS | OXYGEN SATURATION: 95 % | RESPIRATION RATE: 20 BRPM | HEART RATE: 95 BPM | BODY MASS INDEX: 44.05 KG/M2 | DIASTOLIC BLOOD PRESSURE: 85 MMHG | SYSTOLIC BLOOD PRESSURE: 138 MMHG | HEIGHT: 64 IN | TEMPERATURE: 98 F

## 2023-12-14 DIAGNOSIS — Z17.0 MALIGNANT NEOPLASM OF CENTRAL PORTION OF RIGHT BREAST IN FEMALE, ESTROGEN RECEPTOR POSITIVE: Primary | ICD-10-CM

## 2023-12-14 DIAGNOSIS — C50.111 MALIGNANT NEOPLASM OF CENTRAL PORTION OF RIGHT BREAST IN FEMALE, ESTROGEN RECEPTOR POSITIVE: Primary | ICD-10-CM

## 2023-12-14 LAB
ALBUMIN SERPL-MCNC: 4.4 G/DL (ref 3.5–5.2)
ALBUMIN/GLOB SERPL: 1.6 G/DL
ALP SERPL-CCNC: 136 U/L (ref 39–117)
ALT SERPL W P-5'-P-CCNC: 33 U/L (ref 1–33)
ANION GAP SERPL CALCULATED.3IONS-SCNC: 12 MMOL/L (ref 5–15)
AST SERPL-CCNC: 29 U/L (ref 1–32)
BASOPHILS # BLD AUTO: 0.05 10*3/MM3 (ref 0–0.2)
BASOPHILS NFR BLD AUTO: 0.5 % (ref 0–1.5)
BILIRUB SERPL-MCNC: 0.5 MG/DL (ref 0–1.2)
BUN SERPL-MCNC: 12 MG/DL (ref 6–20)
BUN/CREAT SERPL: 17.6 (ref 7–25)
CALCIUM SPEC-SCNC: 9.6 MG/DL (ref 8.6–10.5)
CHLORIDE SERPL-SCNC: 103 MMOL/L (ref 98–107)
CO2 SERPL-SCNC: 26 MMOL/L (ref 22–29)
CREAT SERPL-MCNC: 0.68 MG/DL (ref 0.57–1)
DEPRECATED RDW RBC AUTO: 46.9 FL (ref 37–54)
EGFRCR SERPLBLD CKD-EPI 2021: 102.4 ML/MIN/1.73
EOSINOPHIL # BLD AUTO: 0.17 10*3/MM3 (ref 0–0.4)
EOSINOPHIL NFR BLD AUTO: 1.7 % (ref 0.3–6.2)
ERYTHROCYTE [DISTWIDTH] IN BLOOD BY AUTOMATED COUNT: 14.6 % (ref 12.3–15.4)
GLOBULIN UR ELPH-MCNC: 2.7 GM/DL
GLUCOSE SERPL-MCNC: 147 MG/DL (ref 65–99)
HCT VFR BLD AUTO: 37.9 % (ref 34–46.6)
HGB BLD-MCNC: 12.2 G/DL (ref 12–15.9)
HOLD SPECIMEN: NORMAL
HOLD SPECIMEN: NORMAL
LYMPHOCYTES # BLD AUTO: 3.61 10*3/MM3 (ref 0.7–3.1)
LYMPHOCYTES NFR BLD AUTO: 36.2 % (ref 19.6–45.3)
MCH RBC QN AUTO: 29 PG (ref 26.6–33)
MCHC RBC AUTO-ENTMCNC: 32.2 G/DL (ref 31.5–35.7)
MCV RBC AUTO: 90.2 FL (ref 79–97)
MONOCYTES # BLD AUTO: 0.52 10*3/MM3 (ref 0.1–0.9)
MONOCYTES NFR BLD AUTO: 5.2 % (ref 5–12)
NEUTROPHILS NFR BLD AUTO: 5.61 10*3/MM3 (ref 1.7–7)
NEUTROPHILS NFR BLD AUTO: 56.4 % (ref 42.7–76)
PLATELET # BLD AUTO: 302 10*3/MM3 (ref 140–450)
PMV BLD AUTO: 8.9 FL (ref 6–12)
POTASSIUM SERPL-SCNC: 4.1 MMOL/L (ref 3.5–5.2)
PROT SERPL-MCNC: 7.1 G/DL (ref 6–8.5)
RBC # BLD AUTO: 4.2 10*6/MM3 (ref 3.77–5.28)
SODIUM SERPL-SCNC: 141 MMOL/L (ref 136–145)
WBC NRBC COR # BLD AUTO: 9.96 10*3/MM3 (ref 3.4–10.8)

## 2023-12-14 PROCEDURE — 80053 COMPREHEN METABOLIC PANEL: CPT | Performed by: INTERNAL MEDICINE

## 2023-12-14 PROCEDURE — 36415 COLL VENOUS BLD VENIPUNCTURE: CPT

## 2023-12-14 PROCEDURE — 85025 COMPLETE CBC W/AUTO DIFF WBC: CPT

## 2024-01-17 ENCOUNTER — LAB (OUTPATIENT)
Dept: LAB | Facility: HOSPITAL | Age: 59
End: 2024-01-17
Payer: COMMERCIAL

## 2024-01-17 DIAGNOSIS — Z79.4 TYPE 2 DIABETES MELLITUS WITH HYPERGLYCEMIA, WITH LONG-TERM CURRENT USE OF INSULIN: ICD-10-CM

## 2024-01-17 DIAGNOSIS — E11.65 TYPE 2 DIABETES MELLITUS WITH HYPERGLYCEMIA, WITH LONG-TERM CURRENT USE OF INSULIN: ICD-10-CM

## 2024-01-17 LAB
ALBUMIN SERPL-MCNC: 4.3 G/DL (ref 3.5–5.2)
ALBUMIN UR-MCNC: <1.2 MG/DL
ALBUMIN/GLOB SERPL: 1.8 G/DL
ALP SERPL-CCNC: 128 U/L (ref 39–117)
ALT SERPL W P-5'-P-CCNC: 30 U/L (ref 1–33)
ANION GAP SERPL CALCULATED.3IONS-SCNC: 11.2 MMOL/L (ref 5–15)
AST SERPL-CCNC: 24 U/L (ref 1–32)
BILIRUB SERPL-MCNC: 0.5 MG/DL (ref 0–1.2)
BUN SERPL-MCNC: 14 MG/DL (ref 6–20)
BUN/CREAT SERPL: 17.5 (ref 7–25)
CALCIUM SPEC-SCNC: 9.4 MG/DL (ref 8.6–10.5)
CHLORIDE SERPL-SCNC: 102 MMOL/L (ref 98–107)
CHOLEST SERPL-MCNC: 132 MG/DL (ref 0–200)
CO2 SERPL-SCNC: 26.8 MMOL/L (ref 22–29)
CREAT SERPL-MCNC: 0.8 MG/DL (ref 0.57–1)
CREAT UR-MCNC: 114.2 MG/DL
EGFRCR SERPLBLD CKD-EPI 2021: 86.6 ML/MIN/1.73
GLOBULIN UR ELPH-MCNC: 2.4 GM/DL
GLUCOSE SERPL-MCNC: 152 MG/DL (ref 65–99)
HBA1C MFR BLD: 6.4 % (ref 4.8–5.6)
HDLC SERPL-MCNC: 33 MG/DL (ref 40–60)
LDLC SERPL CALC-MCNC: 68 MG/DL (ref 0–100)
LDLC/HDLC SERPL: 1.91 {RATIO}
MICROALBUMIN/CREAT UR: NORMAL MG/G{CREAT}
POTASSIUM SERPL-SCNC: 4.2 MMOL/L (ref 3.5–5.2)
PROT SERPL-MCNC: 6.7 G/DL (ref 6–8.5)
SODIUM SERPL-SCNC: 140 MMOL/L (ref 136–145)
TRIGL SERPL-MCNC: 180 MG/DL (ref 0–150)
VLDLC SERPL-MCNC: 31 MG/DL (ref 5–40)

## 2024-01-17 PROCEDURE — 82043 UR ALBUMIN QUANTITATIVE: CPT

## 2024-01-17 PROCEDURE — 80053 COMPREHEN METABOLIC PANEL: CPT

## 2024-01-17 PROCEDURE — 80061 LIPID PANEL: CPT

## 2024-01-17 PROCEDURE — 36415 COLL VENOUS BLD VENIPUNCTURE: CPT

## 2024-01-17 PROCEDURE — 83036 HEMOGLOBIN GLYCOSYLATED A1C: CPT

## 2024-01-17 PROCEDURE — 82570 ASSAY OF URINE CREATININE: CPT

## 2024-01-23 ENCOUNTER — OFFICE VISIT (OUTPATIENT)
Dept: ENDOCRINOLOGY | Facility: CLINIC | Age: 59
End: 2024-01-23
Payer: COMMERCIAL

## 2024-01-23 ENCOUNTER — SPECIALTY PHARMACY (OUTPATIENT)
Dept: ENDOCRINOLOGY | Facility: CLINIC | Age: 59
End: 2024-01-23
Payer: COMMERCIAL

## 2024-01-23 VITALS
OXYGEN SATURATION: 97 % | HEART RATE: 87 BPM | WEIGHT: 258 LBS | HEIGHT: 64 IN | SYSTOLIC BLOOD PRESSURE: 130 MMHG | BODY MASS INDEX: 44.05 KG/M2 | DIASTOLIC BLOOD PRESSURE: 75 MMHG

## 2024-01-23 DIAGNOSIS — Z79.4 TYPE 2 DIABETES MELLITUS WITH HYPERGLYCEMIA, WITH LONG-TERM CURRENT USE OF INSULIN: Primary | ICD-10-CM

## 2024-01-23 DIAGNOSIS — E66.01 CLASS 3 SEVERE OBESITY DUE TO EXCESS CALORIES WITHOUT SERIOUS COMORBIDITY WITH BODY MASS INDEX (BMI) OF 45.0 TO 49.9 IN ADULT: ICD-10-CM

## 2024-01-23 DIAGNOSIS — E11.65 TYPE 2 DIABETES MELLITUS WITH HYPERGLYCEMIA, WITH LONG-TERM CURRENT USE OF INSULIN: Primary | ICD-10-CM

## 2024-01-23 DIAGNOSIS — E11.65 TYPE 2 DIABETES MELLITUS WITH HYPERGLYCEMIA, WITHOUT LONG-TERM CURRENT USE OF INSULIN: ICD-10-CM

## 2024-01-23 DIAGNOSIS — I10 HYPERTENSION, ESSENTIAL: ICD-10-CM

## 2024-01-23 DIAGNOSIS — E78.2 MIXED HYPERLIPIDEMIA: ICD-10-CM

## 2024-01-23 LAB — GLUCOSE BLDC GLUCOMTR-MCNC: 159 MG/DL (ref 70–105)

## 2024-01-23 PROCEDURE — 82948 REAGENT STRIP/BLOOD GLUCOSE: CPT | Performed by: INTERNAL MEDICINE

## 2024-01-23 RX ORDER — BLOOD-GLUCOSE SENSOR
1 EACH MISCELLANEOUS DAILY
Qty: 2 EACH | Refills: 6 | Status: SHIPPED | OUTPATIENT
Start: 2024-01-23

## 2024-01-23 RX ORDER — INSULIN LISPRO 100 [IU]/ML
45 INJECTION, SUSPENSION SUBCUTANEOUS
Qty: 60 ML | Refills: 6 | Status: SHIPPED | OUTPATIENT
Start: 2024-01-23

## 2024-01-23 NOTE — PROGRESS NOTES
Specialty Pharmacy Patient Management Program  Endocrinology Introduction to Services Outreach     Alaina Hartman is a 58 y.o. female seen by an Endocrinology provider for Type 2 Diabetes . This was an initial visit to introduce Endocrinology Patient Management Program and Specialty Pharmacy services offered by [facility], as the patient is taking specialty medication Mounjaro.     Alaina Hartman is undecided about filling specialty medication at [facility] and/or enrollment in the Endocrine Disorders Patient Management Program at this time and enrollment is therefore UNDER REVIEW. Patient remains eligible for services in the future if desired. Plan to follow-up at a future visit.    Results of Benefits Investigation  Mounjaro-$25 per month  Freestyle emery- $39.99 per month    Relevant Past Medical History and Comorbidities  Past Medical History:   Diagnosis Date    Ankle edema     at times    Breast cancer, right 2022    GERD (gastroesophageal reflux disease)     Hiatal hernia      Social History     Socioeconomic History    Marital status:      Spouse name: Bora    Number of children: 3    Years of education: 14   Tobacco Use    Smoking status: Former     Packs/day: 0.25     Years: 31.00     Additional pack years: 0.00     Total pack years: 7.75     Types: Cigarettes     Start date:      Quit date: 2010     Years since quittin.0     Passive exposure: Past    Smokeless tobacco: Never    Tobacco comments:     Started smoking about age 14   Vaping Use    Vaping Use: Never used   Substance and Sexual Activity    Alcohol use: Not Currently     Comment: rare    Drug use: Never    Sexual activity: Yes     Partners: Male     Birth control/protection: Hysterectomy     Comment: monogamous with           Allergies  Patient has no known allergies.       Current Medication List    Current Outpatient Medications:     ALPRAZolam (XANAX) 0.5 MG tablet, Take 1  tablet by mouth Daily As Needed for Anxiety., Disp: 20 tablet, Rfl: 0    atorvastatin (LIPITOR) 40 MG tablet, Take 1 tablet by mouth Daily., Disp: 90 tablet, Rfl: 4    BIOTIN PO, Take  by mouth., Disp: , Rfl:     Calcium Carbonate-Vitamin D (Oscal 500 D-3) 500-5 MG-MCG tablet, Take 500 mg by mouth Every 12 (Twelve) Hours., Disp: 60 tablet, Rfl: 6    Cholecalciferol (Vitamin D3) 50 MCG (2000 UT) capsule, Take 1 capsule by mouth once daily, Disp: 100 capsule, Rfl: 3    Continuous Blood Gluc Sensor (FreeStyle Rock 3 Sensor) misc, Use 1 Device Daily., Disp: 2 each, Rfl: 6    Cyanocobalamin (Vitamin B-12) 1000 MCG sublingual tablet, Place 1 tablet under the tongue Daily., Disp: 100 each, Rfl: 4    exemestane (Aromasin) 25 MG tablet, Take 1 tablet by mouth Daily., Disp: 90 tablet, Rfl: 2    glucose blood (Accu-Chek Guide) test strip, 1 each by Other route 4 (Four) Times a Day. Use as instructed, Disp: 150 each, Rfl: 11    ibuprofen (ADVIL,MOTRIN) 800 MG tablet, Take 1 tablet by mouth 3 (Three) Times a Day As Needed. for pain, Disp: , Rfl:     Insulin Lispro Prot & Lispro (HumaLOG Mix 75/25 KwikPen) (75-25) 100 UNIT/ML suspension pen-injector pen, Inject 45 Units under the skin into the appropriate area as directed 3 (Three) Times a Day Before Meals., Disp: 60 mL, Rfl: 6    Insulin Pen Needle (Pen Needles) 32G X 4 MM misc, Use 1 each 4 (Four) Times a Day. Use to inject insulin / DX E11.65, Disp: 150 each, Rfl: 5    lisinopril (PRINIVIL,ZESTRIL) 5 MG tablet, Take 1 tablet by mouth Daily., Disp: 90 tablet, Rfl: 4    metFORMIN ER (GLUCOPHAGE-XR) 500 MG 24 hr tablet, Take 2 tablets by mouth Daily., Disp: 180 tablet, Rfl: 6    multivitamin with minerals tablet tablet, Take 1 tablet by mouth Daily., Disp: , Rfl:     omeprazole (priLOSEC) 40 MG capsule, Take 1 capsule by mouth once daily, Disp: 90 capsule, Rfl: 3    ondansetron (ZOFRAN) 8 MG tablet, Take 1 tablet by mouth 3 (Three) Times a Day As Needed for Nausea or Vomiting.,  Disp: 30 tablet, Rfl: 5    Tirzepatide (Mounjaro) 7.5 MG/0.5ML solution pen-injector pen, Inject 0.5 mL under the skin into the appropriate area as directed 1 (One) Time Per Week., Disp: 2 mL, Rfl: 6    vitamin B-6 (PYRIDOXINE) 100 MG tablet, Take 1 tablet by mouth 2 (Two) Times a Day., Disp: , Rfl:        Changes to Therapy Plan Discussed with Patient  Medication Therapy Changes by Provider Start using the freestyle emeyr to monitor blood sugars. Increase mounjaro to 7.5 mg weekly.   Additional Plans, Therapy Recommendations, or Therapy Problems to Be Addressed: Discussed the pharmacy services with patient and will follow up in the future.     Deidra Hughes, PharmD  Clinical Specialty Pharmacist, Endocrinology  1/23/2024  16:24 EST

## 2024-01-23 NOTE — PATIENT INSTRUCTIONS
Increase Mounjaro to 7.5 mg subcu weekly  Change Humalog mix 75/25 insulin to 45 units subcu 3 times a day before meals  Use freestyle emery sensor 3 to monitor your sugars  Always keep glucose source in case of low blood sugar  Labs before follow-up.

## 2024-01-23 NOTE — PROGRESS NOTES
Endocrine Progress Note Outpatient     Patient Care Team:  Lori Fields DO as PCP - General (Family Medicine)  Seipel, Joseph F, MD as Consulting Physician (Sleep Medicine)  Jagdeep Mcwilliams MD as Surgeon (General Surgery)  Lianet Rust MD as Consulting Physician (Hematology and Oncology)  Chirag Cho MD as Consulting Physician (Endocrinology)    Chief Complaint: Follow-up type 2 diabetes    HPI: This is a 58-year-old female with history of type 2 diabetes, hypertension and hyperlipidemia is here for follow-up.     For type 2 diabetes: Currently on Mounjaro 5 mg subcu weekly, Humalog mix 75/25 insulin, she is taking 55 units subcu 3 times a day before each meal along with Metformin 500 mg twice a day.  The past she has not been able to tolerate glipizide and Actos.  She has tried Jardiance as well but it did not help her sugars.  She unfortunately did not bring blood sugar records for review but tells me her 3-month average blood sugar was about 141 and she believes Mounjaro has helped her.    Hypertension: Her blood pressure is doing well.     Hyperlipidemia: Currently on atorvastatin.  .    Past Medical History:   Diagnosis Date    Ankle edema     at times    Breast cancer, right 2022    GERD (gastroesophageal reflux disease)     Hiatal hernia        Social History     Socioeconomic History    Marital status:      Spouse name: Bora    Number of children: 3    Years of education: 14   Tobacco Use    Smoking status: Former     Packs/day: 0.25     Years: 31.00     Additional pack years: 0.00     Total pack years: 7.75     Types: Cigarettes     Start date:      Quit date: 2010     Years since quittin.0     Passive exposure: Past    Smokeless tobacco: Never    Tobacco comments:     Started smoking about age 14   Vaping Use    Vaping Use: Never used   Substance and Sexual Activity    Alcohol use: Not Currently     Comment: rare    Drug use: Never    Sexual activity:  Yes     Partners: Male     Birth control/protection: Hysterectomy     Comment: monogamous with        Family History   Problem Relation Age of Onset    Breast cancer Mother     Heart disease Mother     Thyroid disease Mother     Stroke Mother     Cancer Mother         lymphoma    Clotting disorder Father     Diabetes Brother     Heart disease Brother     Stroke Brother     Diabetes Brother     Colon cancer Paternal Grandmother        No Known Allergies    ROS:   Constitutional:  Denies fatigue, tiredness.    Eyes:  Denies change in visual acuity   HENT:  Denies nasal congestion or sore throat   Respiratory: denies cough, shortness of breath.   Cardiovascular:  denies chest pain, edema   GI:  Denies abdominal pain, nausea, vomiting.   Endocrine:  Denies polyuria or polydipsia   Psychiatric:  Denies depression or anxiety      Vitals:    01/23/24 0823   BP: 130/75   Pulse: 87   SpO2: 97%     Body mass index is 44.99 kg/m².     Physical Exam:  GEN: NAD, conversant, obese  EYES: EOMI,   NECK: no thyromegaly,  CV: RRR,  LUNG: CTA  PSYCH: AOX3, appropriate mood, affect normal      Results Review:     I reviewed the patient's new clinical results.    Lab Results   Component Value Date    HGBA1C 6.40 (H) 01/17/2024    HGBA1C 8.40 (H) 10/03/2023    HGBA1C 7.0 (H) 02/13/2023      Lab Results   Component Value Date    GLUCOSE 152 (H) 01/17/2024    BUN 14 01/17/2024    CREATININE 0.80 01/17/2024    EGFRIFNONA 90 12/01/2021    EGFRIFAFRI 115 08/26/2020    BCR 17.5 01/17/2024    K 4.2 01/17/2024    CO2 26.8 01/17/2024    CALCIUM 9.4 01/17/2024    PROTENTOTREF 7.0 08/26/2020    ALBUMIN 4.3 01/17/2024    LABIL2 1.8 08/26/2020    AST 24 01/17/2024    ALT 30 01/17/2024    CHOL 132 01/17/2024    TRIG 180 (H) 01/17/2024    LDL 68 01/17/2024    HDL 33 (L) 01/17/2024     Lab Results   Component Value Date    TSH 3.530 06/28/2023         Medication Review: Reviewed.       Current Outpatient Medications:     ALPRAZolam (XANAX) 0.5 MG  tablet, Take 1 tablet by mouth Daily As Needed for Anxiety., Disp: 20 tablet, Rfl: 0    atorvastatin (LIPITOR) 40 MG tablet, Take 1 tablet by mouth Daily., Disp: 90 tablet, Rfl: 4    BIOTIN PO, Take  by mouth., Disp: , Rfl:     Calcium Carbonate-Vitamin D (Oscal 500 D-3) 500-5 MG-MCG tablet, Take 500 mg by mouth Every 12 (Twelve) Hours., Disp: 60 tablet, Rfl: 6    Cholecalciferol (Vitamin D3) 50 MCG (2000 UT) capsule, Take 1 capsule by mouth once daily, Disp: 100 capsule, Rfl: 3    Cyanocobalamin (Vitamin B-12) 1000 MCG sublingual tablet, Place 1 tablet under the tongue Daily., Disp: 100 each, Rfl: 4    exemestane (Aromasin) 25 MG tablet, Take 1 tablet by mouth Daily., Disp: 90 tablet, Rfl: 2    glucose blood (Accu-Chek Guide) test strip, 1 each by Other route 4 (Four) Times a Day. Use as instructed, Disp: 150 each, Rfl: 11    ibuprofen (ADVIL,MOTRIN) 800 MG tablet, Take 1 tablet by mouth 3 (Three) Times a Day As Needed. for pain, Disp: , Rfl:     Insulin Lispro Prot & Lispro (HumaLOG Mix 75/25 KwikPen) (75-25) 100 UNIT/ML suspension pen-injector pen, Inject 55 Units under the skin into the appropriate area as directed 3 (Three) Times a Day Before Meals., Disp: 60 mL, Rfl: 6    Insulin Pen Needle (Pen Needles) 32G X 4 MM misc, Use 1 each 4 (Four) Times a Day. Use to inject insulin / DX E11.65, Disp: 150 each, Rfl: 5    lisinopril (PRINIVIL,ZESTRIL) 5 MG tablet, Take 1 tablet by mouth Daily., Disp: 90 tablet, Rfl: 4    metFORMIN ER (GLUCOPHAGE-XR) 500 MG 24 hr tablet, Take 2 tablets by mouth Daily., Disp: 180 tablet, Rfl: 6    multivitamin with minerals tablet tablet, Take 1 tablet by mouth Daily., Disp: , Rfl:     omeprazole (priLOSEC) 40 MG capsule, Take 1 capsule by mouth once daily, Disp: 90 capsule, Rfl: 3    ondansetron (ZOFRAN) 8 MG tablet, Take 1 tablet by mouth 3 (Three) Times a Day As Needed for Nausea or Vomiting., Disp: 30 tablet, Rfl: 5    Tirzepatide (Mounjaro) 5 MG/0.5ML solution pen-injector, Inject  0.5 mL under the skin into the appropriate area as directed 1 (One) Time Per Week. Start 5 mg dose after finishing 2.5 mg dose for 4 weeks., Disp: 2 mL, Rfl: 5    vitamin B-6 (PYRIDOXINE) 100 MG tablet, Take 1 tablet by mouth 2 (Two) Times a Day., Disp: , Rfl:       Assessment & Plan   1.  Diabetes mellitus type 2 with hyperglycemia: Overall doing well with A1c now at 6.4%.  At this time we will increase Mounjaro to 7.5 mg subcu weekly and decrease Humalog mix 75/25 insulin to 45 units subcu 3 times a day before each meal.  She is advised to continue to work on diet and activity and always keep glucose source in case of low blood sugars.  She will continue metformin 500 mg twice a day.    2.  Hypertension: Well-controlled, continue current medications.    3.  Hyperlipidemia: LDL is below 70, continue atorvastatin.    4.  Class III obesity: Weight is stable, encouraged to continue to work on diet and activity.    Assessment and plan from October 10, 2023 reviewed and updated.                Chirag Cho MD FACE.

## 2024-01-24 ENCOUNTER — TELEPHONE (OUTPATIENT)
Dept: ENDOCRINOLOGY | Facility: CLINIC | Age: 59
End: 2024-01-24
Payer: COMMERCIAL

## 2024-01-24 NOTE — TELEPHONE ENCOUNTER
Pt called stating that Rock 3 sensors Rx could not be filled due to sig. Called pharmacy and updated sig. Pt voiced understanding.

## 2024-02-28 NOTE — TELEPHONE ENCOUNTER
PO FU Call- spoke with pt. No po appt needed unless issues. Knows to call with any questions or concerns.     The patient's History and Physical was reviewed with the patient and I examined the patient. There was no change. The surgical site was confirmed by the patient and me. Plan: The risks, benefits, expected outcome, and alternative to the recommended procedure have been discussed with the patient. Patient understands and wants to proceed with the procedure.

## 2024-03-15 ENCOUNTER — OFFICE VISIT (OUTPATIENT)
Dept: SURGERY | Facility: CLINIC | Age: 59
End: 2024-03-15
Payer: COMMERCIAL

## 2024-03-15 VITALS
SYSTOLIC BLOOD PRESSURE: 136 MMHG | RESPIRATION RATE: 18 BRPM | WEIGHT: 259 LBS | BODY MASS INDEX: 44.22 KG/M2 | OXYGEN SATURATION: 96 % | DIASTOLIC BLOOD PRESSURE: 78 MMHG | HEART RATE: 95 BPM | TEMPERATURE: 98.6 F | HEIGHT: 64 IN

## 2024-03-15 DIAGNOSIS — C50.111 MALIGNANT NEOPLASM OF CENTRAL PORTION OF RIGHT BREAST IN FEMALE, ESTROGEN RECEPTOR POSITIVE: Primary | ICD-10-CM

## 2024-03-15 DIAGNOSIS — Z17.0 MALIGNANT NEOPLASM OF CENTRAL PORTION OF RIGHT BREAST IN FEMALE, ESTROGEN RECEPTOR POSITIVE: Primary | ICD-10-CM

## 2024-03-15 PROCEDURE — 99213 OFFICE O/P EST LOW 20 MIN: CPT | Performed by: SURGERY

## 2024-03-15 NOTE — PROGRESS NOTES
- Hematology/Oncology Outpatient Follow Up    PATIENT NAME:Alaina Hartman  :1965  MRN: [id number]  PRIMARY CARE PHYSICIAN: Lori Fields DO  REFERRING PHYSICIAN: No ref. provider found    Chief Complaint   Patient presents with    Follow-up     Malignant neoplasm of central portion of right breast in female, estrogen receptor positive        HISTORY OF PRESENT ILLNESS:     This is a 57-year-old female who was clinically asymptomatic and had a routine screening mammogram which showed an abnormality on the right breast.  Prior to that patient had left breast biopsy in  for benign disease.     Review of her screening mammogram which was performed on 6/3/2022 showed a new 2 cm focal asymmetry with architectural distortion in the mid to posterior third depth of the outer right breast the left breast was benign.  Right diagnostic mammogram and ultrasound was recommended.  On 2022 patient underwent right diagnostic mammogram and ultrasound which showed a 2 cm irregular mass in the lateral inferior breast.  Same day she had an ultrasound which showed a 1.6 cm on the right breast the right axilla did not show any abnormal lymph nodes.     Patient then underwent ultrasound-guided biopsy of the right breast mass pathology revealed moderately differentiated invasive ductal carcinoma estrogen receptor positive at 95% progesterone receptor positive at 50%, HER2/bertha was negative at 1+ on immunohistochemistry.        Patient had a partial hysterectomy many years ago.  She is  and has 3 children.  She does not smoke, does not drink alcohol     Family history significant for mother with breast cancer in her 50s, her father had skin cancers, paternal grandmother had colon cancer at the age of 70        She is a       2022 estradiol level was less than 5 and FSH was 33 consistent with postmenopausal state  2022 patient underwent right lumpectomy with sentinel lymph node biopsy.   Pathology revealed residual moderately differentiated ductal carcinoma measuring 1.7 cm completely excised total of 5 lymph nodes were removed and all of them were negative for metastatic disease.  Pathology stage is pT1 cpN0 M0.  There was no evidence of DCIS all margins were negative distance to the closest margin was anterior margin at 2 mm ER positive at 95, NY positive at 50% and HER2/bertha was negative.  9/27/2022 patient had Oncotype DX assay done which returned with a recurrence score of 28.  This is consistent with 17% risk of recurrence at 9 years distantly, and more than 15% chemotherapy benefit.  On the validation assay estrogen receptor was positive, NY was positive and HER2/bertha was negative.  10/27/2022: C1 D1 Taxotere and Cytoxan received.  Neulasta support held due to leukocytosis.  11/4/2022: WBC 2.28, hemoglobin 11.9, platelets 297,000, ANC 0.08.  Patient was initiated on Neupogen 480 mg subcu given 11/4/2022, 11/5/2022, 11/6/2022.  Patient to receive Neulasta again with next cycle of treatment.  11/17/2022: Patient received cycle 2 of Taxotere Cytoxan with Neulasta support  12/8/2022: Patient received cycle 3 of Taxotere and Cytoxan with Neulasta support  12/29/2020 patient received cycle 4 of combination chemotherapy with Cytoxan and Taxotere  Patient completed adjuvant breast radiation February 2023 6/30/2023 bilateral diagnostic mammogram with no mammographic findings to indicate right or left breast malignancy      Past Medical History:   Diagnosis Date    Ankle edema     at times    Breast cancer, right 08/2022    Diabetes mellitus     GERD (gastroesophageal reflux disease)     Hiatal hernia     Hypertension        Past Surgical History:   Procedure Laterality Date    BREAST BIOPSY Right 08/18/2022    Procedure: Right breast lumpectomy;  Surgeon: Jagdeep Mcwilliams MD;  Location: [facility] MAIN OR;  Service: General;  Laterality: Right;    BREAST LUMPECTOMY Left 2003    x2    PORTACATH  PLACEMENT N/A 10/06/2022    Procedure: INSERTION OF PORTACATH;  Surgeon: Jagdeep Mcwilliams MD;  Location: [facility] MAIN OR;  Service: General;  Laterality: N/A;    SENTINEL NODE BIOPSY Right 08/18/2022    Procedure: SENTINEL NODE BIOPSY;  Surgeon: Jagdeep Mcwilliams MD;  Location: [facility] MAIN OR;  Service: General;  Laterality: Right;    SUBTOTAL HYSTERECTOMY  10/10/1991    had it done for bleeding. Ovaries still present    TOOTH EXTRACTION      has had all teeth (but one) removed         Current Outpatient Medications:     ALPRAZolam (XANAX) 0.5 MG tablet, Take 1 tablet by mouth Daily As Needed for Anxiety., Disp: 20 tablet, Rfl: 0    atorvastatin (LIPITOR) 40 MG tablet, Take 1 tablet by mouth Daily., Disp: 90 tablet, Rfl: 4    BIOTIN PO, Take  by mouth., Disp: , Rfl:     Calcium Carbonate-Vitamin D (Oscal 500 D-3) 500-5 MG-MCG tablet, Take 500 mg by mouth Every 12 (Twelve) Hours., Disp: 60 tablet, Rfl: 6    Cholecalciferol (Vitamin D3) 50 MCG (2000 UT) capsule, Take 1 capsule by mouth once daily, Disp: 100 capsule, Rfl: 3    Continuous Blood Gluc Sensor (FreeStyle Rock 3 Sensor) misc, Use 1 Device Daily., Disp: 2 each, Rfl: 6    Cyanocobalamin (Vitamin B-12) 1000 MCG sublingual tablet, Place 1 tablet under the tongue Daily., Disp: 100 each, Rfl: 4    exemestane (Aromasin) 25 MG tablet, Take 1 tablet by mouth Daily., Disp: 90 tablet, Rfl: 2    glucose blood (Accu-Chek Guide) test strip, 1 each by Other route 4 (Four) Times a Day. Use as instructed, Disp: 150 each, Rfl: 11    ibuprofen (ADVIL,MOTRIN) 800 MG tablet, Take 1 tablet by mouth 3 (Three) Times a Day As Needed. for pain, Disp: , Rfl:     Insulin Lispro Prot & Lispro (HumaLOG Mix 75/25 KwikPen) (75-25) 100 UNIT/ML suspension pen-injector pen, Inject 45 Units under the skin into the appropriate area as directed 3 (Three) Times a Day Before Meals., Disp: 60 mL, Rfl: 6    Insulin Pen Needle (Pen Needles) 32G X 4 MM misc, Use 1 each 4 (Four) Times a  Day. Use to inject insulin / DX E11.65, Disp: 150 each, Rfl: 5    lisinopril (PRINIVIL,ZESTRIL) 5 MG tablet, Take 1 tablet by mouth Daily., Disp: 90 tablet, Rfl: 4    metFORMIN ER (GLUCOPHAGE-XR) 500 MG 24 hr tablet, Take 2 tablets by mouth Daily., Disp: 180 tablet, Rfl: 6    multivitamin with minerals tablet tablet, Take 1 tablet by mouth Daily., Disp: , Rfl:     omeprazole (priLOSEC) 40 MG capsule, Take 1 capsule by mouth once daily, Disp: 90 capsule, Rfl: 3    ondansetron (ZOFRAN) 8 MG tablet, Take 1 tablet by mouth 3 (Three) Times a Day As Needed for Nausea or Vomiting., Disp: 30 tablet, Rfl: 5    Tirzepatide (Mounjaro) 7.5 MG/0.5ML solution pen-injector pen, Inject 0.5 mL under the skin into the appropriate area as directed 1 (One) Time Per Week., Disp: 2 mL, Rfl: 6    vitamin B-6 (PYRIDOXINE) 100 MG tablet, Take 1 tablet by mouth 2 (Two) Times a Day., Disp: , Rfl:     No Known Allergies    Family History   Problem Relation Age of Onset    Breast cancer Mother     Heart disease Mother     Thyroid disease Mother     Stroke Mother     Cancer Mother         lymphoma    Clotting disorder Father     Diabetes Brother     Heart disease Brother     Stroke Brother     Diabetes Brother     Colon cancer Paternal Grandmother        Cancer-related family history includes Breast cancer in her mother; Cancer in her mother; Colon cancer in her paternal grandmother.    Social History     Tobacco Use    Smoking status: Former     Current packs/day: 0.00     Average packs/day: 0.3 packs/day for 31.0 years (7.8 ttl pk-yrs)     Types: Cigarettes     Start date:      Quit date: 2010     Years since quittin.2     Passive exposure: Past    Smokeless tobacco: Never    Tobacco comments:     Started smoking about age 14   Vaping Use    Vaping status: Never Used   Substance Use Topics    Alcohol use: Not Currently     Comment: rare    Drug use: Never       I have reviewed and confirmed the accuracy of the patient's history:  "Chief complaint, HPI, ROS, and Subjective as entered by the MA/LPN/RN. Lianet Gissel Rust MD 03/18/24        SUBJECTIVE:    Denies any new issues.  Continues to tolerate endocrine therapy without any difficulties.    Patient is here today for routine visit and denies any new issues.          REVIEW OF SYSTEMS:    Review of Systems   Constitutional:  Positive for fatigue. Negative for chills and fever.   HENT:  Negative for congestion, drooling, ear discharge, rhinorrhea, sinus pressure and tinnitus.    Eyes:  Negative for photophobia, pain and discharge.   Respiratory:  Negative for apnea, choking and stridor.    Cardiovascular:  Positive for leg swelling. Negative for palpitations.   Gastrointestinal:  Negative for abdominal distention, abdominal pain and anal bleeding.   Endocrine: Negative for polydipsia and polyphagia.   Genitourinary:  Negative for decreased urine volume, flank pain and genital sores.   Musculoskeletal:  Positive for arthralgias. Negative for gait problem, neck pain and neck stiffness.   Skin:  Negative for color change, rash and wound.   Neurological:  Negative for tremors, seizures, syncope, facial asymmetry and speech difficulty.   Hematological:  Negative for adenopathy.   Psychiatric/Behavioral:  Negative for agitation, confusion, hallucinations and self-injury. The patient is not hyperactive.        OBJECTIVE:    Vitals:    03/18/24 0925   BP: 147/90   Pulse: 87   Resp: 18   Temp: 98 °F (36.7 °C)   TempSrc: Infrared   SpO2: 98%   Weight: 118 kg (261 lb)   Height: 161.3 cm (63.5\")   PainSc: 0-No pain         Body mass index is 45.51 kg/m².    ECOG    (0) Fully active, able to carry on all predisease performance without restriction    Physical Exam  Vitals and nursing note reviewed.   Constitutional:       General: She is not in acute distress.     Appearance: She is not diaphoretic.   HENT:      Head: Normocephalic and atraumatic.   Eyes:      General: No scleral icterus.        Right " eye: No discharge.         Left eye: No discharge.      Conjunctiva/sclera: Conjunctivae normal.   Neck:      Thyroid: No thyromegaly.   Cardiovascular:      Rate and Rhythm: Normal rate and regular rhythm.      Heart sounds: Normal heart sounds.      No friction rub. No gallop.   Pulmonary:      Effort: Pulmonary effort is normal. No respiratory distress.      Breath sounds: No stridor. No wheezing.   Abdominal:      Palpations: Abdomen is soft. There is no mass.      Tenderness: There is no abdominal tenderness. There is no guarding or rebound.   Musculoskeletal:         General: No tenderness. Normal range of motion.      Cervical back: Normal range of motion and neck supple.   Lymphadenopathy:      Cervical: No cervical adenopathy.   Skin:     General: Skin is warm.      Findings: No erythema or rash.   Neurological:      Mental Status: She is alert and oriented to person, place, and time.      Motor: No abnormal muscle tone.   Psychiatric:         Mood and Affect: Mood normal.         Behavior: Behavior normal.     Bilateral mammogram and bilateral axillary exam was unremarkable     I have reexamined the patient and the results are consistent with the previously documented exam. Lianet Gissel Rust MD      RECENT LABS    WBC   Date Value Ref Range Status   03/18/2024 10.82 (H) 3.40 - 10.80 10*3/mm3 Final   08/26/2020 11.0 (H) 3.4 - 10.8 x10E3/uL Final     RBC   Date Value Ref Range Status   03/18/2024 4.36 3.77 - 5.28 10*6/mm3 Final   08/26/2020 4.53 3.77 - 5.28 x10E6/uL Final     Hemoglobin   Date Value Ref Range Status   03/18/2024 12.7 12.0 - 15.9 g/dL Final     Hematocrit   Date Value Ref Range Status   03/18/2024 39.8 34.0 - 46.6 % Final     MCV   Date Value Ref Range Status   03/18/2024 91.3 79.0 - 97.0 fL Final     MCH   Date Value Ref Range Status   03/18/2024 29.1 26.6 - 33.0 pg Final     MCHC   Date Value Ref Range Status   03/18/2024 31.9 31.5 - 35.7 g/dL Final     RDW   Date Value Ref Range  Status   03/18/2024 14.6 12.3 - 15.4 % Final     RDW-SD   Date Value Ref Range Status   03/18/2024 46.9 37.0 - 54.0 fl Final     MPV   Date Value Ref Range Status   03/18/2024 9.7 6.0 - 12.0 fL Final     Platelets   Date Value Ref Range Status   03/18/2024 331 140 - 450 10*3/mm3 Final     Neutrophil %   Date Value Ref Range Status   03/18/2024 62.7 42.7 - 76.0 % Final     Lymphocyte %   Date Value Ref Range Status   03/18/2024 29.7 19.6 - 45.3 % Final     Monocyte %   Date Value Ref Range Status   03/18/2024 4.9 (L) 5.0 - 12.0 % Final     Eosinophil %   Date Value Ref Range Status   03/18/2024 2.1 0.3 - 6.2 % Final     Basophil %   Date Value Ref Range Status   03/18/2024 0.6 0.0 - 1.5 % Final     Immature Grans %   Date Value Ref Range Status   08/09/2022 0.5 0.0 - 0.5 % Final     Neutrophils, Absolute   Date Value Ref Range Status   03/18/2024 6.78 1.70 - 7.00 10*3/mm3 Final     Lymphocytes, Absolute   Date Value Ref Range Status   03/18/2024 3.21 (H) 0.70 - 3.10 10*3/mm3 Final     Monocytes, Absolute   Date Value Ref Range Status   03/18/2024 0.53 0.10 - 0.90 10*3/mm3 Final     Eosinophils, Absolute   Date Value Ref Range Status   03/18/2024 0.23 0.00 - 0.40 10*3/mm3 Final     Basophils, Absolute   Date Value Ref Range Status   03/18/2024 0.07 0.00 - 0.20 10*3/mm3 Final     Immature Grans, Absolute   Date Value Ref Range Status   08/09/2022 0.05 0.00 - 0.05 10*3/mm3 Final     nRBC   Date Value Ref Range Status   08/09/2022 0.0 0.0 - 0.2 /100 WBC Final       Lab Results   Component Value Date    GLUCOSE 152 (H) 01/17/2024    BUN 14 01/17/2024    CREATININE 0.80 01/17/2024    EGFRIFNONA 90 12/01/2021    EGFRIFAFRI 115 08/26/2020    BCR 17.5 01/17/2024    K 4.2 01/17/2024    CO2 26.8 01/17/2024    CALCIUM 9.4 01/17/2024    PROTENTOTREF 7.0 08/26/2020    ALBUMIN 4.3 01/17/2024    LABIL2 1.8 08/26/2020    AST 24 01/17/2024    ALT 30 01/17/2024         Assessment & Plan     Malignant neoplasm of central portion of  right breast in female, estrogen receptor positive  - CBC & Differential  - Mammo Diagnostic Digital Tomosynthesis Bilateral With CAD  - US Breast Bilateral Limited  - Comprehensive Metabolic Panel          ASSESSMENT:      Moderately differentiated invasive ductal carcinoma of the right breast.  Status post right lumpectomy with sentinel lymph node biopsy.  pT1 cpN0 M0.  ER positive at 95%, MA positive at 50% and HER2/bertha was negative.  T1 cN0 M0.  Ongoing management  Oncotype DX assay with a high recurrence score at 28, consistent with 17% risk of distant recurrence at 9 years with tamoxifen alone and group absolute chemotherapy benefit of more than 15%  Diagnosis post adjuvant chemotherapy with Cytoxan and Taxotere.  She has received 4 out of 4 cycles completed on December 29, 2022.  Reviewed  Status post right breast radiation completed February 2023  Radiation-induced dermatitis: Resolved  On Aromasin 25 mg p.o. daily initiated March 2023.  Will continue  Status post partial hysterectomy   Postmenopausal state following lab confirmation  Family history of breast cancer in her mother at the age of 50, paternal grandmother with colon cancer at 17 and her father had skin cancer.  Assessment has been reviewed and updated.    Chemotherapy-induced neutropenia.  Patient is afebrile.          Plans:     CBC reviewed.  CMP ordered  Continue Aromasin 25 mg p.o. daily.  Refills sent today in a 90-day supply  Continue calcium with vitamin D 600 mg twice a day  Bilateral diagnostic mammogram will be due again July 2024, ordered today  Per patient her dentist advised against Zometa due to dental disease  Bone density will be due again 1/31/2025.  Reviewed  Reviewed bilateral diagnostic mammogram from June 2023.    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  Gave information on cancer genetics for her to review.  Patient to  let me know when she is ready to proceed  All questions answered  Follow-up 4 months     Patient verbalized understanding and is in agreement of the above plan.        I spent 30 total minutes, face-to-face, caring for Alaina today. 90% of this time involved counseling and/or coordination of care as documented within this note.

## 2024-03-18 ENCOUNTER — LAB (OUTPATIENT)
Dept: LAB | Facility: HOSPITAL | Age: 59
End: 2024-03-18
Payer: COMMERCIAL

## 2024-03-18 ENCOUNTER — OFFICE VISIT (OUTPATIENT)
Dept: ONCOLOGY | Facility: CLINIC | Age: 59
End: 2024-03-18
Payer: COMMERCIAL

## 2024-03-18 VITALS
OXYGEN SATURATION: 98 % | WEIGHT: 261 LBS | RESPIRATION RATE: 18 BRPM | BODY MASS INDEX: 44.56 KG/M2 | HEIGHT: 64 IN | TEMPERATURE: 98 F | DIASTOLIC BLOOD PRESSURE: 90 MMHG | HEART RATE: 87 BPM | SYSTOLIC BLOOD PRESSURE: 147 MMHG

## 2024-03-18 DIAGNOSIS — C50.111 MALIGNANT NEOPLASM OF CENTRAL PORTION OF RIGHT BREAST IN FEMALE, ESTROGEN RECEPTOR POSITIVE: Primary | ICD-10-CM

## 2024-03-18 DIAGNOSIS — Z17.0 MALIGNANT NEOPLASM OF CENTRAL PORTION OF RIGHT BREAST IN FEMALE, ESTROGEN RECEPTOR POSITIVE: Primary | ICD-10-CM

## 2024-03-18 LAB
ALBUMIN SERPL-MCNC: 4.4 G/DL (ref 3.5–5.2)
ALBUMIN/GLOB SERPL: 1.9 G/DL
ALP SERPL-CCNC: 143 U/L (ref 39–117)
ALT SERPL W P-5'-P-CCNC: 27 U/L (ref 1–33)
ANION GAP SERPL CALCULATED.3IONS-SCNC: 14 MMOL/L (ref 5–15)
AST SERPL-CCNC: 22 U/L (ref 1–32)
BASOPHILS # BLD AUTO: 0.07 10*3/MM3 (ref 0–0.2)
BASOPHILS NFR BLD AUTO: 0.6 % (ref 0–1.5)
BILIRUB SERPL-MCNC: 0.5 MG/DL (ref 0–1.2)
BUN SERPL-MCNC: 11 MG/DL (ref 6–20)
BUN/CREAT SERPL: 16.2 (ref 7–25)
CALCIUM SPEC-SCNC: 9.7 MG/DL (ref 8.6–10.5)
CHLORIDE SERPL-SCNC: 103 MMOL/L (ref 98–107)
CO2 SERPL-SCNC: 23 MMOL/L (ref 22–29)
CREAT SERPL-MCNC: 0.68 MG/DL (ref 0.57–1)
DEPRECATED RDW RBC AUTO: 46.9 FL (ref 37–54)
EGFRCR SERPLBLD CKD-EPI 2021: 102.4 ML/MIN/1.73
EOSINOPHIL # BLD AUTO: 0.23 10*3/MM3 (ref 0–0.4)
EOSINOPHIL NFR BLD AUTO: 2.1 % (ref 0.3–6.2)
ERYTHROCYTE [DISTWIDTH] IN BLOOD BY AUTOMATED COUNT: 14.6 % (ref 12.3–15.4)
GLOBULIN UR ELPH-MCNC: 2.3 GM/DL
GLUCOSE SERPL-MCNC: 147 MG/DL (ref 65–99)
HCT VFR BLD AUTO: 39.8 % (ref 34–46.6)
HGB BLD-MCNC: 12.7 G/DL (ref 12–15.9)
HOLD SPECIMEN: NORMAL
HOLD SPECIMEN: NORMAL
LYMPHOCYTES # BLD AUTO: 3.21 10*3/MM3 (ref 0.7–3.1)
LYMPHOCYTES NFR BLD AUTO: 29.7 % (ref 19.6–45.3)
MCH RBC QN AUTO: 29.1 PG (ref 26.6–33)
MCHC RBC AUTO-ENTMCNC: 31.9 G/DL (ref 31.5–35.7)
MCV RBC AUTO: 91.3 FL (ref 79–97)
MONOCYTES # BLD AUTO: 0.53 10*3/MM3 (ref 0.1–0.9)
MONOCYTES NFR BLD AUTO: 4.9 % (ref 5–12)
NEUTROPHILS NFR BLD AUTO: 6.78 10*3/MM3 (ref 1.7–7)
NEUTROPHILS NFR BLD AUTO: 62.7 % (ref 42.7–76)
PLATELET # BLD AUTO: 331 10*3/MM3 (ref 140–450)
PMV BLD AUTO: 9.7 FL (ref 6–12)
POTASSIUM SERPL-SCNC: 4.3 MMOL/L (ref 3.5–5.2)
PROT SERPL-MCNC: 6.7 G/DL (ref 6–8.5)
RBC # BLD AUTO: 4.36 10*6/MM3 (ref 3.77–5.28)
SODIUM SERPL-SCNC: 140 MMOL/L (ref 136–145)
WBC NRBC COR # BLD AUTO: 10.82 10*3/MM3 (ref 3.4–10.8)

## 2024-03-18 PROCEDURE — 99214 OFFICE O/P EST MOD 30 MIN: CPT | Performed by: INTERNAL MEDICINE

## 2024-03-18 PROCEDURE — 80053 COMPREHEN METABOLIC PANEL: CPT | Performed by: INTERNAL MEDICINE

## 2024-03-18 PROCEDURE — 36415 COLL VENOUS BLD VENIPUNCTURE: CPT

## 2024-03-18 PROCEDURE — 85025 COMPLETE CBC W/AUTO DIFF WBC: CPT

## 2024-03-19 NOTE — PROGRESS NOTES
GENERAL SURGERY ESTABLISHED PATIENT NOTE    Patient Care Team:  Lori Fields DO as PCP - General (Family Medicine)  Seipel, Joseph F, MD as Consulting Physician (Sleep Medicine)  Jagdeep Mcwilliams MD as Surgeon (General Surgery)  Lianet Rust MD as Consulting Physician (Hematology and Oncology)  Chirag Cho MD as Consulting Physician (Endocrinology)    Reason for follow-up: 6 month follow-up for breast cancer    Subjective     Patient is a 58 y.o. female presents for 6-month follow-up from right lumpectomy and sentinel lymph node biopsy performed on 8/18/2022.  The patient has completed chemotherapy, and radiation therapy.  Overall she is doing well without any significant complaints.  She most recently had a diagnostic mammogram of the breast performed bilaterally on 7/18/2023 which demonstrated interval posttreatment changes in the right breast and no mammographic findings to indicate malignancy in the left breast.  She is taking Aromasin without any significant complaints.  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  Overall she appears to be doing well and has no complaints.    Review of Systems   Genitourinary:  Negative for breast discharge, breast lump and breast pain.   Hematological:  Negative for adenopathy.        History  Past Medical History:   Diagnosis Date    Ankle edema     at times    Breast cancer, right 08/2022    Diabetes mellitus     GERD (gastroesophageal reflux disease)     Hiatal hernia     Hypertension      Past Surgical History:   Procedure Laterality Date    BREAST BIOPSY Right 08/18/2022    Procedure: Right breast lumpectomy;  Surgeon: Jgadeep Mcwilliams MD;  Location: [facility] MAIN OR;  Service: General;  Laterality: Right;    BREAST LUMPECTOMY Left 2003    x2    PORTACATH PLACEMENT N/A 10/06/2022    Procedure: INSERTION OF PORTACATH;  Surgeon: Jagdeep Mcwilliams MD;  Location: [facility] MAIN OR;  Service: General;  Laterality: N/A;    SENTINEL  NODE BIOPSY Right 2022    Procedure: SENTINEL NODE BIOPSY;  Surgeon: Jagdeep Mcwilliams MD;  Location: [facility] MAIN OR;  Service: General;  Laterality: Right;    SUBTOTAL HYSTERECTOMY  10/10/1991    had it done for bleeding. Ovaries still present    TOOTH EXTRACTION      has had all teeth (but one) removed     Family History   Problem Relation Age of Onset    Breast cancer Mother     Heart disease Mother     Thyroid disease Mother     Stroke Mother     Cancer Mother         lymphoma    Clotting disorder Father     Diabetes Brother     Heart disease Brother     Stroke Brother     Diabetes Brother     Colon cancer Paternal Grandmother      Social History     Tobacco Use    Smoking status: Former     Current packs/day: 0.00     Average packs/day: 0.3 packs/day for 31.0 years (7.8 ttl pk-yrs)     Types: Cigarettes     Start date:      Quit date: 2010     Years since quittin.2     Passive exposure: Past    Smokeless tobacco: Never    Tobacco comments:     Started smoking about age 14   Vaping Use    Vaping status: Never Used   Substance Use Topics    Alcohol use: Not Currently     Comment: rare    Drug use: Never     (Not in a hospital admission)    Allergies:  Patient has no known allergies.    Objective     Vital Signs       Physical Exam  Vitals reviewed. Exam conducted with a chaperone present.   Constitutional:       Appearance: She is well-developed.   HENT:      Head: Normocephalic and atraumatic.   Eyes:      Pupils: Pupils are equal, round, and reactive to light.   Cardiovascular:      Rate and Rhythm: Normal rate and regular rhythm.   Pulmonary:      Effort: Pulmonary effort is normal.      Breath sounds: Normal breath sounds.   Chest:      Comments: Both breasts were examined the upright position.  Both breasts exhibit pendulous morphology.  There is a well-healed curvilinear incision on the lateral aspect of the right breast.  No palpable masses bilaterally, no skin changes,  thickening, dimpling, or rashes are noted.  No discharge from the nipple  Abdominal:      General: There is no distension.      Palpations: Abdomen is soft.      Tenderness: There is no abdominal tenderness.      Hernia: No hernia is present.   Musculoskeletal:         General: Normal range of motion.      Cervical back: Normal range of motion.   Lymphadenopathy:      Cervical: No cervical adenopathy.      Upper Body:      Right upper body: No supraclavicular or axillary adenopathy.      Left upper body: No supraclavicular or axillary adenopathy.   Skin:     General: Skin is warm and dry.      Findings: No rash.   Neurological:      Mental Status: She is alert and oriented to person, place, and time.         Results Review:   Lab Results (last 24 hours)       ** No results found for the last 24 hours. **          No radiology results for the last day      I reviewed the patient's new imaging results and agree with the interpretation.  I reviewed the patient's other test results and agree with the interpretation    Assessment & Plan   Right breast cancer    Pathology reviewed, patient noted to have 1.7 cm residual moderately differentiated ductal carcinoma which was completely excised.  5 lymph nodes were removed, and all negative.  The patient had an Oncotype DX performed, and elected to undergo adjuvant chemotherapy.  She has now completed radiation therapy.  Recommend follow-up with physical therapy for lymphedema exercises  Recommend follow-up with medical oncology as directed  Recommend follow-up with radiation oncology as directed  Follow-up with me in 6 months or sooner if issues arise  Port-A-Cath has been removed    I discussed the patients findings and my recommendations with the patient.     Jagdeep Mcwilliams MD  03/19/24  15:44 EDT

## 2024-03-28 ENCOUNTER — SPECIALTY PHARMACY (OUTPATIENT)
Dept: ENDOCRINOLOGY | Facility: CLINIC | Age: 59
End: 2024-03-28
Payer: COMMERCIAL

## 2024-03-28 RX ORDER — TIRZEPATIDE 10 MG/.5ML
10 INJECTION, SOLUTION SUBCUTANEOUS WEEKLY
Qty: 2 ML | Refills: 5 | Status: SHIPPED | OUTPATIENT
Start: 2024-03-28

## 2024-03-28 RX ORDER — BLOOD-GLUCOSE SENSOR
1 EACH MISCELLANEOUS
Qty: 2 EACH | Refills: 6 | Status: SHIPPED | OUTPATIENT
Start: 2024-03-28

## 2024-03-28 NOTE — PROGRESS NOTES
Specialty Pharmacy Patient Management Program  Endocrinology Initial Assessment     Alania Hartman was referred by an Endocrinology provider to the Endocrinology Patient Management program offered by [facility] for Type 2 Diabetes on 24.  An initial outreach was conducted, including assessment of therapy appropriateness and specialty medication education for Mounjaro. The patient was introduced to services offered by [facility], including: regular assessments, refill coordination, curbside pick-up or mail order delivery options, prior authorization maintenance, and financial assistance programs as applicable. The patient was also provided with contact information for the pharmacy team.     Insurance Coverage & Financial Support  Benefits Investigation Summary    Prescription: Renewal     Dispensing pharmacy: [facility]    Copay amount: $25/month for Mounjaro *w/ voucher*    PLAN: [employer]  BIN: [id number]  PCN: N/[id number]  RX GROUP: [id number]        Relevant Past Medical History and Comorbidities  Relevant medical history and concomitant health conditions were discussed with the patient. The patient's chart has been reviewed for relevant past medical history and comorbid conditions and updated as necessary.  Past Medical History:   Diagnosis Date    Ankle edema     at times    Breast cancer, right 2022    Diabetes mellitus     GERD (gastroesophageal reflux disease)     Hiatal hernia     Hypertension      Social History     Socioeconomic History    Marital status:      Spouse name: Broa    Number of children: 3    Years of education: 14   Tobacco Use    Smoking status: Former     Current packs/day: 0.00     Average packs/day: 0.3 packs/day for 31.0 years (7.8 ttl pk-yrs)     Types: Cigarettes     Start date:      Quit date: 2010     Years since quittin.2     Passive exposure: Past    Smokeless tobacco: Never    Tobacco comments:     Started  smoking about age 14   Vaping Use    Vaping status: Never Used   Substance and Sexual Activity    Alcohol use: Not Currently     Comment: rare    Drug use: Never    Sexual activity: Yes     Partners: Male     Birth control/protection: None, Hysterectomy     Comment: monogamous with        Problem list reviewed by Roselyn Farris PharmD on 3/28/2024 at  1:48 PM    Allergies  Known allergies and reactions were discussed with the patient. The patient's chart has been reviewed for  allergy information and updated as necessary.   No Known Allergies    Allergies reviewed by Roselyn Farris PharmD on 3/28/2024 at  1:46 PM    Relevant Laboratory Values  Relevant laboratory values were discussed with the patient. The following specialty medication dose adjustment(s) are recommended: No dosage adjustment currently recommended  A1C Last 3 Results          10/3/2023    13:31 1/17/2024    07:40   HGBA1C Last 3 Results   Hemoglobin A1C 8.40  6.40      Lab Results   Component Value Date    HGBA1C 6.40 (H) 01/17/2024     Lab Results   Component Value Date    GLUCOSE 147 (H) 03/18/2024    CALCIUM 9.7 03/18/2024     03/18/2024    K 4.3 03/18/2024    CO2 23.0 03/18/2024     03/18/2024    BUN 11 03/18/2024    CREATININE 0.68 03/18/2024    EGFRIFAFRI 115 08/26/2020    EGFRIFNONA 90 12/01/2021    BCR 16.2 03/18/2024    ANIONGAP 14.0 03/18/2024     Lab Results   Component Value Date    CHOL 132 01/17/2024    CHLPL 199 08/26/2020    TRIG 180 (H) 01/17/2024    HDL 33 (L) 01/17/2024    LDL 68 01/17/2024     Microalbumin          10/3/2023    13:31 1/17/2024    07:40   Microalbumin   Microalbumin, Urine <1.2  <1.2        Current Medication List  This medication list has been reviewed with the patient and evaluated for any interactions or necessary modifications/recommendations, and updated to include all prescription medications, OTC medications, and supplements the patient is currently taking.  This list reflects what  is contained in the patient's profile, which has also been marked as reviewed to communicate to other providers it is the most up to date version of the patient's current medication therapy.     Current Outpatient Medications:     Continuous Blood Gluc Sensor (FreeStyle Rock 3 Sensor) misc, Use 1 Device Every 14 (Fourteen) Days., Disp: 2 each, Rfl: 6    ALPRAZolam (XANAX) 0.5 MG tablet, Take 1 tablet by mouth Daily As Needed for Anxiety., Disp: 20 tablet, Rfl: 0    atorvastatin (LIPITOR) 40 MG tablet, Take 1 tablet by mouth Daily., Disp: 90 tablet, Rfl: 4    BIOTIN PO, Take  by mouth., Disp: , Rfl:     Calcium Carbonate-Vitamin D (Oscal 500 D-3) 500-5 MG-MCG tablet, Take 500 mg by mouth Every 12 (Twelve) Hours., Disp: 60 tablet, Rfl: 6    Cholecalciferol (Vitamin D3) 50 MCG (2000 UT) capsule, Take 1 capsule by mouth once daily, Disp: 100 capsule, Rfl: 3    Cyanocobalamin (Vitamin B-12) 1000 MCG sublingual tablet, Place 1 tablet under the tongue Daily., Disp: 100 each, Rfl: 4    exemestane (Aromasin) 25 MG tablet, Take 1 tablet by mouth Daily., Disp: 90 tablet, Rfl: 2    glucose blood (Accu-Chek Guide) test strip, 1 each by Other route 4 (Four) Times a Day. Use as instructed, Disp: 150 each, Rfl: 11    ibuprofen (ADVIL,MOTRIN) 800 MG tablet, Take 1 tablet by mouth 3 (Three) Times a Day As Needed. for pain, Disp: , Rfl:     Insulin Lispro Prot & Lispro (HumaLOG Mix 75/25 KwikPen) (75-25) 100 UNIT/ML suspension pen-injector pen, Inject 45 Units under the skin into the appropriate area as directed 3 (Three) Times a Day Before Meals., Disp: 60 mL, Rfl: 6    Insulin Pen Needle (Pen Needles) 32G X 4 MM misc, Use 1 each 4 (Four) Times a Day. Use to inject insulin / DX E11.65, Disp: 150 each, Rfl: 5    lisinopril (PRINIVIL,ZESTRIL) 5 MG tablet, Take 1 tablet by mouth Daily., Disp: 90 tablet, Rfl: 4    metFORMIN ER (GLUCOPHAGE-XR) 500 MG 24 hr tablet, Take 2 tablets by mouth Daily., Disp: 180 tablet, Rfl: 6    multivitamin  with minerals tablet tablet, Take 1 tablet by mouth Daily., Disp: , Rfl:     omeprazole (priLOSEC) 40 MG capsule, Take 1 capsule by mouth once daily, Disp: 90 capsule, Rfl: 3    ondansetron (ZOFRAN) 8 MG tablet, Take 1 tablet by mouth 3 (Three) Times a Day As Needed for Nausea or Vomiting., Disp: 30 tablet, Rfl: 5    Tirzepatide (Mounjaro) 10 MG/0.5ML solution pen-injector pen, Inject 0.5 mL under the skin into the appropriate area as directed 1 (One) Time Per Week., Disp: 2 mL, Rfl: 5    vitamin B-6 (PYRIDOXINE) 100 MG tablet, Take 1 tablet by mouth 2 (Two) Times a Day., Disp: , Rfl:     Medicines reviewed by Roselyn Farris, PharmD on 3/28/2024 at  1:47 PM    Drug Interactions  No significant DDIs identified  Recommended Medications Assessment  Aspirin: Not Taking Currently  Statin: Currently Taking   ACEi/ARB: Currently Taking     Initial Education Provided for Specialty Medication  The patient has been taking Mounjaro for some time and tolerating well. Education provided here for patient's reference. Additional patient education shall be provided and documented upon request by the patient, provider, or payer.    Mounjaro® (tirzepatide)   How long will I be on this medication for?  The amount of time you will be on this medication will be determined by your doctor based on blood sugar and A1c control. You will most likely be on this medication or another diabetes medication throughout your lifetime. Do not abruptly stop this medication without talking to your doctor first.     How do I take this medication?  Take as directed on your prescription label. Mounjaro is supplied in a single-use pen for each dose and you will use a new pre-filled pen each week.  It is injected under the skin (subcutaneously) of your stomach, thigh or upper arm.  You may inject in the same body area each week, on the same day each week, with or without food.      What are some possible side effects?  In addition to decreased appetite,  the most common side effects are nausea and indigestion. Redness, itching, and/or swelling at the injection site may occur. You should also monitor for low blood sugar (hypoglycemia) if you are taking Mounjaro with other medications that cause low blood sugar.     What happens if I miss a dose?  If you miss a dose, take it as soon as you remember as long as there are at least 3 days until the next scheduled dose.  If there are less than 3 days, skip the missed dose and resume  Mounjaro on the regularly scheduled day.  Do not take 2 doses at the same time or extra doses.      Medication Safety    What are things I should warn my doctor immediately about?  Do not use Mounjaro if you or a family member have ever had medullary thyroid cancer (MTC) or Multiple Endocrine Neoplasia syndrome type 2 (MEN 2).  Tell your doctor if you have or have had problems with your kidneys or pancreas.  Talk to your doctor if you are pregnant, planning to become pregnant, or breastfeeding. Stop using Mounjaro and get medical help right away if you have severe pain in your stomach area that will not go away, or if you notice any signs/symptoms of an allergic reaction (rash, hives, difficulty breathing, etc.).    What are things that I should be cautious of?  Be cautious of any side effects from this medication. Talk to your doctor if any new ones develop or aren't getting better.    What are some medications that can interact with this one?  Taking Mounjaro with other medications that also lower your blood sugar such as insulin and glipizide/glimepiride/glyburide may increase the risk of low blood sugar. Your doctor may reduce the dose of these medications when you start Mounjaro to minimize low blood sugars.  Always tell your doctor or pharmacist immediately if you start taking any new medications, including over-the-counter medications, vitamins, and herbal supplements.        Medication Storage/Handling    How should I handle this  medication?  Keep this medication out of reach of pets/children and keep the pen capped when not in use.  Do not share your medicine pens with others.    How does this medication need to be stored?  Store Mounjaro in the refrigerator. Do not freeze and do not use if Mounjaro has been frozen.  You may store your Mounjaro pens at room temperature for up to 21 days.  Protect from excessive heat and sunlight.  Keep in the original carton until time of administration.    How should I dispose of this medication?  Used Mounjaro pens should discarded after each use in an approved sharps container after use.  If you do not have a sharps container, you may use a household container made of heavy-duty plastic with a tight-fitting lid that is leak resistant (e.g., heavy-duty plastic laundry detergent bottle). If your doctor decides to stop this medication, take to your local police station for proper disposal. Some pharmacies also have take-back bins for medication drop-off.       Resources/Support    How can I remind myself to take this medication?  You can download reminder apps to help you manage your refills. You may also set an alarm on your phone to remind you.     Is financial support available?   [employer] can provide co-pay cards if you have commercial insurance or patient assistance if you have Medicare or no insurance.     Which vaccines are recommended for me?  Talk to your doctor about these vaccines: Flu, Coronavirus (COVID-19), Pneumococcal (pneumonia), Tdap, Hepatitis B, Zoster (shingles)     Adherence and Self-Administration  Adherence related to the patient's specialty therapy was discussed with the patient. The Adherence segment of this outreach has been reviewed and updated.     Is there a concern with patient's ability to self administer the medication correctly and without issue?: No  Were any potential barriers to adherence identified during the initial assessment or patient education?: No  Are there any  concerns regarding the patient's understanding of the importance of medication adherence?: No  Methods for Supporting Patient Adherence and/or Self-Administration: N/A    Open Medication Therapy Problems  No medication therapy recommendations to display    Goals of Therapy  Goals related to the patient's specialty therapy were discussed with the patient. The Patient Goals segment of this outreach has been reviewed and updated.   Goals Addressed Today        Specialty Pharmacy General Goal      A1c < 7%    Lab Results   Component Value Date    HGBA1C 6.40 (H) 01/17/2024    HGBA1C 8.40 (H) 10/03/2023    HGBA1C 7.0 (H) 02/13/2023    HGBA1C 11.1 (H) 07/07/2022    HGBA1C 11.7 (H) 12/01/2021                 Reassessment Plan & Follow-Up  1. Medication Therapy Changes: Pt currently on Mounjaro 7.5mg once weekly, however reports that she has been experiencing more elevated blood sugars, even above 250mg/dL. She has had some increased stressors in her life recently that could be contributing to increased hyperglycemia, but would also benefit from dose increase to 10mg once weekly. Continue Humalog 75/25 45 units three times daily before meals and metformin 500mg twice daily.  2. Related Plans, Therapy Recommendations, or Therapy Problems to Be Addressed: None  3. Pharmacist to perform regular assessments no more than (6) months from the previous assessment.  4. Care Coordinator to set up future refill outreaches, coordinate prescription delivery, and escalate clinical questions to pharmacist.  5. Welcome information and patient satisfaction survey to be sent by specialty pharmacy team with patient's initial fill.    Attestation  Therapeutic appropriateness: Appropriate   I attest the patient was actively involved in and has agreed to the above plan of care. If the prescribed therapy is at any point deemed not appropriate based on the current or future assessments, a consultation will be initiated with the patient's specialty  care provider to determine the best course of action. The revised plan of therapy will be documented along with any required assessments and/or additional patient education provided.     Roselyn Farris PharmD, BCPS, BCCCP  Clinical Specialty Pharmacist, Endocrinology  3/28/2024  13:53 EDT

## 2024-04-17 ENCOUNTER — SPECIALTY PHARMACY (OUTPATIENT)
Dept: ENDOCRINOLOGY | Facility: CLINIC | Age: 59
End: 2024-04-17
Payer: COMMERCIAL

## 2024-04-17 NOTE — PROGRESS NOTES
Specialty Pharmacy Refill Coordination Note     Alaina is a 58 y.o. female contacted today regarding refills of her specialty medication(s).    Specialty medication(s) and dose(s) confirmed: yes  Changes to medications: no  Changes to insurance: no  Reviewed and verified with patient:         Refill Questions      Flowsheet Row Most Recent Value   Changes to allergies? No   Changes to medications? No   New conditions or infections since last clinic visit No   Unplanned office visit, urgent care, ED, or hospital admission in the last 4 weeks  No   How does patient/caregiver feel medication is working? Good   Financial problems or insurance changes  No   Since the previous refill, were any specialty medication doses or scheduled injections missed or delayed?  No   Does this patient require a clinical escalation to a pharmacist? No            Delivery Questions      Flowsheet Row Most Recent Value   Delivery method [employer]   Delivery address verified with patient/caregiver? Yes   Delivery address Home   Number of medications in delivery 1   Medication(s) being filled and delivered Tirzepatide   Copay verified? Yes   Copay amount 25   Copay form of payment Credit/debit on file                 Follow-up: 1 month(s)     Ruth Boyle, Pharmacy Technician  Specialty Pharmacy Technician   4/17/2024   14:07 EDT    yes

## 2024-04-22 ENCOUNTER — SPECIALTY PHARMACY (OUTPATIENT)
Dept: ENDOCRINOLOGY | Facility: CLINIC | Age: 59
End: 2024-04-22
Payer: COMMERCIAL

## 2024-04-22 NOTE — PROGRESS NOTES
Specialty Pharmacy Refill Coordination Note     Alaina is a 58 y.o. female contacted today regarding refills of her specialty medication(s).    Specialty medication(s) and dose(s) confirmed: yes  Changes to medications: no  Changes to insurance: no  Reviewed and verified with patient:         Refill Questions      Flowsheet Row Most Recent Value   Changes to allergies? No   Changes to medications? No   New conditions or infections since last clinic visit No   Unplanned office visit, urgent care, ED, or hospital admission in the last 4 weeks  No   How does patient/caregiver feel medication is working? Good   Financial problems or insurance changes  No   Since the previous refill, were any specialty medication doses or scheduled injections missed or delayed?  No   Does this patient require a clinical escalation to a pharmacist? No            Delivery Questions      Flowsheet Row Most Recent Value   Delivery method [employer]Ex   Delivery address verified with patient/caregiver? Yes   Delivery address Home   Number of medications in delivery 1   Medication(s) being filled and delivered Tirzepatide   Copay verified? Yes   Copay amount 25   Copay form of payment Credit/debit on file                 Follow-up: 1 month(s)     Ruth Boyle, Pharmacy Technician  Specialty Pharmacy Technician   4/22/2024   09:33 EDT

## 2024-05-13 DIAGNOSIS — E11.65 TYPE 2 DIABETES MELLITUS WITH HYPERGLYCEMIA, WITHOUT LONG-TERM CURRENT USE OF INSULIN: ICD-10-CM

## 2024-05-13 DIAGNOSIS — K20.90 ESOPHAGITIS: ICD-10-CM

## 2024-05-13 RX ORDER — OMEPRAZOLE 40 MG/1
40 CAPSULE, DELAYED RELEASE ORAL DAILY
Qty: 90 CAPSULE | Refills: 3 | Status: SHIPPED | OUTPATIENT
Start: 2024-05-13

## 2024-05-13 RX ORDER — BLOOD SUGAR DIAGNOSTIC
1 STRIP MISCELLANEOUS 4 TIMES DAILY
Qty: 150 EACH | Refills: 11 | Status: SHIPPED | OUTPATIENT
Start: 2024-05-13

## 2024-05-13 RX ORDER — PEN NEEDLE, DIABETIC 30 GX3/16"
1 NEEDLE, DISPOSABLE MISCELLANEOUS 4 TIMES DAILY
Qty: 150 EACH | Refills: 5 | Status: SHIPPED | OUTPATIENT
Start: 2024-05-13 | End: 2024-05-16

## 2024-05-16 RX ORDER — PEN NEEDLE, DIABETIC 32GX 5/32"
NEEDLE, DISPOSABLE MISCELLANEOUS
Qty: 100 EACH | Refills: 2 | Status: SHIPPED | OUTPATIENT
Start: 2024-05-16

## 2024-05-17 ENCOUNTER — SPECIALTY PHARMACY (OUTPATIENT)
Dept: ENDOCRINOLOGY | Facility: CLINIC | Age: 59
End: 2024-05-17
Payer: COMMERCIAL

## 2024-05-17 ENCOUNTER — TELEPHONE (OUTPATIENT)
Dept: ENDOCRINOLOGY | Facility: CLINIC | Age: 59
End: 2024-05-17
Payer: COMMERCIAL

## 2024-05-17 RX ORDER — ACYCLOVIR 400 MG/1
1 TABLET ORAL
Qty: 3 EACH | Refills: 11 | Status: SHIPPED | OUTPATIENT
Start: 2024-05-17

## 2024-05-17 NOTE — PROGRESS NOTES
Specialty Pharmacy Refill Coordination Note     Alaina is a 58 y.o. female contacted today regarding refills of her specialty medication(s).    Specialty medication(s) and dose(s) confirmed: yes  Changes to medications: no  Changes to insurance: no  Reviewed and verified with patient:         Refill Questions      Flowsheet Row Most Recent Value   Changes to allergies? No   Changes to medications? No   New conditions or infections since last clinic visit No   Unplanned office visit, urgent care, ED, or hospital admission in the last 4 weeks  No   How does patient/caregiver feel medication is working? Good   Since the previous refill, were any specialty medication doses or scheduled injections missed or delayed?  No   Does this patient require a clinical escalation to a pharmacist? No            Delivery Questions      Flowsheet Row Most Recent Value   Delivery method [employer]Ex   Delivery address verified with patient/caregiver? Yes   Delivery address Home   Number of medications in delivery 2   Medication(s) being filled and delivered Tirzepatide, Continuous Glucose Sensor   Copay verified? Yes   Copay amount 55   Copay form of payment Credit/debit on file                 Follow-up: 1 month(s)     Ruth Boyle, Pharmacy Technician  Specialty Pharmacy Technician   5/17/2024   11:41 EDT

## 2024-05-17 NOTE — TELEPHONE ENCOUNTER
Specialty Pharmacy Patient Management Program       Alaina Hartman is a 58 y.o. female seen by an Endocrinology provider for Type 2 Diabetes and enrolled in the Endocrinology Patient Management program offered by [facility].      Requested Prescriptions     Signed Prescriptions Disp Refills    Continuous Glucose Sensor (Dexcom G7 Sensor) misc 3 each 11     Sig: Use 1 each Every 10 (Ten) Days.     Authorizing Provider: DANY CHO     Ordering User: MICHELLE FARRIS       Refill sent in to patient's pharmacy for above medication prescribed by Dr. Cho.   Last office visit 1/23/2024.  Next visit scheduled 8/12/2024.    Michelle Farris, PharmD, BCPS, BCCCP  Clinical Specialty Pharmacist, Endocrinology  5/17/2024  11:28 EDT

## 2024-05-29 ENCOUNTER — SPECIALTY PHARMACY (OUTPATIENT)
Dept: ENDOCRINOLOGY | Facility: CLINIC | Age: 59
End: 2024-05-29
Payer: COMMERCIAL

## 2024-05-29 RX ORDER — INSULIN ASPART 100 [IU]/ML
45 INJECTION, SUSPENSION SUBCUTANEOUS
Qty: 120 ML | Refills: 1 | Status: SHIPPED | OUTPATIENT
Start: 2024-05-29

## 2024-05-29 RX ORDER — INSULIN ASPART 100 [IU]/ML
45 INJECTION, SUSPENSION SUBCUTANEOUS
Qty: 60 ML | Refills: 2 | Status: SHIPPED | OUTPATIENT
Start: 2024-05-29 | End: 2024-05-29 | Stop reason: SDUPTHER

## 2024-05-29 NOTE — PROGRESS NOTES
Specialty Pharmacy Patient Management Program  Endocrinology Refill Outreach      Alaina is a 58 y.o. female contacted today regarding refills of her medication(s).    Specialty medication(s) and dose(s) confirmed: Yes- switching Humalog 75/25 to Novolog 70/30 per insurance formulary    Refill Questions      Flowsheet Row Most Recent Value   Changes to allergies? No   Changes to medications? Yes  [Humalog 75/25 to Novolog 70/30 per formulary]   New conditions or infections since last clinic visit No   Unplanned office visit, urgent care, ED, or hospital admission in the last 4 weeks  No   How does patient/caregiver feel medication is working? Very good   Financial problems or insurance changes  No   Since the previous refill, were any specialty medication doses or scheduled injections missed or delayed?  No   Does this patient require a clinical escalation to a pharmacist? No          Delivery Questions      Flowsheet Row Most Recent Value   Delivery method FedEx   Delivery address verified with patient/caregiver? Yes   Delivery address Home   Number of medications in delivery 1   Medication(s) being filled and delivered Insulin Aspart Prot & Aspart   Copay verified? Yes   Copay amount $60   Copay form of payment Credit/debit on file            Follow-Up: in 3 months    Roselyn Farris, [place], BCPS, BCCCP  5/29/2024 11:00 EDT

## 2024-06-10 DIAGNOSIS — C50.111 MALIGNANT NEOPLASM OF CENTRAL PORTION OF RIGHT BREAST IN FEMALE, ESTROGEN RECEPTOR POSITIVE: ICD-10-CM

## 2024-06-10 DIAGNOSIS — Z17.0 MALIGNANT NEOPLASM OF CENTRAL PORTION OF RIGHT BREAST IN FEMALE, ESTROGEN RECEPTOR POSITIVE: ICD-10-CM

## 2024-06-10 RX ORDER — EXEMESTANE 25 MG/1
25 TABLET ORAL DAILY
Qty: 90 TABLET | Refills: 2 | Status: SHIPPED | OUTPATIENT
Start: 2024-06-10

## 2024-06-13 ENCOUNTER — SPECIALTY PHARMACY (OUTPATIENT)
Dept: ENDOCRINOLOGY | Facility: CLINIC | Age: 59
End: 2024-06-13
Payer: COMMERCIAL

## 2024-06-13 NOTE — PROGRESS NOTES
Specialty Pharmacy Refill Coordination Note     Alaina is a 58 y.o. female contacted today regarding refills of her specialty medication(s).    Specialty medication(s) and dose(s) confirmed: es  Changes to medications: no  Changes to insurance: no  Reviewed and verified with patient:         Refill Questions      Flowsheet Row Most Recent Value   Changes to allergies? No   Changes to medications? No   New conditions or infections since last clinic visit No   Unplanned office visit, urgent care, ED, or hospital admission in the last 4 weeks  No   How does patient/caregiver feel medication is working? Good   Financial problems or insurance changes  No   Since the previous refill, were any specialty medication doses or scheduled injections missed or delayed?  No   Does this patient require a clinical escalation to a pharmacist? No            Delivery Questions      Flowsheet Row Most Recent Value   Delivery method FedEx   Delivery address verified with patient/caregiver? Yes   Delivery address Home   Number of medications in delivery 2   Medication(s) being filled and delivered Tirzepatide, Continuous Glucose Sensor   Copay verified? Yes   Copay amount 55   Copay form of payment Credit/debit on file                 Follow-up: 1 month(s)     Ruth Boyle, Pharmacy Technician  Specialty Pharmacy Technician   6/13/2024   10:32 EDT

## 2024-07-02 NOTE — PROGRESS NOTES
Chief Complaint  Chief Complaint   Patient presents with    Annual Exam       Subjective    History of Present Illness        Alaina Hartman presents to [facility] for   Alaina Hartman is a 58 y.o. female here for her annual physical with me. Alaina is here for coordination of medical care, to discuss health maintenance, disease prevention as well as to followup on medical problems.     Annual Physical Exam  Patient's last Physical Exam was 2023. Patient's medical history, medications and allergy lists were reviewed and updated.  Activity level is moderate.   Exercises 3 per week. Walk  Appetite is good.   Feels well with few complaints.   Energy level is good.   Sleeps well.   Patient's last cologuard was 2023. She is advised to repeat in 3 years.   Patient's last mammogram was 2023.   Patient is doing routine self skin exam monthly.   Patient is doing routine self-breast exams monthly  Patient has had hysterectomy preemptively      Situational anxiety  -Patient uses a few Xanax 0.5 mg tablets rarely as needed when she travels only  -Patient's last refill was 20 tablets in 2022  -Patient about to go on vacation and she is requesting refills      Family History   Problem Relation Age of Onset    Breast cancer Mother     Heart disease Mother     Thyroid disease Mother     Stroke Mother     Cancer Mother         lymphoma    Clotting disorder Father     Diabetes Brother     Heart disease Brother     Stroke Brother     Diabetes Brother     Colon cancer Paternal Grandmother        Social History     Tobacco Use    Smoking status: Former     Current packs/day: 0.00     Average packs/day: 0.3 packs/day for 31.0 years (7.8 ttl pk-yrs)     Types: Cigarettes     Start date:      Quit date: 2010     Years since quittin.5     Passive exposure: Past    Smokeless tobacco: Never    Tobacco comments:     Started smoking about age 14   Vaping Use    Vaping status: Never  Used   Substance Use Topics    Alcohol use: Yes     Comment: 1-2 drinks a year    Drug use: Never       Past Surgical History:   Procedure Laterality Date    BREAST BIOPSY Right 08/18/2022    Procedure: Right breast lumpectomy;  Surgeon: Jagdeep Mcwilliams MD;  Location: [facility] MAIN OR;  Service: General;  Laterality: Right;    BREAST LUMPECTOMY Left 2003    x2    PORTACATH PLACEMENT N/A 10/06/2022    Procedure: INSERTION OF PORTACATH;  Surgeon: Jagdeep Mcwilliams MD;  Location: [facility] MAIN OR;  Service: General;  Laterality: N/A;    SENTINEL NODE BIOPSY Right 08/18/2022    Procedure: SENTINEL NODE BIOPSY;  Surgeon: Jagdeep Mcwilliams MD;  Location: [facility] MAIN OR;  Service: General;  Laterality: Right;    SUBTOTAL HYSTERECTOMY  10/10/1991    had it done for bleeding. Ovaries still present    TOOTH EXTRACTION  1995    has had all teeth (but one) removed    VENOUS ACCESS DEVICE (PORT) REMOVAL  2023       Patient Active Problem List   Diagnosis    BMI 40.0-44.9, adult    Bunion, right    Decreased hearing of both ears    Mixed hyperlipidemia    Hypertension, essential    Menopausal syndrome    Obstructive sleep apnea syndrome    Type 2 diabetes mellitus with hyperglycemia, with long-term current use of insulin    Class 3 severe obesity due to excess calories without serious comorbidity with body mass index (BMI) of 45.0 to 49.9 in adult    Malignant neoplasm of central portion of right breast in female, estrogen receptor positive    Encounter for care related to vascular access port    Chemotherapy-induced neutropenia    Coronary artery disease involving native coronary artery of native heart without angina pectoris    Situational anxiety         Current Outpatient Medications:     ALPRAZolam (XANAX) 0.5 MG tablet, Take 1 tablet by mouth Daily As Needed for Anxiety., Disp: 20 tablet, Rfl: 0    atorvastatin (LIPITOR) 40 MG tablet, Take 1 tablet by mouth Daily., Disp: 90 tablet, Rfl: 4    BD Pen Needle  Polly 2nd Gen 32G X 4 MM misc, USE 1 PEN NEEDLE TO INJECT  INSULIN 4 TIMES DAILY, Disp: 100 each, Rfl: 2    BIOTIN PO, Take  by mouth., Disp: , Rfl:     Calcium Carbonate-Vitamin D (Oscal 500 D-3) 500-5 MG-MCG tablet, Take 500 mg by mouth Every 12 (Twelve) Hours., Disp: 60 tablet, Rfl: 6    Cholecalciferol (Vitamin D3) 50 MCG (2000 UT) capsule, Take 1 capsule by mouth once daily, Disp: 100 capsule, Rfl: 3    Continuous Glucose Sensor (Dexcom G7 Sensor) misc, Use 1 each Every 10 (Ten) Days., Disp: 3 each, Rfl: 11    Cyanocobalamin (Vitamin B-12) 1000 MCG sublingual tablet, Place 1 tablet under the tongue Daily., Disp: 100 each, Rfl: 4    exemestane (Aromasin) 25 MG tablet, Take 1 tablet by mouth Daily., Disp: 90 tablet, Rfl: 2    glucose blood (Accu-Chek Guide) test strip, 1 each by Other route 4 (Four) Times a Day. Use as instructed, Disp: 150 each, Rfl: 11    insulin aspart prot & aspart (NovoLOG Mix 70/30 FlexPen) (70-30) 100 UNIT/ML suspension pen-injector injection, Inject 45 units under the skin into the appropriate area as directed 3 (Three) Times a Day Before Meals., Disp: 120 mL, Rfl: 1    lisinopril (PRINIVIL,ZESTRIL) 5 MG tablet, Take 1 tablet by mouth Daily., Disp: 90 tablet, Rfl: 4    loratadine (Claritin) 10 MG tablet, Take 1 tablet by mouth Daily., Disp: , Rfl:     metFORMIN ER (GLUCOPHAGE-XR) 500 MG 24 hr tablet, Take 2 tablets by mouth Daily., Disp: 180 tablet, Rfl: 6    multivitamin with minerals tablet tablet, Take 1 tablet by mouth Daily., Disp: , Rfl:     omeprazole (priLOSEC) 40 MG capsule, Take 1 capsule by mouth once daily, Disp: 90 capsule, Rfl: 3    ondansetron (ZOFRAN) 8 MG tablet, Take 1 tablet by mouth 3 (Three) Times a Day As Needed for Nausea or Vomiting., Disp: 30 tablet, Rfl: 5    Tirzepatide (Mounjaro) 10 MG/0.5ML solution pen-injector pen, Inject 0.5 mL under the skin into the appropriate area as directed 1 (One) Time Per Week., Disp: 2 mL, Rfl: 5    vitamin B-6 (PYRIDOXINE) 100 MG  "tablet, Take 1 tablet by mouth 2 (Two) Times a Day., Disp: , Rfl:     Continuous Glucose Sensor (FreeStyle Rock 3 Sensor) misc, Use 1 Device Every 14 (Fourteen) Days. (Patient not taking: Reported on 7/8/2024), Disp: 2 each, Rfl: 6    ibuprofen (ADVIL,MOTRIN) 800 MG tablet, Take 1 tablet by mouth 3 (Three) Times a Day As Needed. for pain (Patient not taking: Reported on 7/8/2024), Disp: , Rfl:     Objective   /64 (BP Location: Left arm, Patient Position: Sitting, Cuff Size: Adult)   Pulse 98   Temp 96.9 °F (36.1 °C) (Temporal)   Resp 16   Ht 161.3 cm (63.5\")   Wt 117 kg (257 lb 3.2 oz)   SpO2 98%   BMI 44.84 kg/m²   BP Readings from Last 3 Encounters:   07/08/24 130/64   03/18/24 147/90   03/15/24 136/78     Wt Readings from Last 3 Encounters:   07/08/24 117 kg (257 lb 3.2 oz)   03/18/24 118 kg (261 lb)   03/15/24 117 kg (259 lb)     Physical Exam  Constitutional:       General: She is not in acute distress.  HENT:      Head: Normocephalic and atraumatic.      Right Ear: Tympanic membrane normal.      Left Ear: Tympanic membrane normal.      Nose: Nose normal.      Mouth/Throat:      Mouth: Mucous membranes are moist.      Pharynx: Oropharynx is clear. No posterior oropharyngeal erythema.   Eyes:      Extraocular Movements: Extraocular movements intact.      Conjunctiva/sclera: Conjunctivae normal.   Neck:      Comments: - Thyroid not enlarged  Cardiovascular:      Rate and Rhythm: Normal rate and regular rhythm.      Heart sounds: Normal heart sounds.   Pulmonary:      Effort: Pulmonary effort is normal.      Breath sounds: Normal breath sounds. No stridor. No wheezing.   Abdominal:      General: Abdomen is flat. Bowel sounds are normal.      Palpations: Abdomen is soft. There is no mass.      Tenderness: There is no abdominal tenderness. There is no guarding.   Musculoskeletal:         General: No swelling or deformity. Normal range of motion.      Cervical back: Normal range of motion and neck " supple.   Skin:     General: Skin is warm and dry.      Capillary Refill: Capillary refill takes less than 2 seconds.      Coloration: Skin is not jaundiced.      Findings: No rash.   Neurological:      General: No focal deficit present.      Mental Status: She is alert and oriented to person, place, and time.      Cranial Nerves: No cranial nerve deficit.      Motor: No weakness.      Coordination: Coordination normal.      Gait: Gait normal.      Deep Tendon Reflexes: Reflexes normal.   Psychiatric:         Mood and Affect: Mood normal.         Behavior: Behavior normal.         Thought Content: Thought content normal.           Diabetic foot exam:   Left: Filament test present   Pulses Dorsalis Pedis:  present  Posterior Tibial:  present   Sharp/dull discrimination normal       Right: Filament test present   Pulses Dorsalis Pedis:  present  Posterior Tibial:  present   Sharp/dull discrimination normal        Assessment and Plan   Diagnoses and all orders for this visit:    1. Annual physical exam (Primary)  -Overall normal 58-year-old female exam  -Patient already has labs for CMP, lipid panel, A1c ready to be drawn via endocrinology.  Last CBC was drawn on 3/18/2024 and overall came back normal  -For patient's CAD, her last lipid panel showed LDL at 68  -Discussed getting shingles and pneumonia vaccines with patient.  Patient agreeable.  Recommended patient call her insurance to ask where they recommend she get them the cheapest.  Patient verbalizes understanding    2. Situational anxiety  -PDMP/inspect review appropriate  -Refilled ALPRAZolam (XANAX) 0.5 MG tablet; Take 1 tablet by mouth Daily As Needed for Anxiety.  Dispense: 20 tablet; Refill: 0    3. Vitamin D deficiency  -     Vitamin D,25-Hydroxy    4. Drug side effects  -     Vitamin B12    5. Family history of thyroid disease  -     TSH Rfx On Abnormal To Free T4    6. Class 3 severe obesity due to excess calories with serious comorbidity and body mass  index (BMI) of 40.0 to 44.9 in adult  -Information about nutrition and standardized exercise recommendations added to patient's after visit summary        Follow Up   - 1 year for annual physical exam      The patient was counseled regarding nutrition, physical activity, healthy weight, injury prevention, immunizations and preventative health screenings. Expected course, medications, and adverse effects discussed.  Call or return if worsening or persistent symptoms.  I wore protective equipment throughout this patient encounter including a mask and eye protection.   The complete contents of the Assessment and Plan and Data / Lab Results as documented above have been reviewed and addressed by myself with the patient today as part of an ongoing evaluation / treatment plan.

## 2024-07-08 ENCOUNTER — OFFICE VISIT (OUTPATIENT)
Dept: FAMILY MEDICINE CLINIC | Facility: CLINIC | Age: 59
End: 2024-07-08
Payer: COMMERCIAL

## 2024-07-08 VITALS
TEMPERATURE: 96.9 F | SYSTOLIC BLOOD PRESSURE: 130 MMHG | HEIGHT: 64 IN | BODY MASS INDEX: 43.91 KG/M2 | RESPIRATION RATE: 16 BRPM | DIASTOLIC BLOOD PRESSURE: 64 MMHG | WEIGHT: 257.2 LBS | HEART RATE: 98 BPM | OXYGEN SATURATION: 98 %

## 2024-07-08 DIAGNOSIS — T88.7XXA DRUG SIDE EFFECTS: ICD-10-CM

## 2024-07-08 DIAGNOSIS — E55.9 VITAMIN D DEFICIENCY: ICD-10-CM

## 2024-07-08 DIAGNOSIS — Z83.49 FAMILY HISTORY OF THYROID DISEASE: ICD-10-CM

## 2024-07-08 DIAGNOSIS — Z00.00 ANNUAL PHYSICAL EXAM: Primary | ICD-10-CM

## 2024-07-08 DIAGNOSIS — E66.01 CLASS 3 SEVERE OBESITY DUE TO EXCESS CALORIES WITH SERIOUS COMORBIDITY AND BODY MASS INDEX (BMI) OF 40.0 TO 44.9 IN ADULT: ICD-10-CM

## 2024-07-08 DIAGNOSIS — F41.8 SITUATIONAL ANXIETY: ICD-10-CM

## 2024-07-08 PROBLEM — J06.9 VIRAL UPPER RESPIRATORY TRACT INFECTION: Status: RESOLVED | Noted: 2023-08-30 | Resolved: 2024-07-08

## 2024-07-08 PROBLEM — K20.90 ESOPHAGITIS: Status: RESOLVED | Noted: 2018-05-07 | Resolved: 2024-07-08

## 2024-07-08 PROCEDURE — 99213 OFFICE O/P EST LOW 20 MIN: CPT | Performed by: STUDENT IN AN ORGANIZED HEALTH CARE EDUCATION/TRAINING PROGRAM

## 2024-07-08 PROCEDURE — 99396 PREV VISIT EST AGE 40-64: CPT | Performed by: STUDENT IN AN ORGANIZED HEALTH CARE EDUCATION/TRAINING PROGRAM

## 2024-07-08 RX ORDER — LORATADINE 10 MG/1
10 TABLET ORAL DAILY
COMMUNITY

## 2024-07-08 RX ORDER — ALPRAZOLAM 0.5 MG/1
0.5 TABLET ORAL DAILY PRN
Qty: 20 TABLET | Refills: 0 | Status: SHIPPED | OUTPATIENT
Start: 2024-07-08

## 2024-07-08 NOTE — PATIENT INSTRUCTIONS
- call insurance about coverage for pneumonia and shingles vaccines and where to get them the cheapest          - Work up to 150 minutes of aerobic, moderate intensity (you can talk but you can't sing) or 75 minutes of vigorous activity of dedicated exercise a week  - 2 days a week, include resistance/weight training    Light intensity   - Ex: casual walking, light housework, stretching  Moderate Intensity   - brisk walking, water aerobics, ballroom dancing   - breathing will be a little harder with a faster heartbeat  Vigorous Intensity   - jogging/running, aerobic dancing    What are the benefits of movement?    Moving your body has many benefits. It can:    ?Burn calories, which helps people control their weight  ?Help control blood sugar levels in people with diabetes  ?Lower blood pressure, especially in people with high blood pressure  ?Lower stress and help with depression and anxiety  ?Keep bones strong, so they don't get thin and break easily  ?Lower the chance of dying from heart disease    Adding even small amounts of physical activity to your daily routine can improve your health.    What are the main types of exercise?    There are 3 main types of exercise. They are:    ?Aerobic exercise - Aerobic exercise raises a person's heart rate. Examples of aerobic exercise are walking, running, dancing, riding a bike, or swimming.  ?Resistance training - Resistance training helps make your muscles stronger. People can do this type of exercise using weights, exercise bands, or weight machines. You can also do this type of exercise using your own body weight, as with push-ups, or by lifting items in your home, like jugs of water.   ?Stretching - Stretching exercises help your muscles and joints move more easily.    It's important to have all 3 types of exercise in your exercise program. That way, your body, muscles, and joints can be as healthy as possible.    Should I talk to my doctor or nurse before I start  exercising?    If you have not exercised before or have not exercised in a long time, talk with your doctor or nurse before you start a very active exercise program.  If you have heart disease or risk factors for heart disease (like high blood pressure or diabetes), your doctor or nurse might recommend that you have an exercise test before starting an exercise program.  When you start an exercise program, start slowly. For example, do the exercise at a slow pace or for a few minutes only. Over time, you can exercise faster and for longer periods of time.  What should I do when I exercise? -- Each time you exercise, you should:    ?Warm up - Warming up can help keep you from hurting your muscles when you exercise. To warm up, do a light aerobic exercise (such as walking slowly) or stretch for 5 to 10 minutes.  ?Work out - You should try to get a mix of aerobic exercise, resistance training, and stretching. During an aerobic workout, you can walk fast, swim, run, or use an exercise machine, for example. Other activities, like dancing or playing tennis, are also forms of aerobic exercise. You should also take time to stretch all of your joints, including your neck, shoulders, back, hips, and knees. At least 2 times a week, you can do resistance training exercises as part of your workout.  ?Cool down - Cooling down helps keep you from feeling dizzy after you exercise and helps prevent muscle cramps. To cool down, you can stretch or do a light aerobic exercise for 5 minutes.    Some people go to a gym or do group exercise classes. But you can exercise even without these things. Some exercises can be done even in a small space. You can also try online videos or smartphone apps to get ideas for different types of exercise.     How often should I exercise?     Doctors recommend that people exercise at least 30 minutes a day, on 5 or more days of the week.  If you can't exercise for 30 minutes straight, try to exercise for  10 minutes at a time, 3 or 4 times a day. Even exercising for shorter amounts of time is good for you, especially if it means spending less time sitting.     When should I call my doctor or nurse? -- If you have any of the following symptoms when you exercise, stop exercising and call your doctor or nurse right away:  ?Pain or pressure in your chest, arms, throat, jaw, or back  ?Nausea or vomiting  ?Feeling like your heart is fluttering or racing very fast  ?Feeling dizzy or faint    What if I don't have time to exercise?     Many people have very busy lives and might not think that they have time to exercise. But it's important to try to find time to exercise, even if you are tired or work a lot. Exercise can increase your energy level, which can make you feel better and might even help you get more work done.  Even if it's hard to set aside a lot of time to exercise, you can still improve your health by moving your body more. There are many ways that you can be more active. For example, you can:    ?Take the stairs instead of the elevator  ?Park in a parking space that is farther away from the door  ?Take a longer route when you walk from one place to another    Spending a lot of time sitting still - for example, watching television or working on the computer - can be bad for your health. Try to get up and move around whenever you can. Even small amounts of movement, like taking short walks, doing household chores, or gardening, can help improve your health. Finding activities you enjoy, or doing them with other people, can help you add more movement into your daily life.    What else should I do when I exercise?     To exercise safely and avoid problems, it's important to:    ?Drink fluids during and after exercising (but avoid drinks with a lot of caffeine or sugar)  ?Avoid exercising outside if it is too hot or cold  ?Wear layers of clothes, so that you can take them off if you get too hot  ?Wear shoes that fit  well and support your feet  ?Be aware of your surroundings if you exercise outside

## 2024-07-09 ENCOUNTER — PATIENT ROUNDING (BHMG ONLY) (OUTPATIENT)
Dept: FAMILY MEDICINE CLINIC | Facility: CLINIC | Age: 59
End: 2024-07-09
Payer: COMMERCIAL

## 2024-07-09 NOTE — PROGRESS NOTES
July 9, 2024    Hello, may I speak with Alaina Hartman?    My name is Rose Marie      I am  with [facility]  [address] [place] [address] [address]  [place] [address].    Before we get started may I verify your date of birth? 1965    I am calling to officially welcome you to our practice and ask about your recent visit. Is this a good time to talk? yes    Tell me about your visit with us. What things went well?  it was good        We're always looking for ways to make our patients' experiences even better. Do you have recommendations on ways we may improve?  no    Overall were you satisfied with your first visit to our practice? yes       I appreciate you taking the time to speak with me today. Is there anything else I can do for you? no      Thank you, and have a great day.

## 2024-07-11 ENCOUNTER — SPECIALTY PHARMACY (OUTPATIENT)
Dept: ENDOCRINOLOGY | Facility: CLINIC | Age: 59
End: 2024-07-11
Payer: COMMERCIAL

## 2024-07-11 NOTE — PROGRESS NOTES
Specialty Pharmacy Refill Coordination Note     Alaina is a 58 y.o. female contacted today regarding refills of her specialty medication(s).    Specialty medication(s) and dose(s) confirmed: yes  Changes to medications: no  Changes to insurance: no  Reviewed and verified with patient:         Refill Questions      Flowsheet Row Most Recent Value   Changes to allergies? No   Changes to medications? No   New conditions or infections since last clinic visit No   Unplanned office visit, urgent care, ED, or hospital admission in the last 4 weeks  No   How does patient/caregiver feel medication is working? Good   Financial problems or insurance changes  No   Since the previous refill, were any specialty medication doses or scheduled injections missed or delayed?  No   Does this patient require a clinical escalation to a pharmacist? No            Delivery Questions      Flowsheet Row Most Recent Value   Delivery method [employer]Ex   Delivery address verified with patient/caregiver? Yes   Delivery address Home   Number of medications in delivery 2   Medication(s) being filled and delivered Tirzepatide, Continuous Glucose Sensor   Copay verified? Yes   Copay amount 55   Copay form of payment Credit/debit on file   Signature Required No                 Follow-up: 1 month(s)     Ruth Boyle, Pharmacy Technician  Specialty Pharmacy Technician   7/11/2024   09:35 EDT

## 2024-07-19 ENCOUNTER — HOSPITAL ENCOUNTER (OUTPATIENT)
Dept: ULTRASOUND IMAGING | Facility: HOSPITAL | Age: 59
Discharge: HOME | End: 2024-07-19
Payer: COMMERCIAL

## 2024-07-19 ENCOUNTER — HOSPITAL ENCOUNTER (OUTPATIENT)
Dept: MAMMOGRAPHY | Facility: HOSPITAL | Age: 59
Discharge: HOME | End: 2024-07-19
Payer: COMMERCIAL

## 2024-07-22 NOTE — PROGRESS NOTES
- Hematology/Oncology Outpatient Follow Up    PATIENT NAME:Alaina Hartman  :1965  MRN: [id number]  PRIMARY CARE PHYSICIAN: Lori Fields DO  REFERRING PHYSICIAN: Lori Fields DO    Chief Complaint   Patient presents with    Follow-up     Malignant neoplasm of central portion of right breast in female, estrogen receptor positive            HISTORY OF PRESENT ILLNESS:     This is a 57-year-old female who was clinically asymptomatic and had a routine screening mammogram which showed an abnormality on the right breast.  Prior to that patient had left breast biopsy in  for benign disease.     Review of her screening mammogram which was performed on 6/3/2022 showed a new 2 cm focal asymmetry with architectural distortion in the mid to posterior third depth of the outer right breast the left breast was benign.  Right diagnostic mammogram and ultrasound was recommended.  On 2022 patient underwent right diagnostic mammogram and ultrasound which showed a 2 cm irregular mass in the lateral inferior breast.  Same day she had an ultrasound which showed a 1.6 cm on the right breast the right axilla did not show any abnormal lymph nodes.     Patient then underwent ultrasound-guided biopsy of the right breast mass pathology revealed moderately differentiated invasive ductal carcinoma estrogen receptor positive at 95% progesterone receptor positive at 50%, HER2/bertha was negative at 1+ on immunohistochemistry.        Patient had a partial hysterectomy many years ago.  She is  and has 3 children.  She does not smoke, does not drink alcohol     Family history significant for mother with breast cancer in her 50s, her father had skin cancers, paternal grandmother had colon cancer at the age of 70        She is a       2022 estradiol level was less than 5 and FSH was 33 consistent with postmenopausal state  2022 patient underwent right lumpectomy with sentinel lymph node biopsy.   Pathology revealed residual moderately differentiated ductal carcinoma measuring 1.7 cm completely excised total of 5 lymph nodes were removed and all of them were negative for metastatic disease.  Pathology stage is pT1 cpN0 M0.  There was no evidence of DCIS all margins were negative distance to the closest margin was anterior margin at 2 mm ER positive at 95, SC positive at 50% and HER2/bertha was negative.  9/27/2022 patient had Oncotype DX assay done which returned with a recurrence score of 28.  This is consistent with 17% risk of recurrence at 9 years distantly, and more than 15% chemotherapy benefit.  On the validation assay estrogen receptor was positive, SC was positive and HER2/bertha was negative.  10/27/2022: C1 D1 Taxotere and Cytoxan received.  Neulasta support held due to leukocytosis.  11/4/2022: WBC 2.28, hemoglobin 11.9, platelets 297,000, ANC 0.08.  Patient was initiated on Neupogen 480 mg subcu given 11/4/2022, 11/5/2022, 11/6/2022.  Patient to receive Neulasta again with next cycle of treatment.  11/17/2022: Patient received cycle 2 of Taxotere Cytoxan with Neulasta support  12/8/2022: Patient received cycle 3 of Taxotere and Cytoxan with Neulasta support  12/29/2020 patient received cycle 4 of combination chemotherapy with Cytoxan and Taxotere  Patient completed adjuvant breast radiation February 2023 6/30/2023 bilateral diagnostic mammogram with no mammographic findings to indicate right or left breast malignancy      Past Medical History:   Diagnosis Date    Ankle edema     at times    Breast cancer, right 08/2022    Diabetes mellitus     GERD (gastroesophageal reflux disease)     Hiatal hernia     Hypertension        Past Surgical History:   Procedure Laterality Date    BREAST BIOPSY Right 08/18/2022    Procedure: Right breast lumpectomy;  Surgeon: Jagdeep Mcwilliams MD;  Location: [facility] MAIN OR;  Service: General;  Laterality: Right;    BREAST LUMPECTOMY Left 2003    x2    PORTACATH  PLACEMENT N/A 10/06/2022    Procedure: INSERTION OF PORTACATH;  Surgeon: Jagdeep Mcwilliams MD;  Location: [facility] MAIN OR;  Service: General;  Laterality: N/A;    SENTINEL NODE BIOPSY Right 08/18/2022    Procedure: SENTINEL NODE BIOPSY;  Surgeon: Jagdeep Mcwilliams MD;  Location: [facility] MAIN OR;  Service: General;  Laterality: Right;    SUBTOTAL HYSTERECTOMY  10/10/1991    had it done for bleeding. Ovaries still present    TOOTH EXTRACTION  1995    has had all teeth (but one) removed    VENOUS ACCESS DEVICE (PORT) REMOVAL  2023         Current Outpatient Medications:     ALPRAZolam (XANAX) 0.5 MG tablet, Take 1 tablet by mouth Daily As Needed for Anxiety., Disp: 20 tablet, Rfl: 0    atorvastatin (LIPITOR) 40 MG tablet, Take 1 tablet by mouth Daily., Disp: 90 tablet, Rfl: 4    BD Pen Needle Polly 2nd Gen 32G X 4 MM misc, USE 1 PEN NEEDLE TO INJECT  INSULIN 4 TIMES DAILY, Disp: 100 each, Rfl: 2    BIOTIN PO, Take  by mouth., Disp: , Rfl:     Calcium Carbonate-Vitamin D (Oscal 500 D-3) 500-5 MG-MCG tablet, Take 500 mg by mouth Every 12 (Twelve) Hours., Disp: 60 tablet, Rfl: 6    Cholecalciferol (Vitamin D3) 50 MCG (2000 UT) capsule, Take 1 capsule by mouth once daily, Disp: 100 capsule, Rfl: 3    Continuous Glucose Sensor (FreeStyle Rock 3 Sensor) misc, Use 1 Device Every 14 (Fourteen) Days. (Patient not taking: Reported on 7/8/2024), Disp: 2 each, Rfl: 6    Continuous Glucose Sensor (Dexcom G7 Sensor) misc, Use 1 each Every 10 (Ten) Days., Disp: 3 each, Rfl: 11    Cyanocobalamin (Vitamin B-12) 1000 MCG sublingual tablet, Place 1 tablet under the tongue Daily., Disp: 100 each, Rfl: 4    exemestane (Aromasin) 25 MG tablet, Take 1 tablet by mouth Daily., Disp: 90 tablet, Rfl: 2    glucose blood (Accu-Chek Guide) test strip, 1 each by Other route 4 (Four) Times a Day. Use as instructed, Disp: 150 each, Rfl: 11    ibuprofen (ADVIL,MOTRIN) 800 MG tablet, Take 1 tablet by mouth 3 (Three) Times a Day As Needed.  for pain (Patient not taking: Reported on 7/8/2024), Disp: , Rfl:     insulin aspart prot & aspart (NovoLOG Mix 70/30 FlexPen) (70-30) 100 UNIT/ML suspension pen-injector injection, Inject 45 units under the skin into the appropriate area as directed 3 (Three) Times a Day Before Meals., Disp: 120 mL, Rfl: 1    lisinopril (PRINIVIL,ZESTRIL) 5 MG tablet, Take 1 tablet by mouth Daily., Disp: 90 tablet, Rfl: 4    loratadine (Claritin) 10 MG tablet, Take 1 tablet by mouth Daily., Disp: , Rfl:     metFORMIN ER (GLUCOPHAGE-XR) 500 MG 24 hr tablet, Take 2 tablets by mouth Daily., Disp: 180 tablet, Rfl: 6    multivitamin with minerals tablet tablet, Take 1 tablet by mouth Daily., Disp: , Rfl:     omeprazole (priLOSEC) 40 MG capsule, Take 1 capsule by mouth once daily, Disp: 90 capsule, Rfl: 3    ondansetron (ZOFRAN) 8 MG tablet, Take 1 tablet by mouth 3 (Three) Times a Day As Needed for Nausea or Vomiting., Disp: 30 tablet, Rfl: 5    Tirzepatide (Mounjaro) 10 MG/0.5ML solution pen-injector pen, Inject 0.5 mL under the skin into the appropriate area as directed 1 (One) Time Per Week., Disp: 2 mL, Rfl: 5    vitamin B-6 (PYRIDOXINE) 100 MG tablet, Take 1 tablet by mouth 2 (Two) Times a Day., Disp: , Rfl:     No Known Allergies    Family History   Problem Relation Age of Onset    Breast cancer Mother     Heart disease Mother     Thyroid disease Mother     Stroke Mother     Cancer Mother         lymphoma    Clotting disorder Father     Diabetes Brother     Heart disease Brother     Stroke Brother     Diabetes Brother     Colon cancer Paternal Grandmother        Cancer-related family history includes Breast cancer in her mother; Cancer in her mother; Colon cancer in her paternal grandmother.    Social History     Tobacco Use    Smoking status: Former     Current packs/day: 0.00     Average packs/day: 0.3 packs/day for 31.0 years (7.8 ttl pk-yrs)     Types: Cigarettes     Start date: 1979     Quit date: 1/1/2010     Years since  quittin.5     Passive exposure: Past    Smokeless tobacco: Never    Tobacco comments:     Started smoking about age 14   Vaping Use    Vaping status: Never Used   Substance Use Topics    Alcohol use: Yes     Comment: 1-2 drinks a year    Drug use: Never       I have reviewed and confirmed the accuracy of the patient's history: Chief complaint, HPI, ROS, and Subjective as entered by the MA/LPN/RN. Lianet Gissel Rust MD 24      SUBJECTIVE:    Denies any new issues.  Continues to tolerate endocrine therapy without any difficulties.    Patient is here today for routine visit and denies any new issues.      Patient is here today for follow-up and denies any new issues 24          REVIEW OF SYSTEMS:    Review of Systems   Constitutional:  Positive for fatigue. Negative for chills and fever.   HENT:  Negative for congestion, drooling, ear discharge, rhinorrhea, sinus pressure and tinnitus.    Eyes:  Negative for photophobia, pain and discharge.   Respiratory:  Negative for apnea, choking and stridor.    Cardiovascular:  Positive for leg swelling. Negative for palpitations.   Gastrointestinal:  Negative for abdominal distention, abdominal pain and anal bleeding.   Endocrine: Negative for polydipsia and polyphagia.   Genitourinary:  Negative for decreased urine volume, flank pain and genital sores.   Musculoskeletal:  Positive for arthralgias. Negative for gait problem, neck pain and neck stiffness.   Skin:  Negative for color change, rash and wound.   Neurological:  Negative for tremors, seizures, syncope, facial asymmetry and speech difficulty.   Hematological:  Negative for adenopathy.   Psychiatric/Behavioral:  Negative for agitation, confusion, hallucinations and self-injury. The patient is not hyperactive.        OBJECTIVE:    Vitals:    24 0931   BP: 146/84   Pulse: 82   Resp: 18   Temp: 98 °F (36.7 °C)   TempSrc: Infrared   SpO2: 99%   Weight: 115 kg (254 lb)   Height: 162.6 cm  "(64\")   PainSc: 0-No pain           Body mass index is 43.6 kg/m².    ECOG    (0) Fully active, able to carry on all predisease performance without restriction    Physical Exam  Vitals and nursing note reviewed.   Constitutional:       General: She is not in acute distress.     Appearance: She is not diaphoretic.   HENT:      Head: Normocephalic and atraumatic.   Eyes:      General: No scleral icterus.        Right eye: No discharge.         Left eye: No discharge.      Conjunctiva/sclera: Conjunctivae normal.   Neck:      Thyroid: No thyromegaly.   Cardiovascular:      Rate and Rhythm: Normal rate and regular rhythm.      Heart sounds: Normal heart sounds.      No friction rub. No gallop.   Pulmonary:      Effort: Pulmonary effort is normal. No respiratory distress.      Breath sounds: No stridor. No wheezing.   Abdominal:      Palpations: Abdomen is soft. There is no mass.      Tenderness: There is no abdominal tenderness. There is no guarding or rebound.   Musculoskeletal:         General: No tenderness. Normal range of motion.      Cervical back: Normal range of motion and neck supple.   Lymphadenopathy:      Cervical: No cervical adenopathy.   Skin:     General: Skin is warm.      Findings: No erythema or rash.   Neurological:      Mental Status: She is alert and oriented to person, place, and time.      Motor: No abnormal muscle tone.   Psychiatric:         Mood and Affect: Mood normal.         Behavior: Behavior normal.     Bilateral mammogram and bilateral axillary exam was unremarkable    I have reexamined the patient and the results are consistent with the previously documented exam. Lianet Gissel Rust MD 7/23/2024    RECENT LABS    WBC   Date Value Ref Range Status   07/23/2024 9.13 3.40 - 10.80 10*3/mm3 Final   08/26/2020 11.0 (H) 3.4 - 10.8 x10E3/uL Final     RBC   Date Value Ref Range Status   07/23/2024 4.54 3.77 - 5.28 10*6/mm3 Final   08/26/2020 4.53 3.77 - 5.28 x10E6/uL Final     Hemoglobin "   Date Value Ref Range Status   07/23/2024 13.2 12.0 - 15.9 g/dL Final     Hematocrit   Date Value Ref Range Status   07/23/2024 41.2 34.0 - 46.6 % Final     MCV   Date Value Ref Range Status   07/23/2024 90.7 79.0 - 97.0 fL Final     MCH   Date Value Ref Range Status   07/23/2024 29.1 26.6 - 33.0 pg Final     MCHC   Date Value Ref Range Status   07/23/2024 32.0 31.5 - 35.7 g/dL Final     RDW   Date Value Ref Range Status   07/23/2024 14.9 12.3 - 15.4 % Final     RDW-SD   Date Value Ref Range Status   07/23/2024 48.7 37.0 - 54.0 fl Final     MPV   Date Value Ref Range Status   07/23/2024 9.9 6.0 - 12.0 fL Final     Platelets   Date Value Ref Range Status   07/23/2024 339 140 - 450 10*3/mm3 Final     Neutrophil %   Date Value Ref Range Status   07/23/2024 58.3 42.7 - 76.0 % Final     Lymphocyte %   Date Value Ref Range Status   07/23/2024 29.5 19.6 - 45.3 % Final     Monocyte %   Date Value Ref Range Status   07/23/2024 7.6 5.0 - 12.0 % Final     Eosinophil %   Date Value Ref Range Status   07/23/2024 3.9 0.3 - 6.2 % Final     Basophil %   Date Value Ref Range Status   07/23/2024 0.7 0.0 - 1.5 % Final     Immature Grans %   Date Value Ref Range Status   08/09/2022 0.5 0.0 - 0.5 % Final     Neutrophils, Absolute   Date Value Ref Range Status   07/23/2024 5.33 1.70 - 7.00 10*3/mm3 Final     Lymphocytes, Absolute   Date Value Ref Range Status   07/23/2024 2.69 0.70 - 3.10 10*3/mm3 Final     Monocytes, Absolute   Date Value Ref Range Status   07/23/2024 0.69 0.10 - 0.90 10*3/mm3 Final     Eosinophils, Absolute   Date Value Ref Range Status   07/23/2024 0.36 0.00 - 0.40 10*3/mm3 Final     Basophils, Absolute   Date Value Ref Range Status   07/23/2024 0.06 0.00 - 0.20 10*3/mm3 Final     Immature Grans, Absolute   Date Value Ref Range Status   08/09/2022 0.05 0.00 - 0.05 10*3/mm3 Final     nRBC   Date Value Ref Range Status   08/09/2022 0.0 0.0 - 0.2 /100 WBC Final       Lab Results   Component Value Date    GLUCOSE 147 (H)  03/18/2024    BUN 11 03/18/2024    CREATININE 0.68 03/18/2024    EGFRIFNONA 90 12/01/2021    EGFRIFAFRI 115 08/26/2020    BCR 16.2 03/18/2024    K 4.3 03/18/2024    CO2 23.0 03/18/2024    CALCIUM 9.7 03/18/2024    PROTENTOTREF 7.0 08/26/2020    ALBUMIN 4.4 03/18/2024    LABIL2 1.8 08/26/2020    AST 22 03/18/2024    ALT 27 03/18/2024         Assessment & Plan     Malignant neoplasm of central portion of right breast in female, estrogen receptor positive  - CBC & Differential            ASSESSMENT:      Moderately differentiated invasive ductal carcinoma of the right breast.  Status post right lumpectomy with sentinel lymph node biopsy.  pT1 cpN0 M0.  ER positive at 95%, RI positive at 50% and HER2/bertha was negative.  T1 cN0 M0.  Ongoing management  Oncotype DX assay with a high recurrence score at 28, consistent with 17% risk of distant recurrence at 9 years with tamoxifen alone and group absolute chemotherapy benefit of more than 15%  Diagnosis post adjuvant chemotherapy with Cytoxan and Taxotere.  She has received 4 out of 4 cycles completed on December 29, 2022.  Reviewed  Status post right breast radiation completed February 2023  Radiation-induced dermatitis: Resolved  On Aromasin 25 mg p.o. daily initiated March 2023.  She will continue  Status post partial hysterectomy   Postmenopausal state following lab confirmation  Family history of breast cancer in her mother at the age of 50, paternal grandmother with colon cancer at 17 and her father had skin cancer.  Per Patient she is up-to-date on her colonoscopy  Assessment has been reviewed and updated.    Chemotherapy-induced neutropenia.  Patient is afebrile.          Plans:     CBC reviewed, CMP ordered  Continue Aromasin 25 mg p.o. daily.  Refills sent today in a 90-day supply  Continue calcium with vitamin D 600 mg twice a day  Bilateral diagnostic mammogram will be due again July 2024, ordered today.  This is pending later this week  Per patient her dentist  advised against Zometa due to dental disease  Bone density will be due again 1/31/2025.  Reviewed  Reviewed bilateral diagnostic mammogram from June 2023.    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  Gave information on cancer genetics for her to review.  Patient to let me know when she is ready to proceed  All questions answered  Follow-up 4 months     Patient verbalized understanding and is in agreement of the above plan.        I spent 30 total minutes, face-to-face, caring for Alaina today. 90% of this time involved counseling and/or coordination of care as documented within this note.

## 2024-07-23 ENCOUNTER — OFFICE VISIT (OUTPATIENT)
Dept: ONCOLOGY | Facility: CLINIC | Age: 59
End: 2024-07-23
Payer: COMMERCIAL

## 2024-07-23 ENCOUNTER — LAB (OUTPATIENT)
Dept: LAB | Facility: HOSPITAL | Age: 59
End: 2024-07-23
Payer: COMMERCIAL

## 2024-07-23 VITALS
RESPIRATION RATE: 18 BRPM | DIASTOLIC BLOOD PRESSURE: 84 MMHG | SYSTOLIC BLOOD PRESSURE: 146 MMHG | OXYGEN SATURATION: 99 % | HEIGHT: 64 IN | TEMPERATURE: 98 F | WEIGHT: 254 LBS | BODY MASS INDEX: 43.36 KG/M2 | HEART RATE: 82 BPM

## 2024-07-23 DIAGNOSIS — C50.111 MALIGNANT NEOPLASM OF CENTRAL PORTION OF RIGHT BREAST IN FEMALE, ESTROGEN RECEPTOR POSITIVE: Primary | ICD-10-CM

## 2024-07-23 DIAGNOSIS — E11.65 TYPE 2 DIABETES MELLITUS WITH HYPERGLYCEMIA, WITH LONG-TERM CURRENT USE OF INSULIN: ICD-10-CM

## 2024-07-23 DIAGNOSIS — Z17.0 MALIGNANT NEOPLASM OF CENTRAL PORTION OF RIGHT BREAST IN FEMALE, ESTROGEN RECEPTOR POSITIVE: Primary | ICD-10-CM

## 2024-07-23 DIAGNOSIS — Z79.4 TYPE 2 DIABETES MELLITUS WITH HYPERGLYCEMIA, WITH LONG-TERM CURRENT USE OF INSULIN: ICD-10-CM

## 2024-07-23 LAB
ALBUMIN SERPL-MCNC: 4.6 G/DL (ref 3.5–5.2)
ALBUMIN/GLOB SERPL: 2.1 G/DL
ALP SERPL-CCNC: 156 U/L (ref 39–117)
ALT SERPL W P-5'-P-CCNC: 31 U/L (ref 1–33)
ANION GAP SERPL CALCULATED.3IONS-SCNC: 12.4 MMOL/L (ref 5–15)
AST SERPL-CCNC: 25 U/L (ref 1–32)
BASOPHILS # BLD AUTO: 0.06 10*3/MM3 (ref 0–0.2)
BASOPHILS NFR BLD AUTO: 0.7 % (ref 0–1.5)
BILIRUB SERPL-MCNC: 0.5 MG/DL (ref 0–1.2)
BUN SERPL-MCNC: 15 MG/DL (ref 6–20)
BUN/CREAT SERPL: 20 (ref 7–25)
CALCIUM SPEC-SCNC: 10.1 MG/DL (ref 8.6–10.5)
CHLORIDE SERPL-SCNC: 103 MMOL/L (ref 98–107)
CO2 SERPL-SCNC: 25.6 MMOL/L (ref 22–29)
CREAT SERPL-MCNC: 0.75 MG/DL (ref 0.57–1)
DEPRECATED RDW RBC AUTO: 48.7 FL (ref 37–54)
EGFRCR SERPLBLD CKD-EPI 2021: 93.6 ML/MIN/1.73
EOSINOPHIL # BLD AUTO: 0.36 10*3/MM3 (ref 0–0.4)
EOSINOPHIL NFR BLD AUTO: 3.9 % (ref 0.3–6.2)
ERYTHROCYTE [DISTWIDTH] IN BLOOD BY AUTOMATED COUNT: 14.9 % (ref 12.3–15.4)
GLOBULIN UR ELPH-MCNC: 2.2 GM/DL
GLUCOSE SERPL-MCNC: 123 MG/DL (ref 65–99)
HBA1C MFR BLD: 6.58 % (ref 4.8–5.6)
HCT VFR BLD AUTO: 41.2 % (ref 34–46.6)
HGB BLD-MCNC: 13.2 G/DL (ref 12–15.9)
HOLD SPECIMEN: NORMAL
HOLD SPECIMEN: NORMAL
LYMPHOCYTES # BLD AUTO: 2.69 10*3/MM3 (ref 0.7–3.1)
LYMPHOCYTES NFR BLD AUTO: 29.5 % (ref 19.6–45.3)
MCH RBC QN AUTO: 29.1 PG (ref 26.6–33)
MCHC RBC AUTO-ENTMCNC: 32 G/DL (ref 31.5–35.7)
MCV RBC AUTO: 90.7 FL (ref 79–97)
MONOCYTES # BLD AUTO: 0.69 10*3/MM3 (ref 0.1–0.9)
MONOCYTES NFR BLD AUTO: 7.6 % (ref 5–12)
NEUTROPHILS NFR BLD AUTO: 5.33 10*3/MM3 (ref 1.7–7)
NEUTROPHILS NFR BLD AUTO: 58.3 % (ref 42.7–76)
PLATELET # BLD AUTO: 339 10*3/MM3 (ref 140–450)
PMV BLD AUTO: 9.9 FL (ref 6–12)
POTASSIUM SERPL-SCNC: 4.4 MMOL/L (ref 3.5–5.2)
PROT SERPL-MCNC: 6.8 G/DL (ref 6–8.5)
RBC # BLD AUTO: 4.54 10*6/MM3 (ref 3.77–5.28)
SODIUM SERPL-SCNC: 141 MMOL/L (ref 136–145)
WBC NRBC COR # BLD AUTO: 9.13 10*3/MM3 (ref 3.4–10.8)

## 2024-07-23 PROCEDURE — 80050 GENERAL HEALTH PANEL: CPT

## 2024-07-23 PROCEDURE — 83036 HEMOGLOBIN GLYCOSYLATED A1C: CPT | Performed by: INTERNAL MEDICINE

## 2024-07-23 PROCEDURE — 36415 COLL VENOUS BLD VENIPUNCTURE: CPT

## 2024-07-23 PROCEDURE — 99214 OFFICE O/P EST MOD 30 MIN: CPT | Performed by: INTERNAL MEDICINE

## 2024-07-24 LAB
25(OH)D3+25(OH)D2 SERPL-MCNC: 54.5 NG/ML (ref 30–100)
TSH SERPL DL<=0.005 MIU/L-ACNC: 4.04 UIU/ML (ref 0.45–4.5)
VIT B12 SERPL-MCNC: >2000 PG/ML (ref 232–1245)

## 2024-07-25 ENCOUNTER — TELEPHONE (OUTPATIENT)
Dept: FAMILY MEDICINE CLINIC | Facility: CLINIC | Age: 59
End: 2024-07-25
Payer: COMMERCIAL

## 2024-07-25 NOTE — TELEPHONE ENCOUNTER
She probably could just stop it.  If she is a little afraid to just completely stop it, she could just dose once weekly instead

## 2024-07-25 NOTE — TELEPHONE ENCOUNTER
I called and informed the patient, she verbalized understanding. She states she takes a vitamin B12 supplement three times weekly, should she discontinue?

## 2024-07-25 NOTE — TELEPHONE ENCOUNTER
----- Message from Lori Fields sent at 7/25/2024  8:02 AM EDT -----  Thyroid screen came back normal, vitamin D came back normal.  Vitamin B12 came back high but this is a vitamin the body can urinate excess off so not too concerned about it.

## 2024-07-26 ENCOUNTER — HOSPITAL ENCOUNTER (OUTPATIENT)
Dept: ULTRASOUND IMAGING | Facility: HOSPITAL | Age: 59
Discharge: HOME | End: 2024-07-26
Payer: COMMERCIAL

## 2024-07-26 ENCOUNTER — HOSPITAL ENCOUNTER (OUTPATIENT)
Dept: MAMMOGRAPHY | Facility: HOSPITAL | Age: 59
Discharge: HOME | End: 2024-07-26
Payer: COMMERCIAL

## 2024-07-26 DIAGNOSIS — Z17.0 MALIGNANT NEOPLASM OF CENTRAL PORTION OF RIGHT BREAST IN FEMALE, ESTROGEN RECEPTOR POSITIVE: ICD-10-CM

## 2024-07-26 DIAGNOSIS — C50.111 MALIGNANT NEOPLASM OF CENTRAL PORTION OF RIGHT BREAST IN FEMALE, ESTROGEN RECEPTOR POSITIVE: ICD-10-CM

## 2024-07-26 PROCEDURE — G0279 TOMOSYNTHESIS, MAMMO: HCPCS

## 2024-07-26 PROCEDURE — 77066 DX MAMMO INCL CAD BI: CPT

## 2024-07-29 RX ORDER — PEN NEEDLE, DIABETIC 32GX 5/32"
NEEDLE, DISPOSABLE MISCELLANEOUS
Qty: 100 EACH | Refills: 12 | Status: SHIPPED | OUTPATIENT
Start: 2024-07-29

## 2024-08-08 ENCOUNTER — LAB (OUTPATIENT)
Dept: LAB | Facility: HOSPITAL | Age: 59
End: 2024-08-08
Payer: COMMERCIAL

## 2024-08-08 DIAGNOSIS — E11.65 TYPE 2 DIABETES MELLITUS WITH HYPERGLYCEMIA, WITH LONG-TERM CURRENT USE OF INSULIN: ICD-10-CM

## 2024-08-08 DIAGNOSIS — Z79.4 TYPE 2 DIABETES MELLITUS WITH HYPERGLYCEMIA, WITH LONG-TERM CURRENT USE OF INSULIN: ICD-10-CM

## 2024-08-08 LAB
ALBUMIN UR-MCNC: <1.2 MG/DL
CHOLEST SERPL-MCNC: 106 MG/DL (ref 0–200)
CREAT UR-MCNC: 20.9 MG/DL
HDLC SERPL-MCNC: 35 MG/DL (ref 40–60)
LDLC SERPL CALC-MCNC: 51 MG/DL (ref 0–100)
LDLC/HDLC SERPL: 1.41 {RATIO}
MICROALBUMIN/CREAT UR: NORMAL MG/G{CREAT}
TRIGL SERPL-MCNC: 109 MG/DL (ref 0–150)
VLDLC SERPL-MCNC: 20 MG/DL (ref 5–40)

## 2024-08-08 PROCEDURE — 82570 ASSAY OF URINE CREATININE: CPT

## 2024-08-08 PROCEDURE — 80061 LIPID PANEL: CPT

## 2024-08-08 PROCEDURE — 36415 COLL VENOUS BLD VENIPUNCTURE: CPT

## 2024-08-08 PROCEDURE — 82043 UR ALBUMIN QUANTITATIVE: CPT

## 2024-08-12 ENCOUNTER — OFFICE VISIT (OUTPATIENT)
Dept: ENDOCRINOLOGY | Facility: CLINIC | Age: 59
End: 2024-08-12
Payer: COMMERCIAL

## 2024-08-12 ENCOUNTER — SPECIALTY PHARMACY (OUTPATIENT)
Dept: ENDOCRINOLOGY | Facility: CLINIC | Age: 59
End: 2024-08-12
Payer: COMMERCIAL

## 2024-08-12 VITALS
OXYGEN SATURATION: 96 % | HEART RATE: 84 BPM | WEIGHT: 253 LBS | BODY MASS INDEX: 43.19 KG/M2 | SYSTOLIC BLOOD PRESSURE: 135 MMHG | HEIGHT: 64 IN | DIASTOLIC BLOOD PRESSURE: 75 MMHG

## 2024-08-12 DIAGNOSIS — E66.01 CLASS 3 SEVERE OBESITY DUE TO EXCESS CALORIES WITHOUT SERIOUS COMORBIDITY WITH BODY MASS INDEX (BMI) OF 40.0 TO 44.9 IN ADULT: ICD-10-CM

## 2024-08-12 DIAGNOSIS — E78.2 MIXED HYPERLIPIDEMIA: ICD-10-CM

## 2024-08-12 DIAGNOSIS — I10 HYPERTENSION, ESSENTIAL: ICD-10-CM

## 2024-08-12 DIAGNOSIS — Z79.4 TYPE 2 DIABETES MELLITUS WITH HYPERGLYCEMIA, WITH LONG-TERM CURRENT USE OF INSULIN: Primary | ICD-10-CM

## 2024-08-12 DIAGNOSIS — E11.65 TYPE 2 DIABETES MELLITUS WITH HYPERGLYCEMIA, WITH LONG-TERM CURRENT USE OF INSULIN: Primary | ICD-10-CM

## 2024-08-12 PROCEDURE — 99214 OFFICE O/P EST MOD 30 MIN: CPT | Performed by: INTERNAL MEDICINE

## 2024-08-12 PROCEDURE — 95251 CONT GLUC MNTR ANALYSIS I&R: CPT | Performed by: INTERNAL MEDICINE

## 2024-08-12 NOTE — PROGRESS NOTES
Specialty Pharmacy Refill Coordination Note     Alaina is a 58 y.o. female contacted today regarding refills of her specialty medication(s).    Specialty medication(s) and dose(s) confirmed: yes  Changes to medications: no  Changes to insurance: no  Reviewed and verified with patient:         Refill Questions      Flowsheet Row Most Recent Value   Changes to allergies? No   Changes to medications? No   New conditions or infections since last clinic visit No   Unplanned office visit, urgent care, ED, or hospital admission in the last 4 weeks  No   How does patient/caregiver feel medication is working? Good   Financial problems or insurance changes  No   Since the previous refill, were any specialty medication doses or scheduled injections missed or delayed?  No   Does this patient require a clinical escalation to a pharmacist? No            Delivery Questions      Flowsheet Row Most Recent Value   Delivery method  at Pharmacy   Number of medications in delivery 2   Medication(s) being filled and delivered Tirzepatide, Continuous Glucose Sensor   Copay verified? Yes   Copay amount 55.00   Copay form of payment Pay at pickup   Signature Required No                 Follow-up: 1 month(s)     Ruth Boyle, Pharmacy Technician  Specialty Pharmacy Technician   8/12/2024   14:56 EDT

## 2024-08-12 NOTE — PATIENT INSTRUCTIONS
Increase Mounjaro to 12.5 mg subcu weekly  Continue rest of the medications  Watch for low blood sugars and if you start having blood sugars less than 100 then decrease your insulin dose to 40 units 3 times a day.  Always keep glucose source in case of low blood sugar  Labs before follow-up.

## 2024-08-12 NOTE — PROGRESS NOTES
Specialty Pharmacy Patient Management Program  One-Time Clinical Outreach     Alaina Hartman is a 58 y.o. female seen by an Endocrinology provider for Type 2 Diabetes and enrolled in the Endocrinology Patient Management program offered by [facility].      During today's visit, pt was increased from Mounjaro 10mg to 12.5mg weekly.  Unfortunately, d/t stock issues, pharmacy is unable to obtain Mounjaro 12.5mg at this time.  Pt to stay on Mounjaro 10mg weekly until stock permits, to ensure she does not go without this medication altogether.        Navjot Goode, PharmD, MPH  Clinical Specialty Pharmacist, Endocrinology  8/12/2024  09:29 EDT

## 2024-08-12 NOTE — PROGRESS NOTES
Endocrine Progress Note Outpatient     Patient Care Team:  Lori Fields DO as PCP - General (Family Medicine)  Seipel, Joseph F, MD as Consulting Physician (Sleep Medicine)  Jagdeep Mcwilliams MD as Surgeon (General Surgery)  Lianet Rust MD as Consulting Physician (Hematology and Oncology)  Chirag Cho MD as Consulting Physician (Endocrinology)    Chief Complaint: Follow-up type 2 diabetes    HPI: This is a 58-year-old female with history of type 2 diabetes, hypertension and hyperlipidemia is here for follow-up.     For type 2 diabetes: Currently on Mounjaro 10 mg subcu weekly, Humalog mix 75/25 insulin, she is taking 45 units subcu 3 times a day before each meal along with Metformin 500 mg twice a day.  The past she has not been able to tolerate glipizide and Actos.  She has tried Jardiance as well but it did not help her sugars.  She is using Dexcom monitoring system.    Hypertension: Her blood pressure is doing well.     Hyperlipidemia: Currently on atorvastatin.  .    Past Medical History:   Diagnosis Date    Ankle edema     at times    Breast cancer, right 2022    Diabetes mellitus     GERD (gastroesophageal reflux disease)     Hiatal hernia     Hypertension        Social History     Socioeconomic History    Marital status:      Spouse name: Bora    Number of children: 3    Years of education: 14   Tobacco Use    Smoking status: Former     Current packs/day: 0.00     Average packs/day: 0.3 packs/day for 31.0 years (7.8 ttl pk-yrs)     Types: Cigarettes     Start date:      Quit date: 2010     Years since quittin.6     Passive exposure: Past    Smokeless tobacco: Never    Tobacco comments:     Started smoking about age 14   Vaping Use    Vaping status: Never Used   Substance and Sexual Activity    Alcohol use: Yes     Comment: 1-2 drinks a year    Drug use: Never    Sexual activity: Yes     Partners: Male     Birth control/protection: None, Hysterectomy      Comment: monogamous with        Family History   Problem Relation Age of Onset    Breast cancer Mother     Heart disease Mother     Thyroid disease Mother     Stroke Mother     Cancer Mother         lymphoma    Clotting disorder Father     Diabetes Brother     Heart disease Brother     Stroke Brother     Diabetes Brother     Colon cancer Paternal Grandmother        No Known Allergies    ROS:   Constitutional:  Denies fatigue, tiredness.    Eyes:  Denies change in visual acuity   HENT:  Denies nasal congestion or sore throat   Respiratory: denies cough, shortness of breath.   Cardiovascular:  denies chest pain, edema   GI:  Denies abdominal pain, nausea, vomiting.   Endocrine:  Denies polyuria or polydipsia   Psychiatric:  Denies depression or anxiety      Vitals:    08/12/24 0810   BP: 135/75   Pulse: 84   SpO2: 96%     Body mass index is 43.43 kg/m².     Physical Exam:  GEN: NAD, conversant, obese  EYES: EOMI,   NECK: no thyromegaly,  CV: RRR,  LUNG: CTA  PSYCH: AOX3, appropriate mood, affect normal      Results Review:     I reviewed the patient's new clinical results.    Lab Results   Component Value Date    HGBA1C 6.58 (H) 07/23/2024    HGBA1C 6.40 (H) 01/17/2024    HGBA1C 8.40 (H) 10/03/2023      Lab Results   Component Value Date    GLUCOSE 123 (H) 07/23/2024    BUN 15 07/23/2024    CREATININE 0.75 07/23/2024    EGFRIFNONA 90 12/01/2021    EGFRIFAFRI 115 08/26/2020    BCR 20.0 07/23/2024    K 4.4 07/23/2024    CO2 25.6 07/23/2024    CALCIUM 10.1 07/23/2024    PROTENTOTREF 7.0 08/26/2020    ALBUMIN 4.6 07/23/2024    LABIL2 1.8 08/26/2020    AST 25 07/23/2024    ALT 31 07/23/2024    CHOL 106 08/08/2024    TRIG 109 08/08/2024    LDL 51 08/08/2024    HDL 35 (L) 08/08/2024     Lab Results   Component Value Date    TSH 4.040 07/23/2024     Dexcom download interpretation: Dates covered was between July 30, 2024 to August 12, 2024.  Average blood sugar is 144.  Spending 83% in the range and 17% above range and  0% below range.  Glucose graph did not show significant fluctuations.    Medication Review: Reviewed.       Current Outpatient Medications:     ALPRAZolam (XANAX) 0.5 MG tablet, Take 1 tablet by mouth Daily As Needed for Anxiety., Disp: 20 tablet, Rfl: 0    atorvastatin (LIPITOR) 40 MG tablet, Take 1 tablet by mouth Daily., Disp: 90 tablet, Rfl: 4    BIOTIN PO, Take  by mouth., Disp: , Rfl:     Calcium Carbonate-Vitamin D (Oscal 500 D-3) 500-5 MG-MCG tablet, Take 500 mg by mouth Every 12 (Twelve) Hours., Disp: 60 tablet, Rfl: 6    Cholecalciferol (Vitamin D3) 50 MCG (2000 UT) capsule, Take 1 capsule by mouth once daily, Disp: 100 capsule, Rfl: 3    Continuous Glucose Sensor (Dexcom G7 Sensor) misc, Use 1 each Every 10 (Ten) Days., Disp: 3 each, Rfl: 11    exemestane (Aromasin) 25 MG tablet, Take 1 tablet by mouth Daily., Disp: 90 tablet, Rfl: 2    glucose blood (Accu-Chek Guide) test strip, 1 each by Other route 4 (Four) Times a Day. Use as instructed, Disp: 150 each, Rfl: 11    insulin aspart prot & aspart (NovoLOG Mix 70/30 FlexPen) (70-30) 100 UNIT/ML suspension pen-injector injection, Inject 45 units under the skin into the appropriate area as directed 3 (Three) Times a Day Before Meals., Disp: 120 mL, Rfl: 1    Insulin Pen Needle (BD Pen Needle Polly 2nd Gen) 32G X 4 MM misc, USE 1 PEN NEEDLE TO INJECT  INSULIN 4 TIMES DAILY, Disp: 100 each, Rfl: 12    lisinopril (PRINIVIL,ZESTRIL) 5 MG tablet, Take 1 tablet by mouth Daily., Disp: 90 tablet, Rfl: 4    loratadine (Claritin) 10 MG tablet, Take 1 tablet by mouth Daily., Disp: , Rfl:     metFORMIN ER (GLUCOPHAGE-XR) 500 MG 24 hr tablet, Take 2 tablets by mouth Daily., Disp: 180 tablet, Rfl: 6    multivitamin with minerals tablet tablet, Take 1 tablet by mouth Daily., Disp: , Rfl:     omeprazole (priLOSEC) 40 MG capsule, Take 1 capsule by mouth once daily, Disp: 90 capsule, Rfl: 3    ondansetron (ZOFRAN) 8 MG tablet, Take 1 tablet by mouth 3 (Three) Times a Day As  Needed for Nausea or Vomiting., Disp: 30 tablet, Rfl: 5    Tirzepatide (Mounjaro) 10 MG/0.5ML solution pen-injector pen, Inject 0.5 mL under the skin into the appropriate area as directed 1 (One) Time Per Week., Disp: 2 mL, Rfl: 5    vitamin B-6 (PYRIDOXINE) 100 MG tablet, Take 1 tablet by mouth 2 (Two) Times a Day., Disp: , Rfl:       Assessment & Plan   1.  Diabetes mellitus type 2 with hyperglycemia: Overall doing well with A1c at 6.5%.  At this time we have decided to increase Mounjaro to 12.5 mg subcu weekly and continue rest of the medications.  She is advised to watch for low blood sugars and always keep glucose source in case of low blood sugars.  She will continue her metformin and Humalog mix 75/25 insulin at 45 unit subcu 3 times a day.    2.  Hypertension: Well-controlled, continue current medications.    3.  Hyperlipidemia: LDL is below 70, continue atorvastatin.    4.  Class III obesity: Weight is stable, encouraged to continue to work on diet and activity.  Will increase Mounjaro dose as well.    Assessment and plan from January 23, 2024 reviewed and updated.                    Chirag Cho MD FACE.

## 2024-09-09 ENCOUNTER — SPECIALTY PHARMACY (OUTPATIENT)
Dept: ENDOCRINOLOGY | Facility: CLINIC | Age: 59
End: 2024-09-09
Payer: COMMERCIAL

## 2024-09-11 ENCOUNTER — SPECIALTY PHARMACY (OUTPATIENT)
Dept: ENDOCRINOLOGY | Facility: CLINIC | Age: 59
End: 2024-09-11
Payer: COMMERCIAL

## 2024-09-11 NOTE — PROGRESS NOTES
Specialty Pharmacy Refill Coordination Note     Alaina is a 58 y.o. female contacted today regarding refills of her specialty medication(s).    Specialty medication(s) and dose(s) confirmed: yes  Changes to medications: no  Changes to insurance: no  Reviewed and verified with patient:         Refill Questions      Flowsheet Row Most Recent Value   Changes to allergies? No   Changes to medications? No   New conditions or infections since last clinic visit No   Unplanned office visit, urgent care, ED, or hospital admission in the last 4 weeks  No   How does patient/caregiver feel medication is working? Good   Financial problems or insurance changes  No   Since the previous refill, were any specialty medication doses or scheduled injections missed or delayed?  No   Does this patient require a clinical escalation to a pharmacist? No            Delivery Questions      Flowsheet Row Most Recent Value   Delivery method [employer]   Delivery address verified with patient/caregiver? Yes   Delivery address Home   Number of medications in delivery 3   Medication(s) being filled and delivered Tirzepatide, Continuous Glucose Sensor, Insulin Aspart Prot & Aspart   Copay verified? Yes   Copay amount 145.00   Copay form of payment Credit/debit on file   Signature Required No                 Follow-up: 3 month(s)     Ruth Boyle, Pharmacy Technician  Specialty Pharmacy Technician   9/11/2024   10:10 EDT

## 2024-09-17 ENCOUNTER — SPECIALTY PHARMACY (OUTPATIENT)
Dept: ENDOCRINOLOGY | Facility: CLINIC | Age: 59
End: 2024-09-17
Payer: COMMERCIAL

## 2024-09-17 ENCOUNTER — OFFICE VISIT (OUTPATIENT)
Dept: NEUROLOGY | Facility: CLINIC | Age: 59
End: 2024-09-17
Payer: COMMERCIAL

## 2024-09-17 VITALS
DIASTOLIC BLOOD PRESSURE: 86 MMHG | BODY MASS INDEX: 43.02 KG/M2 | HEIGHT: 64 IN | WEIGHT: 252 LBS | SYSTOLIC BLOOD PRESSURE: 152 MMHG | HEART RATE: 98 BPM

## 2024-09-17 DIAGNOSIS — G47.33 OBSTRUCTIVE SLEEP APNEA SYNDROME: Primary | ICD-10-CM

## 2024-09-17 PROCEDURE — 99212 OFFICE O/P EST SF 10 MIN: CPT | Performed by: NURSE PRACTITIONER

## 2024-09-19 ENCOUNTER — OFFICE VISIT (OUTPATIENT)
Dept: RADIATION ONCOLOGY | Facility: HOSPITAL | Age: 59
End: 2024-09-19
Payer: COMMERCIAL

## 2024-09-19 VITALS
DIASTOLIC BLOOD PRESSURE: 99 MMHG | OXYGEN SATURATION: 99 % | WEIGHT: 252 LBS | BODY MASS INDEX: 43.26 KG/M2 | RESPIRATION RATE: 20 BRPM | SYSTOLIC BLOOD PRESSURE: 159 MMHG | HEART RATE: 90 BPM

## 2024-09-19 DIAGNOSIS — C50.111 MALIGNANT NEOPLASM OF CENTRAL PORTION OF RIGHT BREAST IN FEMALE, ESTROGEN RECEPTOR POSITIVE: Primary | ICD-10-CM

## 2024-09-19 DIAGNOSIS — Z17.0 MALIGNANT NEOPLASM OF CENTRAL PORTION OF RIGHT BREAST IN FEMALE, ESTROGEN RECEPTOR POSITIVE: Primary | ICD-10-CM

## 2024-09-19 PROCEDURE — G0463 HOSPITAL OUTPT CLINIC VISIT: HCPCS | Performed by: STUDENT IN AN ORGANIZED HEALTH CARE EDUCATION/TRAINING PROGRAM

## 2024-09-20 ENCOUNTER — OFFICE VISIT (OUTPATIENT)
Dept: SURGERY | Facility: CLINIC | Age: 59
End: 2024-09-20
Payer: COMMERCIAL

## 2024-09-20 VITALS
HEART RATE: 82 BPM | BODY MASS INDEX: 43.02 KG/M2 | HEIGHT: 64 IN | OXYGEN SATURATION: 98 % | SYSTOLIC BLOOD PRESSURE: 127 MMHG | TEMPERATURE: 98.4 F | WEIGHT: 252 LBS | DIASTOLIC BLOOD PRESSURE: 75 MMHG

## 2024-09-20 DIAGNOSIS — C50.111 MALIGNANT NEOPLASM OF CENTRAL PORTION OF RIGHT BREAST IN FEMALE, ESTROGEN RECEPTOR POSITIVE: Primary | ICD-10-CM

## 2024-09-20 DIAGNOSIS — Z17.0 MALIGNANT NEOPLASM OF CENTRAL PORTION OF RIGHT BREAST IN FEMALE, ESTROGEN RECEPTOR POSITIVE: Primary | ICD-10-CM

## 2024-09-20 PROCEDURE — 99213 OFFICE O/P EST LOW 20 MIN: CPT | Performed by: SURGERY

## 2024-10-02 NOTE — PROGRESS NOTES
GENERAL SURGERY ESTABLISHED PATIENT NOTE    Patient Care Team:  Lori Fields DO as PCP - General (Family Medicine)  Seipel, Joseph F, MD as Consulting Physician (Sleep Medicine)  Jagdeep Mcwilliams MD as Surgeon (General Surgery)  Lianet Rust MD as Consulting Physician (Hematology and Oncology)  Chirag Cho MD as Consulting Physician (Endocrinology)  Js Ma MD as Consulting Physician (Radiation Oncology)    Reason for follow-up: 6 month follow-up for breast cancer    Subjective     Patient is a 58 y.o. female presents for 6-month follow-up from right lumpectomy and sentinel lymph node biopsy performed on 8/18/2022.  The patient has completed chemotherapy, and radiation therapy.  Overall she is doing well without any significant complaints.  She most recently had a diagnostic mammogram of the breast performed bilaterally on 7/18/2023 which demonstrated interval posttreatment changes in the right breast and no mammographic findings to indicate malignancy in the left breast.  She is taking Aromasin without any significant complaints.  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  Overall since I last saw the patient she reports that she continues to do well and does not have any complaints at this time.    Review of Systems   Genitourinary:  Negative for breast discharge, breast lump and breast pain.   Hematological:  Negative for adenopathy.        History  Past Medical History:   Diagnosis Date    Ankle edema     at times    Breast cancer, right 08/2022    Diabetes mellitus     GERD (gastroesophageal reflux disease)     Hiatal hernia     Hypertension     Sleep apnea      Past Surgical History:   Procedure Laterality Date    BREAST BIOPSY Right 08/18/2022    Procedure: Right breast lumpectomy;  Surgeon: Jagdeep Mcwilliams MD;  Location: [facility] MAIN OR;  Service: General;  Laterality: Right;    BREAST LUMPECTOMY Left 2003    x2    PORTACATH PLACEMENT N/A 10/06/2022     Procedure: INSERTION OF PORTACATH;  Surgeon: Jagdeep Mcwilliams MD;  Location: [facility] MAIN OR;  Service: General;  Laterality: N/A;    SENTINEL NODE BIOPSY Right 2022    Procedure: SENTINEL NODE BIOPSY;  Surgeon: Jagdeep Mcwilliams MD;  Location: [facility] MAIN OR;  Service: General;  Laterality: Right;    SUBTOTAL HYSTERECTOMY  10/10/1991    had it done for bleeding. Ovaries still present    TOOTH EXTRACTION      has had all teeth (but one) removed    VENOUS ACCESS DEVICE (PORT) REMOVAL       Family History   Problem Relation Age of Onset    Breast cancer Mother     Heart disease Mother     Thyroid disease Mother     Stroke Mother     Cancer Mother         lymphoma    Dementia Mother     Clotting disorder Father     Diabetes Brother     Heart disease Brother     Stroke Brother     Diabetes Brother     Colon cancer Paternal Grandmother      Social History     Tobacco Use    Smoking status: Former     Current packs/day: 0.00     Average packs/day: 0.3 packs/day for 31.0 years (7.8 ttl pk-yrs)     Types: Cigarettes     Start date:      Quit date: 2010     Years since quittin.7     Passive exposure: Past    Smokeless tobacco: Never    Tobacco comments:     Started smoking about age 14   Vaping Use    Vaping status: Never Used   Substance Use Topics    Alcohol use: Yes     Comment: 1-2 drinks a year    Drug use: Never     (Not in a hospital admission)    Allergies:  Patient has no known allergies.    Objective     Vital Signs       Physical Exam  Vitals reviewed. Exam conducted with a chaperone present.   Constitutional:       Appearance: She is well-developed.   HENT:      Head: Normocephalic and atraumatic.   Eyes:      Pupils: Pupils are equal, round, and reactive to light.   Cardiovascular:      Rate and Rhythm: Normal rate and regular rhythm.   Pulmonary:      Effort: Pulmonary effort is normal.      Breath sounds: Normal breath sounds.   Chest:      Comments: Both breasts were  examined the upright position.  Both breasts exhibit pendulous morphology.  There is a well-healed curvilinear incision on the lateral aspect of the right breast.  No palpable masses bilaterally, no skin changes, thickening, dimpling, or rashes are noted.  No discharge from the nipple  Abdominal:      General: There is no distension.      Palpations: Abdomen is soft.      Tenderness: There is no abdominal tenderness.      Hernia: No hernia is present.   Musculoskeletal:         General: Normal range of motion.      Cervical back: Normal range of motion.   Lymphadenopathy:      Cervical: No cervical adenopathy.      Upper Body:      Right upper body: No supraclavicular or axillary adenopathy.      Left upper body: No supraclavicular or axillary adenopathy.   Skin:     General: Skin is warm and dry.      Findings: No rash.   Neurological:      Mental Status: She is alert and oriented to person, place, and time.         Results Review:   Lab Results (last 24 hours)       ** No results found for the last 24 hours. **          No radiology results for the last day      I reviewed the patient's new imaging results and agree with the interpretation.  I reviewed the patient's other test results and agree with the interpretation    Assessment & Plan   Right breast cancer    Pathology reviewed, patient noted to have 1.7 cm residual moderately differentiated ductal carcinoma which was completely excised.  5 lymph nodes were removed, and all negative.  The patient had an Oncotype DX performed, and elected to undergo adjuvant chemotherapy.  She has now completed radiation therapy.  Port-A-Cath has been removed  Recommend follow-up with physical therapy for lymphedema exercises.  Continue lifelong limb restrictions  Recommend follow-up with medical oncology as directed  Recommend follow-up with radiation oncology as directed  The patient has followed up with me every 6 months for 2 years, and now may follow-up with me only as  needed.  I did tell her that I will be happy to see her again in the future for any reason.    I discussed the patients findings and my recommendations with the patient.     Jagdeep Mcwilliams MD  10/02/24  12:50 EDT

## 2024-11-25 ENCOUNTER — LAB (OUTPATIENT)
Dept: LAB | Facility: HOSPITAL | Age: 59
End: 2024-11-25
Payer: COMMERCIAL

## 2024-11-25 ENCOUNTER — OFFICE VISIT (OUTPATIENT)
Dept: ONCOLOGY | Facility: CLINIC | Age: 59
End: 2024-11-25
Payer: COMMERCIAL

## 2024-11-25 VITALS
HEIGHT: 64 IN | BODY MASS INDEX: 43.19 KG/M2 | HEART RATE: 78 BPM | WEIGHT: 253 LBS | DIASTOLIC BLOOD PRESSURE: 82 MMHG | SYSTOLIC BLOOD PRESSURE: 133 MMHG | OXYGEN SATURATION: 97 % | TEMPERATURE: 98.6 F

## 2024-11-25 DIAGNOSIS — C50.111 MALIGNANT NEOPLASM OF CENTRAL PORTION OF RIGHT BREAST IN FEMALE, ESTROGEN RECEPTOR POSITIVE: Primary | ICD-10-CM

## 2024-11-25 DIAGNOSIS — Z17.0 MALIGNANT NEOPLASM OF CENTRAL PORTION OF RIGHT BREAST IN FEMALE, ESTROGEN RECEPTOR POSITIVE: Primary | ICD-10-CM

## 2024-11-25 DIAGNOSIS — Z79.811 AROMATASE INHIBITOR USE: ICD-10-CM

## 2024-11-25 DIAGNOSIS — Z78.0 POSTMENOPAUSAL: ICD-10-CM

## 2024-11-25 LAB
ALBUMIN SERPL-MCNC: 4.3 G/DL (ref 3.5–5.2)
ALBUMIN/GLOB SERPL: 1.7 G/DL
ALP SERPL-CCNC: 141 U/L (ref 39–117)
ALT SERPL W P-5'-P-CCNC: 24 U/L (ref 1–33)
ANION GAP SERPL CALCULATED.3IONS-SCNC: 10.6 MMOL/L (ref 5–15)
AST SERPL-CCNC: 24 U/L (ref 1–32)
BASOPHILS # BLD AUTO: 0.05 10*3/MM3 (ref 0–0.2)
BASOPHILS NFR BLD AUTO: 0.5 % (ref 0–1.5)
BILIRUB SERPL-MCNC: 0.5 MG/DL (ref 0–1.2)
BUN SERPL-MCNC: 14 MG/DL (ref 6–20)
BUN/CREAT SERPL: 19.4 (ref 7–25)
CALCIUM SPEC-SCNC: 9.7 MG/DL (ref 8.6–10.5)
CHLORIDE SERPL-SCNC: 102 MMOL/L (ref 98–107)
CO2 SERPL-SCNC: 25.4 MMOL/L (ref 22–29)
CREAT SERPL-MCNC: 0.72 MG/DL (ref 0.57–1)
DEPRECATED RDW RBC AUTO: 46.4 FL (ref 37–54)
EGFRCR SERPLBLD CKD-EPI 2021: 97.7 ML/MIN/1.73
EOSINOPHIL # BLD AUTO: 0.21 10*3/MM3 (ref 0–0.4)
EOSINOPHIL NFR BLD AUTO: 2.1 % (ref 0.3–6.2)
ERYTHROCYTE [DISTWIDTH] IN BLOOD BY AUTOMATED COUNT: 14.3 % (ref 12.3–15.4)
GLOBULIN UR ELPH-MCNC: 2.5 GM/DL
GLUCOSE SERPL-MCNC: 126 MG/DL (ref 65–99)
HCT VFR BLD AUTO: 38.9 % (ref 34–46.6)
HGB BLD-MCNC: 12.5 G/DL (ref 12–15.9)
HOLD SPECIMEN: NORMAL
LYMPHOCYTES # BLD AUTO: 3.55 10*3/MM3 (ref 0.7–3.1)
LYMPHOCYTES NFR BLD AUTO: 35 % (ref 19.6–45.3)
MCH RBC QN AUTO: 29.5 PG (ref 26.6–33)
MCHC RBC AUTO-ENTMCNC: 32.1 G/DL (ref 31.5–35.7)
MCV RBC AUTO: 91.7 FL (ref 79–97)
MONOCYTES # BLD AUTO: 0.59 10*3/MM3 (ref 0.1–0.9)
MONOCYTES NFR BLD AUTO: 5.8 % (ref 5–12)
NEUTROPHILS NFR BLD AUTO: 5.74 10*3/MM3 (ref 1.7–7)
NEUTROPHILS NFR BLD AUTO: 56.6 % (ref 42.7–76)
PLATELET # BLD AUTO: 320 10*3/MM3 (ref 140–450)
PMV BLD AUTO: 9.5 FL (ref 6–12)
POTASSIUM SERPL-SCNC: 4.4 MMOL/L (ref 3.5–5.2)
PROT SERPL-MCNC: 6.8 G/DL (ref 6–8.5)
RBC # BLD AUTO: 4.24 10*6/MM3 (ref 3.77–5.28)
SODIUM SERPL-SCNC: 138 MMOL/L (ref 136–145)
WBC NRBC COR # BLD AUTO: 10.14 10*3/MM3 (ref 3.4–10.8)

## 2024-11-25 PROCEDURE — 85025 COMPLETE CBC W/AUTO DIFF WBC: CPT

## 2024-11-25 PROCEDURE — 36415 COLL VENOUS BLD VENIPUNCTURE: CPT

## 2024-11-25 PROCEDURE — 80053 COMPREHEN METABOLIC PANEL: CPT | Performed by: PHYSICIAN ASSISTANT

## 2024-11-25 PROCEDURE — 99214 OFFICE O/P EST MOD 30 MIN: CPT | Performed by: NURSE PRACTITIONER

## 2024-11-25 NOTE — PROGRESS NOTES
- Hematology/Oncology Outpatient Follow Up    PATIENT NAME:Alaina Hartman  :1965  MRN: [id number]  PRIMARY CARE PHYSICIAN: Lori Fields DO  REFERRING PHYSICIAN: Lori Fields DO    Chief Complaint   Patient presents with    Follow-up     Malignant neoplasm of central portion of right breast in female, estrogen receptor positive        HISTORY OF PRESENT ILLNESS:     This is a 57-year-old female who was clinically asymptomatic and had a routine screening mammogram which showed an abnormality on the right breast.  Prior to that patient had left breast biopsy in  for benign disease.     Review of her screening mammogram which was performed on 6/3/2022 showed a new 2 cm focal asymmetry with architectural distortion in the mid to posterior third depth of the outer right breast the left breast was benign.  Right diagnostic mammogram and ultrasound was recommended.  On 2022 patient underwent right diagnostic mammogram and ultrasound which showed a 2 cm irregular mass in the lateral inferior breast.  Same day she had an ultrasound which showed a 1.6 cm on the right breast the right axilla did not show any abnormal lymph nodes.     Patient then underwent ultrasound-guided biopsy of the right breast mass pathology revealed moderately differentiated invasive ductal carcinoma estrogen receptor positive at 95% progesterone receptor positive at 50%, HER2/bertha was negative at 1+ on immunohistochemistry.        Patient had a partial hysterectomy many years ago.  She is  and has 3 children.  She does not smoke, does not drink alcohol     Family history significant for mother with breast cancer in her 50s, her father had skin cancers, paternal grandmother had colon cancer at the age of 70        She is a       2022 estradiol level was less than 5 and FSH was 33 consistent with postmenopausal state  2022 patient underwent right lumpectomy with sentinel lymph node biopsy.  Pathology  revealed residual moderately differentiated ductal carcinoma measuring 1.7 cm completely excised total of 5 lymph nodes were removed and all of them were negative for metastatic disease.  Pathology stage is pT1 cpN0 M0.  There was no evidence of DCIS all margins were negative distance to the closest margin was anterior margin at 2 mm ER positive at 95, DC positive at 50% and HER2/bertha was negative.  9/27/2022 patient had Oncotype DX assay done which returned with a recurrence score of 28.  This is consistent with 17% risk of recurrence at 9 years distantly, and more than 15% chemotherapy benefit.  On the validation assay estrogen receptor was positive, DC was positive and HER2/bertha was negative.  10/27/2022: C1 D1 Taxotere and Cytoxan received.  Neulasta support held due to leukocytosis.  11/4/2022: WBC 2.28, hemoglobin 11.9, platelets 297,000, ANC 0.08.  Patient was initiated on Neupogen 480 mg subcu given 11/4/2022, 11/5/2022, 11/6/2022.  Patient to receive Neulasta again with next cycle of treatment.  11/17/2022: Patient received cycle 2 of Taxotere Cytoxan with Neulasta support  12/8/2022: Patient received cycle 3 of Taxotere and Cytoxan with Neulasta support  12/29/2020 patient received cycle 4 of combination chemotherapy with Cytoxan and Taxotere  Patient completed adjuvant breast radiation February 2023 6/30/2023 bilateral diagnostic mammogram with no mammographic findings to indicate right or left breast malignancy      Past Medical History:   Diagnosis Date    Ankle edema     at times    Breast cancer, right 08/2022    Diabetes mellitus     GERD (gastroesophageal reflux disease)     Hiatal hernia     Hypertension     Sleep apnea        Past Surgical History:   Procedure Laterality Date    BREAST BIOPSY Right 08/18/2022    Procedure: Right breast lumpectomy;  Surgeon: Jagdeep Mcwilliams MD;  Location: [facility] MAIN OR;  Service: General;  Laterality: Right;    BREAST LUMPECTOMY Left 2003    x2     PORTACATH PLACEMENT N/A 10/06/2022    Procedure: INSERTION OF PORTACATH;  Surgeon: Jagdeep Mcwilliams MD;  Location: [facility] MAIN OR;  Service: General;  Laterality: N/A;    SENTINEL NODE BIOPSY Right 08/18/2022    Procedure: SENTINEL NODE BIOPSY;  Surgeon: Jagdeep Mcwilliams MD;  Location: [facility] MAIN OR;  Service: General;  Laterality: Right;    SUBTOTAL HYSTERECTOMY  10/10/1991    had it done for bleeding. Ovaries still present    TOOTH EXTRACTION  1995    has had all teeth (but one) removed    VENOUS ACCESS DEVICE (PORT) REMOVAL  2023         Current Outpatient Medications:     ALPRAZolam (XANAX) 0.5 MG tablet, Take 1 tablet by mouth Daily As Needed for Anxiety., Disp: 20 tablet, Rfl: 0    atorvastatin (LIPITOR) 40 MG tablet, Take 1 tablet by mouth Daily., Disp: 90 tablet, Rfl: 4    BIOTIN PO, Take  by mouth., Disp: , Rfl:     Calcium Carbonate-Vitamin D (Oscal 500 D-3) 500-5 MG-MCG tablet, Take 500 mg by mouth Every 12 (Twelve) Hours., Disp: 60 tablet, Rfl: 6    Cholecalciferol (Vitamin D3) 50 MCG (2000 UT) capsule, Take 1 capsule by mouth once daily, Disp: 100 capsule, Rfl: 3    Continuous Glucose Sensor (Dexcom G7 Sensor) misc, Use 1 each Every 10 (Ten) Days., Disp: 3 each, Rfl: 11    exemestane (Aromasin) 25 MG tablet, Take 1 tablet by mouth Daily., Disp: 90 tablet, Rfl: 2    glucose blood (Accu-Chek Guide) test strip, 1 each by Other route 4 (Four) Times a Day. Use as instructed, Disp: 150 each, Rfl: 11    insulin aspart prot & aspart (NovoLOG Mix 70/30 FlexPen) (70-30) 100 UNIT/ML suspension pen-injector injection, Inject 45 units under the skin into the appropriate area as directed 3 (Three) Times a Day Before Meals., Disp: 120 mL, Rfl: 1    Insulin Pen Needle (BD Pen Needle Polly 2nd Gen) 32G X 4 MM misc, USE 1 PEN NEEDLE TO INJECT  INSULIN 4 TIMES DAILY, Disp: 100 each, Rfl: 12    lisinopril (PRINIVIL,ZESTRIL) 5 MG tablet, Take 1 tablet by mouth Daily., Disp: 90 tablet, Rfl: 4    loratadine  (Claritin) 10 MG tablet, Take 1 tablet by mouth Daily., Disp: , Rfl:     metFORMIN ER (GLUCOPHAGE-XR) 500 MG 24 hr tablet, Take 2 tablets by mouth Daily., Disp: 180 tablet, Rfl: 6    multivitamin with minerals tablet tablet, Take 1 tablet by mouth Daily., Disp: , Rfl:     omeprazole (priLOSEC) 40 MG capsule, Take 1 capsule by mouth once daily, Disp: 90 capsule, Rfl: 3    ondansetron (ZOFRAN) 8 MG tablet, Take 1 tablet by mouth 3 (Three) Times a Day As Needed for Nausea or Vomiting., Disp: 30 tablet, Rfl: 5    Tirzepatide (Mounjaro) 12.5 MG/0.5ML solution pen-injector pen, Inject 0.5 mL under the skin into the appropriate area as directed 1 (One) Time Per Week., Disp: 6 mL, Rfl: 3    vitamin B-6 (PYRIDOXINE) 100 MG tablet, Take 1 tablet by mouth 2 (Two) Times a Day., Disp: , Rfl:     Tirzepatide (Mounjaro) 10 MG/0.5ML solution pen-injector pen, Inject 0.5 mL under the skin into the appropriate area as directed 1 (One) Time Per Week. (Patient not taking: Reported on 2024), Disp: 2 mL, Rfl: 5    No Known Allergies    Family History   Problem Relation Age of Onset    Breast cancer Mother     Heart disease Mother     Thyroid disease Mother     Stroke Mother     Cancer Mother         lymphoma    Dementia Mother     Clotting disorder Father     Diabetes Brother     Heart disease Brother     Stroke Brother     Diabetes Brother     Colon cancer Paternal Grandmother        Cancer-related family history includes Breast cancer in her mother; Cancer in her mother; Colon cancer in her paternal grandmother.    Social History     Tobacco Use    Smoking status: Former     Current packs/day: 0.00     Average packs/day: 0.3 packs/day for 31.0 years (7.8 ttl pk-yrs)     Types: Cigarettes     Start date:      Quit date: 2010     Years since quittin.9     Passive exposure: Past    Smokeless tobacco: Never    Tobacco comments:     Started smoking about age 14   Vaping Use    Vaping status: Never Used   Substance Use  "Topics    Alcohol use: Yes     Comment: 1-2 drinks a year    Drug use: Never       I have reviewed and confirmed the accuracy of the patient's history: Chief complaint, HPI, ROS, and Subjective as entered by the MA/LPN/RN.   7/23/24      SUBJECTIVE:  Aliana is here today for her routine 4-month follow-up.  She reports that she is doing well.  She is compliant with her Aromasin and tolerates without issue.  She is compliant with her calcium and vitamin D supplement.  She has noted no changes on self breast exam or concerns.        REVIEW OF SYSTEMS:    Review of Systems   Constitutional:  Negative for chills and fever.   HENT:  Negative for congestion, drooling, ear discharge, rhinorrhea, sinus pressure and tinnitus.    Eyes:  Negative for photophobia, pain and discharge.   Respiratory:  Negative for apnea, choking and stridor.    Cardiovascular:  Negative for palpitations.   Gastrointestinal:  Negative for abdominal distention, abdominal pain and anal bleeding.   Endocrine: Negative for polydipsia and polyphagia.   Genitourinary:  Negative for decreased urine volume, flank pain and genital sores.   Musculoskeletal:  Negative for gait problem, neck pain and neck stiffness.   Skin:  Negative for color change, rash and wound.   Neurological:  Negative for tremors, seizures, syncope, facial asymmetry and speech difficulty.   Hematological:  Negative for adenopathy.   Psychiatric/Behavioral:  Negative for agitation, confusion, hallucinations and self-injury. The patient is not hyperactive.        OBJECTIVE:    Vitals:    11/25/24 0833   BP: 133/82   Pulse: 78   Temp: 98.6 °F (37 °C)   SpO2: 97%   Weight: 115 kg (253 lb)   Height: 162.6 cm (64.02\")   PainSc: 0-No pain             Body mass index is 43.41 kg/m².    ECOG    (0) Fully active, able to carry on all predisease performance without restriction    Physical Exam  Vitals and nursing note reviewed.   Constitutional:       General: She is not in acute distress.     " Appearance: She is not diaphoretic.   HENT:      Head: Normocephalic and atraumatic.   Eyes:      General: No scleral icterus.        Right eye: No discharge.         Left eye: No discharge.      Conjunctiva/sclera: Conjunctivae normal.   Neck:      Thyroid: No thyromegaly.   Cardiovascular:      Rate and Rhythm: Normal rate and regular rhythm.      Heart sounds: Normal heart sounds.      No friction rub. No gallop.   Pulmonary:      Effort: Pulmonary effort is normal. No respiratory distress.      Breath sounds: No stridor. No wheezing.   Chest:   Breasts:     Right: No mass or skin change.      Left: No mass or skin change.   Abdominal:      Palpations: Abdomen is soft. There is no mass.      Tenderness: There is no abdominal tenderness. There is no guarding or rebound.   Musculoskeletal:         General: No tenderness. Normal range of motion.      Cervical back: Normal range of motion and neck supple.   Lymphadenopathy:      Cervical: No cervical adenopathy.      Upper Body:      Right upper body: No supraclavicular or axillary adenopathy.      Left upper body: No supraclavicular or axillary adenopathy.   Skin:     General: Skin is warm.      Findings: No erythema or rash.   Neurological:      Mental Status: She is alert and oriented to person, place, and time.      Motor: No abnormal muscle tone.   Psychiatric:         Mood and Affect: Mood normal.         Behavior: Behavior normal.     Bilateral breast and bilateral axillary exam was unremarkable      RECENT LABS    WBC   Date Value Ref Range Status   11/25/2024 10.14 3.40 - 10.80 10*3/mm3 Final   08/26/2020 11.0 (H) 3.4 - 10.8 x10E3/uL Final     RBC   Date Value Ref Range Status   11/25/2024 4.24 3.77 - 5.28 10*6/mm3 Final   08/26/2020 4.53 3.77 - 5.28 x10E6/uL Final     Hemoglobin   Date Value Ref Range Status   11/25/2024 12.5 12.0 - 15.9 g/dL Final     Hematocrit   Date Value Ref Range Status   11/25/2024 38.9 34.0 - 46.6 % Final     MCV   Date Value Ref  Range Status   11/25/2024 91.7 79.0 - 97.0 fL Final     MCH   Date Value Ref Range Status   11/25/2024 29.5 26.6 - 33.0 pg Final     MCHC   Date Value Ref Range Status   11/25/2024 32.1 31.5 - 35.7 g/dL Final     RDW   Date Value Ref Range Status   11/25/2024 14.3 12.3 - 15.4 % Final     RDW-SD   Date Value Ref Range Status   11/25/2024 46.4 37.0 - 54.0 fl Final     MPV   Date Value Ref Range Status   11/25/2024 9.5 6.0 - 12.0 fL Final     Platelets   Date Value Ref Range Status   11/25/2024 320 140 - 450 10*3/mm3 Final     Neutrophil %   Date Value Ref Range Status   11/25/2024 56.6 42.7 - 76.0 % Final     Lymphocyte %   Date Value Ref Range Status   11/25/2024 35.0 19.6 - 45.3 % Final     Monocyte %   Date Value Ref Range Status   11/25/2024 5.8 5.0 - 12.0 % Final     Eosinophil %   Date Value Ref Range Status   11/25/2024 2.1 0.3 - 6.2 % Final     Basophil %   Date Value Ref Range Status   11/25/2024 0.5 0.0 - 1.5 % Final     Immature Grans %   Date Value Ref Range Status   08/09/2022 0.5 0.0 - 0.5 % Final     Neutrophils, Absolute   Date Value Ref Range Status   11/25/2024 5.74 1.70 - 7.00 10*3/mm3 Final     Lymphocytes, Absolute   Date Value Ref Range Status   11/25/2024 3.55 (H) 0.70 - 3.10 10*3/mm3 Final     Monocytes, Absolute   Date Value Ref Range Status   11/25/2024 0.59 0.10 - 0.90 10*3/mm3 Final     Eosinophils, Absolute   Date Value Ref Range Status   11/25/2024 0.21 0.00 - 0.40 10*3/mm3 Final     Basophils, Absolute   Date Value Ref Range Status   11/25/2024 0.05 0.00 - 0.20 10*3/mm3 Final     Immature Grans, Absolute   Date Value Ref Range Status   08/09/2022 0.05 0.00 - 0.05 10*3/mm3 Final     nRBC   Date Value Ref Range Status   08/09/2022 0.0 0.0 - 0.2 /100 WBC Final       Lab Results   Component Value Date    GLUCOSE 123 (H) 07/23/2024    BUN 15 07/23/2024    CREATININE 0.75 07/23/2024    EGFRIFNONA 90 12/01/2021    EGFRIFAFRI 115 08/26/2020    BCR 20.0 07/23/2024    K 4.4 07/23/2024    CO2 25.6  07/23/2024    CALCIUM 10.1 07/23/2024    PROTENTOTREF 7.0 08/26/2020    ALBUMIN 4.6 07/23/2024    LABIL2 1.8 08/26/2020    AST 25 07/23/2024    ALT 31 07/23/2024         Assessment & Plan     Malignant neoplasm of central portion of right breast in female, estrogen receptor positive        ASSESSMENT:      Moderately differentiated invasive ductal carcinoma of the right breast.  Status post right lumpectomy with sentinel lymph node biopsy.  pT1 cpN0 M0.  ER positive at 95%, MT positive at 50% and HER2/bertha was negative.  T1 cN0 M0.  Ongoing management  Oncotype DX assay with a high recurrence score at 28, consistent with 17% risk of distant recurrence at 9 years with tamoxifen alone and group absolute chemotherapy benefit of more than 15%  Diagnosis post adjuvant chemotherapy with Cytoxan and Taxotere.  She has received 4 out of 4 cycles completed on December 29, 2022.  Reviewed  Status post right breast radiation completed February 2023  Radiation-induced dermatitis: Resolved  On Aromasin 25 mg p.o. daily initiated March 2023.  She will continue  Status post partial hysterectomy   Postmenopausal state following lab confirmation  Family history of breast cancer in her mother at the age of 50, paternal grandmother with colon cancer at 17 and her father had skin cancer.  Per Patient she is up-to-date on her colonoscopy  Assessment has been reviewed and updated.    Chemotherapy-induced neutropenia.  Resolved.          Plans:     CBC reviewed, CMP ordered  Continue Aromasin 25 mg p.o. daily.    Continue calcium with vitamin D 600 mg twice a day  Bilateral diagnostic mammogram will be due again July 2025  Per patient her dentist advised against Zometa due to dental disease  Bone density will be due again 1/31/2025.  Reviewed  Reviewed bilateral diagnostic mammogram from June 2023.    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  Gave information on cancer genetics for her to review.  Patient to let me know when she is ready to proceed  All questions answered  Follow-up 4 months     Patient verbalized understanding and is in agreement of the above plan.        I spent 30 minutes caring for Alaina on this date of service. This time includes time spent by me in the following activities:preparing for the visit, reviewing tests, obtaining and/or reviewing a separately obtained history, performing a medically appropriate examination and/or evaluation , counseling and educating the patient/family/caregiver, ordering medications, tests, or procedures, and documenting information in the medical record

## 2024-12-02 ENCOUNTER — SPECIALTY PHARMACY (OUTPATIENT)
Dept: ENDOCRINOLOGY | Facility: CLINIC | Age: 59
End: 2024-12-02
Payer: COMMERCIAL

## 2024-12-02 ENCOUNTER — TELEPHONE (OUTPATIENT)
Dept: ENDOCRINOLOGY | Facility: CLINIC | Age: 59
End: 2024-12-02
Payer: COMMERCIAL

## 2024-12-02 RX ORDER — INSULIN ASPART 100 [IU]/ML
45 INJECTION, SUSPENSION SUBCUTANEOUS
Qty: 120 ML | Refills: 1 | Status: SHIPPED | OUTPATIENT
Start: 2024-12-02

## 2024-12-02 NOTE — PROGRESS NOTES
Specialty Pharmacy Refill Coordination Note     Alaina is a 59 y.o. female contacted today regarding refills of her specialty medication(s).    Specialty medication(s) and dose(s) confirmed: Yes  Changes to medications: no  Changes to insurance: no  Reviewed and verified with patient:         Refill Questions      Flowsheet Row Most Recent Value   Changes to allergies? No   Changes to medications? No   New conditions or infections since last clinic visit No   Unplanned office visit, urgent care, ED, or hospital admission in the last 4 weeks  No   How does patient/caregiver feel medication is working? Good   Financial problems or insurance changes  No   Since the previous refill, were any specialty medication doses or scheduled injections missed or delayed?  No   Does this patient require a clinical escalation to a pharmacist? No            Delivery Questions      Flowsheet Row Most Recent Value   Delivery method UPS   Delivery address verified with patient/caregiver? Yes   Delivery address Home   Number of medications in delivery 3   Medication(s) being filled and delivered Insulin Aspart Prot & Aspart (NovoLOG Mix 70/30 FlexPen), Continuous Glucose Sensor (Dexcom G7 Sensor), Tirzepatide (MOUNJARO)   Copay verified? Yes   Copay amount $145.00   Copay form of payment Credit/debit on file   Ship Date 12/03/2024   Delivery Date 12/04/2024   Signature Required No                 Follow-up: 3 month(s)     Minnie Reyna, Pharmacy Technician  Specialty Pharmacy Technician   12/2/2024   11:35 EST

## 2024-12-02 NOTE — TELEPHONE ENCOUNTER
Specialty Pharmacy Patient Management Program       Alaina Hartman is a 59 y.o. female seen by an Endocrinology provider for Type 2 Diabetes and enrolled in the Endocrinology Patient Management program offered by [facility].      Requested Prescriptions     Pending Prescriptions Disp Refills    insulin aspart prot & aspart (NovoLOG Mix 70/30 FlexPen) (70-30) 100 UNIT/ML suspension pen-injector injection 120 mL 1     Sig: Inject 45 units under the skin into the appropriate area as directed 3 (Three) Times a Day Before Meals.       Refill sent in to patient's pharmacy for above medication prescribed per telephone order by Dr. Cho.   Last office visit 8/12/2024.  Next visit scheduled 3/6/2025.      Navjot Goode, PharmD, MPH  Clinical Specialty Pharmacist, Endocrinology  12/2/2024  09:19 EST

## 2024-12-02 NOTE — PROGRESS NOTES
Specialty Pharmacy Refill Coordination Note     Alaina is a 59 y.o. female contacted today regarding refills of her specialty medication(s).    Specialty medication(s) and dose(s) confirmed: Yes  Changes to medications: no  Changes to insurance: no  Reviewed and verified with patient:         Refill Questions      Flowsheet Row Most Recent Value   Changes to allergies? No   Changes to medications? No   New conditions or infections since last clinic visit No   Unplanned office visit, urgent care, ED, or hospital admission in the last 4 weeks  No   How does patient/caregiver feel medication is working? Good   Financial problems or insurance changes  No   Since the previous refill, were any specialty medication doses or scheduled injections missed or delayed?  No   Does this patient require a clinical escalation to a pharmacist? No            Delivery Questions      Flowsheet Row Most Recent Value   Delivery method UPS   Delivery address verified with patient/caregiver? Yes   Delivery address Home   Number of medications in delivery 3   Medication(s) being filled and delivered Insulin Aspart Prot & Aspart (NovoLOG Mix 70/30 FlexPen), Continuous Glucose Sensor (Dexcom G7 Sensor), Tirzepatide (MOUNJARO)   Copay verified? Yes   Copay amount $145.00   Copay form of payment Credit/debit on file   Ship Date 12/2/2024   Delivery Date 12/03/2024   Signature Required No                 Follow-up: 3 month(s)     Minnie Reyna, Pharmacy Technician  Specialty Pharmacy Technician   12/2/2024   08:59 EST

## 2024-12-12 DIAGNOSIS — I10 HYPERTENSION, ESSENTIAL: ICD-10-CM

## 2024-12-12 DIAGNOSIS — E78.2 MIXED HYPERLIPIDEMIA: ICD-10-CM

## 2024-12-12 DIAGNOSIS — E11.65 TYPE 2 DIABETES MELLITUS WITH HYPERGLYCEMIA, WITH LONG-TERM CURRENT USE OF INSULIN: ICD-10-CM

## 2024-12-12 DIAGNOSIS — E11.65 TYPE 2 DIABETES MELLITUS WITH HYPERGLYCEMIA, WITHOUT LONG-TERM CURRENT USE OF INSULIN: ICD-10-CM

## 2024-12-12 DIAGNOSIS — Z79.4 TYPE 2 DIABETES MELLITUS WITH HYPERGLYCEMIA, WITH LONG-TERM CURRENT USE OF INSULIN: ICD-10-CM

## 2024-12-12 RX ORDER — LISINOPRIL 5 MG/1
5 TABLET ORAL DAILY
Qty: 90 TABLET | Refills: 1 | Status: SHIPPED | OUTPATIENT
Start: 2024-12-12

## 2024-12-12 RX ORDER — METFORMIN HYDROCHLORIDE 500 MG/1
1000 TABLET, EXTENDED RELEASE ORAL DAILY
Qty: 180 TABLET | Refills: 1 | Status: SHIPPED | OUTPATIENT
Start: 2024-12-12

## 2024-12-12 RX ORDER — ATORVASTATIN CALCIUM 40 MG/1
40 TABLET, FILM COATED ORAL DAILY
Qty: 90 TABLET | Refills: 1 | Status: SHIPPED | OUTPATIENT
Start: 2024-12-12

## 2025-02-03 ENCOUNTER — HOSPITAL ENCOUNTER (OUTPATIENT)
Dept: BONE DENSITY | Facility: HOSPITAL | Age: 60
Discharge: HOME | End: 2025-02-03
Admitting: NURSE PRACTITIONER
Payer: COMMERCIAL

## 2025-02-03 DIAGNOSIS — Z79.811 AROMATASE INHIBITOR USE: ICD-10-CM

## 2025-02-03 DIAGNOSIS — Z78.0 POSTMENOPAUSAL: ICD-10-CM

## 2025-02-03 PROCEDURE — 77080 DXA BONE DENSITY AXIAL: CPT

## 2025-02-27 ENCOUNTER — LAB (OUTPATIENT)
Dept: ENDOCRINOLOGY | Facility: CLINIC | Age: 60
End: 2025-02-27
Payer: COMMERCIAL

## 2025-02-27 DIAGNOSIS — E11.65 TYPE 2 DIABETES MELLITUS WITH HYPERGLYCEMIA, WITH LONG-TERM CURRENT USE OF INSULIN: ICD-10-CM

## 2025-02-27 DIAGNOSIS — Z17.0 MALIGNANT NEOPLASM OF CENTRAL PORTION OF RIGHT BREAST IN FEMALE, ESTROGEN RECEPTOR POSITIVE: ICD-10-CM

## 2025-02-27 DIAGNOSIS — Z79.4 TYPE 2 DIABETES MELLITUS WITH HYPERGLYCEMIA, WITH LONG-TERM CURRENT USE OF INSULIN: ICD-10-CM

## 2025-02-27 DIAGNOSIS — C50.111 MALIGNANT NEOPLASM OF CENTRAL PORTION OF RIGHT BREAST IN FEMALE, ESTROGEN RECEPTOR POSITIVE: ICD-10-CM

## 2025-02-27 RX ORDER — EXEMESTANE 25 MG/1
25 TABLET ORAL DAILY
Qty: 90 TABLET | Refills: 1 | Status: SHIPPED | OUTPATIENT
Start: 2025-02-27

## 2025-02-28 LAB
ALBUMIN SERPL-MCNC: 4.3 G/DL (ref 3.8–4.9)
ALBUMIN/CREAT UR: 44 MG/G CREAT (ref 0–29)
ALP SERPL-CCNC: 142 IU/L (ref 44–121)
ALT SERPL-CCNC: 33 IU/L (ref 0–32)
AST SERPL-CCNC: 28 IU/L (ref 0–40)
BILIRUB SERPL-MCNC: 0.6 MG/DL (ref 0–1.2)
BUN SERPL-MCNC: 12 MG/DL (ref 6–24)
BUN/CREAT SERPL: 14 (ref 9–23)
CALCIUM SERPL-MCNC: 9.9 MG/DL (ref 8.7–10.2)
CHLORIDE SERPL-SCNC: 101 MMOL/L (ref 96–106)
CHOLEST SERPL-MCNC: 124 MG/DL (ref 100–199)
CO2 SERPL-SCNC: 23 MMOL/L (ref 20–29)
CREAT SERPL-MCNC: 0.86 MG/DL (ref 0.57–1)
CREAT UR-MCNC: 67.6 MG/DL
EGFRCR SERPLBLD CKD-EPI 2021: 79 ML/MIN/1.73
GLOBULIN SER CALC-MCNC: 2.5 G/DL (ref 1.5–4.5)
GLUCOSE SERPL-MCNC: 111 MG/DL (ref 70–99)
HBA1C MFR BLD: 6.6 % (ref 4.8–5.6)
HDLC SERPL-MCNC: 34 MG/DL
LDLC SERPL CALC-MCNC: 58 MG/DL (ref 0–99)
MICROALBUMIN UR-MCNC: 29.5 UG/ML
POTASSIUM SERPL-SCNC: 4.5 MMOL/L (ref 3.5–5.2)
PROT SERPL-MCNC: 6.8 G/DL (ref 6–8.5)
SODIUM SERPL-SCNC: 140 MMOL/L (ref 134–144)
TRIGL SERPL-MCNC: 196 MG/DL (ref 0–149)
VLDLC SERPL CALC-MCNC: 32 MG/DL (ref 5–40)

## 2025-03-06 ENCOUNTER — OFFICE VISIT (OUTPATIENT)
Dept: ENDOCRINOLOGY | Facility: CLINIC | Age: 60
End: 2025-03-06
Payer: COMMERCIAL

## 2025-03-06 VITALS
WEIGHT: 252 LBS | SYSTOLIC BLOOD PRESSURE: 130 MMHG | BODY MASS INDEX: 47.58 KG/M2 | HEART RATE: 88 BPM | HEIGHT: 61 IN | OXYGEN SATURATION: 98 % | DIASTOLIC BLOOD PRESSURE: 72 MMHG

## 2025-03-06 DIAGNOSIS — E66.813 CLASS 3 SEVERE OBESITY DUE TO EXCESS CALORIES WITHOUT SERIOUS COMORBIDITY WITH BODY MASS INDEX (BMI) OF 40.0 TO 44.9 IN ADULT: ICD-10-CM

## 2025-03-06 DIAGNOSIS — Z79.4 TYPE 2 DIABETES MELLITUS WITH HYPERGLYCEMIA, WITH LONG-TERM CURRENT USE OF INSULIN: Primary | ICD-10-CM

## 2025-03-06 DIAGNOSIS — E11.65 TYPE 2 DIABETES MELLITUS WITH HYPERGLYCEMIA, WITH LONG-TERM CURRENT USE OF INSULIN: Primary | ICD-10-CM

## 2025-03-06 DIAGNOSIS — E78.2 MIXED HYPERLIPIDEMIA: ICD-10-CM

## 2025-03-06 DIAGNOSIS — E66.01 CLASS 3 SEVERE OBESITY DUE TO EXCESS CALORIES WITHOUT SERIOUS COMORBIDITY WITH BODY MASS INDEX (BMI) OF 40.0 TO 44.9 IN ADULT: ICD-10-CM

## 2025-03-06 DIAGNOSIS — I10 HYPERTENSION, ESSENTIAL: ICD-10-CM

## 2025-03-06 PROCEDURE — 99214 OFFICE O/P EST MOD 30 MIN: CPT | Performed by: INTERNAL MEDICINE

## 2025-03-06 PROCEDURE — 95251 CONT GLUC MNTR ANALYSIS I&R: CPT | Performed by: INTERNAL MEDICINE

## 2025-03-06 NOTE — PROGRESS NOTES
Endocrine Progress Note Outpatient     Patient Care Team:  Lori Fields DO as PCP - General (Family Medicine)  Seipel, Joseph F, MD as Consulting Physician (Sleep Medicine)  Jagdepe Mcwilliams MD as Surgeon (General Surgery)  Lianet Rust MD as Consulting Physician (Hematology and Oncology)  Chirag Cho MD as Consulting Physician (Endocrinology)  Js Ma MD as Consulting Physician (Radiation Oncology)    Chief Complaint: Follow-up type 2 diabetes    HPI: This is a 59-year-old female with history of type 2 diabetes, hypertension and hyperlipidemia is here for follow-up.     For type 2 diabetes: Currently on Mounjaro 12.5 mg subcu weekly, Humalog mix 75/25 insulin, she is taking 45 units subcu 3 times a day before each meal along with Metformin 500 mg twice a day.  The past she has not been able to tolerate glipizide and Actos.  She has tried Jardiance as well but it did not help her sugars.  She is using Dexcom monitoring system.    Hypertension: Her blood pressure is doing well.     Hyperlipidemia: Currently on atorvastatin.  .    Past Medical History:   Diagnosis Date    Ankle edema     at times    Breast cancer, right 08/2022    Diabetes mellitus     GERD (gastroesophageal reflux disease)     Hiatal hernia     Hypertension     Sleep apnea        Social History     Socioeconomic History    Marital status:      Spouse name: Bora    Number of children: 3    Years of education: 14   Tobacco Use    Smoking status: Former     Current packs/day: 0.00     Average packs/day: 0.3 packs/day for 31.0 years (7.8 ttl pk-yrs)     Types: Cigarettes     Start date: 1979     Quit date: 1/1/2010     Years since quitting: 15.1     Passive exposure: Past    Smokeless tobacco: Never    Tobacco comments:     Started smoking about age 14   Vaping Use    Vaping status: Never Used   Substance and Sexual Activity    Alcohol use: Yes     Comment: 1-2 drinks a year    Drug use: Never    Sexual activity:  Yes     Partners: Male     Birth control/protection: None, Hysterectomy     Comment: monogamous with        Family History   Problem Relation Age of Onset    Breast cancer Mother     Heart disease Mother     Thyroid disease Mother     Stroke Mother     Cancer Mother         lymphoma    Dementia Mother     Clotting disorder Father     Diabetes Brother     Heart disease Brother     Stroke Brother     Diabetes Brother     Colon cancer Paternal Grandmother        No Known Allergies    ROS:   Constitutional:  Denies fatigue, tiredness.    Eyes:  Denies change in visual acuity   HENT:  Denies nasal congestion or sore throat   Respiratory: denies cough, shortness of breath.   Cardiovascular:  denies chest pain, edema   GI:  Denies abdominal pain, nausea, vomiting.   Endocrine:  Denies polyuria or polydipsia   Psychiatric:  Denies depression or anxiety      Vitals:    03/06/25 0805   BP: 130/72   Pulse: 88   SpO2: 98%     Body mass index is 47.58 kg/m².     Physical Exam:  GEN: NAD, conversant, obese  EYES: EOMI,   NECK: no thyromegaly,  CV: RRR,  LUNG: CTA  PSYCH: AOX3, appropriate mood, affect normal      Results Review:     I reviewed the patient's new clinical results.    Lab Results   Component Value Date    HGBA1C 6.6 (H) 02/27/2025    HGBA1C 6.58 (H) 07/23/2024    HGBA1C 6.40 (H) 01/17/2024      Lab Results   Component Value Date    GLUCOSE 111 (H) 02/27/2025    BUN 12 02/27/2025    CREATININE 0.86 02/27/2025    EGFRIFNONA 90 12/01/2021    EGFRIFAFRI 115 08/26/2020    BCR 14 02/27/2025    K 4.5 02/27/2025    CO2 23 02/27/2025    CALCIUM 9.9 02/27/2025    ALBUMIN 4.3 02/27/2025    AST 28 02/27/2025    ALT 33 (H) 02/27/2025    CHOL 106 08/08/2024    TRIG 196 (H) 02/27/2025    LDL 58 02/27/2025    HDL 34 (L) 02/27/2025     Lab Results   Component Value Date    TSH 4.040 07/23/2024       Dexcom download interpretation: Dates covered was between February 21, 2025 to March 6, 2025.  Average blood sugar is 138.   Spending 87% in the range, 11% above range and about 3% below range.  Glucose graph did not show significant fluctuations.    Medication Review: Reviewed.       Current Outpatient Medications:     ALPRAZolam (XANAX) 0.5 MG tablet, Take 1 tablet by mouth Daily As Needed for Anxiety., Disp: 20 tablet, Rfl: 0    atorvastatin (LIPITOR) 40 MG tablet, Take 1 tablet by mouth once daily, Disp: 90 tablet, Rfl: 1    BIOTIN PO, Take  by mouth., Disp: , Rfl:     Calcium Carbonate-Vitamin D (Oscal 500 D-3) 500-5 MG-MCG tablet, Take 500 mg by mouth Every 12 (Twelve) Hours., Disp: 60 tablet, Rfl: 6    Cholecalciferol (Vitamin D3) 50 MCG (2000 UT) capsule, Take 1 capsule by mouth once daily, Disp: 100 capsule, Rfl: 3    Continuous Glucose Sensor (Dexcom G7 Sensor) misc, Use 1 each Every 10 (Ten) Days., Disp: 3 each, Rfl: 11    exemestane (AROMASIN) 25 MG tablet, Take 1 tablet by mouth once daily, Disp: 90 tablet, Rfl: 1    glucose blood (Accu-Chek Guide) test strip, 1 each by Other route 4 (Four) Times a Day. Use as instructed, Disp: 150 each, Rfl: 11    insulin aspart prot & aspart (NovoLOG Mix 70/30 FlexPen) (70-30) 100 UNIT/ML suspension pen-injector injection, Inject 45 units under the skin into the appropriate area as directed 3 (Three) Times a Day Before Meals., Disp: 120 mL, Rfl: 1    Insulin Pen Needle (BD Pen Needle Polly 2nd Gen) 32G X 4 MM misc, USE 1 PEN NEEDLE TO INJECT  INSULIN 4 TIMES DAILY, Disp: 100 each, Rfl: 12    lisinopril (PRINIVIL,ZESTRIL) 5 MG tablet, Take 1 tablet by mouth once daily, Disp: 90 tablet, Rfl: 1    loratadine (Claritin) 10 MG tablet, Take 1 tablet by mouth Daily., Disp: , Rfl:     metFORMIN ER (GLUCOPHAGE-XR) 500 MG 24 hr tablet, Take 2 tablets by mouth once daily, Disp: 180 tablet, Rfl: 1    multivitamin with minerals tablet tablet, Take 1 tablet by mouth Daily., Disp: , Rfl:     omeprazole (priLOSEC) 40 MG capsule, Take 1 capsule by mouth once daily, Disp: 90 capsule, Rfl: 3    ondansetron  (ZOFRAN) 8 MG tablet, Take 1 tablet by mouth 3 (Three) Times a Day As Needed for Nausea or Vomiting., Disp: 30 tablet, Rfl: 5    Tirzepatide 12.5 MG/0.5ML solution auto-injector, Inject 0.5 mL under the skin into the appropriate area as directed 1 (One) Time Per Week., Disp: 6 mL, Rfl: 3    vitamin B-6 (PYRIDOXINE) 100 MG tablet, Take 1 tablet by mouth 2 (Two) Times a Day., Disp: , Rfl:       Assessment & Plan   1.  Diabetes mellitus type 2 with hyperglycemia: Overall doing well with A1c stable at 6.6%.  She is telling me that she has changed her insurance and the new insurance is not covering Mounjaro at an affordable rate.  She may have to stop it after she runs out of her current supply.  For now I will continue current regimen and advised to send us the message if she end up stopping the Mounjaro so we may have to adjust the insulin dose.      2.  Hypertension: Well-controlled, continue current medications.    3.  Hyperlipidemia: LDL is below 70, continue atorvastatin.    4.  Class III obesity: Weight is stable, encouraged to continue to work on diet and activity.      Assessment and plan from August 12, 2024 reviewed and updated.                    Chirag Cho MD FACE.

## 2025-03-06 NOTE — PATIENT INSTRUCTIONS
Continue current management for now.  Please notify us if you end up starting Mounjaro due to cost  Always keep glucose source in case of low blood sugar  Labs before follow-up.

## 2025-03-10 ENCOUNTER — SPECIALTY PHARMACY (OUTPATIENT)
Dept: ENDOCRINOLOGY | Facility: CLINIC | Age: 60
End: 2025-03-10
Payer: COMMERCIAL

## 2025-03-10 NOTE — PROGRESS NOTES
Specialty Pharmacy Patient Management Program  Program Disenrollment      Alaina Hartman is a 59 y.o. female seen by an Endocrinology provider for Type 2 Diabetes. Patient was previously enrolled in the Endocrinology Patient Management program offered by [facility].      Disenrolling patient today as Mounjaro copay increased and pt can no longer afford Mounjaro at this time.  Pt is welcome to re-enroll in  in the future if able and financially viable.        Navjot Goode, PharmD, MPH   3/10/2025  12:32 EDT

## 2025-03-21 NOTE — PROGRESS NOTES
- Hematology/Oncology Outpatient Follow Up    PATIENT NAME:Alaina Hartman  :1965  MRN: [id number]  PRIMARY CARE PHYSICIAN: Lori Fields DO  REFERRING PHYSICIAN: Lori Fields DO    Chief Complaint   Patient presents with    Follow-up     Malignant neoplasm of central portion of right breast in female, estrogen receptor positive            HISTORY OF PRESENT ILLNESS:     This is a 57-year-old female who was clinically asymptomatic and had a routine screening mammogram which showed an abnormality on the right breast.  Prior to that patient had left breast biopsy in  for benign disease.     Review of her screening mammogram which was performed on 6/3/2022 showed a new 2 cm focal asymmetry with architectural distortion in the mid to posterior third depth of the outer right breast the left breast was benign.  Right diagnostic mammogram and ultrasound was recommended.  On 2022 patient underwent right diagnostic mammogram and ultrasound which showed a 2 cm irregular mass in the lateral inferior breast.  Same day she had an ultrasound which showed a 1.6 cm on the right breast the right axilla did not show any abnormal lymph nodes.     Patient then underwent ultrasound-guided biopsy of the right breast mass pathology revealed moderately differentiated invasive ductal carcinoma estrogen receptor positive at 95% progesterone receptor positive at 50%, HER2/bertha was negative at 1+ on immunohistochemistry.        Patient had a partial hysterectomy many years ago.  She is  and has 3 children.  She does not smoke, does not drink alcohol     Family history significant for mother with breast cancer in her 50s, her father had skin cancers, paternal grandmother had colon cancer at the age of 70        She is a       2022 estradiol level was less than 5 and FSH was 33 consistent with postmenopausal state  2022 patient underwent right lumpectomy with sentinel lymph node biopsy.   Pathology revealed residual moderately differentiated ductal carcinoma measuring 1.7 cm completely excised total of 5 lymph nodes were removed and all of them were negative for metastatic disease.  Pathology stage is pT1 cpN0 M0.  There was no evidence of DCIS all margins were negative distance to the closest margin was anterior margin at 2 mm ER positive at 95, WA positive at 50% and HER2/bertha was negative.  9/27/2022 patient had Oncotype DX assay done which returned with a recurrence score of 28.  This is consistent with 17% risk of recurrence at 9 years distantly, and more than 15% chemotherapy benefit.  On the validation assay estrogen receptor was positive, WA was positive and HER2/bertha was negative.  10/27/2022: C1 D1 Taxotere and Cytoxan received.  Neulasta support held due to leukocytosis.  11/4/2022: WBC 2.28, hemoglobin 11.9, platelets 297,000, ANC 0.08.  Patient was initiated on Neupogen 480 mg subcu given 11/4/2022, 11/5/2022, 11/6/2022.  Patient to receive Neulasta again with next cycle of treatment.  11/17/2022: Patient received cycle 2 of Taxotere Cytoxan with Neulasta support  12/8/2022: Patient received cycle 3 of Taxotere and Cytoxan with Neulasta support  12/29/2020 patient received cycle 4 of combination chemotherapy with Cytoxan and Taxotere  Patient completed adjuvant breast radiation February 2023 6/30/2023 bilateral diagnostic mammogram with no mammographic findings to indicate right or left breast malignancy      Past Medical History:   Diagnosis Date    Ankle edema     at times    Breast cancer, right 08/2022    Diabetes mellitus     GERD (gastroesophageal reflux disease)     Hiatal hernia     Hypertension     Sleep apnea        Past Surgical History:   Procedure Laterality Date    BREAST BIOPSY Right 08/18/2022    Procedure: Right breast lumpectomy;  Surgeon: Jagdeep Mcwilliams MD;  Location: [facility] MAIN OR;  Service: General;  Laterality: Right;    BREAST LUMPECTOMY Left 2003    x2     PORTACATH PLACEMENT N/A 10/06/2022    Procedure: INSERTION OF PORTACATH;  Surgeon: Jagdeep Mcwilliams MD;  Location: [facility] MAIN OR;  Service: General;  Laterality: N/A;    SENTINEL NODE BIOPSY Right 08/18/2022    Procedure: SENTINEL NODE BIOPSY;  Surgeon: Jagdeep Mcwilliams MD;  Location: [facility] MAIN OR;  Service: General;  Laterality: Right;    SUBTOTAL HYSTERECTOMY  10/10/1991    had it done for bleeding. Ovaries still present    TOOTH EXTRACTION  1995    has had all teeth (but one) removed    VENOUS ACCESS DEVICE (PORT) REMOVAL  2023         Current Outpatient Medications:     ALPRAZolam (XANAX) 0.5 MG tablet, Take 1 tablet by mouth Daily As Needed for Anxiety., Disp: 20 tablet, Rfl: 0    atorvastatin (LIPITOR) 40 MG tablet, Take 1 tablet by mouth once daily, Disp: 90 tablet, Rfl: 1    BIOTIN PO, Take  by mouth., Disp: , Rfl:     Calcium Carbonate-Vitamin D (Oscal 500 D-3) 500-5 MG-MCG tablet, Take 500 mg by mouth Every 12 (Twelve) Hours., Disp: 60 tablet, Rfl: 6    Cholecalciferol (Vitamin D3) 50 MCG (2000 UT) capsule, Take 1 capsule by mouth once daily, Disp: 100 capsule, Rfl: 3    Continuous Glucose Sensor (Dexcom G7 Sensor) misc, Use 1 each Every 10 (Ten) Days., Disp: 3 each, Rfl: 11    exemestane (AROMASIN) 25 MG tablet, Take 1 tablet by mouth once daily, Disp: 90 tablet, Rfl: 1    glucose blood (Accu-Chek Guide) test strip, 1 each by Other route 4 (Four) Times a Day. Use as instructed, Disp: 150 each, Rfl: 11    insulin aspart prot & aspart (NovoLOG Mix 70/30 FlexPen) (70-30) 100 UNIT/ML suspension pen-injector injection, Inject 45 units under the skin into the appropriate area as directed 3 (Three) Times a Day Before Meals., Disp: 120 mL, Rfl: 1    Insulin Pen Needle (BD Pen Needle Polly 2nd Gen) 32G X 4 MM misc, USE 1 PEN NEEDLE TO INJECT  INSULIN 4 TIMES DAILY, Disp: 100 each, Rfl: 12    lisinopril (PRINIVIL,ZESTRIL) 5 MG tablet, Take 1 tablet by mouth once daily, Disp: 90 tablet, Rfl: 1     loratadine (Claritin) 10 MG tablet, Take 1 tablet by mouth Daily., Disp: , Rfl:     metFORMIN ER (GLUCOPHAGE-XR) 500 MG 24 hr tablet, Take 2 tablets by mouth once daily, Disp: 180 tablet, Rfl: 1    multivitamin with minerals tablet tablet, Take 1 tablet by mouth Daily., Disp: , Rfl:     omeprazole (priLOSEC) 40 MG capsule, Take 1 capsule by mouth once daily, Disp: 90 capsule, Rfl: 3    ondansetron (ZOFRAN) 8 MG tablet, Take 1 tablet by mouth 3 (Three) Times a Day As Needed for Nausea or Vomiting., Disp: 30 tablet, Rfl: 5    Tirzepatide 12.5 MG/0.5ML solution auto-injector, Inject 0.5 mL under the skin into the appropriate area as directed 1 (One) Time Per Week., Disp: 6 mL, Rfl: 3    vitamin B-6 (PYRIDOXINE) 100 MG tablet, Take 1 tablet by mouth 2 (Two) Times a Day., Disp: , Rfl:     No Known Allergies    Family History   Problem Relation Age of Onset    Breast cancer Mother     Heart disease Mother     Thyroid disease Mother     Stroke Mother     Cancer Mother         lymphoma    Dementia Mother     Clotting disorder Father     Diabetes Brother     Heart disease Brother     Stroke Brother     Diabetes Brother     Colon cancer Paternal Grandmother        Cancer-related family history includes Breast cancer in her mother; Cancer in her mother; Colon cancer in her paternal grandmother.    Social History     Tobacco Use    Smoking status: Former     Current packs/day: 0.00     Average packs/day: 0.3 packs/day for 31.0 years (7.8 ttl pk-yrs)     Types: Cigarettes     Start date: 1979     Quit date: 1/1/2010     Years since quitting: 15.2     Passive exposure: Past    Smokeless tobacco: Never    Tobacco comments:     Started smoking about age 14   Vaping Use    Vaping status: Never Used   Substance Use Topics    Alcohol use: Yes     Comment: 1-2 drinks a year    Drug use: Never       I have reviewed and confirmed the accuracy of the patient's history: Chief complaint, HPI, ROS, and Subjective as entered by the  "MA/BENI/MIGUEL. Lianet Rust MD 03/24/25  3/24/25      SUBJECTIVE:    Denies any new issues.  She remains on Aromasin and calcium with vitamin D.        REVIEW OF SYSTEMS:    Review of Systems   Constitutional:  Negative for chills and fever.   HENT:  Negative for congestion, drooling, ear discharge, rhinorrhea, sinus pressure and tinnitus.    Eyes:  Negative for photophobia, pain and discharge.   Respiratory:  Negative for apnea, choking and stridor.    Cardiovascular:  Negative for palpitations.   Gastrointestinal:  Negative for abdominal distention, abdominal pain and anal bleeding.   Endocrine: Negative for polydipsia and polyphagia.   Genitourinary:  Negative for decreased urine volume, flank pain and genital sores.   Musculoskeletal:  Negative for gait problem, neck pain and neck stiffness.   Skin:  Negative for color change, rash and wound.   Neurological:  Negative for tremors, seizures, syncope, facial asymmetry and speech difficulty.   Hematological:  Negative for adenopathy.   Psychiatric/Behavioral:  Negative for agitation, confusion, hallucinations and self-injury. The patient is not hyperactive.        OBJECTIVE:    Vitals:    03/24/25 0947   BP: 149/80   Pulse: 87   Resp: 20   Temp: 97.6 °F (36.4 °C)   TempSrc: Infrared   SpO2: 97%   Weight: 115 kg (253 lb)   Height: 161.3 cm (63.5\")   PainSc: 0-No pain               Body mass index is 44.11 kg/m².    ECOG    (0) Fully active, able to carry on all predisease performance without restriction    Physical Exam  Vitals and nursing note reviewed.   Constitutional:       General: She is not in acute distress.     Appearance: She is not diaphoretic.   HENT:      Head: Normocephalic and atraumatic.   Eyes:      General: No scleral icterus.        Right eye: No discharge.         Left eye: No discharge.      Conjunctiva/sclera: Conjunctivae normal.   Neck:      Thyroid: No thyromegaly.   Cardiovascular:      Rate and Rhythm: Normal rate and regular rhythm. "      Heart sounds: Normal heart sounds.      No friction rub. No gallop.   Pulmonary:      Effort: Pulmonary effort is normal. No respiratory distress.      Breath sounds: No stridor. No wheezing.   Chest:   Breasts:     Right: No mass or skin change.      Left: No mass or skin change.   Abdominal:      Palpations: Abdomen is soft. There is no mass.      Tenderness: There is no abdominal tenderness. There is no guarding or rebound.   Musculoskeletal:         General: No tenderness. Normal range of motion.      Cervical back: Normal range of motion and neck supple.   Lymphadenopathy:      Cervical: No cervical adenopathy.      Upper Body:      Right upper body: No supraclavicular or axillary adenopathy.      Left upper body: No supraclavicular or axillary adenopathy.   Skin:     General: Skin is warm.      Findings: No erythema or rash.   Neurological:      Mental Status: She is alert and oriented to person, place, and time.      Motor: No abnormal muscle tone.   Psychiatric:         Mood and Affect: Mood normal.         Behavior: Behavior normal.     Bilateral breast and bilateral axillary exam was unremarkable    I have reexamined the patient and the results are consistent with the previously documented exam. Lianet Gissel Rust MD  3/24/25    RECENT LABS    WBC   Date Value Ref Range Status   03/24/2025 11.29 (H) 3.40 - 10.80 10*3/mm3 Final   08/26/2020 11.0 (H) 3.4 - 10.8 x10E3/uL Final     RBC   Date Value Ref Range Status   03/24/2025 4.27 3.77 - 5.28 10*6/mm3 Final   08/26/2020 4.53 3.77 - 5.28 x10E6/uL Final     Hemoglobin   Date Value Ref Range Status   03/24/2025 12.6 12.0 - 15.9 g/dL Final     Hematocrit   Date Value Ref Range Status   03/24/2025 39.2 34.0 - 46.6 % Final     MCV   Date Value Ref Range Status   03/24/2025 91.8 79.0 - 97.0 fL Final     MCH   Date Value Ref Range Status   03/24/2025 29.5 26.6 - 33.0 pg Final     MCHC   Date Value Ref Range Status   03/24/2025 32.1 31.5 - 35.7 g/dL Final      RDW   Date Value Ref Range Status   03/24/2025 14.1 12.3 - 15.4 % Final     RDW-SD   Date Value Ref Range Status   03/24/2025 46.1 37.0 - 54.0 fl Final     MPV   Date Value Ref Range Status   03/24/2025 9.5 6.0 - 12.0 fL Final     Platelets   Date Value Ref Range Status   03/24/2025 309 140 - 450 10*3/mm3 Final     Neutrophil %   Date Value Ref Range Status   03/24/2025 63.0 42.7 - 76.0 % Final     Lymphocyte %   Date Value Ref Range Status   03/24/2025 30.0 19.6 - 45.3 % Final     Monocyte %   Date Value Ref Range Status   03/24/2025 4.9 (L) 5.0 - 12.0 % Final     Eosinophil %   Date Value Ref Range Status   03/24/2025 1.6 0.3 - 6.2 % Final     Basophil %   Date Value Ref Range Status   03/24/2025 0.5 0.0 - 1.5 % Final     Immature Grans %   Date Value Ref Range Status   08/09/2022 0.5 0.0 - 0.5 % Final     Neutrophils, Absolute   Date Value Ref Range Status   03/24/2025 7.11 (H) 1.70 - 7.00 10*3/mm3 Final     Lymphocytes, Absolute   Date Value Ref Range Status   03/24/2025 3.39 (H) 0.70 - 3.10 10*3/mm3 Final     Monocytes, Absolute   Date Value Ref Range Status   03/24/2025 0.55 0.10 - 0.90 10*3/mm3 Final     Eosinophils, Absolute   Date Value Ref Range Status   03/24/2025 0.18 0.00 - 0.40 10*3/mm3 Final     Basophils, Absolute   Date Value Ref Range Status   03/24/2025 0.06 0.00 - 0.20 10*3/mm3 Final     Immature Grans, Absolute   Date Value Ref Range Status   08/09/2022 0.05 0.00 - 0.05 10*3/mm3 Final     nRBC   Date Value Ref Range Status   08/09/2022 0.0 0.0 - 0.2 /100 WBC Final       Lab Results   Component Value Date    GLUCOSE 111 (H) 02/27/2025    BUN 12 02/27/2025    CREATININE 0.86 02/27/2025    EGFRIFNONA 90 12/01/2021    EGFRIFAFRI 115 08/26/2020    BCR 14 02/27/2025    K 4.5 02/27/2025    CO2 23 02/27/2025    CALCIUM 9.9 02/27/2025    ALBUMIN 4.3 02/27/2025    AST 28 02/27/2025    ALT 33 (H) 02/27/2025         Assessment & Plan     Malignant neoplasm of central portion of right breast in female,  estrogen receptor positive  - CBC & Differential          ASSESSMENT:      Moderately differentiated invasive ductal carcinoma of the right breast.  Status post right lumpectomy with sentinel lymph node biopsy.  pT1 cpN0 M0.  ER positive at 95%, AR positive at 50% and HER2/bertha was negative.  T1 cN0 M0.  Ongoing management  Oncotype DX assay with a high recurrence score at 28, consistent with 17% risk of distant recurrence at 9 years with tamoxifen alone and group absolute chemotherapy benefit of more than 15%  Diagnosis post adjuvant chemotherapy with Cytoxan and Taxotere.  She has received 4 out of 4 cycles completed on December 29, 2022.  Reviewed  Status post right breast radiation completed February 2023  Radiation-induced dermatitis: This has resolved  On Aromasin 25 mg p.o. daily initiated March 2023.  Continue the same  Status post partial hysterectomy   Postmenopausal state following lab confirmation  Family history of breast cancer in her mother at the age of 50, paternal grandmother with colon cancer at 17 and her father had skin cancer.  Per Patient she is up-to-date on her colonoscopy.  She follows up with her PCP for this  Assessment has been reviewed and updated.    Chemotherapy-induced neutropenia.  Resolved.          Plans:     Labs reviewed  Continue Aromasin 25 mg p.o. daily.    Continue calcium with vitamin D 600 mg twice a day  Bilateral diagnostic mammogram will be due again July 2025.  This was ordered today  Per patient her dentist advised against Zometa due to dental disease  Bone density will be due again February 2027.  Last bone density February 2025 was normal  Reviewed bilateral diagnostic mammogram from June 2023.    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  Gave information on cancer genetics for her to review.  Patient to let me know when she is ready to proceed  All questions  answered  Follow-up in 6 months     Patient verbalized understanding and is in agreement of the above plan.        Electronically signed by Lianet Rust MD, 03/24/25, 11:54 AM EDT.

## 2025-03-24 ENCOUNTER — OFFICE VISIT (OUTPATIENT)
Dept: ONCOLOGY | Facility: CLINIC | Age: 60
End: 2025-03-24
Payer: COMMERCIAL

## 2025-03-24 ENCOUNTER — LAB (OUTPATIENT)
Dept: LAB | Facility: HOSPITAL | Age: 60
End: 2025-03-24
Payer: COMMERCIAL

## 2025-03-24 VITALS
HEIGHT: 64 IN | SYSTOLIC BLOOD PRESSURE: 149 MMHG | HEART RATE: 87 BPM | WEIGHT: 253 LBS | TEMPERATURE: 97.6 F | RESPIRATION RATE: 20 BRPM | BODY MASS INDEX: 43.19 KG/M2 | DIASTOLIC BLOOD PRESSURE: 80 MMHG | OXYGEN SATURATION: 97 %

## 2025-03-24 DIAGNOSIS — C50.111 MALIGNANT NEOPLASM OF CENTRAL PORTION OF RIGHT BREAST IN FEMALE, ESTROGEN RECEPTOR POSITIVE: Primary | ICD-10-CM

## 2025-03-24 DIAGNOSIS — Z17.0 MALIGNANT NEOPLASM OF CENTRAL PORTION OF RIGHT BREAST IN FEMALE, ESTROGEN RECEPTOR POSITIVE: Primary | ICD-10-CM

## 2025-03-24 PROBLEM — E11.9 DIABETES: Status: ACTIVE | Noted: 2025-03-24

## 2025-03-24 PROBLEM — S53.126A CLOSED POSTERIOR DISLOCATION OF ELBOW: Status: ACTIVE | Noted: 2025-03-24

## 2025-03-24 LAB
ALBUMIN SERPL-MCNC: 4.4 G/DL (ref 3.5–5.2)
ALBUMIN/GLOB SERPL: 2.1 G/DL
ALP SERPL-CCNC: 135 U/L (ref 39–117)
ALT SERPL W P-5'-P-CCNC: 30 U/L (ref 1–33)
ANION GAP SERPL CALCULATED.3IONS-SCNC: 13.7 MMOL/L (ref 5–15)
AST SERPL-CCNC: 22 U/L (ref 1–32)
BASOPHILS # BLD AUTO: 0.06 10*3/MM3 (ref 0–0.2)
BASOPHILS NFR BLD AUTO: 0.5 % (ref 0–1.5)
BILIRUB SERPL-MCNC: 0.6 MG/DL (ref 0–1.2)
BUN SERPL-MCNC: 15 MG/DL (ref 6–20)
BUN/CREAT SERPL: 17.4 (ref 7–25)
CALCIUM SPEC-SCNC: 9.7 MG/DL (ref 8.6–10.5)
CHLORIDE SERPL-SCNC: 106 MMOL/L (ref 98–107)
CO2 SERPL-SCNC: 24.3 MMOL/L (ref 22–29)
CREAT SERPL-MCNC: 0.86 MG/DL (ref 0.57–1)
DEPRECATED RDW RBC AUTO: 46.1 FL (ref 37–54)
EGFRCR SERPLBLD CKD-EPI 2021: 78.9 ML/MIN/1.73
EOSINOPHIL # BLD AUTO: 0.18 10*3/MM3 (ref 0–0.4)
EOSINOPHIL NFR BLD AUTO: 1.6 % (ref 0.3–6.2)
ERYTHROCYTE [DISTWIDTH] IN BLOOD BY AUTOMATED COUNT: 14.1 % (ref 12.3–15.4)
GLOBULIN UR ELPH-MCNC: 2.1 GM/DL
GLUCOSE SERPL-MCNC: 133 MG/DL (ref 65–99)
HCT VFR BLD AUTO: 39.2 % (ref 34–46.6)
HGB BLD-MCNC: 12.6 G/DL (ref 12–15.9)
HOLD SPECIMEN: NORMAL
HOLD SPECIMEN: NORMAL
LYMPHOCYTES # BLD AUTO: 3.39 10*3/MM3 (ref 0.7–3.1)
LYMPHOCYTES NFR BLD AUTO: 30 % (ref 19.6–45.3)
MCH RBC QN AUTO: 29.5 PG (ref 26.6–33)
MCHC RBC AUTO-ENTMCNC: 32.1 G/DL (ref 31.5–35.7)
MCV RBC AUTO: 91.8 FL (ref 79–97)
MONOCYTES # BLD AUTO: 0.55 10*3/MM3 (ref 0.1–0.9)
MONOCYTES NFR BLD AUTO: 4.9 % (ref 5–12)
NEUTROPHILS NFR BLD AUTO: 63 % (ref 42.7–76)
NEUTROPHILS NFR BLD AUTO: 7.11 10*3/MM3 (ref 1.7–7)
PLATELET # BLD AUTO: 309 10*3/MM3 (ref 140–450)
PMV BLD AUTO: 9.5 FL (ref 6–12)
POTASSIUM SERPL-SCNC: 4.1 MMOL/L (ref 3.5–5.2)
PROT SERPL-MCNC: 6.5 G/DL (ref 6–8.5)
RBC # BLD AUTO: 4.27 10*6/MM3 (ref 3.77–5.28)
SODIUM SERPL-SCNC: 144 MMOL/L (ref 136–145)
WBC NRBC COR # BLD AUTO: 11.29 10*3/MM3 (ref 3.4–10.8)

## 2025-03-24 PROCEDURE — 80053 COMPREHEN METABOLIC PANEL: CPT | Performed by: INTERNAL MEDICINE

## 2025-03-24 PROCEDURE — 99214 OFFICE O/P EST MOD 30 MIN: CPT | Performed by: INTERNAL MEDICINE

## 2025-03-24 PROCEDURE — 85025 COMPLETE CBC W/AUTO DIFF WBC: CPT

## 2025-03-24 PROCEDURE — 36415 COLL VENOUS BLD VENIPUNCTURE: CPT

## 2025-03-24 NOTE — LETTER
March 24, 2025     Patient: Alaina Hartman   YOB: 1965   Date of Visit: 3/24/2025       To Whom It May Concern:     Alaina Hartman was seen in my office today.           Sincerely,        Lianet Rust MD    CC: No Recipients

## 2025-04-03 ENCOUNTER — TELEPHONE (OUTPATIENT)
Dept: ENDOCRINOLOGY | Facility: CLINIC | Age: 60
End: 2025-04-03
Payer: COMMERCIAL

## 2025-04-03 RX ORDER — ACYCLOVIR 800 MG/1
1 TABLET ORAL
Qty: 6 EACH | Refills: 1 | Status: SHIPPED | OUTPATIENT
Start: 2025-04-03

## 2025-04-03 NOTE — TELEPHONE ENCOUNTER
Pt LM stating that she is done with her Mounjaro and would like to know how she should adjust her insulin dose. Recent blood sugars have been scanned into chart.

## 2025-04-03 NOTE — TELEPHONE ENCOUNTER
Specialty Pharmacy Patient Management Program       Alaina Hartman is a 59 y.o. female seen by an Endocrinology provider for Type 2 Diabetes and enrolled in the Endocrinology Patient Management program offered by [facility].      Freestyle Rock 3 more affordable with coupon than Dexcom.      Requested Prescriptions     Pending Prescriptions Disp Refills    Continuous Glucose Sensor (FreeStyle Rock 3 Sensor) misc 6 each 1     Sig: Use 1 each Every 14 (Fourteen) Days.       Refill sent in to patient's pharmacy for above medication prescribed per telephone order by Dr. Cho.   Last office visit 3/6/2025.  Next visit scheduled 10/9/2025.      Navjot Goode, PharmD, MPH  Clinical Specialty Pharmacist, Endocrinology  4/3/2025  14:31 EDT

## 2025-04-23 NOTE — TELEPHONE ENCOUNTER
Specialty Pharmacy Patient Management Program       Alaina Hartman is a 59 y.o. female seen by an Endocrinology provider for Type 2 Diabetes and enrolled in the Endocrinology Patient Management program offered by [facility].      Pt requesting to increase from Novolog 70/30 50u to 60u TID AC after BG continues to run high s/p D/C Mounjaro (cost).    Requested Prescriptions     Pending Prescriptions Disp Refills    insulin aspart prot & aspart (NovoLOG Mix 70/30 FlexPen) (70-30) 100 UNIT/ML suspension pen-injector injection 150 mL 1     Sig: Inject 0.6 mL under the skin into the appropriate area as directed 3 (Three) Times a Day Before Meals.       Refill sent in to patient's pharmacy for above medication prescribed pending provider approval by Dr. Cho.   Last office visit 3/6/2025.  Next visit scheduled 10/9/2025.      Navjot Goode, PharmD, MPH  Clinical Specialty Pharmacist, Endocrinology  4/23/2025  09:39 EDT

## 2025-04-24 RX ORDER — INSULIN ASPART 100 [IU]/ML
60 INJECTION, SUSPENSION SUBCUTANEOUS
Qty: 150 ML | Refills: 1 | Status: SHIPPED | OUTPATIENT
Start: 2025-04-24

## 2025-05-04 DIAGNOSIS — K20.90 ESOPHAGITIS: ICD-10-CM

## 2025-05-05 DIAGNOSIS — K20.90 ESOPHAGITIS: ICD-10-CM

## 2025-05-05 RX ORDER — OMEPRAZOLE 40 MG/1
40 CAPSULE, DELAYED RELEASE ORAL DAILY
Qty: 90 CAPSULE | Refills: 3 | Status: SHIPPED | OUTPATIENT
Start: 2025-05-05

## 2025-05-19 ENCOUNTER — SPECIALTY PHARMACY (OUTPATIENT)
Dept: ENDOCRINOLOGY | Facility: CLINIC | Age: 60
End: 2025-05-19
Payer: COMMERCIAL

## 2025-05-19 RX ORDER — TIRZEPATIDE 2.5 MG/.5ML
2.5 INJECTION, SOLUTION SUBCUTANEOUS WEEKLY
Qty: 6 ML | Refills: 1 | Status: SHIPPED | OUTPATIENT
Start: 2025-05-19

## 2025-05-19 NOTE — PROGRESS NOTES
Specialty Pharmacy Patient Management Program  Endocrinology Initial Assessment     Alaina Hartman was referred by an Endocrinology provider to the Endocrinology Patient Management program offered by [facility] for Type 2 Diabetes on 05/19/25.  An initial outreach was conducted, including assessment of therapy appropriateness and specialty medication education for Mounjaro. The patient was introduced to services offered by [facility], including: regular assessments, refill coordination, curbside pick-up or mail order delivery options, prior authorization maintenance, and financial assistance programs as applicable. The patient was also provided with contact information for the pharmacy team.     Insurance Coverage & Financial Support  [employer]     Relevant Past Medical History and Comorbidities  Relevant medical history and concomitant health conditions were discussed with the patient. The patient's chart has been reviewed for relevant past medical history and comorbid conditions and updated as necessary.  Past Medical History:   Diagnosis Date    Ankle edema     at times    Breast cancer, right 08/2022    Diabetes mellitus     GERD (gastroesophageal reflux disease)     Hiatal hernia     Hypertension     Sleep apnea      Social History     Socioeconomic History    Marital status:      Spouse name: Bora    Number of children: 3    Years of education: 14   Tobacco Use    Smoking status: Former     Current packs/day: 0.00     Average packs/day: 0.3 packs/day for 31.0 years (7.8 ttl pk-yrs)     Types: Cigarettes     Start date: 1979     Quit date: 1/1/2010     Years since quitting: 15.3     Passive exposure: Past    Smokeless tobacco: Never    Tobacco comments:     Started smoking about age 14   Vaping Use    Vaping status: Never Used   Substance and Sexual Activity    Alcohol use: Yes     Comment: 1-2 drinks a year    Drug use: Never    Sexual activity: Yes      Partners: Male     Birth control/protection: None, Hysterectomy     Comment: monogamous with        Problem list reviewed by Navjot Goode, Gege on 5/19/2025 at  3:11 PM    Allergies  Known allergies and reactions were discussed with the patient. The patient's chart has been reviewed for  allergy information and updated as necessary.   No Known Allergies    Allergies reviewed by Navjot Goode, Gege on 5/19/2025 at  3:09 PM    Relevant Laboratory Values  Relevant laboratory values were discussed with the patient. The following specialty medication dose adjustment(s) are recommended: Phone Initial Assessment - Patient restarting Mounjaro at 2.5mg weekly (plan to re-titrate as tolerated) and re-enrolling in .  Last/new baseline A1c 6.6%, on track with goal of maintaining A1c of <7%.  Will follow.      A1C Last 3 Results          7/23/2024    09:25 2/27/2025    08:45   HGBA1C Last 3 Results   Hemoglobin A1C 6.58  6.6      Lab Results   Component Value Date    HGBA1C 6.6 (H) 02/27/2025     Lab Results   Component Value Date    GLUCOSE 133 (H) 03/24/2025    CALCIUM 9.7 03/24/2025     03/24/2025    K 4.1 03/24/2025    CO2 24.3 03/24/2025     03/24/2025    BUN 15 03/24/2025    CREATININE 0.86 03/24/2025    EGFRIFAFRI 115 08/26/2020    EGFRIFNONA 90 12/01/2021    BCR 17.4 03/24/2025    ANIONGAP 13.7 03/24/2025     Lab Results   Component Value Date    CHOL 106 08/08/2024    CHLPL 124 02/27/2025    TRIG 196 (H) 02/27/2025    HDL 34 (L) 02/27/2025    LDL 58 02/27/2025     Microalbumin          8/8/2024    09:43 2/27/2025    08:45   Microalbumin   Microalbumin, Urine <1.2  29.5        Current Medication List  This medication list has been reviewed with the patient and evaluated for any interactions or necessary modifications/recommendations, and updated to include all prescription medications, OTC medications, and supplements the patient is currently taking.  This list reflects what is contained  in the patient's profile, which has also been marked as reviewed to communicate to other providers it is the most up to date version of the patient's current medication therapy.     Current Outpatient Medications:     omeprazole (priLOSEC) 40 MG capsule, Take 1 capsule by mouth once daily (Patient taking differently: Take 1 capsule by mouth Daily. Pt reports taking only 1 x 40mg capsule of omeprazole daily, despite duplicate orders.), Disp: 90 capsule, Rfl: 3    omeprazole (priLOSEC) 40 MG capsule, Take 1 capsule by mouth Daily. (Patient taking differently: Take 1 capsule by mouth Daily. Pt reports taking only 1 x 40mg capsule of omeprazole daily, despite duplicate orders.), Disp: 90 capsule, Rfl: 3    ALPRAZolam (XANAX) 0.5 MG tablet, Take 1 tablet by mouth Daily As Needed for Anxiety., Disp: 20 tablet, Rfl: 0    atorvastatin (LIPITOR) 40 MG tablet, Take 1 tablet by mouth once daily, Disp: 90 tablet, Rfl: 1    BIOTIN PO, Take  by mouth., Disp: , Rfl:     Calcium Carbonate-Vitamin D (Oscal 500 D-3) 500-5 MG-MCG tablet, Take 500 mg by mouth Every 12 (Twelve) Hours., Disp: 60 tablet, Rfl: 6    Cholecalciferol (Vitamin D3) 50 MCG (2000 UT) capsule, Take 1 capsule by mouth once daily, Disp: 100 capsule, Rfl: 3    Continuous Glucose Sensor (FreeStyle Rock 3 Sensor) [facility], Change sensor Every 14 (Fourteen) Days., Disp: 6 each, Rfl: 1    exemestane (AROMASIN) 25 MG tablet, Take 1 tablet by mouth once daily, Disp: 90 tablet, Rfl: 1    glucose blood (Accu-Chek Guide) test strip, 1 each by Other route 4 (Four) Times a Day. Use as instructed, Disp: 150 each, Rfl: 11    insulin aspart prot & aspart (NovoLOG Mix 70/30 FlexPen) (70-30) 100 UNIT/ML suspension pen-injector injection, Inject 60 units under the skin into the appropriate area as directed 3 (Three) Times a Day Before Meals., Disp: 150 mL, Rfl: 1    Insulin Pen Needle (BD Pen Needle Polly 2nd Gen) 32G X 4 MM misc, USE 1 PEN NEEDLE TO INJECT  INSULIN 4 TIMES DAILY, Disp:  100 each, Rfl: 12    lisinopril (PRINIVIL,ZESTRIL) 5 MG tablet, Take 1 tablet by mouth once daily, Disp: 90 tablet, Rfl: 1    loratadine (Claritin) 10 MG tablet, Take 1 tablet by mouth Daily., Disp: , Rfl:     metFORMIN ER (GLUCOPHAGE-XR) 500 MG 24 hr tablet, Take 2 tablets by mouth once daily, Disp: 180 tablet, Rfl: 1    multivitamin with minerals tablet tablet, Take 1 tablet by mouth Daily., Disp: , Rfl:     ondansetron (ZOFRAN) 8 MG tablet, Take 1 tablet by mouth 3 (Three) Times a Day As Needed for Nausea or Vomiting., Disp: 30 tablet, Rfl: 5    Tirzepatide (Mounjaro) 2.5 MG/0.5ML solution auto-injector, Inject 2.5 mg under the skin into the appropriate area as directed 1 (One) Time Per Week., Disp: 6 mL, Rfl: 1    vitamin B-6 (PYRIDOXINE) 100 MG tablet, Take 1 tablet by mouth 2 (Two) Times a Day., Disp: , Rfl:     Medicines reviewed by Navjot Goode, PharmD on 5/19/2025 at  3:09 PM  Medicines reviewed by Navjot Goode, PharmD on 5/19/2025 at  3:11 PM    Drug Interactions  No Clinically Significant DDIs Were Identified at Present Time Upon Marking Medications Reviewed    Recommended Medications Assessment  Aspirin: Not Taking Currently  Statin: Currently Taking   ACEi/ARB: Currently Taking     Initial Education Provided for Specialty Medication  The patient has been provided with the following education and any applicable administration techniques (i.e. self-injection) have been demonstrated for the therapies indicated. All questions and concerns have been addressed prior to the patient receiving the medication, and the patient has verbalized comprehension of the education and any materials provided. Additional patient education shall be provided and documented upon request by the patient, provider, or payer.        Mounjaro® (tirzepatide)   How long will I be on this medication for?  The amount of time you will be on this medication will be determined by your doctor based on blood sugar and A1c control. You  will most likely be on this medication or another diabetes medication throughout your lifetime. Do not abruptly stop this medication without talking to your doctor first.     How do I take this medication?  Take as directed on your prescription label. Mounjaro is supplied in a single-use pen for each dose and you will use a new pre-filled pen each week.  It is injected under the skin (subcutaneously) of your stomach, thigh or upper arm.  You may inject in the same body area each week, on the same day each week, with or without food.      What are some possible side effects?  In addition to decreased appetite, the most common side effects are nausea and indigestion. Redness, itching, and/or swelling at the injection site may occur. You should also monitor for low blood sugar (hypoglycemia) if you are taking Mounjaro with other medications that cause low blood sugar.     What happens if I miss a dose?  If you miss a dose, take it as soon as you remember as long as there are at least 3 days until the next scheduled dose.  If there are less than 3 days, skip the missed dose and resume  Mounjaro on the regularly scheduled day.  Do not take 2 doses at the same time or extra doses.      Medication Safety    What are things I should warn my doctor immediately about?  Do not use Mounjaro if you or a family member have ever had medullary thyroid cancer (MTC) or Multiple Endocrine Neoplasia syndrome type 2 (MEN 2).  Tell your doctor if you have or have had problems with your kidneys or pancreas.  Talk to your doctor if you are pregnant, planning to become pregnant, or breastfeeding. Stop using Mounjaro and get medical help right away if you have severe pain in your stomach area that will not go away, or if you notice any signs/symptoms of an allergic reaction (rash, hives, difficulty breathing, etc.).    What are things that I should be cautious of?  Be cautious of any side effects from this medication. Talk to your doctor if  any new ones develop or aren't getting better.    What are some medications that can interact with this one?  Taking Mounjaro with other medications that also lower your blood sugar such as insulin and glipizide/glimepiride/glyburide may increase the risk of low blood sugar. Your doctor may reduce the dose of these medications when you start Mounjaro to minimize low blood sugars.  Always tell your doctor or pharmacist immediately if you start taking any new medications, including over-the-counter medications, vitamins, and herbal supplements.        Medication Storage/Handling    How should I handle this medication?  Keep this medication out of reach of pets/children and keep the pen capped when not in use.  Do not share your medicine pens with others.    How does this medication need to be stored?  Store Mounjaro in the refrigerator. Do not freeze and do not use if Mounjaro has been frozen.  You may store your Mounjaro pens at room temperature for up to 21 days.  Protect from excessive heat and sunlight.  Keep in the original carton until time of administration.    How should I dispose of this medication?  Used Mounjaro pens should discarded after each use in an approved sharps container after use.  If you do not have a sharps container, you may use a household container made of heavy-duty plastic with a tight-fitting lid that is leak resistant (e.g., heavy-duty plastic laundry detergent bottle). If your doctor decides to stop this medication, take to your local police station for proper disposal. Some pharmacies also have take-back bins for medication drop-off.       Resources/Support    How can I remind myself to take this medication?  You can download reminder apps to help you manage your refills. You may also set an alarm on your phone to remind you.     Is financial support available?   [employer] Imn[employer] can provide co-pay cards if you have commercial insurance or patient assistance if you have Medicare or no  insurance.     Which vaccines are recommended for me?  Talk to your doctor about these vaccines: Flu, Coronavirus (COVID-19), Pneumococcal (pneumonia), Tdap, Hepatitis B, Zoster (shingles)           Adherence and Self-Administration  Adherence related to the patient's specialty therapy was discussed with the patient. The Adherence segment of this outreach has been reviewed and updated.     Is there a concern with patient's ability to self administer the medication correctly and without issue?: No  Were any potential barriers to adherence identified during the initial assessment or patient education?: No  Are there any concerns regarding the patient's understanding of the importance of medication adherence?: No  Methods for Supporting Patient Adherence and/or Self-Administration: Continued  enrollment    Open Medication Therapy Problems  No medication therapy recommendations to display    Goals of Therapy  Goals related to the patient's specialty therapy were discussed with the patient. The Patient Goals segment of this outreach has been reviewed and updated.   Goals Addressed Today        Specialty Pharmacy General Goal      Maintain A1c <7%      Lab Results    Component Value Date     Phone Initial Assessment  05/19/2025 Phone Initial Assessment - Patient restarting Mounjaro at 2.5mg weekly (plan to re-titrate as tolerated) and re-enrolling in .  Last/new baseline A1c 6.6%, on track with goal of maintaining A1c of <7%.  Will follow.  CR    HGBA1C 6.6 (H) 02/27/2025     Phone Reassessment  09/17/2024 Phone Reassessment - Last A1c well-controlled and met goal of <7%.  No changes recommended at this time, will continue to follow.  CR    HGBA1C 6.58 (H) 07/23/2024     HGBA1C 6.40 (H) 01/17/2024     HGBA1C 8.40 (H) 10/03/2023     HGBA1C 7.0 (H) 02/13/2023     HGBA1C 11.1 (H) 07/07/2022                      Reassessment Plan & Follow-Up  1. Medication Therapy Changes: Phone Initial Assessment - Patient restarting  Mounjaro at 2.5mg weekly (plan to re-titrate as tolerated) and re-enrolling in .  Last/new baseline A1c 6.6%, on track with goal of maintaining A1c of <7%.  Will follow.    2. Related Plans, Therapy Recommendations, or Therapy Problems to Be Addressed: None  3. Pharmacist to perform regular assessments no more than (6) months from the previous assessment.  4. Care Coordinator to set up future refill outreaches, coordinate prescription delivery, and escalate clinical questions to pharmacist.  5. Welcome information and patient satisfaction survey to be sent by specialty pharmacy team with patient's initial fill.    Attestation  Therapeutic appropriateness: Appropriate   I attest the patient was actively involved in and has agreed to the above plan of care. If the prescribed therapy is at any point deemed not appropriate based on the current or future assessments, a consultation will be initiated with the patient's specialty care provider to determine the best course of action. The revised plan of therapy will be documented along with any required assessments and/or additional patient education provided.       Navjot Goode, PharmD, MPH  Clinical Specialty Pharmacist, Endocrinology  5/19/2025  15:14 EDT    Discussed the aforementioned information with the patient via Telephone.

## 2025-05-19 NOTE — PROGRESS NOTES
Specialty Pharmacy Patient Management Program  Endocrinology Refill Outreach      Alaina is a 59 y.o. female contacted today regarding refills of her medication(s).    Specialty medication(s) and dose(s) confirmed: Mounjaro 2.5mg      Delivery Questions      Flowsheet Row Most Recent Value   Delivery method UPS   Delivery address verified with patient/caregiver? Yes   Delivery address Home   Number of medications in delivery 2   Medication(s) being filled and delivered Insulin Aspart Prot & Aspart (NovoLOG Mix 70/30 FlexPen), Tirzepatide (MOUNJARO)   Copay verified? Yes   Copay amount Estimated Copay $1460.05   Copay form of payment HSA/FSA on file   Delivery Date Selection 05/21/25   Signature Required No   Do you consent to receive electronic handouts?  No            Follow-Up: 28zoë Goode, PharmD, MPH  Clinical Specialty Pharmacist  5/19/2025  15:09 EDT

## 2025-05-19 NOTE — TELEPHONE ENCOUNTER
Specialty Pharmacy Patient Management Program       Alaina Hartman is a 59 y.o. female seen by an Endocrinology provider for Type 2 Diabetes and enrolled in the Endocrinology Patient Management program offered by [facility].      Pt opting to restart Mounjaro after stopping therapy d/t inability to afford increased copays.  Will need to re-titrate starting at 2.5mg weekly.    Requested Prescriptions     Pending Prescriptions Disp Refills    Tirzepatide (Mounjaro) 2.5 MG/0.5ML solution auto-injector 6 mL 1     Sig: Inject 2.5 mg under the skin into the appropriate area as directed 1 (One) Time Per Week.       Refill sent in to patient's pharmacy for above medication prescribed pending provider approval by Dr. Cho.   Last office visit 3/6/2025.  Next visit scheduled 10/9/2025.      Navjot Goode, PharmD, MPH  Clinical Specialty Pharmacist, Endocrinology  5/19/2025  15:04 EDT

## 2025-06-10 DIAGNOSIS — I10 HYPERTENSION, ESSENTIAL: ICD-10-CM

## 2025-06-10 DIAGNOSIS — E11.65 TYPE 2 DIABETES MELLITUS WITH HYPERGLYCEMIA, WITHOUT LONG-TERM CURRENT USE OF INSULIN: ICD-10-CM

## 2025-06-10 DIAGNOSIS — E11.65 TYPE 2 DIABETES MELLITUS WITH HYPERGLYCEMIA, WITH LONG-TERM CURRENT USE OF INSULIN: ICD-10-CM

## 2025-06-10 DIAGNOSIS — Z79.4 TYPE 2 DIABETES MELLITUS WITH HYPERGLYCEMIA, WITH LONG-TERM CURRENT USE OF INSULIN: ICD-10-CM

## 2025-06-10 DIAGNOSIS — E78.2 MIXED HYPERLIPIDEMIA: ICD-10-CM

## 2025-06-10 RX ORDER — ATORVASTATIN CALCIUM 40 MG/1
40 TABLET, FILM COATED ORAL DAILY
Qty: 90 TABLET | Refills: 2 | Status: SHIPPED | OUTPATIENT
Start: 2025-06-10

## 2025-06-10 RX ORDER — LISINOPRIL 5 MG/1
5 TABLET ORAL DAILY
Qty: 90 TABLET | Refills: 2 | Status: SHIPPED | OUTPATIENT
Start: 2025-06-10

## 2025-06-10 RX ORDER — METFORMIN HYDROCHLORIDE 500 MG/1
1000 TABLET, EXTENDED RELEASE ORAL DAILY
Qty: 180 TABLET | Refills: 2 | Status: SHIPPED | OUTPATIENT
Start: 2025-06-10

## 2025-06-12 DIAGNOSIS — Z79.4 TYPE 2 DIABETES MELLITUS WITH HYPERGLYCEMIA, WITH LONG-TERM CURRENT USE OF INSULIN: Primary | ICD-10-CM

## 2025-06-12 DIAGNOSIS — E11.65 TYPE 2 DIABETES MELLITUS WITH HYPERGLYCEMIA, WITH LONG-TERM CURRENT USE OF INSULIN: Primary | ICD-10-CM

## 2025-06-12 RX ORDER — TIRZEPATIDE 5 MG/.5ML
5 INJECTION, SOLUTION SUBCUTANEOUS WEEKLY
Qty: 2 ML | Refills: 2 | Status: SHIPPED | OUTPATIENT
Start: 2025-06-12

## 2025-06-12 NOTE — TELEPHONE ENCOUNTER
Specialty Pharmacy Patient Management Program  Per Protocol Prescription Order or Refill     Patient will be filling or currently fills medications at [facility] and is enrolled in the Patient Management Program.    Requested Prescriptions     Pending Prescriptions Disp Refills    Tirzepatide (Mounjaro) 5 MG/0.5ML solution auto-injector 2 mL 2     Sig: Inject 5 mg under the skin into the appropriate area as directed 1 (One) Time Per Week.     Prescription orders above were sent to the pharmacy per Collaborative Care Agreement Protocol.     Contacted patient to discuss tolerability of Mounjaro following initiation. Patient restarted medication 5/20/25 so far and has tolerated well without adverse effects. Patient states they feel comfortable continuing with 5 mg dose at this time.     All questions answered, patient in agreement with plan, and patient expressed understanding .       Last Office Visit: 3/6/25  Next Office Visit: 10/9/25    Lorene Young, PharmD, [facility]  Specialty Clinical Pharmacist  06/12/25  09:12 EDT

## 2025-06-13 NOTE — PROGRESS NOTES
Subjective   Alaina Hartman is a 59 y.o. female.   Chief Complaint   Patient presents with    Leg Pain       History of Present Illness     Left Leg pain  - Started: 1 week ago  - Started in inner thigh near the pelvis but has extended downward toward the knee.  Area feels sore, slightly red  -Denies: Fevers, fatigue, itching'  - Typically wears loose leg pants, has not changed detergent  - Treatment: Hydrocortisone cream, Ibuprofen/Tylenol without benefit      Bilateral leg swelling  - Started about a year ago but states it seems worse lately  - Has taken tirzepatide for years for diabetes but 6 months ago she switched insurances and they would not cover it so she was off of it she recently restarted it.  Patient was 253 pounds on 3/24/2025 and is currently 270 pounds today  - Patient states that her swelling is better in the morning      The following portions of the patient's history were reviewed and updated as appropriate: allergies, current medications, past family history, past medical history, past social history, past surgical history, and problem list.    Patient Active Problem List   Diagnosis    BMI 40.0-44.9, adult    Bunion, right    Decreased hearing of both ears    Mixed hyperlipidemia    Hypertension, essential    Menopausal syndrome    Obstructive sleep apnea syndrome    Type 2 diabetes mellitus with hyperglycemia, with long-term current use of insulin    Class 3 severe obesity due to excess calories without serious comorbidity with body mass index (BMI) of 40.0 to 44.9 in adult    Malignant neoplasm of central portion of right breast in female, estrogen receptor positive    Encounter for care related to vascular access port    Chemotherapy-induced neutropenia    Coronary artery disease involving native coronary artery of native heart without angina pectoris    Situational anxiety    Closed posterior dislocation of elbow    Diabetes       Current Outpatient Medications on File Prior to Visit    Medication Sig Dispense Refill    ALPRAZolam (XANAX) 0.5 MG tablet Take 1 tablet by mouth Daily As Needed for Anxiety. 20 tablet 0    atorvastatin (LIPITOR) 40 MG tablet Take 1 tablet by mouth once daily 90 tablet 2    BIOTIN PO Take  by mouth.      Calcium Carbonate-Vitamin D (Oscal 500 D-3) 500-5 MG-MCG tablet Take 500 mg by mouth Every 12 (Twelve) Hours. 60 tablet 6    Cholecalciferol (Vitamin D3) 50 MCG (2000 UT) capsule Take 1 capsule by mouth once daily 100 capsule 3    Continuous Glucose Sensor (FreeStyle Rock 3 Sensor) misc Change sensor Every 14 (Fourteen) Days. 6 each 1    exemestane (AROMASIN) 25 MG tablet Take 1 tablet by mouth once daily 90 tablet 1    glucose blood (Accu-Chek Guide) test strip 1 each by Other route 4 (Four) Times a Day. Use as instructed 150 each 11    insulin aspart prot & aspart (NovoLOG Mix 70/30 FlexPen) (70-30) 100 UNIT/ML suspension pen-injector injection Inject 60 units under the skin into the appropriate area as directed 3 (Three) Times a Day Before Meals. 150 mL 1    Insulin Pen Needle (BD Pen Needle Polly 2nd Gen) 32G X 4 MM misc USE 1 PEN NEEDLE TO INJECT  INSULIN 4 TIMES DAILY 100 each 12    lisinopril (PRINIVIL,ZESTRIL) 5 MG tablet Take 1 tablet by mouth once daily 90 tablet 2    loratadine (Claritin) 10 MG tablet Take 1 tablet by mouth Daily.      metFORMIN ER (GLUCOPHAGE-XR) 500 MG 24 hr tablet Take 2 tablets by mouth once daily 180 tablet 2    multivitamin with minerals tablet tablet Take 1 tablet by mouth Daily.      omeprazole (priLOSEC) 40 MG capsule Take 1 capsule by mouth once daily (Patient taking differently: Take 1 capsule by mouth Daily. Pt reports taking only 1 x 40mg capsule of omeprazole daily, despite duplicate orders.) 90 capsule 3    omeprazole (priLOSEC) 40 MG capsule Take 1 capsule by mouth Daily. (Patient taking differently: Take 1 capsule by mouth Daily. Pt reports taking only 1 x 40mg capsule of omeprazole daily, despite duplicate orders.) 90  "capsule 3    ondansetron (ZOFRAN) 8 MG tablet Take 1 tablet by mouth 3 (Three) Times a Day As Needed for Nausea or Vomiting. 30 tablet 5    Tirzepatide (Mounjaro) 5 MG/0.5ML solution auto-injector Inject 5 mg under the skin into the appropriate area as directed 1 (One) Time Per Week. 2 mL 2    vitamin B-6 (PYRIDOXINE) 100 MG tablet Take 1 tablet by mouth 2 (Two) Times a Day.       No current facility-administered medications on file prior to visit.     Current outpatient and discharge medications have been reconciled for the patient.  Reviewed by: Lori Fields DO      No Known Allergies      Objective   Visit Vitals  /86 (BP Location: Right arm, Patient Position: Sitting, Cuff Size: Adult)   Pulse 102   Temp 97.3 °F (36.3 °C) (Temporal)   Resp 18   Ht 161.3 cm (63.5\")   Wt 123 kg (270 lb 4 oz)   SpO2 98% Comment: ra   BMI 47.12 kg/m²       Physical Exam  HENT:      Head: Normocephalic and atraumatic.   Eyes:      Conjunctiva/sclera: Conjunctivae normal.   Cardiovascular:      Rate and Rhythm: Normal rate and regular rhythm.      Comments: - Crescendo decrescendo murmur heard loudest at right upper sternal border  Pulmonary:      Effort: Pulmonary effort is normal.      Breath sounds: Normal breath sounds.   Musculoskeletal:        Legs:    Skin:     Comments: - Very mild erythema on right inner thigh not hot, to palpation, no fluctuance noted, no drainage noted   Neurological:      General: No focal deficit present.      Mental Status: She is alert and oriented to person, place, and time.   Psychiatric:         Mood and Affect: Mood normal.         Behavior: Behavior normal.           Diagnoses and all orders for this visit:    1. Cellulitis of left lower extremity (Primary)  - Will treat for cellulitis  -    cephalexin (KEFLEX) 500 MG capsule; Take 1 capsule by mouth 4 (Four) Times a Day for 6 days.  Dispense: 24 capsule; Refill: 0    2. Bilateral edema of lower extremity/ Heart murmur  -     Protein, " Urine, 24 Hour - Urine, Clean Catch  -     Adult Transthoracic Echo Complete W/ Cont if Necessary Per Protocol  -     US Liver  - Discussed venous insufficiency with patient.  Patient has compression stockings up to the knee and a foot peddler.  Encouraged her to use those and try to keep her feet elevated throughout the day for breaks  - Patient states that she has had a murmur in her heart since she was a child    4. BMI 40.0-44.9, adult  Class 3 Severe Obesity (BMI >=40). Obesity-related health conditions include the following: diabetes mellitus. Obesity is unchanged. BMI is is above average; BMI management plan is completed. We discussed portion control and increasing exercise.       Follow Up  - 7/10/2025 for annual physical exam and bilateral lower extremity edema    Expected course, medications, and adverse effects discussed as appropriate.  Call or return if worsening or persistent symptoms. Hand hygiene was performed before donning protective equipment and after removal when leaving the room.    This document is intended for medical expert use only. Reading of this document by patients and/or patient's family without participating medical staff guidance may result in misinterpretation and unintended morbidity. Any interpretation of such data is the responsibility of the patient and/or family member responsible for the patient in concert with their primary or specialist providers, not to be left for sources of online searches such as [employer] or similar queries. Relying on these approaches to knowledge may result in misinterpretation, misguided goals of care and even death should patients or family members try recommendations outside of the realm of professional medical care.

## 2025-06-16 ENCOUNTER — SPECIALTY PHARMACY (OUTPATIENT)
Dept: ENDOCRINOLOGY | Facility: CLINIC | Age: 60
End: 2025-06-16
Payer: COMMERCIAL

## 2025-06-16 ENCOUNTER — OFFICE VISIT (OUTPATIENT)
Dept: FAMILY MEDICINE CLINIC | Facility: CLINIC | Age: 60
End: 2025-06-16
Payer: COMMERCIAL

## 2025-06-16 VITALS
SYSTOLIC BLOOD PRESSURE: 132 MMHG | HEART RATE: 102 BPM | RESPIRATION RATE: 18 BRPM | HEIGHT: 64 IN | BODY MASS INDEX: 46.14 KG/M2 | WEIGHT: 270.25 LBS | OXYGEN SATURATION: 98 % | DIASTOLIC BLOOD PRESSURE: 86 MMHG | TEMPERATURE: 97.3 F

## 2025-06-16 DIAGNOSIS — L03.116 CELLULITIS OF LEFT LOWER EXTREMITY: Primary | ICD-10-CM

## 2025-06-16 DIAGNOSIS — R01.1 HEART MURMUR: ICD-10-CM

## 2025-06-16 DIAGNOSIS — R60.0 BILATERAL EDEMA OF LOWER EXTREMITY: ICD-10-CM

## 2025-06-16 PROCEDURE — 99213 OFFICE O/P EST LOW 20 MIN: CPT | Performed by: STUDENT IN AN ORGANIZED HEALTH CARE EDUCATION/TRAINING PROGRAM

## 2025-06-16 RX ORDER — CEPHALEXIN 500 MG/1
500 CAPSULE ORAL 4 TIMES DAILY
Qty: 24 CAPSULE | Refills: 0 | Status: SHIPPED | OUTPATIENT
Start: 2025-06-16 | End: 2025-06-22

## 2025-06-16 NOTE — PROGRESS NOTES
Specialty Pharmacy Patient Management Program  Endocrinology Refill Outreach      Alaina is a 59 y.o. female contacted today regarding refills of her medication(s).    Specialty medication(s) and dose(s) confirmed: Mounjaro, Novolog 70-30    Refill Questions      Flowsheet Row Most Recent Value   Changes to allergies? No   Changes to medications? No   New conditions or infections since last clinic visit No   Unplanned office visit, urgent care, ED, or hospital admission in the last 4 weeks  No   How does patient/caregiver feel medication is working? Good   Financial problems or insurance changes  No   Since the previous refill, were any specialty medication doses or scheduled injections missed or delayed?  No   Does this patient require a clinical escalation to a pharmacist? No          Delivery Questions      Flowsheet Row Most Recent Value   Delivery method UPS   Delivery address verified with patient/caregiver? Yes   Delivery address Home   Number of medications in delivery 1   Medication(s) being filled and delivered Insulin Aspart Prot & Aspart (NovoLOG Mix 70/30 FlexPen), Tirzepatide (Mounjaro)   Copay verified? Yes   Copay amount $94.38   Copay form of payment Credit/debit on file   Delivery Date Selection 06/17/25   Signature Required No   Do you consent to receive electronic handouts?  No            Follow-Up: 1 month    Minnie Reyna CPHT  Clinical Specialty Pharmacy Technician  6/16/2025  08:12 EDT

## 2025-06-16 NOTE — PATIENT INSTRUCTIONS
- wear compressions stockings from the morning, elevated feet throughout the day to give yourself breaks

## 2025-06-19 LAB
PROT 24H UR-MRATE: 247 MG/24 HR (ref 30–150)
PROT UR-MCNC: 9.5 MG/DL

## 2025-06-25 ENCOUNTER — HOSPITAL ENCOUNTER (OUTPATIENT)
Dept: CARDIOLOGY | Facility: HOSPITAL | Age: 60
Discharge: HOME | End: 2025-06-25
Payer: COMMERCIAL

## 2025-06-25 ENCOUNTER — HOSPITAL ENCOUNTER (OUTPATIENT)
Dept: ULTRASOUND IMAGING | Facility: HOSPITAL | Age: 60
Discharge: HOME | End: 2025-06-25
Payer: COMMERCIAL

## 2025-06-25 LAB
AORTIC DIMENSIONLESS INDEX: 0.81 (DI)
AV MEAN PRESS GRAD SYS DOP V1V2: 8 MMHG
AV VMAX SYS DOP: 207 CM/SEC
BH CV ECHO LEFT VENTRICLE GLOBAL LONGITUDINAL STRAIN: -20 %
BH CV ECHO MEAS - ACS: 2 CM
BH CV ECHO MEAS - AO MAX PG: 17.1 MMHG
BH CV ECHO MEAS - AO V2 VTI: 40.8 CM
BH CV ECHO MEAS - AVA(I,D): 2.06 CM2
BH CV ECHO MEAS - EDV(CUBED): 74.1 ML
BH CV ECHO MEAS - EDV(MOD-SP4): 99.9 ML
BH CV ECHO MEAS - EF(MOD-SP4): 72.3 %
BH CV ECHO MEAS - ESV(CUBED): 32.8 ML
BH CV ECHO MEAS - ESV(MOD-SP4): 27.7 ML
BH CV ECHO MEAS - FS: 23.8 %
BH CV ECHO MEAS - IVS/LVPW: 1.17 CM
BH CV ECHO MEAS - IVSD: 1.4 CM
BH CV ECHO MEAS - LA DIMENSION: 4.7 CM
BH CV ECHO MEAS - LAT PEAK E' VEL: 10.7 CM/SEC
BH CV ECHO MEAS - LV MASS(C)D: 200.6 GRAMS
BH CV ECHO MEAS - LV MAX PG: 10 MMHG
BH CV ECHO MEAS - LV MEAN PG: 5 MMHG
BH CV ECHO MEAS - LV V1 MAX: 158 CM/SEC
BH CV ECHO MEAS - LV V1 VTI: 33 CM
BH CV ECHO MEAS - LVIDD: 4.2 CM
BH CV ECHO MEAS - LVIDS: 3.2 CM
BH CV ECHO MEAS - LVOT AREA: 2.5 CM2
BH CV ECHO MEAS - LVOT DIAM: 1.8 CM
BH CV ECHO MEAS - LVPWD: 1.2 CM
BH CV ECHO MEAS - MED PEAK E' VEL: 9.1 CM/SEC
BH CV ECHO MEAS - MV A MAX VEL: 124 CM/SEC
BH CV ECHO MEAS - MV DEC SLOPE: 612 CM/SEC2
BH CV ECHO MEAS - MV DEC TIME: 0.23 SEC
BH CV ECHO MEAS - MV E MAX VEL: 132 CM/SEC
BH CV ECHO MEAS - MV E/A: 1.06
BH CV ECHO MEAS - MV MAX PG: 9.4 MMHG
BH CV ECHO MEAS - MV MEAN PG: 5 MMHG
BH CV ECHO MEAS - MV P1/2T: 79 MSEC
BH CV ECHO MEAS - MV V2 VTI: 40.5 CM
BH CV ECHO MEAS - MVA(P1/2T): 2.8 CM2
BH CV ECHO MEAS - MVA(VTI): 2.07 CM2
BH CV ECHO MEAS - PA V2 MAX: 130.5 CM/SEC
BH CV ECHO MEAS - PULM A REVS DUR: 0.09 SEC
BH CV ECHO MEAS - PULM A REVS VEL: 33.5 CM/SEC
BH CV ECHO MEAS - PULM DIAS VEL: 59.6 CM/SEC
BH CV ECHO MEAS - PULM S/D: 1.49
BH CV ECHO MEAS - PULM SYS VEL: 88.7 CM/SEC
BH CV ECHO MEAS - QP/QS: 0.47
BH CV ECHO MEAS - RV MAX PG: 3.5 MMHG
BH CV ECHO MEAS - RV V1 MAX: 94.2 CM/SEC
BH CV ECHO MEAS - RV V1 VTI: 19.7 CM
BH CV ECHO MEAS - RVDD: 1.9 CM
BH CV ECHO MEAS - RVOT DIAM: 1.6 CM
BH CV ECHO MEAS - SV(LVOT): 84 ML
BH CV ECHO MEAS - SV(MOD-SP4): 72.2 ML
BH CV ECHO MEAS - SV(RVOT): 39.6 ML
BH CV ECHO MEAS - TAPSE (>1.6): 2.44 CM
BH CV ECHO MEASUREMENTS AVERAGE E/E' RATIO: 13.33
BH CV XLRA - TDI S': 18.9 CM/SEC
SINUS: 2.8 CM

## 2025-06-25 PROCEDURE — 93306 TTE W/DOPPLER COMPLETE: CPT

## 2025-06-25 PROCEDURE — 93356 MYOCRD STRAIN IMG SPCKL TRCK: CPT | Performed by: INTERNAL MEDICINE

## 2025-06-25 PROCEDURE — 93306 TTE W/DOPPLER COMPLETE: CPT | Performed by: INTERNAL MEDICINE

## 2025-06-25 PROCEDURE — 76705 ECHO EXAM OF ABDOMEN: CPT

## 2025-06-25 PROCEDURE — 93356 MYOCRD STRAIN IMG SPCKL TRCK: CPT

## 2025-07-07 ENCOUNTER — SPECIALTY PHARMACY (OUTPATIENT)
Dept: ENDOCRINOLOGY | Facility: CLINIC | Age: 60
End: 2025-07-07
Payer: COMMERCIAL

## 2025-07-07 NOTE — PROGRESS NOTES
Chief Complaint  Chief Complaint   Patient presents with    Annual Exam    Cellulitis and edema       Subjective    History of Present Illness        Alaina Hartman presents to [facility] for   Alaina Hartman is a 59 y.o. female here for her annual physical with me. Alaina is here for coordination of medical care, to discuss health maintenance, disease prevention as well as to followup on medical problems.     Annual Physical Exam  Patient's last Physical Exam was 07/08/24. Patient's medical history, medications and allergy lists were reviewed and updated.  Activity level is minimal to moderate.   Exercises 7 per week.   Appetite is good.   Feels well with none complaints.   Energy level is good.   Sleeps well.   Patient's last cologuard was 07/19/23. She is advised to repeat in 3 years.   Patient's last mammogram was 07/06/24.   Patient is doing routine self skin exam daily.   Patient is doing routine self-breast exams weekly  Last pap smear was done: Hysterectomy   -Patient will contact insurance for coverage of Prevnar 21, Shingrix, and COVID vaccines       Cellulitis of left lower extremity/Bilateral Edema   - Follow up from 06/16/25:  Sent in cephalexin 500 MG capsule; 1 capsule daily (completed)  - Improvement with medication  Today  - States the area of concern is much improved, does have some discomfort lingering in the area and states the area is still a little pigmented      Bilateral lower extremity edema/heart murmur  - Follow-up from 6/16/2025: [place] murmur on physical exam.  Stated she had gained weight since coming off tirzepatide for a while due to insurance issues.  Reported her leg swelling being better in the morning  - Ordered liver ultrasound, cardiac echo, urine 24-hour protein collection.  Discussed venous insufficiency with patient, she has compression stockings and foot [place], encouraged her to use them  Today  - reports improvement with Wearing compression  stockings and diabetic socks daily  - Using a foot peddler daily      Hepatic steatosis  -Liver ultrasound came back showing no signs of vascular congestion but did show hepatic steatosis  - Patient is on GLP-1 for diabetes and has lost 11 lbs since 6/16/25        Family History   Problem Relation Age of Onset    Breast cancer Mother     Heart disease Mother     Thyroid disease Mother     Cancer Mother         lymphoma    Dementia Mother     Clotting disorder Father     Diabetes Brother     Heart disease Brother     Stroke Brother     Diabetes Brother     Colon cancer Paternal Grandmother        Social History     Tobacco Use    Smoking status: Former     Current packs/day: 0.00     Average packs/day: 0.3 packs/day for 31.0 years (7.8 ttl pk-yrs)     Types: Cigarettes     Start date: 1979     Quit date: 1/1/2010     Years since quitting: 15.5     Passive exposure: Past    Smokeless tobacco: Never    Tobacco comments:     Started smoking about age 14   Vaping Use    Vaping status: Never Used   Substance Use Topics    Alcohol use: Yes     Comment: 1-2 drinks a year    Drug use: Never       Past Surgical History:   Procedure Laterality Date    BREAST BIOPSY Right 08/18/2022    Procedure: Right breast lumpectomy;  Surgeon: Jagdeep Mcwilliams MD;  Location: [facility];  Service: General;  Laterality: Right;    BREAST LUMPECTOMY Left 2003    BREAST LUMPECTOMY Right 08/18/2022    PORTACATH PLACEMENT N/A 10/06/2022    Procedure: INSERTION OF PORTACATH;  Surgeon: Jagdeep Mcwilliams MD;  Location: [facility] MA[facility] OR;  Service: General;  Laterality: N/A;    SENTINEL NODE BIOPSY Right 08/18/2022    Procedure: SENTINEL NODE BIOPSY;  Surgeon: Jagdeep Mcwilliams MD;  Location: [facility] MA[facility] OR;  Service: General;  Laterality: Right;    SUBTOTAL HYSTERECTOMY  10/10/1991    had it done for bleeding. Ovaries still present    TOOTH EXTRACTION  1995    has had all teeth (but one) removed    VENOUS ACCESS DEVICE (PORT)  REMOVAL  2023       Patient Active Problem List   Diagnosis    BMI 40.0-44.9, adult    Bunion, right    Decreased hearing of both ears    Mixed hyperlipidemia    Hypertension, essential    Menopausal syndrome    Obstructive sleep apnea syndrome    Type 2 diabetes mellitus with hyperglycemia, with long-term current use of insulin    Class 3 severe obesity due to excess calories without serious comorbidity with body mass index (BMI) of 40.0 to 44.9 in adult    Malignant neoplasm of central portion of right breast in female, estrogen receptor positive    Encounter for care related to vascular access port    Chemotherapy-induced neutropenia    Coronary artery disease involving native coronary artery of native heart without angina pectoris    Situational anxiety    Closed posterior dislocation of elbow    Diabetes    Hepatic steatosis         Current Outpatient Medications:     ALPRAZolam (XANAX) 0.5 MG tablet, Take 1 tablet by mouth Daily As Needed for Anxiety., Disp: 20 tablet, Rfl: 0    atorvastatin (LIPITOR) 40 MG tablet, Take 1 tablet by mouth once daily, Disp: 90 tablet, Rfl: 2    BIOTIN PO, Take  by mouth., Disp: , Rfl:     Calcium Carbonate-Vitamin D (Oscal 500 D-3) 500-5 MG-MCG tablet, Take 500 mg by mouth Every 12 (Twelve) Hours., Disp: 60 tablet, Rfl: 6    Cholecalciferol (Vitamin D3) 50 MCG (2000 UT) capsule, Take 1 capsule by mouth once daily, Disp: 100 capsule, Rfl: 3    Continuous Glucose Sensor (FreeStyle Rock 3 Sensor) [facility], Change sensor Every 14 (Fourteen) Days., Disp: 6 each, Rfl: 1    exemestane (AROMASIN) 25 MG tablet, Take 1 tablet by mouth once daily, Disp: 90 tablet, Rfl: 1    glucose blood (Accu-Chek Guide) test strip, 1 each by Other route 4 (Four) Times a Day. Use as instructed, Disp: 150 each, Rfl: 11    insulin aspart prot & aspart (NovoLOG Mix 70/30 FlexPen) (70-30) 100 UNIT/ML suspension pen-injector injection, Inject 60 units under the skin into the appropriate area as directed 3 (Three)  "Times a Day Before Meals., Disp: 150 mL, Rfl: 1    Insulin Pen Needle (BD Pen Needle Polly 2nd Gen) 32G X 4 MM misc, USE 1 PEN NEEDLE TO INJECT  INSULIN 4 TIMES DAILY, Disp: 100 each, Rfl: 12    lisinopril (PRINIVIL,ZESTRIL) 5 MG tablet, Take 1 tablet by mouth once daily, Disp: 90 tablet, Rfl: 2    loratadine (Claritin) 10 MG tablet, Take 1 tablet by mouth Daily., Disp: , Rfl:     metFORMIN ER (GLUCOPHAGE-XR) 500 MG 24 hr tablet, Take 2 tablets by mouth once daily, Disp: 180 tablet, Rfl: 2    multivitamin with minerals tablet tablet, Take 1 tablet by mouth Daily., Disp: , Rfl:     omeprazole (priLOSEC) 40 MG capsule, Take 1 capsule by mouth once daily (Patient taking differently: Take 1 capsule by mouth Daily. Pt reports taking only 1 x 40mg capsule of omeprazole daily, despite duplicate orders.), Disp: 90 capsule, Rfl: 3    omeprazole (priLOSEC) 40 MG capsule, Take 1 capsule by mouth Daily. (Patient taking differently: Take 1 capsule by mouth Daily. Pt reports taking only 1 x 40mg capsule of omeprazole daily, despite duplicate orders.), Disp: 90 capsule, Rfl: 3    ondansetron (ZOFRAN) 8 MG tablet, Take 1 tablet by mouth 3 (Three) Times a Day As Needed for Nausea or Vomiting., Disp: 30 tablet, Rfl: 5    Tirzepatide (Mounjaro) 7.5 MG/0.5ML solution auto-injector, Inject 7.5 mg under the skin into the appropriate area as directed 1 (One) Time Per Week., Disp: 2 mL, Rfl: 1    vitamin B-6 (PYRIDOXINE) 100 MG tablet, Take 1 tablet by mouth 2 (Two) Times a Day., Disp: , Rfl:     Objective   /60 (BP Location: Right arm, Patient Position: Sitting, Cuff Size: Large Adult)   Pulse 89   Temp 98.4 °F (36.9 °C) (Skin)   Resp 16   Ht 161.3 cm (63.5\")   Wt 118 kg (259 lb 12.8 oz)   SpO2 98%   BMI 45.30 kg/m²   BP Readings from Last 3 Encounters:   07/10/25 114/60   06/16/25 132/86   03/24/25 149/80     Wt Readings from Last 3 Encounters:   07/10/25 118 kg (259 lb 12.8 oz)   06/16/25 123 kg (270 lb 4 oz)   03/24/25 115 " kg (253 lb)     Physical Exam  Constitutional:       General: She is not in acute distress.  HENT:      Head: Normocephalic and atraumatic.      Right Ear: Tympanic membrane normal.      Left Ear: Tympanic membrane normal.      Nose: Nose normal.      Mouth/Throat:      Mouth: Mucous membranes are moist.      Pharynx: Oropharynx is clear. No posterior oropharyngeal erythema.   Eyes:      Extraocular Movements: Extraocular movements intact.      Conjunctiva/sclera: Conjunctivae normal.   Neck:      Comments: - Thyroid not enlarged  Cardiovascular:      Rate and Rhythm: Normal rate and regular rhythm.      Heart sounds: Normal heart sounds.   Pulmonary:      Effort: Pulmonary effort is normal.      Breath sounds: Normal breath sounds. No stridor. No wheezing.   Abdominal:      General: Abdomen is flat. Bowel sounds are normal.      Palpations: Abdomen is soft. There is no mass.      Tenderness: There is no abdominal tenderness. There is no guarding.   Musculoskeletal:         General: No swelling or deformity. Normal range of motion.      Cervical back: Normal range of motion and neck supple.   Skin:     General: Skin is warm and dry.      Capillary Refill: Capillary refill takes less than 2 seconds.      Coloration: Skin is not jaundiced.             Comments: - Left inner thigh area cellulitis is greatly improved: Some mild postinflammatory hyperpigmentation, nontender to palpation  - Bilateral feet: No callusing or open sores   Neurological:      General: No focal deficit present.      Mental Status: She is alert and oriented to person, place, and time.      Cranial Nerves: No cranial nerve deficit.      Motor: No weakness.      Coordination: Coordination normal.      Gait: Gait normal.      Deep Tendon Reflexes: Reflexes normal.   Psychiatric:         Mood and Affect: Mood normal.         Behavior: Behavior normal.         Thought Content: Thought content normal.       Diabetic Foot Exam Performed and Monofilament  Test Performed      Assessment and Plan   Diagnoses and all orders for this visit:    1. Annual physical exam (Primary)  -     CBC & Differential  -     TSH Rfx On Abnormal To Free T4  -     Vitamin B12  - Normal 59-year-old female exam  - CMP, lipid panel and A1c have already been drawn recently  - Breast cancer screening: Up-to-date  - Colon cancer screening: Up-to-date  - Cervical cancer screening: Not a candidate    2. Cellulitis of left lower extremity  - Greatly improved.  Patient has some lingering discomfort in the area.  Discussed with patient that I would like to wait and see if this continues to get better.  If this starts to worsen advised her to come back in.  Patient verbalized understanding    3. Bilateral edema of lower extremity  - Noted improvement with compression stockings, foot peddler, elevating feet.  Losing weight on GLP-1  - Patient is considering getting a vibration plate to see if this helps with her edema as well      Hepatic steatosis  - Losing weight on GLP-1  - Would like to see patient in about 6 months to recheck weight and reassess hepatic steatosis with ultrasound    5. Drug side effects  -     Vitamin B12    6. Thyroid disorder screen  -     TSH Rfx On Abnormal To Free T4    7. Morbid obesity with BMI of 45.0-49.9, adult  Class 3 Severe Obesity (BMI >=40). Obesity-related health conditions include the following: hypertension, diabetes mellitus, and dyslipidemias. Obesity is unchanged. BMI is is above average; BMI management plan is completed. We discussed portion control and increasing exercise.     Follow Up   - 1 year for annual physical exam  - 6 months weight check, hepatic steatosis      The patient was counseled regarding nutrition, physical activity, healthy weight, injury prevention, immunizations and preventative health screenings. Expected course, medications, and adverse effects discussed.  Call or return if worsening or persistent symptoms.  I wore protective equipment  throughout this patient encounter including a mask and eye protection.   The complete contents of the Assessment and Plan and Data / Lab Results as documented above have been reviewed and addressed by myself with the patient today as part of an ongoing evaluation / treatment plan.

## 2025-07-07 NOTE — PROGRESS NOTES
Specialty Pharmacy Patient Management Program  Endocrinology Refill Outreach      Alaina is a 59 y.o. female contacted today regarding refills of her medication(s).    Specialty medication(s) and dose(s) confirmed: Mounjaro    Refill Questions      Flowsheet Row Most Recent Value   Changes to allergies? No   Changes to medications? No   New conditions or infections since last clinic visit No   Unplanned office visit, urgent care, ED, or hospital admission in the last 4 weeks  No   How does patient/caregiver feel medication is working? Good   Financial problems or insurance changes  No   Since the previous refill, were any specialty medication doses or scheduled injections missed or delayed?  No   Does this patient require a clinical escalation to a pharmacist? No          Delivery Questions      Flowsheet Row Most Recent Value   Delivery method UPS   Delivery address verified with patient/caregiver? Yes   Delivery address Home   Number of medications in delivery 1   Medication(s) being filled and delivered Tirzepatide (Mounjaro)   Copay verified? Yes   Copay amount $25.00   Copay form of payment Credit/debit on file   Delivery Date Selection 07/08/25   Signature Required No   Do you consent to receive electronic handouts?  No            Follow-Up: 1 month    Minnie Reyna CPHT  Clinical Specialty Pharmacy Technician  7/7/2025  10:00 EDT

## 2025-07-07 NOTE — TELEPHONE ENCOUNTER
Specialty Pharmacy Patient Management Program       Alaina Hartman is a 59 y.o. female seen by an Endocrinology provider for Type 2 Diabetes and enrolled in the Endocrinology Patient Management program offered by [facility].      Requested Prescriptions     Pending Prescriptions Disp Refills    Tirzepatide (Mounjaro) 7.5 MG/0.5ML solution auto-injector 2 mL 1     Sig: Inject 7.5 mg under the skin into the appropriate area as directed 1 (One) Time Per Week.       Patient reports she is tolerating mounjaro 5 mg well and denies any side effects. She previously took mounjaro 12.5 mg and we are titrating her back to the maximum tolerated dose after patient was off of the medication due to lack of insurance coverage for a couple months.  Patient reports her blood sugars have been averaging around 142, lowest blood sugar 69.  Patient is currently taking novolog 70/30 mix 60 units TIDAC.   Will reach out to provider to see if he would like to adjust her insulin dose with the mounjaro dose increase.     Refill sent in to patient's pharmacy for above medication prescribed pending provider approval by Dr. Cho.   Last office visit 3/6/25.  Next visit scheduled 10/9/25.      Deidra Hughes, Gege  Clinical Specialty Pharmacist, Endocrinology  7/7/2025  13:04 EDT

## 2025-07-08 RX ORDER — TIRZEPATIDE 7.5 MG/.5ML
7.5 INJECTION, SOLUTION SUBCUTANEOUS WEEKLY
Qty: 2 ML | Refills: 1 | Status: SHIPPED | OUTPATIENT
Start: 2025-07-08

## 2025-07-10 ENCOUNTER — OFFICE VISIT (OUTPATIENT)
Dept: FAMILY MEDICINE CLINIC | Facility: CLINIC | Age: 60
End: 2025-07-10
Payer: COMMERCIAL

## 2025-07-10 VITALS
RESPIRATION RATE: 16 BRPM | TEMPERATURE: 98.4 F | HEART RATE: 89 BPM | OXYGEN SATURATION: 98 % | WEIGHT: 259.8 LBS | DIASTOLIC BLOOD PRESSURE: 60 MMHG | HEIGHT: 64 IN | BODY MASS INDEX: 44.35 KG/M2 | SYSTOLIC BLOOD PRESSURE: 114 MMHG

## 2025-07-10 DIAGNOSIS — E66.01 MORBID OBESITY WITH BMI OF 45.0-49.9, ADULT: ICD-10-CM

## 2025-07-10 DIAGNOSIS — L03.116 CELLULITIS OF LEFT LOWER EXTREMITY: ICD-10-CM

## 2025-07-10 DIAGNOSIS — R60.0 BILATERAL EDEMA OF LOWER EXTREMITY: ICD-10-CM

## 2025-07-10 DIAGNOSIS — K76.0 HEPATIC STEATOSIS: ICD-10-CM

## 2025-07-10 DIAGNOSIS — T88.7XXA DRUG SIDE EFFECTS: ICD-10-CM

## 2025-07-10 DIAGNOSIS — Z00.00 ANNUAL PHYSICAL EXAM: Primary | ICD-10-CM

## 2025-07-10 DIAGNOSIS — Z13.29 THYROID DISORDER SCREEN: ICD-10-CM

## 2025-07-15 LAB
BASOPHILS # BLD AUTO: 0.1 X10E3/UL (ref 0–0.2)
BASOPHILS NFR BLD AUTO: 1 %
EOSINOPHIL # BLD AUTO: 0.6 X10E3/UL (ref 0–0.4)
EOSINOPHIL NFR BLD AUTO: 6 %
ERYTHROCYTE [DISTWIDTH] IN BLOOD BY AUTOMATED COUNT: 14.1 % (ref 11.7–15.4)
HCT VFR BLD AUTO: 39.6 % (ref 34–46.6)
HGB BLD-MCNC: 12.9 G/DL (ref 11.1–15.9)
IMM GRANULOCYTES # BLD AUTO: 0 X10E3/UL (ref 0–0.1)
IMM GRANULOCYTES NFR BLD AUTO: 0 %
LYMPHOCYTES # BLD AUTO: 3.8 X10E3/UL (ref 0.7–3.1)
LYMPHOCYTES NFR BLD AUTO: 35 %
MCH RBC QN AUTO: 29.3 PG (ref 26.6–33)
MCHC RBC AUTO-ENTMCNC: 32.6 G/DL (ref 31.5–35.7)
MCV RBC AUTO: 90 FL (ref 79–97)
MONOCYTES # BLD AUTO: 0.6 X10E3/UL (ref 0.1–0.9)
MONOCYTES NFR BLD AUTO: 6 %
NEUTROPHILS # BLD AUTO: 5.7 X10E3/UL (ref 1.4–7)
NEUTROPHILS NFR BLD AUTO: 52 %
PLATELET # BLD AUTO: 337 X10E3/UL (ref 150–450)
RBC # BLD AUTO: 4.41 X10E6/UL (ref 3.77–5.28)
TSH SERPL DL<=0.005 MIU/L-ACNC: 3.7 UIU/ML (ref 0.45–4.5)
VIT B12 SERPL-MCNC: 1375 PG/ML (ref 232–1245)
WBC # BLD AUTO: 10.9 X10E3/UL (ref 3.4–10.8)

## 2025-07-18 ENCOUNTER — RESULTS FOLLOW-UP (OUTPATIENT)
Dept: FAMILY MEDICINE CLINIC | Facility: CLINIC | Age: 60
End: 2025-07-18
Payer: COMMERCIAL

## 2025-07-28 ENCOUNTER — HOSPITAL ENCOUNTER (OUTPATIENT)
Dept: ULTRASOUND IMAGING | Facility: HOSPITAL | Age: 60
Discharge: HOME | End: 2025-07-28
Payer: COMMERCIAL

## 2025-07-28 ENCOUNTER — RESULTS FOLLOW-UP (OUTPATIENT)
Dept: ONCOLOGY | Facility: CLINIC | Age: 60
End: 2025-07-28
Payer: COMMERCIAL

## 2025-07-28 ENCOUNTER — HOSPITAL ENCOUNTER (OUTPATIENT)
Dept: MAMMOGRAPHY | Facility: HOSPITAL | Age: 60
Discharge: HOME | End: 2025-07-28
Payer: COMMERCIAL

## 2025-07-28 DIAGNOSIS — Z17.0 MALIGNANT NEOPLASM OF CENTRAL PORTION OF RIGHT BREAST IN FEMALE, ESTROGEN RECEPTOR POSITIVE: ICD-10-CM

## 2025-07-28 DIAGNOSIS — C50.111 MALIGNANT NEOPLASM OF CENTRAL PORTION OF RIGHT BREAST IN FEMALE, ESTROGEN RECEPTOR POSITIVE: ICD-10-CM

## 2025-07-28 PROCEDURE — 77066 DX MAMMO INCL CAD BI: CPT

## 2025-07-28 PROCEDURE — G0279 TOMOSYNTHESIS, MAMMO: HCPCS

## 2025-08-01 ENCOUNTER — SPECIALTY PHARMACY (OUTPATIENT)
Dept: ENDOCRINOLOGY | Facility: CLINIC | Age: 60
End: 2025-08-01
Payer: COMMERCIAL

## 2025-08-01 NOTE — PROGRESS NOTES
Specialty Pharmacy Patient Management Program  Endocrinology Refill Outreach      Alaina is a 59 y.o. female contacted today regarding refills of her medication(s).    Specialty medication(s) and dose(s) confirmed: Mounjaro    Refill Questions      Flowsheet Row Most Recent Value   Changes to allergies? No   Changes to medications? No   New conditions or infections since last clinic visit No   Unplanned office visit, urgent care, ED, or hospital admission in the last 4 weeks  No   How does patient/caregiver feel medication is working? Good   Financial problems or insurance changes  No   Since the previous refill, were any specialty medication doses or scheduled injections missed or delayed?  No   Does this patient require a clinical escalation to a pharmacist? No          Delivery Questions      Flowsheet Row Most Recent Value   Delivery method UPS   Delivery address verified with patient/caregiver? Yes   Delivery address Home   Number of medications in delivery 3   Medication(s) being filled and delivered Continuous Glucose Sensor (FreeStyle Rock 3 Sensor), Insulin Aspart Prot & Aspart (NovoLOG Mix 70/30 FlexPen), Tirzepatide (Mounjaro)   Copay verified? Yes   Copay amount $95.00   Copay form of payment Credit/debit on file   Delivery Date Selection 08/05/25   Signature Required No   Do you consent to receive electronic handouts?  No            Follow-Up: 1 month    Minnie Reyna CPHT  Clinical Specialty Pharmacy Technician  8/1/2025  14:00 EDT

## 2025-08-04 ENCOUNTER — OFFICE VISIT (OUTPATIENT)
Dept: CARDIOLOGY | Facility: CLINIC | Age: 60
End: 2025-08-04
Payer: COMMERCIAL

## 2025-08-04 VITALS
OXYGEN SATURATION: 98 % | HEIGHT: 63 IN | RESPIRATION RATE: 18 BRPM | HEART RATE: 82 BPM | DIASTOLIC BLOOD PRESSURE: 88 MMHG | WEIGHT: 260 LBS | SYSTOLIC BLOOD PRESSURE: 150 MMHG | BODY MASS INDEX: 46.07 KG/M2

## 2025-08-04 DIAGNOSIS — E78.2 MIXED HYPERLIPIDEMIA: Primary | ICD-10-CM

## 2025-08-04 DIAGNOSIS — Z79.4 TYPE 2 DIABETES MELLITUS WITH HYPERGLYCEMIA, WITH LONG-TERM CURRENT USE OF INSULIN: ICD-10-CM

## 2025-08-04 DIAGNOSIS — E11.65 TYPE 2 DIABETES MELLITUS WITH HYPERGLYCEMIA, WITH LONG-TERM CURRENT USE OF INSULIN: ICD-10-CM

## 2025-08-04 DIAGNOSIS — G47.33 OBSTRUCTIVE SLEEP APNEA SYNDROME: ICD-10-CM

## 2025-08-04 DIAGNOSIS — I10 HYPERTENSION, ESSENTIAL: ICD-10-CM

## 2025-08-04 PROCEDURE — 99204 OFFICE O/P NEW MOD 45 MIN: CPT | Performed by: INTERNAL MEDICINE

## 2025-08-07 DIAGNOSIS — I10 HYPERTENSION, ESSENTIAL: ICD-10-CM

## 2025-08-07 RX ORDER — LISINOPRIL 10 MG/1
10 TABLET ORAL DAILY
Qty: 90 TABLET | Refills: 3 | Status: SHIPPED | OUTPATIENT
Start: 2025-08-07

## 2025-08-07 RX ORDER — LISINOPRIL 5 MG/1
5 TABLET ORAL DAILY
Qty: 90 TABLET | Refills: 2 | Status: SHIPPED | OUTPATIENT
Start: 2025-08-07 | End: 2025-08-07

## 2025-08-08 PROCEDURE — 93000 ELECTROCARDIOGRAM COMPLETE: CPT | Performed by: INTERNAL MEDICINE

## 2025-08-11 ENCOUNTER — PATIENT ROUNDING (BHMG ONLY) (OUTPATIENT)
Dept: CARDIOLOGY | Facility: CLINIC | Age: 60
End: 2025-08-11
Payer: COMMERCIAL

## 2025-08-27 ENCOUNTER — TELEPHONE (OUTPATIENT)
Dept: ENDOCRINOLOGY | Facility: CLINIC | Age: 60
End: 2025-08-27
Payer: COMMERCIAL

## 2025-08-27 ENCOUNTER — SPECIALTY PHARMACY (OUTPATIENT)
Dept: ENDOCRINOLOGY | Facility: CLINIC | Age: 60
End: 2025-08-27
Payer: COMMERCIAL

## 2025-08-27 RX ORDER — TIRZEPATIDE 10 MG/.5ML
10 INJECTION, SOLUTION SUBCUTANEOUS WEEKLY
Qty: 6 ML | Refills: 1 | Status: SHIPPED | OUTPATIENT
Start: 2025-08-27

## 2025-08-27 RX ORDER — HYDROCHLOROTHIAZIDE 12.5 MG/1
CAPSULE ORAL
Qty: 6 EACH | Refills: 1 | Status: SHIPPED | OUTPATIENT
Start: 2025-08-27

## (undated) DEVICE — SOLUTION,WATER,IRRIGATION,1000ML,STERILE: Brand: MEDLINE

## (undated) DEVICE — KT SURG TURNOVER 050

## (undated) DEVICE — GOWN,REINFORCE,POLY,SIRUS,BREATH SLV,XLG: Brand: MEDLINE

## (undated) DEVICE — UNDERGLV SURG BIOGEL/PI PF SYNTH SURG SZ8.5 BLU 50/BX

## (undated) DEVICE — CUFF SCD HEMOFORCE SEQ CALF STD MD

## (undated) DEVICE — PROVE COVER: Brand: UNBRANDED

## (undated) DEVICE — GOWN,REINFRCE,POLY,SIRUS,BREATH SLV,XXLG: Brand: MEDLINE

## (undated) DEVICE — PK MINOR LAPAROTOMY 50

## (undated) DEVICE — GARMENT COMPR 16V 1ST STAGE VEST XL 40IN BGE

## (undated) DEVICE — SUT VIC 3/0 SH 27IN J416H

## (undated) DEVICE — SYR LUERLOK 5CC

## (undated) DEVICE — UNDYED BRAIDED (POLYGLACTIN 910), SYNTHETIC ABSORBABLE SUTURE: Brand: COATED VICRYL

## (undated) DEVICE — NDL HYPO PRECISIONGLIDE REG 25G 1 1/2

## (undated) DEVICE — GLV SURG SIGNATURE ESSENTIAL PF LTX SZ8

## (undated) DEVICE — SUT MONOCRYL 4/0 PS2 27IN Y426H ETY426H

## (undated) DEVICE — DECANTER: Brand: UNBRANDED

## (undated) DEVICE — C-ARM: Brand: UNBRANDED

## (undated) DEVICE — ELECTRD BLD EZ CLN MOD XLNG 2.75IN

## (undated) DEVICE — SLV SCD CALF HEMOFORCE DVT THERP REPROC MD

## (undated) DEVICE — SUT SILK 2/0 SH CR8 18IN CR8 C012D

## (undated) DEVICE — ADHS SKIN PREMIERPRO EXOFIN TOPICAL HI/VISC .5ML

## (undated) DEVICE — PAD,ABDOMINAL,5"X9",STERILE,LF,1/PK: Brand: MEDLINE INDUSTRIES, INC.

## (undated) DEVICE — KENDALL SCD EXPRESS FOOT CUFF, MEDIUM: Brand: KENDALL SCD

## (undated) DEVICE — GAUZE,SPONGE,FLUFF,6"X6.75",STRL,5/TRAY: Brand: MEDLINE

## (undated) DEVICE — 3M™ UNIVERSAL ELECTROSURGICAL PAD, SPLIT, WITH 15 FT CORD 9165L: Brand: 3M™

## (undated) DEVICE — TBG PENCL TELESCP MEGADYNE SMOKE EVAC 15FT

## (undated) DEVICE — 9165 UNIVERSAL PATIENT PLATE: Brand: 3M™

## (undated) DEVICE — COVER,MAYO STAND,STERILE: Brand: MEDLINE